# Patient Record
Sex: FEMALE | Race: BLACK OR AFRICAN AMERICAN | NOT HISPANIC OR LATINO | Employment: FULL TIME | URBAN - METROPOLITAN AREA
[De-identification: names, ages, dates, MRNs, and addresses within clinical notes are randomized per-mention and may not be internally consistent; named-entity substitution may affect disease eponyms.]

---

## 2017-01-06 ENCOUNTER — OFFICE VISIT (OUTPATIENT)
Dept: FAMILY MEDICINE | Facility: CLINIC | Age: 27
End: 2017-01-06

## 2017-01-06 VITALS
HEART RATE: 84 BPM | OXYGEN SATURATION: 99 % | SYSTOLIC BLOOD PRESSURE: 117 MMHG | WEIGHT: 282.5 LBS | BODY MASS INDEX: 44.24 KG/M2 | TEMPERATURE: 98.3 F | DIASTOLIC BLOOD PRESSURE: 77 MMHG

## 2017-01-06 DIAGNOSIS — N83.202 CYST OF LEFT OVARY: ICD-10-CM

## 2017-01-06 DIAGNOSIS — Z00.00 ENCOUNTER FOR ROUTINE ADULT HEALTH EXAMINATION WITHOUT ABNORMAL FINDINGS: Primary | ICD-10-CM

## 2017-01-06 RX ORDER — IBUPROFEN 600 MG/1
600 TABLET, FILM COATED ORAL
COMMUNITY
Start: 2016-11-29 | End: 2019-12-30

## 2017-01-06 RX ORDER — OXYCODONE AND ACETAMINOPHEN 5; 325 MG/1; MG/1
1 TABLET ORAL
COMMUNITY
Start: 2017-01-03 | End: 2017-01-13

## 2017-01-06 RX ORDER — METRONIDAZOLE 500 MG/1
500 TABLET ORAL
COMMUNITY
Start: 2017-01-03 | End: 2017-01-10

## 2017-01-06 NOTE — PATIENT INSTRUCTIONS
Polycystic Ovary Syndrome  Polycystic ovary syndrome (PCOS) causes harmless, small cysts in the ovaries and other symptoms. PCOS is caused by certain hormones being out of balance. The word  syndrome  means a group of symptoms. Women with PCOS may have no periods, irregular periods, or very long periods.    Your ovaries  Women store their eggs in their ovaries. Each egg is in a capsule called a follicle. Normally during the reproductive years, one follicle grows to produce a mature egg each month. This egg is released during ovulation and the follicle dissolves.  Hormones out of balance  With polycystic ovary syndrome (PCOS), the hormones that control ovulation are out of balance. These include estrogen, progesterones, and androgen. As a result, ovulation may not occur. Instead, the follicle stays enlarged. This is a fluid-filled sac (cyst). Over time, the ovaries fill with many small cysts. This is why they are called  poly  (many)  cystic  ovaries. In some women, the ovaries also make too much androgen.  PCOS symptoms  Women with PCOS may also have one or more of these symptoms:    Trouble getting pregnant (fertility problems)    Weight gain, especially around the waist     Acne    Hair growth on the face and other parts of the body    Patches of thickened, velvety, darkened skin called acanthosis nigricans  Women with PCOS are also at increased risk of developing cancer of the uterine lining, diabetes, and heart disease.     5488-6506 [employer]. [address] [place] [address], [place] [address]. All rights reserved. This information is not intended as a substitute for professional medical care. Always follow your healthcare professional's instructions.

## 2017-01-06 NOTE — MR AVS SNAPSHOT
After Visit Summary   1/6/2017    Dariusz Leyva    MRN: [id number]           Patient Information     Date Of Birth          1990        Visit Information        Provider Department      1/6/2017 4:30 PM Destiny Pinzon DO [facility]        Care Instructions      Polycystic Ovary Syndrome  Polycystic ovary syndrome (PCOS) causes harmless, small cysts in the ovaries and other symptoms. PCOS is caused by certain hormones being out of balance. The word  syndrome  means a group of symptoms. Women with PCOS may have no periods, irregular periods, or very long periods.    Your ovaries  Women store their eggs in their ovaries. Each egg is in a capsule called a follicle. Normally during the reproductive years, one follicle grows to produce a mature egg each month. This egg is released during ovulation and the follicle dissolves.  Hormones out of balance  With polycystic ovary syndrome (PCOS), the hormones that control ovulation are out of balance. These include estrogen, progesterones, and androgen. As a result, ovulation may not occur. Instead, the follicle stays enlarged. This is a fluid-filled sac (cyst). Over time, the ovaries fill with many small cysts. This is why they are called  poly  (many)  cystic  ovaries. In some women, the ovaries also make too much androgen.  PCOS symptoms  Women with PCOS may also have one or more of these symptoms:    Trouble getting pregnant (fertility problems)    Weight gain, especially around the waist     Acne    Hair growth on the face and other parts of the body    Patches of thickened, velvety, darkened skin called acanthosis nigricans  Women with PCOS are also at increased risk of developing cancer of the uterine lining, diabetes, and heart disease.     3365-7949 [employer]. [address] [place] [address], [place] [address]. All rights reserved. This information is not intended as a substitute for professional medical care. Always follow your  healthcare professional's instructions.              Follow-ups after your visit        Your next 10 appointments already scheduled     Jan 06, 2017  4:30 PM   Return Visit with Destiny Pinzon DO   [facility] ([facility])    [address] [place] [address]   [phone]            Jan 11, 2017  9:30 AM   Return Visit with Myrtle Mejia PHD   [facility] ([facility])    [address] [place] [address]   [phone]              Who to contact     Please call your clinic at [phone] to:    Ask questions about your health    Make or cancel appointments    Discuss your medicines    Learn about your test results    Speak to your doctor   If you have compliments or concerns about an experience at your clinic, or if you wish to file a complaint, please contact [facility] Patient Relations at [phone] or email us at [email or web address]         Additional Information About Your Visit        [facility] Information     [employer] gives you secure access to your electronic health record. If you see a primary care provider, you can also send messages to your care team and make appointments. If you have questions, please call your primary care clinic.  If you do not have a primary care provider, please call [phone] and they will assist you.      [employer] is an electronic gateway that provides easy, online access to your medical records. With [employer], you can request a clinic appointment, read your test results, renew a prescription or communicate with your care team.     To access your existing account, please contact your [facility] or call [phone] for assistance.        Care EveryWhere ID     This is your Care EveryWhere ID. This could be used by other organizations to access your [place] medical records  [id number]        Your Vitals Were     Pulse Temperature Pulse Oximetry Last Period Breastfeeding?       84  98.3  F (36.8  C) (Oral) 99% 12/14/2016 No        Blood Pressure from Last 3 Encounters:   01/06/17 117/77   12/02/16 138/82   10/12/16 126/92    Weight from Last 3 Encounters:   01/06/17 282 lb 8 oz (128.141 kg)   12/02/16 280 lb (127.007 kg)   10/12/16 272 lb 6.4 oz (123.56 kg)              Today, you had the following     No orders found for display       Primary Care Provider Office Phone # Fax #    Destiny Pinzon [phone]       [facility] [address] [place] [address]        Thank you!     Thank you for choosing [facility]  for your care. Our goal is always to provide you with excellent care. Hearing back from our patients is one way we can continue to improve our services. Please take a few minutes to complete the written survey that you may receive in the mail after your visit with us. Thank you!             Your Updated Medication List - Protect others around you: Learn how to safely use, store and throw away your medicines at www.disposemymeds.org.          This list is accurate as of: 1/6/17  4:25 PM.  Always use your most recent med list.                   Brand Name Dispense Instructions for use    Ankle Stabilizer XL Misc     1 each    1 Units as needed       cetirizine 10 MG tablet    zyrTEC    30 tablet    Take 1 tablet (10 mg) by mouth every evening       etonogestrel-ethinyl estradiol 0.12-0.015 MG/24HR vaginal ring    NUVARING    3 each    Insert 1 ring vaginally every 21 days then remove for 1 week then repeat with new ring.       fluticasone 50 MCG/ACT spray    FLONASE    16 g    Spray 1-2 sprays into both nostrils daily       ibuprofen 600 MG tablet    ADVIL/MOTRIN     Take 600 mg by mouth       metroNIDAZOLE 500 MG tablet    FLAGYL     Take 500 mg by mouth       oxyCODONE-acetaminophen 5-325 MG per tablet    PERCOCET     Take 1 tablet by mouth

## 2017-01-06 NOTE — PROGRESS NOTES
Preceptor attestation:  Patient seen and discussed with the resident.  Assessment and plan reviewed with resident and agreed upon.  Supervising physician: Hawa Sherman  [facility]

## 2017-01-06 NOTE — PROGRESS NOTES
Subjective:   Dariusz Leyva is a 26 year old female who presents for ER follow up. Patient was seen at [facility] ED on January 3, 2017 1 week ago for suprapubic and abdominal pain.  At that time she had a right upper quadrant ultrasound which was normal UA was unremarkable pregnancy test negative as well as a transvaginal ultrasound showing a 3.1 cm hemorrhagic cyst within the left ovary with moderate complex fluid in the pelvis compatible with blood products from bleeding hemorrhagic cyst. She was also found to have bacterial vaginosis and was treated with Flagyl. She tells me she has finished this prescription and her pelvic pain has resolved.   She reports her last menstrual period was December 14.  She has not had intercourse since her last negative pregnancy tests that she had at the ED.  She has a NuvaRing at home but has not been using this yet.  She does report abnormal periods.  Otherwise today she denies abdominal/pelvic pain, fevers chills, nausea vomiting diarrhea, vaginal discharge, vaginal bleeding, dysuria or hematuria.     Of note she was recently seen for a preventative physical exam visit, at that time her hepatitis B antibody was equivocal.  Needs repeat hep B antibody studies today to ensure immunity.    PMHX/PSHX/MEDS/ALLERGIES/SHX/FHX reviewed and updated in Epic.    Objective:   /77 mmHg  Pulse 84  Temp(Src) 98.3  F (36.8  C) (Oral)  Ht   Wt 282 lb 8 oz (128.141 kg)  SpO2 99%  LMP 12/14/2016  Breastfeeding? No, Body mass index is 44.24 kg/(m^2).  Constitutional: healthy, a&o, nad  Cardiovascular: RRR, no m/g/r  Respiratory: BCTA, no wheezes/rhonchi or rales  Gastrointestinal: soft, nt/nd, BS+, no organomegaly  Psychiatric: mentation appears normal and affect normal/bright       Assesment & Plan:   Dariusz Leyva, 26 year old female, with history of recent ED visit 1 week ago, diagnosed with BV and left ovarian hemorrhagic cyst.    (Z00.00) Encounter for routine adult health  examination without abnormal findings  (primary encounter diagnosis)  Comment: Hep B antibody equivocal at recent physical exam visit, we will repeat this today to ensure immunity.  Plan: Hepatitis B Surface Ab ([employer])    (N83.202) Cyst of left ovary  Comment: we discussed the likelihood of underlying PCOS considering BMI of 44 and irregular periods.  We discussed the options for treatment such as regulating her periods with OCPs as well as metformin.  She would like to hold off on treatment for now, will return to discuss this further if she desires. Nicholas pelvic pain from recent ED visit 1 week ago has resolved. We discussed the pathophysiology of a ruptured ovarian cyst and what to expect in the future.  Plan:   - continue to monitor for now, may return to discuss initiation of OCPs if she desires      I ended our visit today by discussing the patient's diagnoses and recommended treatment. Please refer to today's diagnoses and orders for further details. I briefly discussed the pathophysiology of these conditions and outlined their expected course. I discussed the warning symptoms and signs that indicate an atypical course that would need urgent or emergent care. I also discussed self care strategies for symptom relief. Patient voiced complete understanding of plan of care and was in full agreement to proceed as discussed.    RTC prn or sooner if develops new or worsening symptoms.      Preceptor: Dr. Whit Pinzon PGY-3

## 2017-01-09 LAB — HBV SURFACE AB SER-ACNC: ABNORMAL M[IU]/ML

## 2017-01-11 ENCOUNTER — OFFICE VISIT (OUTPATIENT)
Dept: PSYCHOLOGY | Facility: CLINIC | Age: 27
End: 2017-01-11

## 2017-01-11 DIAGNOSIS — F32.A DEPRESSION, UNSPECIFIED DEPRESSION TYPE: Primary | ICD-10-CM
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
## 2017-01-13 NOTE — PROGRESS NOTES
I have reviewed and agree with the behavioral health fellow's documentation for this visit.  I did not personally see the patient.  Katty Mac, PhD., LP

## 2017-01-16 NOTE — PROGRESS NOTES
Quick Note:    Sent [employer] message that I would not re-immunize at this time unless her sexual partner has hep B and she is high risk.  ______

## 2017-01-26 ENCOUNTER — OFFICE VISIT (OUTPATIENT)
Dept: FAMILY MEDICINE | Facility: CLINIC | Age: 27
End: 2017-01-26

## 2017-01-26 VITALS
TEMPERATURE: 98.1 F | DIASTOLIC BLOOD PRESSURE: 78 MMHG | WEIGHT: 271.6 LBS | HEIGHT: 66 IN | SYSTOLIC BLOOD PRESSURE: 131 MMHG | HEART RATE: 86 BPM | BODY MASS INDEX: 43.65 KG/M2 | OXYGEN SATURATION: 98 %

## 2017-01-26 DIAGNOSIS — M25.551 HIP PAIN, RIGHT: ICD-10-CM

## 2017-01-26 DIAGNOSIS — N89.8 VAGINAL DISCHARGE: Primary | ICD-10-CM

## 2017-01-26 DIAGNOSIS — Z91.89 HISTORY OF SEXUAL INTERCOURSE: ICD-10-CM

## 2017-01-26 LAB
BACTERIA: NORMAL
BACTERIA: NORMAL
BILIRUBIN UR: NEGATIVE
BLOOD UR: ABNORMAL
CASTS: NORMAL /LPF
CLUE CELLS: NORMAL
CRYSTAL URINE: NORMAL /LPF
EPITHELIAL CELLS UR: >100 /LPF (ref 0–2)
GLUCOSE URINE: NEGATIVE
HBA1C MFR BLD: 5.4 % (ref 4.1–5.7)
HCG UR QL: NEGATIVE
KETONES UR QL: ABNORMAL
LEUKOCYTE ESTERASE UR: NEGATIVE
MOTILE TRICHOMONAS: NEGATIVE
MUCOUS URINE: NORMAL LPF
NITRITE UR QL STRIP: NEGATIVE
ODOR: NORMAL
PH UR STRIP: 6 [PH] (ref 5–7)
PH WET PREP: NORMAL
PROTEIN UR: ABNORMAL
RBC URINE: NORMAL /HPF
SP GR UR STRIP: >=1.03
UROBILINOGEN UR STRIP-ACNC: ABNORMAL
WBC URINE: NORMAL /HPF
WBC WET PREP: NORMAL
YEAST: NORMAL

## 2017-01-26 NOTE — PROGRESS NOTES
"     SUBJECTIVE       Dariusz Leyva is a 26 year old  female with a PMHx significant for:     Patient Active Problem List   Diagnosis     Lumbago     Nonallopathic lesion of lumbar region     Nonallopathic lesion of sacral region     Pain in thoracic spine     Nonallopathic lesion of thoracic region     Abnormal Pap smear of cervix     Large tonsils     Morbid obesity (H)     MDD (major depressive disorder)     CARLY (generalized anxiety disorder)     Depression     Here today for vaginal discharge and hip pain  #vaginal discharge  -seen ED January 3, 2017 1 week ago for suprapubic and abdominal pain.  At that time she had a right upper quadrant ultrasound which was normal UA was unremarkable pregnancy test negative as well as a transvaginal ultrasound showing a 3.1 cm hemorrhagic cyst within the left ovary with moderate complex fluid in the pelvis compatible with blood products from bleeding hemorrhagic cyst. She was also found to have bacterial vaginosis and was treated with Flagyl.   -ongoing discharge since  -started nuva ring two weeks ago, two days ago developed discomfort and discharge and took it out  -pap 2/2016, negative  -does have an odor  -burning and itching   -no fever, abdominal pain, urinary issues  -last intercourse: 2 weeks condoms  -LMP: 1/14/2017 - irregular, heavy periods  -No hx of migraine with aura, HTN, breast CA (personal or family), and clotting (personal or family).   #Sits at work mostly  -started right hip pain at work  -touch or movement makes worse  -takes ibuprofen without relief  -no injury  -prior two days ago did nancy  -no numbness, tingling    Patient speaks English and so an  was not used.    PMH, Medications and Allergies were reviewed and updated as needed.          OBJECTIVE     Filed Vitals:    01/26/17 1434   BP: 131/78   Pulse: 86   Temp: 98.1  F (36.7  C)   TempSrc: Oral   Height: 5' 6\" (167.6 cm)   Weight: 271 lb 9.6 oz (123.197 kg)   SpO2: 98%     Body mass " index is 43.86 kg/(m^2).    Gen:  NAD  HEENT: mucous membranes moist  Neck: supple without lymphadenopathy  CV:  RRR  - no murmurs, rubs, or gallups  Pulm:  CTAB, no wheezes/rales/rhonchi  ABD: soft, obese, nontender, no masses, no rebound, BS intact throughout  Extrem: normal strength bilaterally. Pain with hip flexion, no pain with internal rotation of the right hip.   Neuro: normal sensation and lower extremity strength  Psych: Mood and affect appropriate  : thick, white discharge. Cervix normal, No CMT. No odor to discharge.     No results found for this or any previous visit (from the past 24 hour(s)).      ASSESSMENT AND PLAN     Dariusz was seen today for vaginal problem and musculoskeletal problem.    Diagnoses and all orders for this visit:    Vaginal discharge: wet prep obtained. Hx of recurrent BV and yeast. A1C 6/2016, with A1C 5.7, maybe reason for recurrent symptoms. Repeat A1C today, BMI 44 with evidence of metabolic syndrome and likely PCOS with heavy, irregular periods, and hirtuism. Obtained wet prep, UPT and UA. Pap up to date. Prescribed birth control pills - no Fmhx or personal hx clot, breast Ca, migraine with aura.   -     HCG Qualitative Urine (UPT)  (P FM)  -     Urinalysis, Micro If ([facility])  -     Wet Prep (P )  -     Chlamydia/Gono Amplified ([facility])   - A1C    Hip pain, right: likely musculoskeletal, related to recent nancy class. Pain with hip flexion. Could also be related to possible ovarian cyst. Gait normal. Neuro exam normal. Recommended observation with continued tylenol, NSAID and ice.     RTC in 3 weeks for follow up or sooner if develops new or worsening symptoms.    Discussed with Dr. Rain Wright, PGY- 3

## 2017-01-26 NOTE — PROGRESS NOTES
Preceptor attestation:  Patient seen and discussed with the resident.  Assessment and plan reviewed with resident and agreed upon.  Supervising physician: Emil Medina  [facility]
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
## 2017-01-27 LAB
C TRACH RRNA SPEC QL NAA+PROBE: NEGATIVE
N GONORRHOEA RRNA SPEC QL NAA+PROBE: NEGATIVE

## 2017-03-07 ENCOUNTER — TELEPHONE (OUTPATIENT)
Dept: PSYCHOLOGY | Facility: CLINIC | Age: 27
End: 2017-03-07

## 2017-03-07 NOTE — TELEPHONE ENCOUNTER
[facility] phone call message- general phone call:    Reason for call: Patient would like a call back from Dr. Mejia due to issues with scheduling. Would like to see if she can to get to see her sooner but at a later time. Please call her back.     Return call needed: Yes    OK to leave a message on voice mail? Yes    Primary language: English      needed? No    Call taken on March 7, 2017 at 11:31 AM by Jorge Arriola

## 2017-03-08 NOTE — TELEPHONE ENCOUNTER
Called Dariusz back - she reports her work schedule has changed and is unable to get back in to see me for the next month with her current availability. Agreed to make a one-time slot on 3/10/17 at 3:30, at which visit we will plan for ongoing care. She is aware of this appointment time.    Myrtle Meija, Ph.D.,   Behavioral Health Fellow

## 2017-03-10 ENCOUNTER — OFFICE VISIT (OUTPATIENT)
Dept: PSYCHOLOGY | Facility: CLINIC | Age: 27
End: 2017-03-10

## 2017-03-10 DIAGNOSIS — F32.A DEPRESSION, UNSPECIFIED DEPRESSION TYPE: Primary | ICD-10-CM

## 2017-03-10 NOTE — PROGRESS NOTES
Behavioral Health Progress Note    Client Name: Dariusz Leyva    Service Type: Individual  Length of Visit: 50 minutes  Attendees: none    Identifying Information and Presenting Problem:    The patient is a 26 year old American female who is being seen for problematic symptoms of depression, emotional dysregulation and interpersonal problems.    Treatment Objective(s) Addressed in This Session:  Mood Instability: will develop better understanding of triggers and coping strategies to stabilize mood  Interpersonal effectiveness: will identify alternative strategies to improve interpersonal problems    Progress on / Status of Treatment Objective(s) / Homework:  Stable     PHQ-9 SCORE 6/22/2016 7/20/2016 10/12/2016   Total Score 10 11 10       CARLY-7 SCORE 7/20/2016 10/12/2016   Total Score 9 9       Topics Discussed/Interventions Provided:    Today we discussed Dariusz's lack of trust in people and ambivalence about having more stable relationships. She really does not believe that people can be trusted because she feels she has been let down by everyone. Discussed what attributes she looks for in a partner - i.e., attractiveness, can lead the conversation, funny, and accomplished/successful. She has two close friendships that she made when she was younger. Discussed how to recognize if a person is trustworthy, like if she has not been betrayed by them or taken advantage of by them after knowing them for many years. Encouraged her to look for those attributes in new people she meets. She has mixed feelings about her current relationship with Eliezer. His main positive attribute is that he provides for her financially. However, he is not aware of her other ongoing relationships and would probably break up with her if he found out. Because this is an inherently unstable relationship, introduced the option of finding a relationship with someone she wanted to be with exclusively or pursuing an openly polyamorous  relationship. She had not considered polyamory and voiced skepticism that she could find a man like that. Discussed how she could not find it if not open about her own desires. She seems to want an exclusive relationship eventually, but we'll stay mindful of alternative options.      Assessment: The patient appeared to be active and engaged in today's session and was receptive to feedback.     Mental Status: Dariusz Leyva appeared generally alert and oriented. Dress was casual and appropriate to the weather and occasion. Grooming and hygiene were good. Eye contact was good. Speech was of normal volume and rate and was clear, coherent, and relevant. Mood was euthymic with congruent affect. Thought processes were relevant, logical and goal-directed. Thought content was WNL with no evidence of psychotic or paranoid features. No evidence of SI/HI or self-harm, intent, or plans. Memory appeared grossly intact. Insight and judgment appeared adequate to maintain safety and patient exhibited good impulse control during the appointment.     Does the patient appear to be at imminent risk of harm to self/others at this time? No    The session was necessary to address depression that have been interfering with patient's ability to function interpersonally and occupationally.  Ongoing psychotherapy is necessary to provide counseling.    Diagnosis (DSM-5):    311 Other/unspec. Depressive Disorder    R/O Borderline Personality Disorder     Plan:  1. Follow up in 1 month.       NOTE: Treatment plan update DUE.  Diagnostic assessment update due 7/18/17.

## 2017-03-10 NOTE — MR AVS SNAPSHOT
After Visit Summary   3/10/2017    Dariusz Leyva    MRN: [id number]           Patient Information     Date Of Birth          1990        Visit Information        Provider Department      3/10/2017 3:30 PM Myrtle Mejia, PhD [facility]        Today's Diagnoses     Depression, unspecified depression type    -  1       Follow-ups after your visit        Follow-up notes from your care team     Return in about 1 month (around 4/10/2017).      Your next 10 appointments already scheduled     Apr 05, 2017  3:30 PM CDT   Return Visit with Myrtle Mejia,    [facility] ([facility])    [address] [place] [address]   [phone]              Who to contact     Please call your clinic at [phone] to:    Ask questions about your health    Make or cancel appointments    Discuss your medicines    Learn about your test results    Speak to your doctor   If you have compliments or concerns about an experience at your clinic, or if you wish to file a complaint, please contact [facility] Patient Relations at [phone] or email us at [email or web address]         Additional Information About Your Visit        MyChart Information     ViVut gives you secure access to your electronic health record. If you see a primary care provider, you can also send messages to your care team and make appointments. If you have questions, please call your primary care clinic.  If you do not have a primary care provider, please call [phone] and they will assist you.      BrightWhistlehart is an electronic gateway that provides easy, online access to your medical records. With Boomr, you can request a clinic appointment, read your test results, renew a prescription or communicate with your care team.     To access your existing account, please contact your [facility] or call [phone] for assistance.        Care EveryWhere ID     This  is your Care EveryWhere ID. This could be used by other organizations to access your [place] medical records  [id number]         Blood Pressure from Last 3 Encounters:   01/26/17 131/78   01/06/17 117/77   12/02/16 138/82    Weight from Last 3 Encounters:   01/26/17 271 lb 9.6 oz (123.2 kg)   01/06/17 282 lb 8 oz (128.1 kg)   12/02/16 280 lb (127 kg)              Today, you had the following     No orders found for display       Primary Care Provider Office Phone # Fax #    Destiny Pinzon [phone]       [facility] [address] [place] [address]        Thank you!     Thank you for choosing [facility]  for your care. Our goal is always to provide you with excellent care. Hearing back from our patients is one way we can continue to improve our services. Please take a few minutes to complete the written survey that you may receive in the mail after your visit with us. Thank you!             Your Updated Medication List - Protect others around you: Learn how to safely use, store and throw away your medicines at www.disposemymeds.org.          This list is accurate as of: 3/10/17 11:59 PM.  Always use your most recent med list.                   Brand Name Dispense Instructions for use    Ankle Stabilizer XL Misc     1 each    1 Units as needed       cetirizine 10 MG tablet    zyrTEC    30 tablet    Take 1 tablet (10 mg) by mouth every evening       desogestrel-ethinyl estradiol 0.1/0.125/0.15 -0.025 MG per tablet    MARGARET CONNELLYIVET    84 tablet    Take 1 tablet by mouth daily       etonogestrel-ethinyl estradiol 0.12-0.015 MG/24HR vaginal ring    NUVARING    3 each    Insert 1 ring vaginally every 21 days then remove for 1 week then repeat with new ring.       fluticasone 50 MCG/ACT spray    FLONASE    16 g    Spray 1-2 sprays into both nostrils daily       ibuprofen 600 MG tablet    ADVIL/MOTRIN     Take 600 mg by mouth

## 2017-03-15 ENCOUNTER — OFFICE VISIT (OUTPATIENT)
Dept: FAMILY MEDICINE | Facility: CLINIC | Age: 27
End: 2017-03-15

## 2017-03-15 VITALS
TEMPERATURE: 98.5 F | BODY MASS INDEX: 44.71 KG/M2 | OXYGEN SATURATION: 98 % | WEIGHT: 277 LBS | HEART RATE: 73 BPM | DIASTOLIC BLOOD PRESSURE: 79 MMHG | SYSTOLIC BLOOD PRESSURE: 119 MMHG

## 2017-03-15 DIAGNOSIS — R87.610 ATYPICAL SQUAMOUS CELLS OF UNDETERMINED SIGNIFICANCE ON CYTOLOGIC SMEAR OF CERVIX (ASC-US): ICD-10-CM

## 2017-03-15 DIAGNOSIS — R10.84 ABDOMINAL PAIN, GENERALIZED: Primary | ICD-10-CM

## 2017-03-15 LAB
BACTERIA: NORMAL
CLUE CELLS: NORMAL
HCG UR QL: NEGATIVE
MOTILE TRICHOMONAS: NEGATIVE
ODOR: NORMAL
PH WET PREP: NORMAL
WBC WET PREP: <2
YEAST: NORMAL

## 2017-03-15 NOTE — PROGRESS NOTES
Preceptor attestation:  Patient seen and discussed with the resident. Assessment and plan reviewed with resident and agreed upon.  Supervising physician: Rico Lerner  [facility]

## 2017-03-15 NOTE — PROGRESS NOTES
"       SUBJECTIVE       Dariusz Leyva is a 26 year old  female with a PMH significant for:     Patient Active Problem List   Diagnosis     Lumbago     Nonallopathic lesion of lumbar region     Nonallopathic lesion of sacral region     Pain in thoracic spine     Nonallopathic lesion of thoracic region     Abnormal Pap smear of cervix     Large tonsils     Morbid obesity (H)     MDD (major depressive disorder)     CARLY (generalized anxiety disorder)     Depression     She presents with -1 month of \"cervical pain\". On and off sharp pain, feels \"like a contraction\", bothers her for 10 seconds at a time. Pain is intermittent and she does not note any inciting or relieving factors. \"Milky white\" vaginal discharge. She notes this smells different from her normal discharge. Has had history of bacterial vaginosis and vaginal yeast infections in the past. Has been sexually active within the last month. Has had two different sexual partners in the last year. No bleeding. Also noticing dull lower abdominal pain, associated with \"cervical pain.\"  She notes constipation, no diarrhea. Had a \"blighted ovum\" in 2013, s/p D&C. Patient also has had high risk HPV and ASCUS in 2015. She has since had a negative PAP smear and HPV testing last year. She was due for PAP smear last month.     No fever,chills, night sweats, or abdominal pain. Illness 1 week ago, fever chills, body aches.     Only medication patient is currently on is Nuvaring    PMH, Medications and Allergies were reviewed and updated as needed.        REVIEW OF SYSTEMS     ROS noted above.        OBJECTIVE     Vitals:    03/15/17 1550   BP: 119/79   Pulse: 73   Temp: 98.5  F (36.9  C)   SpO2: 98%   Weight: 277 lb (125.6 kg)     Body mass index is 44.71 kg/(m^2).    Constitutional: Awake, alert, cooperative, no apparent distress, and appears stated age.  Lungs: No increased work of breathing, good air exchange, clear to auscultation bilaterally, no crackles or " "wheezing.  Cardiovascular: Regular rate and rhythm, normal S1 and S2, no S3 or S4, and no murmur noted.  Abdomen: Obese, No scars, normal bowel sounds, soft, non-distended, Mild tenderness of the suprapubic region.   Genitourinary: No urethral discharge, normal external genitalia. Sterile speculum exam revealed copious white discharge in the vaginal vault. Punctate hemorrhage noted on the anterior border of the cervix. No abnormalities noted with the cervical os. No cervical motion tenderness. No vaginal wall tenderness. No ovarian masses noted on bimanual exam.     Results for orders placed or performed in visit on 03/15/17 (from the past 24 hour(s))   HCG Qualitative Urine (UPT)  ([facility])   Result Value Ref Range    HCG Qual Urine NEGATIVE Negative           ASSESSMENT AND PLAN     1. Abdominal pain, generalized  Patient's pain is described as \"cervical pain.\" Patient's b-HCG is negative. Presence of abnormal discharge makes vaginitis more likely. Suprapubic pain to palpation makes pelvic inflammatory disease worrisome, but patient without cervical motion tenderness. Presence of punctate hemorrhage of the cervix is concerning for cervical pathology, especially with past history of ASCUS. Patient also due for Pap smear at this time. Patient is agreeable to treat pain with ibuprofen PRN until etiology is worked up further.   - HCG Qualitative Urine (UPT)  ([facility])  - Gonorrhea/Chlamydia  - Wet Prep  - GYN Cytology  - HPV Cascade PCR    Will call patient regarding results and will schedule follow-up at that time.     Emli Orr MD PGY1  [facility]    "

## 2017-03-15 NOTE — MR AVS SNAPSHOT
After Visit Summary   3/15/2017    Dariusz Leyva    MRN: [id number]           Patient Information     Date Of Birth          1990        Visit Information        Provider Department      3/15/2017 3:50 PM Emil Orr MD [facility]        Today's Diagnoses     Abdominal pain, generalized    -  1    Atypical squamous cells of undetermined significance on cytologic smear of cervix (ASC-US)           Follow-ups after your visit        Your next 10 appointments already scheduled     Apr 05, 2017  3:30 PM CDT   Return Visit with Myrtle Mejia PhD   [facility] ([facility])    [address] [place] [address]   [phone]              Who to contact     Please call your clinic at [phone] to:    Ask questions about your health    Make or cancel appointments    Discuss your medicines    Learn about your test results    Speak to your doctor   If you have compliments or concerns about an experience at your clinic, or if you wish to file a complaint, please contact [facility] Patient Relations at [phone] or email us at [email or web address]         Additional Information About Your Visit        MyChart Information     mPowa gives you secure access to your electronic health record. If you see a primary care provider, you can also send messages to your care team and make appointments. If you have questions, please call your primary care clinic.  If you do not have a primary care provider, please call [phone] and they will assist you.      mPowa is an electronic gateway that provides easy, online access to your medical records. With mPowa, you can request a clinic appointment, read your test results, renew a prescription or communicate with your care team.     To access your existing account, please contact your [facility] or call [phone] for assistance.        Care EveryWhere ID      This is your Care EveryWhere ID. This could be used by other organizations to access your [facility] medical records  [id number]        Your Vitals Were     Pulse Temperature Last Period Pulse Oximetry Breastfeeding? BMI (Body Mass Index)    73 98.5  F (36.9  C) 02/15/2017 (Exact Date) 98% No 44.71 kg/m2       Blood Pressure from Last 3 Encounters:   03/15/17 119/79   01/26/17 131/78   01/06/17 117/77    Weight from Last 3 Encounters:   03/15/17 277 lb (125.6 kg)   01/26/17 271 lb 9.6 oz (123.2 kg)   01/06/17 282 lb 8 oz (128.1 kg)              We Performed the Following     Chlamydia/Gono Amplified ([facility])     GYN Cytology ([facility])     HCG Qualitative Urine (UPT)  ([facility])     Wet Prep ([facility])        Primary Care Provider Office Phone # Fax #    Destiny Pinzon [phone]       [facility] [address] [place] [address]        Thank you!     Thank you for choosing [facility]  for your care. Our goal is always to provide you with excellent care. Hearing back from our patients is one way we can continue to improve our services. Please take a few minutes to complete the written survey that you may receive in the mail after your visit with us. Thank you!             Your Updated Medication List - Protect others around you: Learn how to safely use, store and throw away your medicines at www.disposemymeds.org.          This list is accurate as of: 3/15/17  4:59 PM.  Always use your most recent med list.                   Brand Name Dispense Instructions for use    Ankle Stabilizer XL Misc     1 each    1 Units as needed       cetirizine 10 MG tablet    zyrTEC    30 tablet    Take 1 tablet (10 mg) by mouth every evening       desogestrel-ethinyl estradiol 0.1/0.125/0.15 -0.025 MG per tablet    GODWIN, VELIVET    84 tablet    Take 1 tablet by mouth daily       etonogestrel-ethinyl estradiol 0.12-0.015 MG/24HR vaginal ring    NUVARING    3 each    Insert 1 ring vaginally every 21 days  then remove for 1 week then repeat with new ring.       fluticasone 50 MCG/ACT spray    FLONASE    16 g    Spray 1-2 sprays into both nostrils daily       ibuprofen 600 MG tablet    ADVIL/MOTRIN     Take 600 mg by mouth

## 2017-03-16 LAB
C TRACH RRNA CVX QL NAA+PROBE: NEGATIVE
N GONORRHOEA RRNA SPEC QL NAA+PROBE: NEGATIVE

## 2017-03-20 LAB
INTERPRETATION: NORMAL
INTERPRETER: NORMAL

## 2017-03-22 ENCOUNTER — RECORDS - HEALTHEAST (OUTPATIENT)
Dept: ADMINISTRATIVE | Facility: OTHER | Age: 27
End: 2017-03-22

## 2017-03-22 LAB
HIGH RISK?: YES
HPV REFLEX?: NORMAL
LAB AP ABNORMAL BLEEDING: NO
LAB AP BIRTH CONTROL/HORMONES: NORMAL
LAB AP CASE REPORT: NORMAL
LAB AP CERVICAL APPEARANCE: NORMAL
LAB AP GYN INTERPRETATION: NORMAL
LAB AP MALIGNANT?: NORMAL
LAB AP PATIENT STATUS: NORMAL
LAB AP PREVIOUS ABNL: NORMAL
LAB AP PREVIOUS NORMAL: NORMAL
LAST MENS PERIOD: NORMAL
SPECIMEN ADEQUACY:: NORMAL

## 2017-03-22 NOTE — PROGRESS NOTES
"Called patient to inform her of her testing results. She continues to have \"cervical\" pain at this time. I informed her that she should come back to clinic for follow-up for further workup at this time."

## 2017-03-23 NOTE — PROGRESS NOTES
PAP smear showed no abnormal cells and reflex HPV was negative for high risk HPV. Per guidelines, Jamelia should return to routine PAP smear every three years.    Emil Orr MD PGY1  [facility]

## 2017-04-05 ENCOUNTER — OFFICE VISIT (OUTPATIENT)
Dept: PSYCHOLOGY | Facility: CLINIC | Age: 27
End: 2017-04-05

## 2017-04-05 DIAGNOSIS — F32.A DEPRESSION, UNSPECIFIED DEPRESSION TYPE: Primary | ICD-10-CM

## 2017-04-05 NOTE — MR AVS SNAPSHOT
After Visit Summary   4/5/2017    Dariusz Leyva    MRN: [id number]           Patient Information     Date Of Birth          1990        Visit Information        Provider Department      4/5/2017 3:30 PM Myrtle Mejia, PhD [facility]        Today's Diagnoses     Depression, unspecified depression type    -  1       Follow-ups after your visit        Who to contact     Please call your clinic at [phone] to:    Ask questions about your health    Make or cancel appointments    Discuss your medicines    Learn about your test results    Speak to your doctor   If you have compliments or concerns about an experience at your clinic, or if you wish to file a complaint, please contact [facility] Patient Relations at [phone] or email us at [email or web address]         Additional Information About Your Visit        MyChart Information     Ibercheckt gives you secure access to your electronic health record. If you see a primary care provider, you can also send messages to your care team and make appointments. If you have questions, please call your primary care clinic.  If you do not have a primary care provider, please call [phone] and they will assist you.      [employer] is an electronic gateway that provides easy, online access to your medical records. With [employer], you can request a clinic appointment, read your test results, renew a prescription or communicate with your care team.     To access your existing account, please contact your [facility] or call [phone] for assistance.        Care EveryWhere ID     This is your Care EveryWhere ID. This could be used by other organizations to access your [place] medical records  [id number]        Your Vitals Were     Last Period                   02/15/2017 (Exact Date)            Blood Pressure from Last 3 Encounters:   03/15/17 119/79   01/26/17 131/78   01/06/17  117/77    Weight from Last 3 Encounters:   03/15/17 277 lb (125.6 kg)   01/26/17 271 lb 9.6 oz (123.2 kg)   01/06/17 282 lb 8 oz (128.1 kg)              Today, you had the following     No orders found for display       Primary Care Provider Office Phone # Fax #    Destiny Pinzon [phone]       [facility] [address] [place] [address]        Thank you!     Thank you for choosing [facility]  for your care. Our goal is always to provide you with excellent care. Hearing back from our patients is one way we can continue to improve our services. Please take a few minutes to complete the written survey that you may receive in the mail after your visit with us. Thank you!             Your Updated Medication List - Protect others around you: Learn how to safely use, store and throw away your medicines at www.disposemymeds.org.          This list is accurate as of: 4/5/17 11:59 PM.  Always use your most recent med list.                   Brand Name Dispense Instructions for use    Ankle Stabilizer XL Misc     1 each    1 Units as needed       cetirizine 10 MG tablet    zyrTEC    30 tablet    Take 1 tablet (10 mg) by mouth every evening       desogestrel-ethinyl estradiol 0.1/0.125/0.15 -0.025 MG per tablet    GODWIN, VELIVET    84 tablet    Take 1 tablet by mouth daily       etonogestrel-ethinyl estradiol 0.12-0.015 MG/24HR vaginal ring    NUVARING    3 each    Insert 1 ring vaginally every 21 days then remove for 1 week then repeat with new ring.       fluticasone 50 MCG/ACT spray    FLONASE    16 g    Spray 1-2 sprays into both nostrils daily       ibuprofen 600 MG tablet    ADVIL/MOTRIN     Take 600 mg by mouth
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
## 2017-04-07 ENCOUNTER — TRANSFERRED RECORDS (OUTPATIENT)
Dept: HEALTH INFORMATION MANAGEMENT | Facility: CLINIC | Age: 27
End: 2017-04-07

## 2017-04-18 ENCOUNTER — OFFICE VISIT (OUTPATIENT)
Dept: FAMILY MEDICINE | Facility: CLINIC | Age: 27
End: 2017-04-18

## 2017-04-18 VITALS
SYSTOLIC BLOOD PRESSURE: 129 MMHG | TEMPERATURE: 98.4 F | HEART RATE: 66 BPM | BODY MASS INDEX: 44.04 KG/M2 | WEIGHT: 280.6 LBS | DIASTOLIC BLOOD PRESSURE: 78 MMHG | HEIGHT: 67 IN | OXYGEN SATURATION: 97 %

## 2017-04-18 DIAGNOSIS — R10.2 PELVIC PAIN IN FEMALE: ICD-10-CM

## 2017-04-18 DIAGNOSIS — D17.30 LIPOMA OF SKIN AND SUBCUTANEOUS TISSUE: ICD-10-CM

## 2017-04-18 DIAGNOSIS — R35.0 URINARY FREQUENCY: Primary | ICD-10-CM

## 2017-04-18 LAB
BACTERIA: NORMAL
BILIRUBIN UR: NEGATIVE
BLOOD UR: ABNORMAL
CASTS: NORMAL /LPF
CRYSTAL URINE: NORMAL /LPF
EPITHELIAL CELLS UR: NORMAL /LPF (ref 0–2)
GLUCOSE URINE: NEGATIVE
KETONES UR QL: ABNORMAL
LEUKOCYTE ESTERASE UR: ABNORMAL
MUCOUS URINE: NORMAL LPF
NITRITE UR QL STRIP: NEGATIVE
PH UR STRIP: 6 [PH] (ref 5–7)
PROTEIN UR: ABNORMAL
RBC URINE: NORMAL /HPF
SP GR UR STRIP: 1.02
UROBILINOGEN UR STRIP-ACNC: ABNORMAL
WBC URINE: NORMAL /HPF

## 2017-04-18 RX ORDER — SULFAMETHOXAZOLE/TRIMETHOPRIM 800-160 MG
1 TABLET ORAL 2 TIMES DAILY
Qty: 6 TABLET | Refills: 0 | Status: SHIPPED | OUTPATIENT
Start: 2017-04-18 | End: 2017-04-18

## 2017-04-18 ASSESSMENT — ANXIETY QUESTIONNAIRES
IF YOU CHECKED OFF ANY PROBLEMS ON THIS QUESTIONNAIRE, HOW DIFFICULT HAVE THESE PROBLEMS MADE IT FOR YOU TO DO YOUR WORK, TAKE CARE OF THINGS AT HOME, OR GET ALONG WITH OTHER PEOPLE: SOMEWHAT DIFFICULT
GAD7 TOTAL SCORE: 8
1. FEELING NERVOUS, ANXIOUS, OR ON EDGE: SEVERAL DAYS
6. BECOMING EASILY ANNOYED OR IRRITABLE: SEVERAL DAYS
2. NOT BEING ABLE TO STOP OR CONTROL WORRYING: MORE THAN HALF THE DAYS
5. BEING SO RESTLESS THAT IT IS HARD TO SIT STILL: NOT AT ALL
3. WORRYING TOO MUCH ABOUT DIFFERENT THINGS: MORE THAN HALF THE DAYS
7. FEELING AFRAID AS IF SOMETHING AWFUL MIGHT HAPPEN: NOT AT ALL

## 2017-04-18 ASSESSMENT — PATIENT HEALTH QUESTIONNAIRE - PHQ9: 5. POOR APPETITE OR OVEREATING: MORE THAN HALF THE DAYS

## 2017-04-18 NOTE — PROGRESS NOTES
Preceptor attestation:  Patient seen and discussed with the resident. Assessment and plan reviewed with resident and agreed upon.  Supervising physician: John Luke

## 2017-04-18 NOTE — MR AVS SNAPSHOT
After Visit Summary   4/18/2017    Dariusz Leyva    MRN: [id number]           Patient Information     Date Of Birth          1990        Visit Information        Provider Department      4/18/2017 3:30 PM Destiny Pinzon DO [facility]        Today's Diagnoses     Urinary frequency    -  1    Lipoma of skin and subcutaneous tissue        Pelvic pain in female           Follow-ups after your visit        Additional Services     GENERAL SURG ADULT REFERRAL       Patient to stop at the [facility] Desk    Reason for Referral: Lipoma on back     needed: No  Language: English    May leave message on voicemail: Yes    ([facility] Only) Referral should be tracked (Yes/No)?                  Future tests that were ordered for you today     Open Future Orders        Priority Expected Expires Ordered    GENERAL SURG ADULT REFERRAL Routine  4/18/2018 4/18/2017            Who to contact     Please call your clinic at [phone] to:    Ask questions about your health    Make or cancel appointments    Discuss your medicines    Learn about your test results    Speak to your doctor   If you have compliments or concerns about an experience at your clinic, or if you wish to file a complaint, please contact [facility] Patient Relations at [phone] or email us at [email or web address]         Additional Information About Your Visit        MyChart Information     [employer] gives you secure access to your electronic health record. If you see a primary care provider, you can also send messages to your care team and make appointments. If you have questions, please call your primary care clinic.  If you do not have a primary care provider, please call [phone] and they will assist you.      [employer] is an electronic gateway that provides easy, online access to your medical records. With [employer], you can request a clinic appointment, read your test results, renew a  "prescription or communicate with your care team.     To access your existing account, please contact your [facility] or call [phone] for assistance.        Care EveryWhere ID     This is your Care EveryWhere ID. This could be used by other organizations to access your [facility] medical records  [id number]        Your Vitals Were     Pulse Temperature Height Last Period Pulse Oximetry BMI (Body Mass Index)    66 98.4  F (36.9  C) (Oral) 5' 6.5\" (168.9 cm) 04/13/2017 (Approximate) 97% 44.61 kg/m2       Blood Pressure from Last 3 Encounters:   04/18/17 129/78   03/15/17 119/79   01/26/17 131/78    Weight from Last 3 Encounters:   04/18/17 280 lb 9.6 oz (127.3 kg)   03/15/17 277 lb (125.6 kg)   01/26/17 271 lb 9.6 oz (123.2 kg)              We Performed the Following     Urinalysis, Micro If ([facility])     Urine Culture ([facility])        Primary Care Provider Office Phone # Fax #    Destiny Pinzon [phone]       [facility] [address] [place] [address]        Thank you!     Thank you for choosing [facility]  for your care. Our goal is always to provide you with excellent care. Hearing back from our patients is one way we can continue to improve our services. Please take a few minutes to complete the written survey that you may receive in the mail after your visit with us. Thank you!             Your Updated Medication List - Protect others around you: Learn how to safely use, store and throw away your medicines at www.disposemymeds.org.          This list is accurate as of: 4/18/17  4:19 PM.  Always use your most recent med list.                   Brand Name Dispense Instructions for use    Ankle Stabilizer XL Misc     1 each    1 Units as needed       cetirizine 10 MG tablet    zyrTEC    30 tablet    Take 1 tablet (10 mg) by mouth every evening       desogestrel-ethinyl estradiol 0.1/0.125/0.15 -0.025 MG per tablet    CHRIS CONNELLY    84 tablet    " Take 1 tablet by mouth daily       etonogestrel-ethinyl estradiol 0.12-0.015 MG/24HR vaginal ring    NUVARING    3 each    Insert 1 ring vaginally every 21 days then remove for 1 week then repeat with new ring.       fluticasone 50 MCG/ACT spray    FLONASE    16 g    Spray 1-2 sprays into both nostrils daily       ibuprofen 600 MG tablet    ADVIL/MOTRIN     Take 600 mg by mouth

## 2017-04-18 NOTE — NURSING NOTE
Primary Care PTSD Screen    In your life, have you ever had any experience that was so frightening, horrible, or upsetting that, in the past month, you...    1.) Have had nightmares about it or thought about it when you did not want to? No    2.) Tried hard not to think about it or went out of your way to avoid situations that remind you of it? No    3.) Were constantly on guard, watchful, or easily startled? No    4.) Felt numb or detached from others, activities, or your surroundings? No                        Bipolar Screening    Have you experienced sustained periods of feeling uncharacteristically energetic? No    Have you had periods of not sleeping, but not feeling tired? No    Have you felt that your thoughts were racing and couldn't be slowed down? No    Have you had periods where you were excessive in sexual interest, spending money, or taking unusual risks? Yes

## 2017-04-18 NOTE — PROGRESS NOTES
Subjective:   Dariusz Leyva is a 26 year old female with PMHx of    Patient Active Problem List    Diagnosis Date Noted     Depression 07/20/2016     Priority: Medium     MDD (major depressive disorder) 06/23/2016     Priority: Medium     CARLY (generalized anxiety disorder) 06/23/2016     Priority: Medium     Large tonsils 11/09/2015     Priority: Medium     Morbid obesity (H) 11/09/2015     Priority: Medium     Abnormal Pap smear of cervix      Priority: Medium     3/15/17 nl Pap with neg HPV, pt needs repeat Pap in 3yrs.  2/1/16: Normal pap with neg HPV, pt need repeat pap in 1 yr  3/6/15 ASCUS Pap with + high risk HPV, pt needs repeat Pap in 1yr.       Lumbago 01/08/2015     Priority: Medium     Nonallopathic lesion of lumbar region 01/08/2015     Priority: Medium     Problem list name updated by automated process. Provider to review       Nonallopathic lesion of sacral region 01/08/2015     Priority: Medium     Problem list name updated by automated process. Provider to review       Pain in thoracic spine 01/08/2015     Priority: Medium     Nonallopathic lesion of thoracic region 01/08/2015     Priority: Medium     Problem list name updated by automated process. Provider to review         who presents for re-check of lower abdominal pain. She has been seen and evaluated for this same complaint twice here in the past few months, most recently 1 month ago. At that visit, she had a normal pap and normal pelvic exam, neg UPT, and neg wet prep and neg GC/chlam. She has had a TVUS done Jan of this year when she was having this pelvic pain which showed a 3.1 cm hemorrhagic cyst within the left ovary with moderate complex fluid in the pelvis compatible with blood products from bleeding hemorrhagic cyst.    Today she tells me she is still having the pain but it is mild. States pain occurs every day or every other day. Pain tends to get worse before and during her period. Periods occur every 30 days and are regular,  "denies heavy flow. Denies any fevers/chills, dysuria,. Endorses urinary frequency and persistent vaginal discharge. Currently sexually active, last time was 2 weeks ago, denies dyspareunia. Currently on her menstrual cycle. Denies vaginal itching or irritation. Denies recent UTI or Abx. Had an A1c of 5.4 in Jan.     PMHX/PSHX/MEDS/ALLERGIES/SHX/FHX reviewed and updated in Epic.      Objective:   /78  Pulse 66  Temp 98.4  F (36.9  C) (Oral)  Ht 5' 6.5\" (168.9 cm)  Wt 280 lb 9.6 oz (127.3 kg)  LMP 04/13/2017 (Approximate)  SpO2 97%  BMI 44.61 kg/m2, Body mass index is 44.61 kg/(m^2).  Constitutional: obese, a&o, nad  Gastrointestinal: soft, nt/nd, BS+, no organomegaly  : Declined exam today       Results for orders placed or performed in visit on 03/15/17   HCG Qualitative Urine (UPT)  ([facility])   Result Value Ref Range    HCG Qual Urine NEGATIVE Negative   Wet Prep ([facility])   Result Value Ref Range    Yeast Wet Prep None none    Motile Trichomonas Wet Prep Negative Negative    Clue Cells Wet Prep None NONE    WBC WET PREP <2 2 - 5    Bacteria Wet Prep Few None    pH Wet Prep Not performed 3.8 - 4.5    Odor Wet Prep None NONE   Chlamydia/Gono Amplified ([facility])   Result Value Ref Range    Chlamydia trac,Amplified Prb Negative Negative    N gonorrhoeae,Amplified Prb Negative Negative    Narrative    Test performed by:  [facility] [address] [place] [address]   GYN Cytology ([facility])   Result Value Ref Range    Lab AP Case Report SEE RESULTS BELOW     Lab AP Gyn Interpretation SEE RESULTS BELOW     Lab AP Malignant? Normal Normal    Specimen adequacy:       Satisfactory for evaluation, endocervical/transformation zone component present    HPV Reflex? Yes regardless of result     High Risk? Yes     Last Mens Period 2/19/17     Lab AP Abnormal Bleeding No     Lab AP Patient Status not applicable     Lab AP Birth Control/Hormones Pill/patch/ring     Lab AP Previous Normal 2/1/16  "    Lab AP Previous Abnl Yes, ASUS and high risk HPV 3/2015     Lab AP Cervical Appearance punctate bleeding of the anterior cervix     Narrative    Test performed by:  [facility] [address] [place] [address]   HPV [place] PCR ([facility])   Result Value Ref Range    Interpretation No HPV Type(s) Detected No HPV Type(s) Detected, No High Risk HP     Lemuel Freitas MD, [facility]     Narrative    Test performed by:  [facility] [address] [place] [address]  No HPV Type(s) Detected  Interpretation: The sample is negative for the presence of DNA from one or   more of the following high risk HPV types (16, 18, 31, 33, 35, 39, 45, 51, 52,   56, 58, 66, and 68) and/or probable high or low risk HPV types (2a, 6, 11,   26, 30, 32, 34, 42, 43, 44, 53, 54, 55, 57, 61, 62, 67, 69, 70, 71, 72, 73,   74, 77, 81, 82, 83, 84, 85, 87, 89).  High risk types are classified by the   IARC as carcinogenicity, probably, or possible carcinogenic to humans.    According to the IARC, low risk types are not classifiable as to their   carcingenicity to humans. These results do not exclude the possibility of   additional low or high risk HPV types not detected due to adequacy and   representativeness of sampling or assay sensitivity.  Comments: The absence of low and or high risk HPV types reduces the collective   risk for the development of cervical dysplasia or neoplasia.  This result, in   association with the morphology assessment of the Pap sample are encians   information for the determination of follow-up testing and in the clinical   management of the patient.  Methodology:  Genomic DNA was extracted from the submitted specimen and   amplified by the polymerase chain reaction (PCR) using consensus   oligonucleotide primers for the L1 region of the human papillomavirus (HPV)   genome.  Concurrently, the integrity of the extracted DNA was evaluated by the   amplification of  beta-globin, a common housekeeping gene.  Result   interpretation is performed by analysis of peak height and size data generated   through fluorescence detection by automated electrophoresis.  HPV DNA   positive PCR products were subjected to digestion by the restriction   endonucleases Hae III, Pst 1, and Rsa 1.  Digested DNA fragments were    by automated electrophoresis.  Digital electropherograms and gel   images of data were generated and the specific HPV type was determined by   matching the restriction fragment patterns of the respective specimens to that   of known HPV restriction fragment patterns.  The analytical and performance   characteristics of this laboratory-developed test (LDT) were determined by   [facility] pursuant to Clinical Laboratory Improvement Amendments (CLIA 88)   requirements.  It has not been cleared or approved by the U.S. Food and Drug   Administration (FDA).  The FDA has determined that such clearance or approval   is not a requirement prior to use for clinical purposes.       Assesment & Plan:   Dariusz Leyva, 26 year old female, here for evaluation of pelvic pain    (R10.2) Pelvic pain in female  Comment/Plan: DDx includes endometriosis, UTI (considering urinary frequency in the setting of a normal A1c a few months ago), PID, or STD. She declined  exam today as well as wet prep. Will start with UA and UC, if positive with treat for UTI which may be etiology of her pelvic pain. If negative, could consider PID treatment or repeat TVUS.    UPDATE 4/20/17: UC showing no growth. Called pt to notify her of the results and she would like to proceed with TVUS. Will place order and have  call her to help get her scheduled for this.    (R35.0) Urinary frequency  (primary encounter diagnosis)  Plan:  Urinalysis, Micro If ([facility]), Urine Culture         ([facility])    (D17.30) Lipoma of skin and subcutaneous tissue  Comment: causing pain, would like to have this  removed.  Plan: GENERAL SURG ADULT REFERRAL    I ended our visit today by discussing the patient's diagnoses and recommended treatment. Please refer to today's diagnoses and orders for further details. I briefly discussed the pathophysiology of these conditions and outlined their expected course. I discussed the warning symptoms and signs that indicate an atypical course that would need urgent or emergent care. I also discussed self care strategies for symptom relief. Patient voiced complete understanding of plan of care and was in full agreement to proceed as discussed.    RTC in 1-2 weeks, for follow up of pelvic pain or sooner if develops new or worsening symptoms.      Preceptor: Dr. Doran PGY-3

## 2017-04-19 LAB — CULTURE: NORMAL

## 2017-04-19 ASSESSMENT — PATIENT HEALTH QUESTIONNAIRE - PHQ9: SUM OF ALL RESPONSES TO PHQ QUESTIONS 1-9: 10

## 2017-04-19 ASSESSMENT — ANXIETY QUESTIONNAIRES: GAD7 TOTAL SCORE: 8

## 2017-04-19 NOTE — PATIENT INSTRUCTIONS
[facility] General Surgery  [facility]   [address], [address]   [place] [address]   [phone]  Date: Friday April 21 2017   Time:  9:45 AM Dr. Baker     If you have any questions or need to reschedule please call the number listed above.  Any other concerns please call our referral coordinator at [phone]    [facility]  Care Coordinator     GAVE TO PATIENT VIA PHONE 2:07 PM 4/19/2017     PELVIC ULTRASOUND REFERRAL:  [facility]  Radiology Department 1st floor  [address] [address]  [phone]  Date:  Friday April 21, 2017  Time:  12:30 PM  Please have a full bladder by drinking at least 24 ounces of water and refrain from using the bathroom 1 hour prior to appointment.  Given to patient via phone.  Michelle Barney  4/21/17

## 2017-04-20 NOTE — PROGRESS NOTES
Called to notify pt of neg UC results. She wishes to proceed with TVUS. Order placed. Will follow up after she has this done.

## 2017-04-21 ENCOUNTER — HOSPITAL ENCOUNTER (OUTPATIENT)
Dept: ULTRASOUND IMAGING | Facility: CLINIC | Age: 27
Discharge: HOME | End: 2017-04-21

## 2017-04-21 ENCOUNTER — RECORDS - HEALTHEAST (OUTPATIENT)
Dept: ADMINISTRATIVE | Facility: OTHER | Age: 27
End: 2017-04-21

## 2017-04-21 ENCOUNTER — OFFICE VISIT - HEALTHEAST (OUTPATIENT)
Dept: SURGERY | Facility: CLINIC | Age: 27
End: 2017-04-21

## 2017-04-21 DIAGNOSIS — R10.2 PELVIC PAIN IN FEMALE: ICD-10-CM

## 2017-04-21 DIAGNOSIS — D17.1 LIPOMA OF TORSO: ICD-10-CM

## 2017-04-21 DIAGNOSIS — R10.2 PELVIC PAIN: ICD-10-CM

## 2017-04-21 ASSESSMENT — MIFFLIN-ST. JEOR: SCORE: 2028.17

## 2017-04-21 NOTE — PROGRESS NOTES
Pelvic Ultrasound  LM- we will fax orders to [facility] they will call pt to schedule  Otilia Ibanez 10:19 AM 4/21/2017

## 2017-04-21 NOTE — LETTER
April 25, 2017      Dariusz Leyva  [address]   [place] [address]        Dear Dariusz,      Just want to let you know that your  transvaginal ultrasound was normal. Thanks!          If you have any questions, please call the clinic to make an appointment.    Sincerely,    Destiny Pinzon MD

## 2017-04-25 ENCOUNTER — TELEPHONE (OUTPATIENT)
Dept: FAMILY MEDICINE | Facility: CLINIC | Age: 27
End: 2017-04-25

## 2017-04-25 NOTE — TELEPHONE ENCOUNTER
Patient wants to know if she could have a thyroid test because of her weight gain. Also requested a wet prep. Transferred to schedule to set up appointment.

## 2017-04-26 ENCOUNTER — AMBULATORY - HEALTHEAST (OUTPATIENT)
Dept: SURGERY | Facility: CLINIC | Age: 27
End: 2017-04-26

## 2017-04-26 ENCOUNTER — RECORDS - HEALTHEAST (OUTPATIENT)
Dept: ADMINISTRATIVE | Facility: OTHER | Age: 27
End: 2017-04-26

## 2017-04-27 ENCOUNTER — AMBULATORY - HEALTHEAST (OUTPATIENT)
Dept: SURGERY | Facility: CLINIC | Age: 27
End: 2017-04-27

## 2017-04-27 ENCOUNTER — COMMUNICATION - HEALTHEAST (OUTPATIENT)
Dept: SURGERY | Facility: CLINIC | Age: 27
End: 2017-04-27

## 2017-04-27 DIAGNOSIS — D17.1 LIPOMA OF TORSO: ICD-10-CM

## 2017-04-28 ENCOUNTER — OFFICE VISIT (OUTPATIENT)
Dept: FAMILY MEDICINE | Facility: CLINIC | Age: 27
End: 2017-04-28

## 2017-04-28 VITALS
DIASTOLIC BLOOD PRESSURE: 77 MMHG | HEART RATE: 89 BPM | BODY MASS INDEX: 42.91 KG/M2 | SYSTOLIC BLOOD PRESSURE: 120 MMHG | WEIGHT: 273.4 LBS | HEIGHT: 67 IN | TEMPERATURE: 98.3 F

## 2017-04-28 DIAGNOSIS — R63.5 WEIGHT GAIN: ICD-10-CM

## 2017-04-28 DIAGNOSIS — N89.8 VAGINAL DISCHARGE: Primary | ICD-10-CM

## 2017-04-28 LAB
BACTERIA: NORMAL
CLUE CELLS: NORMAL
MOTILE TRICHOMONAS: NEGATIVE
ODOR: NORMAL
PH WET PREP: NORMAL
TSH SERPL DL<=0.05 MIU/L-ACNC: 4.21 UIU/ML (ref 0.3–5)
WBC WET PREP: NORMAL
YEAST: NORMAL

## 2017-04-28 RX ORDER — FLUCONAZOLE 150 MG/1
150 TABLET ORAL ONCE
Qty: 1 TABLET | Refills: 0 | Status: SHIPPED | OUTPATIENT
Start: 2017-04-28 | End: 2017-04-28

## 2017-04-28 NOTE — MR AVS SNAPSHOT
After Visit Summary   4/28/2017    Dariusz Leyva    MRN: [id number]           Patient Information     Date Of Birth          1990        Visit Information        Provider Department      4/28/2017 4:30 PM Destiny Pinzon DO [facility]        Today's Diagnoses     Vaginal discharge    -  1    Weight gain           Follow-ups after your visit        Additional Services     WEIGHT MANAGEMENT/ [facility] LIFESTYLE PROGRAM REFERRAL       Patient prefers to be called    Reason for Referral: inability to lose weight     needed: No  Language: English    May leave message on voicemail: Yes    ([place] Only) Referral should be tracked (Yes/No)?                  Who to contact     Please call your clinic at [phone] to:    Ask questions about your health    Make or cancel appointments    Discuss your medicines    Learn about your test results    Speak to your doctor   If you have compliments or concerns about an experience at your clinic, or if you wish to file a complaint, please contact [facility] Patient Relations at [phone] or email us at [email or web address]         Additional Information About Your Visit        MyChart Information     [employer] gives you secure access to your electronic health record. If you see a primary care provider, you can also send messages to your care team and make appointments. If you have questions, please call your primary care clinic.  If you do not have a primary care provider, please call [phone] and they will assist you.      [employer] is an electronic gateway that provides easy, online access to your medical records. With [employer], you can request a clinic appointment, read your test results, renew a prescription or communicate with your care team.     To access your existing account, please contact your [facility] or call [phone] for assistance.        Care EveryWhere ID      "This is your Care EveryWhere ID. This could be used by other organizations to access your [place] medical records  [id number]        Your Vitals Were     Pulse Temperature Height Last Period BMI (Body Mass Index)       89 98.3  F (36.8  C) 5' 7\" (170.2 cm) 04/13/2017 (Approximate) 42.82 kg/m2        Blood Pressure from Last 3 Encounters:   04/28/17 120/77   04/18/17 129/78   03/15/17 119/79    Weight from Last 3 Encounters:   04/28/17 273 lb 6.4 oz (124 kg)   04/18/17 280 lb 9.6 oz (127.3 kg)   03/15/17 277 lb (125.6 kg)              We Performed the Following     TSH  Sensitive ([facility])     WEIGHT MANAGEMENT/ P LIFESTYLE PROGRAM REFERRAL     Wet Prep ([facility])          Today's Medication Changes          These changes are accurate as of: 4/28/17 11:59 PM.  If you have any questions, ask your nurse or doctor.               Start taking these medicines.        Dose/Directions    fluconazole 150 MG tablet   Commonly known as:  DIFLUCAN   Used for:  Vaginal discharge        Dose:  150 mg   Take 1 tablet (150 mg) by mouth once for 1 dose   Quantity:  1 tablet   Refills:  0            Where to get your medicines      These medications were sent to [facility] - [place], [place] - [address] AT [address] [facility] [address]  [address], [place] [address]     Phone:  [phone]     fluconazole 150 MG tablet                Primary Care Provider Office Phone # Fax #    Destiny DO Jeromy [phone]       [facility] [address] [place] [address]        Thank you!     Thank you for choosing [facility]  for your care. Our goal is always to provide you with excellent care. Hearing back from our patients is one way we can continue to improve our services. Please take a few minutes to complete the written survey that you may receive in the mail after your visit with us. Thank you!             Your Updated Medication List - Protect others around you: Learn how to safely use, " store and throw away your medicines at www.disposemymeds.org.          This list is accurate as of: 4/28/17 11:59 PM.  Always use your most recent med list.                   Brand Name Dispense Instructions for use    Ankle Stabilizer XL Misc     1 each    1 Units as needed       cetirizine 10 MG tablet    zyrTEC    30 tablet    Take 1 tablet (10 mg) by mouth every evening       desogestrel-ethinyl estradiol 0.1/0.125/0.15 -0.025 MG per tablet    GODWIN VELIVET    84 tablet    Take 1 tablet by mouth daily       etonogestrel-ethinyl estradiol 0.12-0.015 MG/24HR vaginal ring    NUVARING    3 each    Insert 1 ring vaginally every 21 days then remove for 1 week then repeat with new ring.       fluconazole 150 MG tablet    DIFLUCAN    1 tablet    Take 1 tablet (150 mg) by mouth once for 1 dose       fluticasone 50 MCG/ACT spray    FLONASE    16 g    Spray 1-2 sprays into both nostrils daily       ibuprofen 600 MG tablet    ADVIL/MOTRIN     Take 600 mg by mouth       TRAMADOL HCL PO

## 2017-04-28 NOTE — PROGRESS NOTES
Preceptor attestation:  Patient seen and discussed with the resident. Assessment and plan reviewed with resident and agreed upon.  Supervising physician: Andrae Curtis  [facility]

## 2017-04-28 NOTE — PROGRESS NOTES
"Subjective:   Dariusz Leyva is a 26 year old female with PMHx of  Patient Active Problem List    Diagnosis Date Noted     Depression 07/20/2016     Priority: Medium     MDD (major depressive disorder) 06/23/2016     Priority: Medium     CARLY (generalized anxiety disorder) 06/23/2016     Priority: Medium     Large tonsils 11/09/2015     Priority: Medium     Morbid obesity (H) 11/09/2015     Priority: Medium     Abnormal Pap smear of cervix      Priority: Medium     3/15/17 nl Pap with neg HPV, pt needs repeat Pap in 3yrs.  2/1/16: Normal pap with neg HPV, pt need repeat pap in 1 yr  3/6/15 ASCUS Pap with + high risk HPV, pt needs repeat Pap in 1yr.       Lumbago 01/08/2015     Priority: Medium     Nonallopathic lesion of lumbar region 01/08/2015     Priority: Medium     Problem list name updated by automated process. Provider to review       Nonallopathic lesion of sacral region 01/08/2015     Priority: Medium     Problem list name updated by automated process. Provider to review       Pain in thoracic spine 01/08/2015     Priority: Medium     Nonallopathic lesion of thoracic region 01/08/2015     Priority: Medium     Problem list name updated by automated process. Provider to review       who presents for wet prep. She is having thick white discharge with vaginal itching and irritation. LMP 4/13/17. Not currently sexually active. No abnormal genital lesions. She does have a hx of yeast infection, this feels similar. She is also concerned about inability to lose weight. She has been trying exercise, portion control and dietary modifications without improvement. Wants her thyroid levels checked. Had these checked in October and were normal. Otherwise she denies fatigue, fevers/chills, abd/pelvic pain or NVD.    PMHX/PSHX/MEDS/ALLERGIES/SHX/FHX reviewed and updated in Epic.      Objective:   /77  Pulse 89  Temp 98.3  F (36.8  C)  Ht 5' 7\" (170.2 cm)  Wt 273 lb 6.4 oz (124 kg)  LMP 04/13/2017 (Approximate)  " BMI 42.82 kg/m2, Body mass index is 42.82 kg/(m^2).  Constitutional: obese, a&o, nad  Cardiovascular: RRR, no m/g/r  : Declined exam  Psychiatric: mentation appears normal and affect normal/bright    Assesment & Plan:   Dariusz Leyva, 26 year old female, here for evaluation of inability to lose weight and vaginal discharge.    (N89.8) Vaginal discharge  (primary encounter diagnosis)  Comment: will treat for yeast infection based on symptoms.  Plan: Wet Prep ([facility]), fluconazole (DIFLUCAN) 150         MG tablet    (R63.5) Weight gain  Plan: [facility]  [facility] WEIGHT MANAGEMENT/        [facility] LIFESTYLE PROGRAM REFERRAL    I ended our visit today by discussing the patient's diagnoses and recommended treatment. Please refer to today's diagnoses and orders for further details. I briefly discussed the pathophysiology of these conditions and outlined their expected course. I discussed the warning symptoms and signs that indicate an atypical course that would need urgent or emergent care. I also discussed self care strategies for symptom relief. Patient voiced complete understanding of plan of care and was in full agreement to proceed as discussed.    RTC as needed, for follow up of vaginal DC or sooner if develops new or worsening symptoms.      Preceptor: Dr. Ever Pinzon PGY-3

## 2017-04-28 NOTE — LETTER
May 3, 2017       TO: Dariusz Leyva  [address]   [place] [address]         Dear Dariusz,    Your doctor has referred you to [facility] here at [facility]. These visits are usually covered by insurance, but each insurance plan is different so we encourage you to check this out with your particular insurance carrier.  Please call the number on the back of your insurance card to ask them if [facility] visits will be covered before your appointment. Here are some tips when you make that phone call:     -You will want to talk to someone about your covered benefits.   -Tell them that your doctor has referred you for a [facility] visit and they  will be billing with       these codes:     Health and Behavior Assessment for the initial visit (38090)    Health and Behavior Intervention for any follow up visits (03614)   -Ask if those codes are covered in your plan.    Your insurance should be able to tell you if those codes are covered or not. If they tell you that they are not covered, please contact the Referral Coordinators at [facility] so that we can let your doctor know, cancel your appointment and consider other options to help you towards your health goals.  Please call if you have any questions.    Your [facility] appointment is scheduled for:  Date: June 6 2017   Time: 3:30 Pm with Dr Mejia     Thank you,    Le  Care Coordinator   [phone]

## 2017-05-01 NOTE — PROGRESS NOTES
Please call patient and let her know wet prep was negative for trichomonas, yeast or BV. TSH (thyroid function) was normal. Thanks!

## 2017-05-04 ENCOUNTER — COMMUNICATION - HEALTHEAST (OUTPATIENT)
Dept: SURGERY | Facility: CLINIC | Age: 27
End: 2017-05-04

## 2017-05-30 DIAGNOSIS — J30.2 SEASONAL ALLERGIC RHINITIS: ICD-10-CM

## 2017-05-30 DIAGNOSIS — J30.2 SEASONAL ALLERGIC RHINITIS, UNSPECIFIED ALLERGIC RHINITIS TRIGGER: Primary | ICD-10-CM

## 2017-05-30 RX ORDER — FLUTICASONE PROPIONATE 50 MCG
1-2 SPRAY, SUSPENSION (ML) NASAL DAILY
Qty: 16 G | Refills: 0 | Status: SHIPPED | OUTPATIENT
Start: 2017-05-30 | End: 2017-12-22

## 2017-06-07 ENCOUNTER — TELEPHONE (OUTPATIENT)
Dept: FAMILY MEDICINE | Facility: CLINIC | Age: 27
End: 2017-06-07

## 2017-06-07 NOTE — TELEPHONE ENCOUNTER
This provider placed an outreach call to the patient as she did not show for her scheduled appointment today 6/7/2017. She forgot about today's appointment and we rescheduled for 2 weeks from today. 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    Myrtle Mejia, PhD

## 2017-07-18 ENCOUNTER — TRANSFERRED RECORDS (OUTPATIENT)
Dept: HEALTH INFORMATION MANAGEMENT | Facility: CLINIC | Age: 27
End: 2017-07-18

## 2017-10-06 ENCOUNTER — OFFICE VISIT (OUTPATIENT)
Dept: FAMILY MEDICINE | Facility: CLINIC | Age: 27
End: 2017-10-06

## 2017-10-06 VITALS
HEART RATE: 74 BPM | BODY MASS INDEX: 42.53 KG/M2 | DIASTOLIC BLOOD PRESSURE: 75 MMHG | SYSTOLIC BLOOD PRESSURE: 108 MMHG | HEIGHT: 67 IN | TEMPERATURE: 98 F | WEIGHT: 271 LBS

## 2017-10-06 DIAGNOSIS — Z11.3 ROUTINE SCREENING FOR STI (SEXUALLY TRANSMITTED INFECTION): Primary | ICD-10-CM

## 2017-10-06 DIAGNOSIS — N89.8 VAGINAL DISCHARGE: ICD-10-CM

## 2017-10-06 LAB
BACTERIA: NORMAL
BACTERIA: NORMAL
BILIRUBIN UR: NEGATIVE
BLOOD UR: NEGATIVE
CASTS: NORMAL /LPF
CLUE CELLS: NORMAL
CRYSTAL URINE: NORMAL /LPF
EPITHELIAL CELLS UR: NORMAL /LPF (ref 0–2)
GLUCOSE URINE: NEGATIVE
HCG UR QL: NEGATIVE
KETONES UR QL: NEGATIVE
LEUKOCYTE ESTERASE UR: ABNORMAL
MOTILE TRICHOMONAS: NEGATIVE
MUCOUS URINE: NORMAL LPF
NITRITE UR QL STRIP: NEGATIVE
ODOR: NORMAL
PH UR STRIP: 6.5 [PH] (ref 5–7)
PH WET PREP: NORMAL
PROTEIN UR: NEGATIVE
RBC URINE: <2 /HPF
SP GR UR STRIP: 1.02
UROBILINOGEN UR STRIP-ACNC: ABNORMAL
WBC URINE: NORMAL /HPF
WBC WET PREP: NORMAL
YEAST: NORMAL

## 2017-10-06 NOTE — PROGRESS NOTES
Preceptor attestation:  Patient seen and discussed with the resident. Assessment and plan reviewed with resident and agreed upon.  Supervising physician: Keyur Gallagher  [facility]

## 2017-10-06 NOTE — PATIENT INSTRUCTIONS
Labs were normal. The STD test is still pending and I will call with the results. You may consider an IUD in the future if you notice continued irritation from the NuvaRing. Thank you!    Dr. Rousseau

## 2017-10-06 NOTE — LETTER
October 10, 2017      Dariusz Leyva  [address]  [place] [address]        Dear Dariusz,  The results from your labs were normal without signs of infections. No follow up is needed.  Thank you for allowing me to be a part of your health care!    Please see below for your test results.    Resulted Orders   Urinalysis, Micro If ([facility])   Result Value Ref Range    Specific Gravity Urine 1.020 1.005 - 1.030    pH Urine 6.5 4.5 - 8.0    Leukocyte Esterase UR 3+ (A) NEGATIVE    Nitrite Urine Negative NEGATIVE    Protein UR Negative NEGATIVE    Glucose Urine Negative NEGATIVE    Ketones Urine Negative NEGATIVE    Urobilinogen mg/dL 0.2 E.U./dL 0.2 E.U./dL    Bilirubin UR Negative NEGATIVE    Blood UR Negative NEGATIVE   Wet Prep ([facility])   Result Value Ref Range    Yeast Wet Prep None none    Motile Trichomonas Wet Prep Negative Negative    Clue Cells Wet Prep Present <20% NONE    WBC WET PREP 10-25 2 - 5    Bacteria Wet Prep Few None    pH Wet Prep Not performed 3.8 - 4.5    Odor Wet Prep None NONE   HCG Qualitative Urine (UPT)  ([facility])   Result Value Ref Range    HCG Qual Urine NEGATIVE Negative   Chlamydia/Gono Amplified ([facility])   Result Value Ref Range    Chlamydia trac,Amplified Prb Negative Negative    N gonorrhoeae,Amplified Prb Negative Negative    Narrative    Test performed by:  [facility] [address] [place] [address]   Urine Microscopic ([facility])   Result Value Ref Range    WBC Urine 10-25 <5 /hpf    RBC Urine <2 <5 /hpf    Epithelial Cells UR 5-10 0 - 2 /lpf    Mucous Urine Few NONE lpf    Casts Urine amorphous urates (few) NONE /lpf    Crystal Urine None NONE /lpf    Bacteria Wet Prep Few None       If you have any questions, please call the clinic to make an appointment.    Sincerely,    Janeth Rousseau MD

## 2017-10-06 NOTE — PROGRESS NOTES
S: Dariusz Leyva is a 27 year old female with a PMH of BV presenting to clinic today with a chief complaint of concern for recurrent BV or yeast. Vaginal discharge of yellow or milky white. Noting discolored underwear at times. Denies blood. LMP 9/22/17 reported to be normal. She denies new sexual partners. She has the nuvaring since feb 2017. She notes increased vaginal irritation but other birth controls make her feel sick in the past and likes the lower hormone options. Occasional abdominal pain, but no nausea or vomiting. She reports foul smelling discharge similar to past episodes of BV.       ROS:  General: No fevers, chills  GI: No vomiting, constipation, diarrhea  : No urinary pains, no frequency.    Patient Active Problem List   Diagnosis     Lumbago     Nonallopathic lesion of lumbar region     Nonallopathic lesion of sacral region     Pain in thoracic spine     Nonallopathic lesion of thoracic region     Abnormal Pap smear of cervix     Large tonsils     Morbid obesity (H)     MDD (major depressive disorder)     CARLY (generalized anxiety disorder)     Depression       Current Outpatient Prescriptions   Medication Sig Dispense Refill     fluticasone (FLONASE) 50 MCG/ACT spray Spray 1-2 sprays into both nostrils daily 16 g 0     TRAMADOL HCL PO        desogestrel-ethinyl estradiol (GODWIN, VELIVET) 0.1/0.125/0.15 -0.025 MG per tablet Take 1 tablet by mouth daily (Patient not taking: Reported on 10/6/2017) 84 tablet 1     ibuprofen (ADVIL/MOTRIN) 600 MG tablet Take 600 mg by mouth       etonogestrel-ethinyl estradiol (NUVARING) 0.12-0.015 MG/24HR vaginal ring Insert 1 ring vaginally every 21 days then remove for 1 week then repeat with new ring. (Patient not taking: Reported on 10/6/2017) 3 each 3     cetirizine (ZYRTEC) 10 MG tablet Take 1 tablet (10 mg) by mouth every evening (Patient not taking: Reported on 10/6/2017) 30 tablet 1     Elastic Bandages & Supports (ANKLE STABILIZER XL) MISC 1 Units as  "needed 1 each 0       O: /75  Pulse 74  Temp 98  F (36.7  C) (Oral)  Ht 5' 7.25\" (170.8 cm)  Wt 271 lb (122.9 kg)  LMP 09/22/2017  BMI 42.13 kg/m2   Gen:  Well nourished and in No acute distress  ABD: soft, nontender, obese  : pink moist cervix with physiologic discharge and without bleeding. Female external genitalia without obvious abnormality.   Psych: Euthymic      Assessment and Plan:  Dariusz was seen today for vaginal problem.    Diagnoses and all orders for this visit:    Routine screening for STI (sexually transmitted infection)  -     Chlamydia/Gono Amplified ([facility])  Comments: denied new sexual partners however due to increased and changed discharge will check for STI although no obvious purulent discharge or irritation of cervix on physical exam. will call patient with results    Vaginal discharge  -     Urinalysis, Micro If ([facility])  -     Wet Prep ([facility])  -     HCG Qualitative Urine (UPT)  ([facility])  -     Urine Microscopic (P )  Comments: labs were discussed and no BV, trich, or yeast. Unlikely UTI. May all be related to NuvaRing which patient has had since the beginning of this year which could lead to occasional vaginal irritation. Discussed future possibility of placing an IUD for birth control which may help alleviate the discomfort from the intravaginal birthcontrol. Patient is reportedly already aware of proper hygiene to minimize yeast or BV infections. She will return on a PRN basis.       This patient was seen and discussed with Dr. Gallagher.     Janeth Rousseau DO  PGY 1    "

## 2017-10-06 NOTE — MR AVS SNAPSHOT
After Visit Summary   10/6/2017    Dariusz Leyva    MRN: [id number]           Patient Information     Date Of Birth          1990        Visit Information        Provider Department      10/6/2017 2:10 PM Janeth Rousseau MD [facility]        Today's Diagnoses     Routine screening for STI (sexually transmitted infection)    -  1    Vaginal discharge          Care Instructions    Labs were normal. The STD test is still pending and I will call with the results. You may consider an IUD in the future if you notice continued irritation from the NuvaRing. Thank you!    Dr. Rousseau          Follow-ups after your visit        Follow-up notes from your care team     Return if symptoms worsen or fail to improve.      Who to contact     Please call your clinic at [phone] to:    Ask questions about your health    Make or cancel appointments    Discuss your medicines    Learn about your test results    Speak to your doctor   If you have compliments or concerns about an experience at your clinic, or if you wish to file a complaint, please contact [facility] Patient Relations at [phone] or email us at [email or web address]         Additional Information About Your Visit        MyChart Information     [employer] gives you secure access to your electronic health record. If you see a primary care provider, you can also send messages to your care team and make appointments. If you have questions, please call your primary care clinic.  If you do not have a primary care provider, please call [phone] and they will assist you.      [employer] is an electronic gateway that provides easy, online access to your medical records. With [employer], you can request a clinic appointment, read your test results, renew a prescription or communicate with your care team.     To access your existing account, please contact your [facility] or call  "[phone] for assistance.        Care EveryWhere ID     This is your Care EveryWhere ID. This could be used by other organizations to access your [place] medical records  [id number]        Your Vitals Were     Pulse Temperature Height Last Period BMI (Body Mass Index)       74 98  F (36.7  C) (Oral) 5' 7.25\" (170.8 cm) 09/22/2017 42.13 kg/m2        Blood Pressure from Last 3 Encounters:   10/06/17 108/75   04/28/17 120/77   04/18/17 129/78    Weight from Last 3 Encounters:   10/06/17 271 lb (122.9 kg)   04/28/17 273 lb 6.4 oz (124 kg)   04/18/17 280 lb 9.6 oz (127.3 kg)              We Performed the Following     Chlamydia/Gono Amplified ([facility])     HCG Qualitative Urine (UPT)  ([facility])     Urinalysis, Micro If ([facility])     Urine Microscopic ([facility])     Wet Prep (P )        Primary Care Provider Office Phone # Fax #    Janeth Antionette Rousseau [phone]       [facility] [address] [place] [address]        Equal Access to Services     ELLY ESCOTO AH: Hadii royce [place], waaxda luadarsh, qaybta kaalmada stevenda, rosalee tay. So [facility] [phone].    ATENCIÓN: Si habla español, tiene a bates disposición servicios gratuitos de asistencia lingüística. LlThe [facility] [phone].    We comply with applicable federal civil rights laws and [place] laws. We do not discriminate on the basis of race, color, national origin, age, disability, sex, sexual orientation, or gender identity.            Thank you!     Thank you for choosing [facility]  for your care. Our goal is always to provide you with excellent care. Hearing back from our patients is one way we can continue to improve our services. Please take a few minutes to complete the written survey that you may receive in the mail after your visit with us. Thank you!             Your Updated Medication List - Protect others around you: Learn how to safely use, store and throw away your " medicines at www.disposemymeds.org.          This list is accurate as of: 10/6/17  3:31 PM.  Always use your most recent med list.                   Brand Name Dispense Instructions for use Diagnosis    Ankle Stabilizer XL Misc     1 each    1 Units as needed    Achilles tendinitis of right lower extremity       cetirizine 10 MG tablet    zyrTEC    30 tablet    Take 1 tablet (10 mg) by mouth every evening    Rash and nonspecific skin eruption       desogestrel-ethinyl estradiol 0.1/0.125/0.15 -0.025 MG per tablet    GODWIN VELIVECHAPITO    84 tablet    Take 1 tablet by mouth daily    Vaginal discharge, History of sexual intercourse       etonogestrel-ethinyl estradiol 0.12-0.015 MG/24HR vaginal ring    NUVARING    3 each    Insert 1 ring vaginally every 21 days then remove for 1 week then repeat with new ring.    Encounter for initial prescription of vaginal ring hormonal contraceptive       fluticasone 50 MCG/ACT spray    FLONASE    16 g    Spray 1-2 sprays into both nostrils daily    Seasonal allergic rhinitis, unspecified allergic rhinitis trigger       ibuprofen 600 MG tablet    ADVIL/MOTRIN     Take 600 mg by mouth        TRAMADOL HCL PO

## 2017-10-09 LAB
C TRACH RRNA CVX QL NAA+PROBE: NEGATIVE
N GONORRHOEA RRNA SPEC QL NAA+PROBE: NEGATIVE

## 2017-10-10 NOTE — PROGRESS NOTES
Please send letter to patient with lab results.    Dear Dariusz,     The results from your labs were normal without signs of infections. No follow up is needed.  Thank you for allowing me to be a part of your health care!    Sincerely,   Dr. Rousseau  10/10/2017

## 2017-12-21 ENCOUNTER — TRANSFERRED RECORDS (OUTPATIENT)
Dept: HEALTH INFORMATION MANAGEMENT | Facility: CLINIC | Age: 27
End: 2017-12-21

## 2017-12-22 ENCOUNTER — DOCUMENTATION ONLY (OUTPATIENT)
Dept: PSYCHOLOGY | Facility: CLINIC | Age: 27
End: 2017-12-22

## 2017-12-22 ENCOUNTER — OFFICE VISIT (OUTPATIENT)
Dept: FAMILY MEDICINE | Facility: CLINIC | Age: 27
End: 2017-12-22

## 2017-12-22 VITALS
OXYGEN SATURATION: 100 % | HEART RATE: 90 BPM | DIASTOLIC BLOOD PRESSURE: 68 MMHG | WEIGHT: 281.38 LBS | BODY MASS INDEX: 43.74 KG/M2 | TEMPERATURE: 98.1 F | SYSTOLIC BLOOD PRESSURE: 128 MMHG

## 2017-12-22 DIAGNOSIS — F43.21 SITUATIONAL DEPRESSION: ICD-10-CM

## 2017-12-22 DIAGNOSIS — N91.2 ABSENCE OF MENSTRUATION: ICD-10-CM

## 2017-12-22 DIAGNOSIS — Z32.01 PREGNANCY TEST POSITIVE: Primary | ICD-10-CM

## 2017-12-22 DIAGNOSIS — Z11.3 SCREEN FOR STD (SEXUALLY TRANSMITTED DISEASE): ICD-10-CM

## 2017-12-22 LAB
BACTERIA: NORMAL
CLUE CELLS: NORMAL
HCG UR QL: POSITIVE
HIV 1+2 AB+HIV1 P24 AG SERPL QL IA: NEGATIVE
MOTILE TRICHOMONAS: NEGATIVE
ODOR: NORMAL
PH WET PREP: NORMAL
WBC WET PREP: NORMAL
YEAST: NORMAL

## 2017-12-22 RX ORDER — ONDANSETRON 4 MG/1
4-8 TABLET, ORALLY DISINTEGRATING ORAL EVERY 8 HOURS PRN
Qty: 50 TABLET | Refills: 0 | Status: SHIPPED | OUTPATIENT
Start: 2017-12-22 | End: 2018-02-23

## 2017-12-22 RX ORDER — OXYCODONE AND ACETAMINOPHEN 7.5; 325 MG/1; MG/1
2 TABLET ORAL PRN
COMMUNITY
End: 2019-12-30

## 2017-12-22 ASSESSMENT — ANXIETY QUESTIONNAIRES
3. WORRYING TOO MUCH ABOUT DIFFERENT THINGS: SEVERAL DAYS
6. BECOMING EASILY ANNOYED OR IRRITABLE: NOT AT ALL
GAD7 TOTAL SCORE: 3
5. BEING SO RESTLESS THAT IT IS HARD TO SIT STILL: NOT AT ALL
IF YOU CHECKED OFF ANY PROBLEMS ON THIS QUESTIONNAIRE, HOW DIFFICULT HAVE THESE PROBLEMS MADE IT FOR YOU TO DO YOUR WORK, TAKE CARE OF THINGS AT HOME, OR GET ALONG WITH OTHER PEOPLE: SOMEWHAT DIFFICULT
7. FEELING AFRAID AS IF SOMETHING AWFUL MIGHT HAPPEN: NOT AT ALL
1. FEELING NERVOUS, ANXIOUS, OR ON EDGE: SEVERAL DAYS
2. NOT BEING ABLE TO STOP OR CONTROL WORRYING: NOT AT ALL

## 2017-12-22 ASSESSMENT — PATIENT HEALTH QUESTIONNAIRE - PHQ9
SUM OF ALL RESPONSES TO PHQ QUESTIONS 1-9: 9
5. POOR APPETITE OR OVEREATING: SEVERAL DAYS

## 2017-12-22 NOTE — PROGRESS NOTES
Preceptor attestation:  Patient seen and discussed with the resident. Assessment and plan reviewed with resident and agreed upon.  Supervising physician: Hawa Sherman  [facility]

## 2017-12-22 NOTE — PROGRESS NOTES
SUBJECTIVE       Dariusz Leyva is a 27 year old  female with a PMH significant for:     Patient Active Problem List   Diagnosis     Lumbago     Nonallopathic lesion of lumbar region     Nonallopathic lesion of sacral region     Pain in thoracic spine     Nonallopathic lesion of thoracic region     Abnormal Pap smear of cervix     Large tonsils     Morbid obesity (H)     MDD (major depressive disorder)     CARLY (generalized anxiety disorder)     Depression     She presents for STD check and urine pregnancy test.    Patient is concerned that she might be pregnant.  Her last menstrual period was on November 18, 2017.  It was like any of her usual periods.  It lasted 3-4 days.  She had some cramping on the first day.  Flow is heavy initially, but then decreased and stopped after the fourth day.  She had been on NuvaRing for contraception but recently stopped it due to side effect of irritation in order.  She notes that her periods have been somewhat different since stopping the NuvaRing but are still coming fairly regularly.    She has been feeling nauseated for the last month noting breast tenderness.  This is very unusual for her and she is concerned that she might be pregnant.  If she is pregnant, she notes that it would be from a partner that she is not in a relationship and would not be good for her right now.    She would also like to be checked for sexually transmitted infections.  She underwent testing for gonorrhea chlamydia and trichomonas at her last office visit last month and all was negative.  She had full STD testing done approximately 1 year ago.  She is sexually active with 2 male partners.  And she feels safe in those relationships.    She denies any vaginal discharge or odors.  She also denies pelvic pain.    PMH, Medications and Allergies were reviewed and updated as needed.    ROS as above        OBJECTIVE     Vitals:    12/22/17 0833   BP: 128/68   BP Location: Left arm   Patient Position:  Sitting   Cuff Size: Adult Large   Pulse: 90   Temp: 98.1  F (36.7  C)   TempSrc: Oral   SpO2: 100%   Weight: 281 lb 6 oz (127.6 kg)     Body mass index is 43.74 kg/(m^2).    General: Alert, appropriate, and cooperative.  Appears stated age.  In no acute distress.  Tearful when discussing positive pregnancy result  HEENT:  Atraumatic.  Pupils equal, round, and reactive bilaterally.  Extraocular movements intact.Mouth shows moist mucous membranes.   Neck:  Supple.  No adenopathy.  Thyroid palpation within normal limits.  CV:  Regular rhythm and rate.  No murmurs, rubs, or gallops.  Lungs:  Clear to auscultation bilaterally.  No wheezes, rhonchi, or rales.  Abd:  Soft, non-distended, non-tender.  Bowel sounds present.  No masses or organomegaly to palpation.      Results for orders placed or performed in visit on 12/22/17 (from the past 24 hour(s))   HCG Qualitative Urine (UPT)  ([facility])   Result Value Ref Range    HCG Qual Urine POSITIVE Negative   Wet Prep ([facility])   Result Value Ref Range    Yeast Wet Prep None none    Motile Trichomonas Wet Prep Negative Negative    Clue Cells Wet Prep Present <20% NONE    WBC WET PREP 2-5 2 - 5    Bacteria Wet Prep Moderate None    pH Wet Prep Not performed 3.8 - 4.5    Odor Wet Prep None NONE           ASSESSMENT AND PLAN     (Z32.01) Pregnancy test positive  (primary encounter diagnosis)  (N91.2) Absence of menstruation  Comment: Patient's last menstrual period was November 18.  Pregnancy test is positive today in clinic.  This is an unwanted pregnancy.  Patient states that this is not a good time in her life, and her current partner is not the father of this baby.  She is very tearful regarding this and is just wondering what her options are.  Discussed options including carrying child to term and keeping the child, carrying the child to term and giving child up for adoption, or terminating the pregnancy.  I informed her that we do not do pregnancy terminations here at this  clinic, but due to prenatal care.  She was given information on where she can be seen for pregnancy termination.  If she decides to continue the pregnancy, which it seems very unlikely that she will at this time, patient can come here for prenatal care.  Patient is also having nausea.  We discussed options for nausea including Zofran and discussed the risks of cardiac defects, as well as vitamin B6 and pyridoxine.  She elects to have Zofran.    Plan: Patient given prescription for Zofran ODT 4-8 mg 3 times daily as needed.  Patient was also given resources for places regarding termination of pregnancy as above.  Strongly encouraged patient to consider contraception after the termination.      (Z11.3) Screen for STD (sexually transmitted disease)  Comment: Patient requesting full STD testing today.  Has no acute concerns, declines offer for pelvic exam.  She is up-to-date on her Pap smear  Plan: Full STD testing with HIV, syphilis, gonorrhea, chlamydia, wet prep, hepatitis B, hepatitis C patient feeling very upset after finding out she was pregnant.  This was an unplanned and unwanted pregnancy.  She has required therapy for    (F43.21) Situational depression  Comment: Similar situations in the past and had this with Dr. Mejia here at the clinic.  Patient is not currently on medications and declines offer for medications today.  Is willing to resume counseling.  Plan: Mental health referral placed today.  Do not feel patient will need ongoing community therapy, but likely will need 3-6 visits to discuss her concerns.      RTC as needed for follow up of contraception vs prenatal care or sooner if develops new or worsening symptoms.    Staffed with Dr. Sherman.    Carly Hernandes MD PGY-3  [facility]  12/22/2017

## 2017-12-22 NOTE — PATIENT INSTRUCTIONS
Results for NONA MOYA (MRN [id number]) as of 12/22/2017 09:13   Ref. Range 12/22/2017 08:44   HCG Qual Urine Latest Ref Range: Negative  POSITIVE       You are 4 weeks and 6 days pregnant.    For nausea:  1. Take zofran 1-2 tablets three times daily as needed for nausea    For mood:  1. Someone will call you to set up appointment    If you have thoughts about self harm or if you just need additional support and care, here are some resources for you:    Crisis Lines:  [facility] Adult Crisis:  [phone]  [facility] Multilingual Crisis Line:  [phone]  [facility] Adult Crisis:  [phone]    You can also consider going to the [facility] at the following address.  Walk ins are welcome:    [address] [place] [address]   [phone] (for 24 hour crisis consultation)    Monday - Friday 8:00am - 7:00pm  Saturday:  11:00am - 3:00pm  Sunday and Holidays Closed    If you feel at risk of immediate harm, go directly to the Emergency Department.    Follow up as needed    A message has been sent to the behavioral health team to advise for mental health referral. See documentation encounter for details.  Mirian  12/22/17

## 2017-12-22 NOTE — PROGRESS NOTES
Behavioral Health Team,    Patient is being referred for mental health services. Please advise if we are able to see patient for in house treatment or if a community option would be best.    Thank you.     Mirian  Care Coordinator

## 2017-12-22 NOTE — MR AVS SNAPSHOT
After Visit Summary   12/22/2017    Nona Leyva    MRN: [id number]           Patient Information     Date Of Birth          1990        Visit Information        Provider Department      12/22/2017 8:40 AM Carly Hernandes MD [facility]        Today's Diagnoses     Absence of menstruation    -  1    Screen for STD (sexually transmitted disease)        Situational depression        Pregnancy test positive          Care Instructions    Results for NONA LEYVA (MRN [id number]) as of 12/22/2017 09:13   Ref. Range 12/22/2017 08:44   HCG Qual Urine Latest Ref Range: Negative  POSITIVE       You are 4 weeks and 6 days pregnant.    For nausea:  1. Take zofran 1-2 tablets three times daily as needed for nausea    For mood:  1. Someone will call you to set up appointment    If you have thoughts about self harm or if you just need additional support and care, here are some resources for you:    Crisis Lines:  [facility] Adult Crisis:  [phone]  [facility] Multilingual Crisis Line:  [phone]  [facility] Adult Crisis:  [phone]    You can also consider going to the [facility] at the following address.  Walk ins are welcome:    [address] [place] [address]   [phone] (for 24 hour crisis consultation)    Monday - Friday 8:00am - 7:00pm  Saturday:  11:00am - 3:00pm  Sunday and Holidays Closed    If you feel at risk of immediate harm, go directly to the Emergency Department.    Follow up as needed            Follow-ups after your visit        Additional Services     MENTAL HEALTH REFERRAL  -       Use this form for behavioral health consults and assessments. The referral coordinator will help to determine whether patients are best served by clinic behavioral health staff or by community providers.    Presenting Problem: situational depression    Currently having suicidal thoughts: No  Previous psych hospitalization: No    Type of  referral(s) requested (indicate all that apply):  Adult Psychotherapy--for diagnosis and/or non-pharmacological treatment of mental health diagnosis.        needed:No  Language: English                  Who to contact     Please call your clinic at [phone] to:    Ask questions about your health    Make or cancel appointments    Discuss your medicines    Learn about your test results    Speak to your doctor   If you have compliments or concerns about an experience at your clinic, or if you wish to file a complaint, please contact [facility] Patient Relations at [phone] or email us at [email or web address]         Additional Information About Your Visit        [employer] Information     [employer] gives you secure access to your electronic health record. If you see a primary care provider, you can also send messages to your care team and make appointments. If you have questions, please call your primary care clinic.  If you do not have a primary care provider, please call [phone] and they will assist you.      [employer] is an electronic gateway that provides easy, online access to your medical records. With [employer], you can request a clinic appointment, read your test results, renew a prescription or communicate with your care team.     To access your existing account, please contact your [facility] or call [phone] for assistance.        Care EveryWhere ID     This is your Care EveryWhere ID. This could be used by other organizations to access your [facility] medical records  [id number]        Your Vitals Were     Pulse Temperature Last Period Pulse Oximetry Breastfeeding? BMI (Body Mass Index)    90 98.1  F (36.7  C) (Oral) 11/17/2017 100% No 43.74 kg/m2       Blood Pressure from Last 3 Encounters:   12/22/17 128/68   10/06/17 108/75   04/28/17 120/77    Weight from Last 3 Encounters:   12/22/17 281 lb 6 oz (127.6 kg)    10/06/17 271 lb (122.9 kg)   04/28/17 273 lb 6.4 oz (124 kg)              We Performed the Following     Chlamydia/Gono Amplified ([facility])     HCG Qualitative Urine (UPT)  ([facility])     Hepatitis B Surface Ag ([facility])     Hepatitis C Antibody ([facility])     HIV Ag/Ab Screen [facility] ([facility])     MENTAL HEALTH REFERRAL  -     Syphilis Screen [facility] (RPR/VDRL) ([facility])     Wet Prep ([facility])          Today's Medication Changes          These changes are accurate as of: 12/22/17  9:14 AM.  If you have any questions, ask your nurse or doctor.               Start taking these medicines.        Dose/Directions    ondansetron 4 MG ODT tab   Commonly known as:  ZOFRAN ODT   Used for:  Pregnancy test positive   Started by:  Carly Hernandes MD        Dose:  4-8 mg   Take 1-2 tablets (4-8 mg) by mouth every 8 hours as needed for nausea   Quantity:  50 tablet   Refills:  0            Where to get your medicines      These medications were sent to [id number] - [place], [place] - [address] AT [facility] [address] [facility] [address]  [address], [place] [address]     Phone:  [phone]     ondansetron 4 MG ODT tab                Primary Care Provider Office Phone # Fax #    Janeth Antionette Rousseau [phone]       [facility] [address] [place] [address]        Equal Access to Services     DEMI ESCOTO AH: Hadii aad ku hadasho Soomaali, waaxda luqadaha, qaybta kaalmada adeegyada, waxay mcin haymariellen dang mcnulty ah. So [facility] [phone].    ATENCIÓN: Si habla español, tiene a bates disposición servicios gratuitos de asistencia lingüística. Llame al [phone].    We comply with applicable federal civil rights laws and [place] laws. We do not discriminate on the basis of race, color, national origin, age, disability, sex, sexual orientation, or gender identity.            Thank you!     Thank you for choosing [facility]  for your care. Our goal is  always to provide you with excellent care. Hearing back from our patients is one way we can continue to improve our services. Please take a few minutes to complete the written survey that you may receive in the mail after your visit with us. Thank you!             Your Updated Medication List - Protect others around you: Learn how to safely use, store and throw away your medicines at www.disposemymeds.org.          This list is accurate as of: 12/22/17  9:14 AM.  Always use your most recent med list.                   Brand Name Dispense Instructions for use Diagnosis    Ankle Stabilizer XL Misc     1 each    1 Units as needed    Achilles tendinitis of right lower extremity       cetirizine 10 MG tablet    zyrTEC    30 tablet    Take 1 tablet (10 mg) by mouth every evening    Rash and nonspecific skin eruption       desogestrel-ethinyl estradiol 0.1/0.125/0.15 -0.025 MG per tablet    GODWIN, VELIVET    84 tablet    Take 1 tablet by mouth daily    Vaginal discharge, History of sexual intercourse       etonogestrel-ethinyl estradiol 0.12-0.015 MG/24HR vaginal ring    NUVARING    3 each    Insert 1 ring vaginally every 21 days then remove for 1 week then repeat with new ring.    Encounter for initial prescription of vaginal ring hormonal contraceptive       fluticasone 50 MCG/ACT spray    FLONASE    16 g    Spray 1-2 sprays into both nostrils daily    Seasonal allergic rhinitis, unspecified allergic rhinitis trigger       ibuprofen 600 MG tablet    ADVIL/MOTRIN     Take 600 mg by mouth        ondansetron 4 MG ODT tab    ZOFRAN ODT    50 tablet    Take 1-2 tablets (4-8 mg) by mouth every 8 hours as needed for nausea    Pregnancy test positive       oxyCODONE-acetaminophen 7.5-325 MG per tablet    PERCOCET     Take 1 tablet by mouth 2 times daily        TRAMADOL HCL PO

## 2017-12-22 NOTE — PROGRESS NOTES
Unfortunately, I do not think we have current openings for in-house therapy at this time.  Please share the following community resources with this patient:    [facility]  [facility] [facility] [address]  [place] [address]  [phone]    [facility]  [facility] [facility]  [address]  [place], [place] [address]  [phone]    [facility] - [address]   [facility] [address]  [phone]

## 2017-12-22 NOTE — LETTER
December 27, 2017      Dariusz Leyva  [address]   [place] [address]        Dear Dariusz,  We recently had placed an order for you to see a mental health provider. Unfortunately we are unable to see you in clinic due to being at capacity. Please call one of the following recommended clinics to schedule an appointment for therapy.    [facility]  [address] [place] [address]  [address]  [place] [address]  [phone]     [facility]  [address] [place] [address]  [address]  [place], [place] [address]  [phone]     [facility]  [address]  [address]   [facility] [address]  [phone]      Sincerely,    Mirian  Care Coordinator

## 2017-12-23 LAB
HBV SURFACE AG SERPL QL IA: NEGATIVE
RPR SER QL: NORMAL

## 2017-12-23 ASSESSMENT — ANXIETY QUESTIONNAIRES: GAD7 TOTAL SCORE: 3

## 2017-12-25 LAB — HCV AB SER QL: NEGATIVE

## 2017-12-26 LAB
C TRACH RRNA SPEC QL NAA+PROBE: NEGATIVE
N GONORRHOEA RRNA SPEC QL NAA+PROBE: NEGATIVE

## 2018-02-23 ENCOUNTER — RECORDS - HEALTHEAST (OUTPATIENT)
Dept: ADMINISTRATIVE | Facility: OTHER | Age: 28
End: 2018-02-23

## 2018-02-23 ENCOUNTER — OFFICE VISIT (OUTPATIENT)
Dept: FAMILY MEDICINE | Facility: CLINIC | Age: 28
End: 2018-02-23
Payer: COMMERCIAL

## 2018-02-23 VITALS
BODY MASS INDEX: 43.36 KG/M2 | HEIGHT: 67 IN | WEIGHT: 276.25 LBS | TEMPERATURE: 98.3 F | SYSTOLIC BLOOD PRESSURE: 127 MMHG | RESPIRATION RATE: 16 BRPM | HEART RATE: 84 BPM | OXYGEN SATURATION: 100 % | DIASTOLIC BLOOD PRESSURE: 75 MMHG

## 2018-02-23 DIAGNOSIS — Z11.3 SCREEN FOR STD (SEXUALLY TRANSMITTED DISEASE): Primary | ICD-10-CM

## 2018-02-23 DIAGNOSIS — R10.32 LLQ ABDOMINAL PAIN: ICD-10-CM

## 2018-02-23 LAB
% GRANULOCYTES: 61.8 %G (ref 40–75)
BACTERIA: NORMAL
CLUE CELLS: NORMAL
GRANULOCYTES #: 5.6 K/UL (ref 1.6–8.3)
HCT VFR BLD AUTO: 42.7 % (ref 35–47)
HEMOGLOBIN: 12.9 G/DL (ref 11.7–15.7)
LYMPHOCYTES # BLD AUTO: 2.8 K/UL (ref 0.8–5.3)
LYMPHOCYTES NFR BLD AUTO: 31.1 %L (ref 20–48)
MCH RBC QN AUTO: 29.1 PG (ref 26.5–35)
MCHC RBC AUTO-ENTMCNC: 30.2 G/DL (ref 32–36)
MCV RBC AUTO: 96.3 FL (ref 78–100)
MID #: 0.6 K/UL (ref 0–2.2)
MID %: 7.1 %M (ref 0–20)
MOTILE TRICHOMONAS: NEGATIVE
ODOR: NORMAL
PH WET PREP: NORMAL
PLATELET # BLD AUTO: 504 K/UL (ref 150–450)
RBC # BLD AUTO: 4.4 M/UL (ref 3.8–5.2)
WBC # BLD AUTO: 9 K/UL (ref 4–11)
WBC WET PREP: <2
YEAST: NORMAL

## 2018-02-23 NOTE — PATIENT INSTRUCTIONS
Bloodwork today  Schedule US    Back in 1 week if not getting better     Dr. Enedelia Arana    PELVIC ULTRASOUND:  [facility]  Radiology Department 1st floor  [address] [address]  [phone]  Date:   Monday February 26, 2018  Time:  6:45 AM  Please have a full bladder by drinking 32 ounces of water and refrain from using the bathroom 1 hour prior to appointment.  Given to patient.  Michelle BYRNES Pack  2/23/18

## 2018-02-23 NOTE — MR AVS SNAPSHOT
After Visit Summary   2/23/2018    Dariusz Leyva    MRN: [id number]           Patient Information     Date Of Birth          1990        Visit Information        Provider Department      2/23/2018 3:10 PM Enedelia Arana MD [facility]        Today's Diagnoses     Screen for STD (sexually transmitted disease)    -  1    LLQ abdominal pain          Care Instructions    Bloodwork today  Schedule US    Back in 1 week if not getting better     Dr. Enedelia Arana          Follow-ups after your visit        Future tests that were ordered for you today     Open Future Orders        Priority Expected Expires Ordered    US Pelvic Complete with Transvaginal Routine  2/23/2019 2/23/2018            Who to contact     Please call your clinic at [phone] to:    Ask questions about your health    Make or cancel appointments    Discuss your medicines    Learn about your test results    Speak to your doctor            Additional Information About Your Visit        MyChart Information     [employer] gives you secure access to your electronic health record. If you see a primary care provider, you can also send messages to your care team and make appointments. If you have questions, please call your primary care clinic.  If you do not have a primary care provider, please call [phone] and they will assist you.      [employer] is an electronic gateway that provides easy, online access to your medical records. With [employer], you can request a clinic appointment, read your test results, renew a prescription or communicate with your care team.     To access your existing account, please contact your [facility] or call [phone] for assistance.        Care EveryWhere ID     This is your Care EveryWhere ID. This could be used by other organizations to access your [facility] medical records  [id number]        Your Vitals Were     Pulse Temperature Respirations Height Last  "Period Pulse Oximetry    84 98.3  F (36.8  C) (Oral) 16 5' 6.69\" (169.4 cm) 02/03/2018 100%    Breastfeeding? BMI (Body Mass Index)                No 43.67 kg/m2           Blood Pressure from Last 3 Encounters:   02/23/18 127/75   12/22/17 128/68   10/06/17 108/75    Weight from Last 3 Encounters:   02/23/18 276 lb 4 oz (125.3 kg)   12/22/17 281 lb 6 oz (127.6 kg)   10/06/17 271 lb (122.9 kg)              We Performed the Following     CBC with Diff Plt ([facility])     Chlamydia/Gono Amplified ([facility])     Wet Prep ([facility])          Today's Medication Changes          These changes are accurate as of 2/23/18  3:54 PM.  If you have any questions, ask your nurse or doctor.               Stop taking these medicines if you haven't already. Please contact your care team if you have questions.     ondansetron 4 MG ODT tab   Commonly known as:  ZOFRAN ODT   Stopped by:  Enedelia Arana MD                    Primary Care Provider Office Phone # Fax #    Janeth Antionette Rousseau [phone]       [facility] [address] [place] [address]        Equal Access to Services     ELLY ESCOOT AH: Delma tonyo Solaurel, waaxda luqadaha, qaybta kaalmada adeegyada, rosalee tay. So [place] [facility] [phone].    ATENCIÓN: Si habla español, tiene a bates disposición servicios gratuitos de asistencia lingüística. Llame al [phone].    We comply with applicable federal civil rights laws and [place] laws. We do not discriminate on the basis of race, color, national origin, age, disability, sex, sexual orientation, or gender identity.            Thank you!     Thank you for choosing [facility]  for your care. Our goal is always to provide you with excellent care. Hearing back from our patients is one way we can continue to improve our services. Please take a few minutes to complete the written survey that you may receive in the mail after your visit with us. Thank you!   "           Your Updated Medication List - Protect others around you: Learn how to safely use, store and throw away your medicines at www.disposemymeds.org.          This list is accurate as of 2/23/18  3:54 PM.  Always use your most recent med list.                   Brand Name Dispense Instructions for use Diagnosis    ibuprofen 600 MG tablet    ADVIL/MOTRIN     Take 600 mg by mouth        oxyCODONE-acetaminophen 7.5-325 MG per tablet    PERCOCET     Take 2 tablets by mouth as needed        TRAMADOL HCL PO

## 2018-02-23 NOTE — LETTER
February 27, 2018      Dariusz Leyva  [address]   [place] [address]        Dear Dariusz,  Your tests from last clinic visit were all negative for infection. We will watch for the ultrasound results. Come see me in clinic if things aren't getting better.   Please see below for your test results.    Resulted Orders   Chlamydia/Gono Amplified ([facility])   Result Value Ref Range    Chlamydia trac,Amplified Prb Negative Negative    N gonorrhoeae,Amplified Prb Negative Negative    Narrative    Test performed by:  [facility]  [address] [place] [address]   Wet Prep ([facility])   Result Value Ref Range    Yeast Wet Prep None none    Motile Trichomonas Wet Prep Negative Negative    Clue Cells Wet Prep None NONE    WBC WET PREP <2 2 - 5    Bacteria Wet Prep Few None    pH Wet Prep Not performed 3.8 - 4.5    Odor Wet Prep None NONE   CBC with Diff Plt ([facility])   Result Value Ref Range    WBC 9.0 4.0 - 11.0 K/uL    Lymphocytes # 2.8 0.8 - 5.3 K/uL    % Lymphocytes 31.1 20.0 - 48.0 %L    Mid # 0.6 0.0 - 2.2 K/uL    Mid % 7.1 0.0 - 20.0 %M    GRANULOCYTES # 5.6 1.6 - 8.3 K/uL    % Granulocytes 61.8 40.0 - 75.0 %G    RBC 4.4 3.8 - 5.2 M/uL    Hemoglobin 12.9 11.7 - 15.7 g/dL    Hematocrit 42.7 35.0 - 47.0 %    MCV 96.3 78.0 - 100.0 fL    MCH 29.1 26.5 - 35.0    MCHC 30.2 (L) 32.0 - 36.0 g/dL    Platelets 504.0 (H) 150.0 - 450.0 K/uL       If you have any questions, please call the clinic to make an appointment.    Sincerely,    Enedelia Arana MD

## 2018-02-23 NOTE — PROGRESS NOTES
"     SUBJECTIVE     Chief Complaint   Patient presents with     Pelvic Pain     left side pelvic area - pressure/ache - started this morning - was told has cyst on left during u/s at Dr. Hubbard's office (ANA completed)     Med Rec     unable to complete       Subjective: Dariusz Leyva is a 27 year old female with   Patient Active Problem List   Diagnosis     Lumbago     Nonallopathic lesion of lumbar region     Nonallopathic lesion of sacral region     Pain in thoracic spine     Nonallopathic lesion of thoracic region     Abnormal Pap smear of cervix     Large tonsils     Morbid obesity (H)     MDD (major depressive disorder)     CARLY (generalized anxiety disorder)     Depression      here for LLQ pain.     L sided abdominal pain, worse with certain sitting positions. Has had it off/on 2-3 days. \"pulsating\", lasts <1 min, however today doesn't seem to go away, had similar thing about 1 year ago. 5/10 severity. Was told she had an ovarian cyst at Dr. Teresa Hubbard's office (for therapeutic ) in 2017 = told her that her L ovary was bigger than right. They had set up a follow-up, but was told to cancel it if it didn't bother her so she canceled it. Has nuvaring in now. No fevers. LMP was . +sexually active, no new partners, wants to get STD testing again. +strong odor, thinks she has BV (recently took medication for it).     +intermittent chest pain on L side (not triggered by walking, no assoc diaphoresis or trouble breathing, no family history of heart dz), no trouble breathing. +sore throat.       ROS:  General: No fevers  Skin: No new rashes      PMH/PSH, FamHx, Meds, Allergies reviewed and updated as needed.    Social History     Social History     Marital status: Single     Spouse name: N/A     Number of children: N/A     Years of education: N/A     Social History Main Topics     Smoking status: Never Smoker     Smokeless tobacco: Never Used     Alcohol use 0.0 oz/week     0 Standard drinks or " "equivalent per week      Comment: 1 per week     Drug use: Yes      Comment: smokes pot once every few day      Sexual activity: Yes     Partners: Male     Other Topics Concern     None     Social History Narrative           OBJECTIVE     /75 (BP Location: Right arm, Patient Position: Sitting, Cuff Size: Adult Large)  Pulse 84  Temp 98.3  F (36.8  C) (Oral)  Resp 16  Ht 5' 6.69\" (169.4 cm)  Wt 276 lb 4 oz (125.3 kg)  LMP 02/03/2018  SpO2 100%  Breastfeeding? No  BMI 43.67 kg/m2  Body mass index is 43.67 kg/(m^2).    Physical exam:  Gen: No acute distress  CV: Regular rate and rhythm, no murmurs/rubs/gallops  Resp: Clear to auscultation bilaterally, no crackles or wheezes  ABD: soft, +moderate LLQ tenderness, no masses, no rebound.  : Normal external female genitalia. Vaginal discharge is thin. Cervix appears normal. Mild firmness of superior portion of cervical os, no polyp or overlying changes seen. No CMT. Significant L adnexal tenderness, no masses felt.  Skin: no suspicious lesions or rashes  Psych: Mood and affect appropriate, mentation appears normal.    Results for orders placed or performed in visit on 02/23/18 (from the past 24 hour(s))   CBC with Diff Plt ([facility])   Result Value Ref Range    WBC 9.0 4.0 - 11.0 K/uL    Lymphocytes # 2.8 0.8 - 5.3 K/uL    % Lymphocytes 31.1 20.0 - 48.0 %L    Mid # 0.6 0.0 - 2.2 K/uL    Mid % 7.1 0.0 - 20.0 %M    GRANULOCYTES # 5.6 1.6 - 8.3 K/uL    % Granulocytes 61.8 40.0 - 75.0 %G    RBC 4.4 3.8 - 5.2 M/uL    Hemoglobin 12.9 11.7 - 15.7 g/dL    Hematocrit 42.7 35.0 - 47.0 %    MCV 96.3 78.0 - 100.0 fL    MCH 29.1 26.5 - 35.0    MCHC 30.2 (L) 32.0 - 36.0 g/dL    Platelets 504.0 (H) 150.0 - 450.0 K/uL   Wet Prep ([facility])   Result Value Ref Range    Yeast Wet Prep None none    Motile Trichomonas Wet Prep Negative Negative    Clue Cells Wet Prep None NONE    WBC WET PREP <2 2 - 5    Bacteria Wet Prep Few None    pH Wet Prep Not performed 3.8 - 4.5    Odor Wet " Prep None NONE       1/3/17 pelvic US: 1.  3.1 cm hemorrhagic cyst within the left ovary with moderate complex fluid in the pelvis compatible with blood products and bleeding hemorrhagic cyst.    4/21/17 pelvic US: FINDINGS:  Bladder is well-distended. Uterus is midline and anteverted measuring 7.1 x 4.4 x 3.1 cm. Myometrium has a homogeneous texture. Endometrial stripe is normal at 5 mm. Both ovaries are seen and have a normal morphology and flow on color Doppler imaging.   Trace free fluid in the cul-de-sac.     ASSESSMENT AND PLAN     Dariusz Leyva is a 27 year old female here for  LLQ pain    1. Screen for STD (sexually transmitted disease)  Declines blood testing today for STDs. Appears similar to BV on exam. Nothing on wet prep.   - Chlamydia/Gono Amplified ([facility])  - Wet Prep ([facility])    2. LLQ abdominal pain  Significant tenderness on exam. No leukocytosis and hgb at baseline. Vitally stable. Reviewed prior charts - hemorrhagic cysts noted on prior pelvic US. Hx of hemorrhagic cysts in the past - per patient report had one in early January. LMP 02/03 - [facility]. Low likelihood of PID without leukocytosis and no CMT and fairly recent negative STD testing. Will check US today and have her follow up if pain isn't getting better within the week. Signed ANA for Dr. Teresa Hubbard's clinic.   - CBC with Diff Plt ([facility])  - US Pelvic Complete with Transvaginal; Future    Enedelia Arana MD, PGY-2  I precepted today with Marlen Rader MD.

## 2018-02-23 NOTE — PROGRESS NOTES
"Preceptor attestation:  Vital signs reviewed: /75 (BP Location: Right arm, Patient Position: Sitting, Cuff Size: Adult Large)  Pulse 84  Temp 98.3  F (36.8  C) (Oral)  Resp 16  Ht 5' 6.69\" (169.4 cm)  Wt 276 lb 4 oz (125.3 kg)  LMP 02/03/2018  SpO2 100%  Breastfeeding? No  BMI 43.67 kg/m2    Patient seen and discussed with the resident. Assessment and plan reviewed with resident and agreed upon.    Supervising physician: Marlen Rader MD  [facility]  "

## 2018-02-26 ENCOUNTER — HOSPITAL ENCOUNTER (OUTPATIENT)
Dept: ULTRASOUND IMAGING | Facility: CLINIC | Age: 28
Discharge: HOME | End: 2018-02-26

## 2018-02-26 ENCOUNTER — TRANSFERRED RECORDS (OUTPATIENT)
Dept: HEALTH INFORMATION MANAGEMENT | Facility: CLINIC | Age: 28
End: 2018-02-26

## 2018-02-26 DIAGNOSIS — R10.32 LEFT LOWER QUADRANT PAIN: ICD-10-CM

## 2018-02-26 LAB
C TRACH RRNA CVX QL NAA+PROBE: NEGATIVE
N GONORRHOEA RRNA SPEC QL NAA+PROBE: NEGATIVE

## 2018-03-29 ENCOUNTER — TELEPHONE (OUTPATIENT)
Dept: FAMILY MEDICINE | Facility: CLINIC | Age: 28
End: 2018-03-29

## 2018-03-29 NOTE — TELEPHONE ENCOUNTER
Request refill sent  For nuvaring but not current in pt's medication list. Please advise.  Ambika Ibanez MA

## 2018-04-02 NOTE — TELEPHONE ENCOUNTER
Pt called back and I notified pt she needed to schedule an appointment for a refill of this medication. Pt agreed and phone call was transferred to the scheduling department. Ambika Ibanez MA

## 2018-06-07 ENCOUNTER — OFFICE VISIT (OUTPATIENT)
Dept: FAMILY MEDICINE | Facility: CLINIC | Age: 28
End: 2018-06-07
Payer: COMMERCIAL

## 2018-06-07 VITALS
BODY MASS INDEX: 44.61 KG/M2 | TEMPERATURE: 98.5 F | SYSTOLIC BLOOD PRESSURE: 116 MMHG | WEIGHT: 282.2 LBS | OXYGEN SATURATION: 98 % | HEART RATE: 84 BPM | DIASTOLIC BLOOD PRESSURE: 79 MMHG

## 2018-06-07 DIAGNOSIS — Z30.015 ENCOUNTER FOR INITIAL PRESCRIPTION OF VAGINAL RING HORMONAL CONTRACEPTIVE: Primary | ICD-10-CM

## 2018-06-07 DIAGNOSIS — R53.83 FATIGUE, UNSPECIFIED TYPE: ICD-10-CM

## 2018-06-07 LAB
BACTERIA: NORMAL
BILIRUBIN UR: NEGATIVE
BLOOD UR: NEGATIVE
CASTS: NORMAL /LPF
CRYSTAL URINE: NORMAL /LPF
EPITHELIAL CELLS UR: NORMAL /LPF (ref 0–2)
GLUCOSE URINE: NEGATIVE
HCG UR QL: NEGATIVE
HEMOGLOBIN: 12.8 G/DL (ref 11.7–15.7)
KETONES UR QL: NEGATIVE
LEUKOCYTE ESTERASE UR: ABNORMAL
MUCOUS URINE: NORMAL LPF
NITRITE UR QL STRIP: NEGATIVE
PH UR STRIP: 6 [PH] (ref 5–7)
PROTEIN UR: NEGATIVE
RBC URINE: NORMAL /HPF
SP GR UR STRIP: >=1.03
TSH SERPL DL<=0.05 MIU/L-ACNC: 1.38 UIU/ML (ref 0.3–5)
UROBILINOGEN UR STRIP-ACNC: ABNORMAL
WBC URINE: NORMAL /HPF

## 2018-06-07 RX ORDER — ETONOGESTREL AND ETHINYL ESTRADIOL VAGINAL RING .015; .12 MG/D; MG/D
RING VAGINAL
Qty: 3 EACH | Refills: 1 | Status: SHIPPED | OUTPATIENT
Start: 2018-06-07 | End: 2018-06-19

## 2018-06-07 ASSESSMENT — ANXIETY QUESTIONNAIRES
6. BECOMING EASILY ANNOYED OR IRRITABLE: NEARLY EVERY DAY
GAD7 TOTAL SCORE: 4
2. NOT BEING ABLE TO STOP OR CONTROL WORRYING: NOT AT ALL
IF YOU CHECKED OFF ANY PROBLEMS ON THIS QUESTIONNAIRE, HOW DIFFICULT HAVE THESE PROBLEMS MADE IT FOR YOU TO DO YOUR WORK, TAKE CARE OF THINGS AT HOME, OR GET ALONG WITH OTHER PEOPLE: SOMEWHAT DIFFICULT
7. FEELING AFRAID AS IF SOMETHING AWFUL MIGHT HAPPEN: NOT AT ALL
1. FEELING NERVOUS, ANXIOUS, OR ON EDGE: NOT AT ALL
5. BEING SO RESTLESS THAT IT IS HARD TO SIT STILL: NOT AT ALL
3. WORRYING TOO MUCH ABOUT DIFFERENT THINGS: NOT AT ALL

## 2018-06-07 ASSESSMENT — PATIENT HEALTH QUESTIONNAIRE - PHQ9: 5. POOR APPETITE OR OVEREATING: SEVERAL DAYS

## 2018-06-07 NOTE — LETTER
August 1, 2018      Dariusz Leyva  [address]   [place] [address]        Dear Dariusz,  Pregnancy test negative. No evidence of bladder infection. Thyroid and hemoglobin normal, if she continues to have fatigue she can schedule a follow up appointment and we can discuss further.       Please see below for your test results.    Resulted Orders   Hemoglobin (HGB) ([facility])   Result Value Ref Range    Hemoglobin 12.8 11.7 - 15.7 g/dL   Thyroid [place] ([facility])   Result Value Ref Range    TSH 1.38 0.30 - 5.00 uIU/mL    Narrative    Test performed by:  [facility]  [address] [place] [address]   Urinalysis, Micro If ([facility])   Result Value Ref Range    Specific Gravity Urine >=1.030 1.005 - 1.030    pH Urine 6.0 4.5 - 8.0    Leukocyte Esterase UR Trace (A) NEGATIVE    Nitrite Urine Negative NEGATIVE    Protein UR Negative NEGATIVE    Glucose Urine Negative NEGATIVE    Ketones Urine Negative NEGATIVE    Urobilinogen mg/dL 0.2 E.U./dL 0.2 E.U./dL    Bilirubin UR Negative NEGATIVE    Blood UR Negative NEGATIVE   HCG Qualitative Urine (UPT)  ([facility])   Result Value Ref Range    HCG Qual Urine NEGATIVE Negative   Urine Microscopic ([facility])   Result Value Ref Range    WBC Urine 2-5 <5 /hpf    RBC Urine None <5 /hpf    Epithelial Cells UR 5-10 0 - 2 /lpf    Mucous Urine None NONE lpf    Casts Urine None NONE /lpf    Crystal Urine None NONE /lpf    Bacteria Wet Prep Few None       If you have any questions, please call the clinic to make an appointment.    Sincerely,    Ramos Brito MD

## 2018-06-07 NOTE — PROGRESS NOTES
Preceptor Attestation:   Patient seen, evaluated and discussed with the resident. I have verified the content of the note, which accurately reflects my assessment of the patient and the plan of care.   Supervising Physician:  Donald James MD

## 2018-06-07 NOTE — PATIENT INSTRUCTIONS
[facility]  [address] [place] [address] [address]  [place], [place] [address]  [phone]    [facility]  [address] [facility] [address]  [place] [address]  [phone] (for appointments)  Fax: [phone]    [facility] [address]  [place] [address]  Phone:  [phone]  Fax: [phone]  Hours:  Monday - Friday 8:30 - 5:00pm    [facility]  [facility] [facility] [address]  [place] [address]  [phone]

## 2018-06-07 NOTE — PROGRESS NOTES
Primary Care Behavioral Health Consult Note    Meeting lasted: 5 minutes  Others present: no one    Identifying Information and Presenting Problem:    Dr. Brito requested behavioral health consultation for this patient regarding mental health referral resources.  The patient is a 27 year old American individual that agreed to be seen by behavioral health today.    Topics Discussed/Interventions Provided: Briefly touched base on request for mental health resources.  Had provided these back in December 2017 and checked into any barriers which could have interfered with follow up at that time.  Ms. Leyva reported that it was challenging to schedule appts due to work schedule at that time.  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  We discussed the benefits of looking for other work given unhappiness in call center.  She denied any other barriers to accessing care at this time.  Provided resources for community based mental health providers who may have weekend or evening hours to help with accessing care.  Let her know that we have additional resources we can share if she runs into any barriers scheduling at the providers/agencies shared today.  She expressed appreciation for this help today.    Assessment:     Mental Status: Dariusz appeared generally alert and oriented. Dress was casual and appropriate to the weather and occasion. Grooming and hygiene were good. Eye contact was within normal limits. Speech was of normal volume and rate and was clear, coherent, and relevant. Mood was mildly depressed with congruent affect. Thought processes were relevant, logical and goal-directed. Thought content was WNL with no evidence of psychotic or paranoid features. No evidence of SI/HI or self-harm, intent, or plans. Memory appeared grossly intact. Insight and judgment appeared intact and patient exhibited good impulse  control during the appointment.     PHQ-9 SCORE 10/12/2016 4/18/2017 12/22/2017   Total Score 10 10 9       CARLY-7 SCORE 10/12/2016 4/18/2017 12/22/2017   Total Score 9 8 3       Diagnostic Considerations:      A complete diagnostic assessment was not performed at today's visit.  Review of the problem list today indicates the presence of the following diagnoses:    Major Depressive Disorder  Generalized Anxiety Disorder    Plan:  1.  Ms. Leyva will reach out to one or more of the agencies provided today to set up counseling.  She will let us know if she encounters any barriers and would like more help with this.    Katty Mac, PhD.,LP

## 2018-06-08 ASSESSMENT — PATIENT HEALTH QUESTIONNAIRE - PHQ9: SUM OF ALL RESPONSES TO PHQ QUESTIONS 1-9: 9

## 2018-06-08 ASSESSMENT — ANXIETY QUESTIONNAIRES: GAD7 TOTAL SCORE: 4

## 2018-06-19 ENCOUNTER — OFFICE VISIT (OUTPATIENT)
Dept: FAMILY MEDICINE | Facility: CLINIC | Age: 28
End: 2018-06-19
Payer: COMMERCIAL

## 2018-06-19 VITALS
WEIGHT: 288.8 LBS | DIASTOLIC BLOOD PRESSURE: 84 MMHG | OXYGEN SATURATION: 100 % | SYSTOLIC BLOOD PRESSURE: 125 MMHG | HEART RATE: 64 BPM | TEMPERATURE: 98.3 F | BODY MASS INDEX: 45.65 KG/M2

## 2018-06-19 DIAGNOSIS — Z11.3 ROUTINE SCREENING FOR STI (SEXUALLY TRANSMITTED INFECTION): ICD-10-CM

## 2018-06-19 DIAGNOSIS — Z30.015 ENCOUNTER FOR INITIAL PRESCRIPTION OF VAGINAL RING HORMONAL CONTRACEPTIVE: ICD-10-CM

## 2018-06-19 DIAGNOSIS — Z11.3 ROUTINE SCREENING FOR STI (SEXUALLY TRANSMITTED INFECTION): Primary | ICD-10-CM

## 2018-06-19 DIAGNOSIS — Z11.3 SCREEN FOR STD (SEXUALLY TRANSMITTED DISEASE): Primary | ICD-10-CM

## 2018-06-19 DIAGNOSIS — E66.01 MORBID OBESITY (H): Chronic | ICD-10-CM

## 2018-06-19 DIAGNOSIS — Z30.44 ENCOUNTER FOR SURVEILLANCE OF VAGINAL RING HORMONAL CONTRACEPTIVE DEVICE: ICD-10-CM

## 2018-06-19 PROBLEM — Z13.220 LIPID SCREENING: Status: ACTIVE | Noted: 2018-06-19

## 2018-06-19 LAB
ANION GAP SERPL CALCULATED.3IONS-SCNC: 9 MMOL/L (ref 5–18)
BUN SERPL-MCNC: 13 MG/DL (ref 8–22)
CALCIUM SERPL-MCNC: 9.4 MG/DL (ref 8.5–10.5)
CHLORIDE SERPL-SCNC: 106 MMOL/L (ref 98–107)
CHOLEST SERPL-MCNC: 265 MG/DL
CO2 SERPL-SCNC: 23 MMOL/L (ref 22–31)
CREAT SERPL-MCNC: 0.79 MG/DL (ref 0.6–1.1)
FASTING?: ABNORMAL
GLUCOSE SERPL-MCNC: 98 MG/DL (ref 70–125)
HDLC SERPL-MCNC: 57 MG/DL
HIV 1+2 AB+HIV1 P24 AG SERPL QL IA: NEGATIVE
LDLC SERPL CALC-MCNC: 192 MG/DL
POTASSIUM SERPL-SCNC: 3.8 MMOL/L (ref 3.5–5)
SODIUM SERPL-SCNC: 138 MMOL/L (ref 136–145)
TRIGL SERPL-MCNC: 80 MG/DL

## 2018-06-19 RX ORDER — ETONOGESTREL AND ETHINYL ESTRADIOL VAGINAL RING .015; .12 MG/D; MG/D
RING VAGINAL
Qty: 3 EACH | Refills: 11 | Status: SHIPPED | OUTPATIENT
Start: 2018-06-19 | End: 2019-01-28

## 2018-06-19 ASSESSMENT — ANXIETY QUESTIONNAIRES
6. BECOMING EASILY ANNOYED OR IRRITABLE: SEVERAL DAYS
2. NOT BEING ABLE TO STOP OR CONTROL WORRYING: NOT AT ALL
7. FEELING AFRAID AS IF SOMETHING AWFUL MIGHT HAPPEN: NOT AT ALL
IF YOU CHECKED OFF ANY PROBLEMS ON THIS QUESTIONNAIRE, HOW DIFFICULT HAVE THESE PROBLEMS MADE IT FOR YOU TO DO YOUR WORK, TAKE CARE OF THINGS AT HOME, OR GET ALONG WITH OTHER PEOPLE: SOMEWHAT DIFFICULT
3. WORRYING TOO MUCH ABOUT DIFFERENT THINGS: NOT AT ALL
1. FEELING NERVOUS, ANXIOUS, OR ON EDGE: NOT AT ALL
GAD7 TOTAL SCORE: 2
5. BEING SO RESTLESS THAT IT IS HARD TO SIT STILL: NOT AT ALL

## 2018-06-19 ASSESSMENT — PATIENT HEALTH QUESTIONNAIRE - PHQ9: 5. POOR APPETITE OR OVEREATING: SEVERAL DAYS

## 2018-06-19 NOTE — MR AVS SNAPSHOT
"              After Visit Summary   6/19/2018    Dariusz Leyva    MRN: [id number]           Patient Information     Date Of Birth          1990        Visit Information        Provider Department      6/19/2018 4:10 PM Roberto Ramirez MD [facility]        Today's Diagnoses     Screen for STD (sexually transmitted disease)    -  1    Encounter for surveillance of vaginal ring hormonal contraceptive device        Encounter for initial prescription of vaginal ring hormonal contraceptive        Morbid obesity (H)        Routine screening for STI (sexually transmitted infection)          Care Instructions    Using Nuva ring  Now starting menstral flow    1) \"STI\" check: GC/Ch.  HIV.  Syph.  2) Check Lipids and blood sugar    We will contact you with results ?Monday next week  All these tests should be done ONCE  Year:  June 2019          Follow-ups after your visit        Who to contact     Please call your clinic at [phone] to:    Ask questions about your health    Make or cancel appointments    Discuss your medicines    Learn about your test results    Speak to your doctor            Additional Information About Your Visit        [employer] Information     [employer] gives you secure access to your electronic health record. If you see a primary care provider, you can also send messages to your care team and make appointments. If you have questions, please call your primary care clinic.  If you do not have a primary care provider, please call [phone] and they will assist you.      [employer] is an electronic gateway that provides easy, online access to your medical records. With [employer], you can request a clinic appointment, read your test results, renew a prescription or communicate with your care team.     To access your existing account, please contact your [facility] or call [phone] for assistance.        Care EveryWhere ID     This is your Care EveryWhere ID. This " could be used by other organizations to access your [facility] medical records  [id number]        Your Vitals Were     Pulse Temperature Last Period Pulse Oximetry BMI (Body Mass Index)       64 98.3  F (36.8  C) (Oral) 06/17/2018 (Exact Date) 100% 45.65 kg/m2        Blood Pressure from Last 3 Encounters:   06/19/18 125/84   06/07/18 116/79   02/23/18 127/75    Weight from Last 3 Encounters:   06/19/18 288 lb 12.8 oz (131 kg)   06/07/18 282 lb 3.2 oz (128 kg)   02/23/18 276 lb 4 oz (125.3 kg)              We Performed the Following     Basic Metabolic Panel ([place])     Chlamydia/Gono Amplified ([facility])     HIV Ag/Ab Screen [place] ([facility])     Lipid Panel ([facility])     Syphilis Screen [place] (RPR/VDRL) ([facility])          Where to get your medicines      These medications were sent to [id number] - [place], [place] - [address] AT [facility] [address] [facility] [address]  [address], [place] [address]     Phone:  [phone]     etonogestrel-ethinyl estradiol 0.12-0.015 MG/24HR vaginal ring          Primary Care Provider Office Phone # Fax #    Janeth Antionette Rousseau [phone]       [facility] [address] [place] [address]        Equal Access to Services     ELLY ESCOTO AH: Hadii royce tonyo Solaurel, waaxda luqadaha, qaybta kaalmada chito, rosalee tay. So [facility] [phone].    ATENCIÓN: Si habla español, tiene a bates disposición servicios gratuitos de asistencia lingüística. Llame al [phone].    We comply with applicable federal civil rights laws and [place] laws. We do not discriminate on the basis of race, color, national origin, age, disability, sex, sexual orientation, or gender identity.            Thank you!     Thank you for choosing [facility]  for your care. Our goal is always to provide you with excellent care. Hearing back from our patients is one way we can continue to improve our services. Please  take a few minutes to complete the written survey that you may receive in the mail after your visit with us. Thank you!             Your Updated Medication List - Protect others around you: Learn how to safely use, store and throw away your medicines at www.disposemymeds.org.          This list is accurate as of 6/19/18  4:36 PM.  Always use your most recent med list.                   Brand Name Dispense Instructions for use Diagnosis    etonogestrel-ethinyl estradiol 0.12-0.015 MG/24HR vaginal ring    NUVARING    3 each    Insert 1 ring vaginally every 21 days then remove for 1 week then repeat with new ring.    Encounter for initial prescription of vaginal ring hormonal contraceptive       ibuprofen 600 MG tablet    ADVIL/MOTRIN     Take 600 mg by mouth        oxyCODONE-acetaminophen 7.5-325 MG per tablet    PERCOCET     Take 2 tablets by mouth as needed        TRAMADOL HCL PO

## 2018-06-20 LAB
C TRACH RRNA SPEC QL NAA+PROBE: NEGATIVE
N GONORRHOEA RRNA SPEC QL NAA+PROBE: NEGATIVE
TREPONEMA ANTIBODY (SYPHILIS): NEGATIVE

## 2018-06-20 ASSESSMENT — PATIENT HEALTH QUESTIONNAIRE - PHQ9: SUM OF ALL RESPONSES TO PHQ QUESTIONS 1-9: 7

## 2018-06-20 ASSESSMENT — ANXIETY QUESTIONNAIRES: GAD7 TOTAL SCORE: 2

## 2018-06-26 NOTE — PROGRESS NOTES
"       SUBJECTIVE       Dariusz Leyva is a 27 year old  female with a PMH significant for:     Patient Active Problem List   Diagnosis     Lumbago     Nonallopathic lesion of lumbar region     Nonallopathic lesion of sacral region     Pain in thoracic spine     Nonallopathic lesion of thoracic region     Abnormal Pap smear of cervix     Large tonsils     Morbid obesity (H)     MDD (major depressive disorder)     CARLY (generalized anxiety disorder)     Depression     Lipid screening     She presents with request for \"STI check\"  In relationship with one male. New partner.     Using Nuva ring  Now starting menstral flow.    PMH, Medications and Allergies were reviewed and updated as needed.        REVIEW OF SYSTEMS     General: No fevers, chills, sweats, unexplained weight loss  Head: No headache  Neck: No swallowing problems   CV: No chest pain or palpitations  Resp: No shortness of breath.  No cough. No hemoptysis.  GI: No constipation, diarrhea, or blood in stool.  no nausea or vomiting  : No pain passing urine or urinary frequency            OBJECTIVE     Vitals:    06/19/18 1613   BP: 125/84   Pulse: 64   Temp: 98.3  F (36.8  C)   TempSrc: Oral   SpO2: 100%   Weight: 288 lb 12.8 oz (131 kg)     Body mass index is 45.65 kg/(m^2).    Gen:  Well nourished and in NAD  HEENT: PERRLA; TMs normal color and landmarks; nasopharynx pink and moist; oropharynx pink and moist  Neck: supple without lymphadenopathy  CV:  RRR  - no murmurs, rubs, or gallups,   Pulm:  CTAB, no wheezes/rales/rhonchi, good air entry   ABD: soft, nontender, no masses, no rebound, BS intact throughout  Extrem: no cyanosis, edema or clubbing  Psych: Euthymic     No results found for this or any previous visit (from the past 24 hour(s)).        ASSESSMENT AND PLAN     Dariusz was seen today for other and medication reconciliation.    Diagnoses and all orders for this visit:    Screen for STD (sexually transmitted disease)  -     HIV Ag/Ab Screen " "[place] ([facility])  -     Syphilis Screen [place] (RPR/VDRL) ([facility])    Encounter for surveillance of vaginal ring hormonal contraceptive device    Encounter for initial prescription of vaginal ring hormonal contraceptive  -     etonogestrel-ethinyl estradiol (NUVARING) 0.12-0.015 MG/24HR vaginal ring; Insert 1 ring vaginally every 21 days then remove for 1 week then repeat with new ring.    Morbid obesity (H)  -     Cancel: Basic Metabolic Panel ([place])  -     Cancel: Lipid Panel ([place])  -     Basic Metabolic Profile ([facility])  -     Lipid [place] ([facility]) - Results > 1 hr    Routine screening for STI (sexually transmitted infection)  -     Chlamydia/Gono Amplified ([facility])        Patient Instructions   Using Nuva ring  Now starting menstral flow    1) \"STI\" check: GC/Ch.  HIV.  Syph.  2) Check Lipids and blood sugar    We will contact you with results ?Monday next week  All these tests should be done ONCE  Year:  June 2019      Total of 30 minutes was spent in face to face contact with patient with > 50% in counseling and coordination of care.  Options for treatment and/or follow-up care were reviewed with the patient. Dariusz Leyva was engaged and actively involved in the decision making process. She verbalized understanding of the options discussed and was satisfied with the final plan.        Roberto Ramirez MD        "

## 2018-07-11 ENCOUNTER — MEDICAL CORRESPONDENCE (OUTPATIENT)
Dept: HEALTH INFORMATION MANAGEMENT | Facility: CLINIC | Age: 28
End: 2018-07-11

## 2018-07-16 ENCOUNTER — MEDICAL CORRESPONDENCE (OUTPATIENT)
Dept: HEALTH INFORMATION MANAGEMENT | Facility: CLINIC | Age: 28
End: 2018-07-16

## 2018-08-27 ENCOUNTER — OFFICE VISIT (OUTPATIENT)
Dept: ENDOCRINOLOGY | Facility: CLINIC | Age: 28
End: 2018-08-27
Payer: COMMERCIAL

## 2018-08-27 ENCOUNTER — ALLIED HEALTH/NURSE VISIT (OUTPATIENT)
Dept: GASTROENTEROLOGY | Facility: CLINIC | Age: 28
End: 2018-08-27
Payer: COMMERCIAL

## 2018-08-27 VITALS
BODY MASS INDEX: 45.99 KG/M2 | OXYGEN SATURATION: 99 % | HEART RATE: 72 BPM | RESPIRATION RATE: 17 BRPM | SYSTOLIC BLOOD PRESSURE: 130 MMHG | HEIGHT: 67 IN | TEMPERATURE: 98.4 F | WEIGHT: 293 LBS | DIASTOLIC BLOOD PRESSURE: 84 MMHG

## 2018-08-27 DIAGNOSIS — Z71.3 NUTRITIONAL COUNSELING: ICD-10-CM

## 2018-08-27 DIAGNOSIS — E66.01 MORBID OBESITY (H): Primary | ICD-10-CM

## 2018-08-27 RX ORDER — CYCLOBENZAPRINE HCL 10 MG
TABLET ORAL
Refills: 0 | Status: ON HOLD | COMMUNITY
Start: 2018-06-26 | End: 2020-07-30

## 2018-08-27 ASSESSMENT — ENCOUNTER SYMPTOMS
CHILLS: 0
POLYPHAGIA: 0
NECK PAIN: 0
STIFFNESS: 0
ARTHRALGIAS: 0
NIGHT SWEATS: 1
BACK PAIN: 1
ALTERED TEMPERATURE REGULATION: 0
MUSCLE CRAMPS: 0
JOINT SWELLING: 0
HALLUCINATIONS: 0
MYALGIAS: 0
MUSCLE WEAKNESS: 0
FEVER: 0
WEIGHT GAIN: 1
DECREASED APPETITE: 0
POLYDIPSIA: 0
INCREASED ENERGY: 1
WEIGHT LOSS: 0
FATIGUE: 1

## 2018-08-27 NOTE — MR AVS SNAPSHOT
"              After Visit Summary   8/27/2018    Dariusz Leyva    MRN: [id number]           Patient Information     Date Of Birth          1990        Visit Information        Provider Department      8/27/2018 8:30 AM Alberto Gutierres MD [facility] Medical Weight Management        Today's Diagnoses     Morbid obesity (H)    -  1       Follow-ups after your visit        Follow-up notes from your care team     Return in about 4 months (around 12/27/2018).      Who to contact     Please call your clinic at [phone] to:    Ask questions about your health    Make or cancel appointments    Discuss your medicines    Learn about your test results    Speak to your doctor            Additional Information About Your Visit        [employer] Information     Fontself gives you secure access to your electronic health record. If you see a primary care provider, you can also send messages to your care team and make appointments. If you have questions, please call your primary care clinic.  If you do not have a primary care provider, please call [phone] and they will assist you.      Fontself is an electronic gateway that provides easy, online access to your medical records. With Fontself, you can request a clinic appointment, read your test results, renew a prescription or communicate with your care team.     To access your existing account, please contact your [facility] or call [phone] for assistance.        Care EveryWhere ID     This is your Care EveryWhere ID. This could be used by other organizations to access your [facility] medical records  [id number]        Your Vitals Were     Pulse Temperature Respirations Height Pulse Oximetry BMI (Body Mass Index)    72 98.4  F (36.9  C) (Oral) 17 1.702 m (5' 7\") 99% 46.36 kg/m2       Blood Pressure from Last 3 Encounters:   08/27/18 130/84   06/19/18 125/84   06/07/18 116/79    Weight from Last 3 Encounters:   08/27/18 134.3 kg " (296 lb)   06/19/18 131 kg (288 lb 12.8 oz)   06/07/18 128 kg (282 lb 3.2 oz)              Today, you had the following     No orders found for display       Primary Care Provider Office Phone # Fax #    Janeth Antionette Rousseau [phone]       [facility] [address] [place] [address]        Equal Access to Services     DEMI ESCOTO : Hadii aad ku hadasho Soomaali, waaxda luqadaha, qaybta kaalmada adeegyada, waxay idiin hayaan adeholli millard laDouglasaan ah. So [facility] [phone].    ATENCIÓN: Si habla espallyn, tiene a bates disposición servicios gratuitos de asistencia lingüística. [facility] [phone].    We comply with applicable federal civil rights laws and [place] laws. We do not discriminate on the basis of race, color, national origin, age, disability, sex, sexual orientation, or gender identity.            Thank you!     Thank you for choosing [facility] WEIGHT MANAGEMENT  for your care. Our goal is always to provide you with excellent care. Hearing back from our patients is one way we can continue to improve our services. Please take a few minutes to complete the written survey that you may receive in the mail after your visit with us. Thank you!             Your Updated Medication List - Protect others around you: Learn how to safely use, store and throw away your medicines at www.disposemymeds.org.          This list is accurate as of 8/27/18 11:25 AM.  Always use your most recent med list.                   Brand Name Dispense Instructions for use Diagnosis    cyclobenzaprine 10 MG tablet    FLEXERIL     TK ONE T AT NIGHT AS DIRECTED        etonogestrel-ethinyl estradiol 0.12-0.015 MG/24HR vaginal ring    NUVARING    3 each    Insert 1 ring vaginally every 21 days then remove for 1 week then repeat with new ring.    Encounter for initial prescription of vaginal ring hormonal contraceptive       ibuprofen 600 MG tablet    ADVIL/MOTRIN     Take 600 mg by mouth         oxyCODONE-acetaminophen 7.5-325 MG per tablet    PERCOCET     Take 2 tablets by mouth as needed        TRAMADOL HCL PO

## 2018-08-27 NOTE — NURSING NOTE
"Chief Complaint   Patient presents with     Consult     pt here for weight management consult       Vitals:    08/27/18 0837   BP: 130/84   BP Location: Left arm   Patient Position: Sitting   Cuff Size: Adult Large   Pulse: 72   Resp: 17   Temp: 98.4  F (36.9  C)   TempSrc: Oral   SpO2: 99%   Weight: 296 lb   Height: 5' 7\"       Body mass index is 46.36 kg/(m^2).      LORETO Larios, EMT                      "

## 2018-08-27 NOTE — PROGRESS NOTES
"    New Medical Weight Management Consult    PATIENT:  Dariusz Leyva  MRN:         [id number]  :         1990  LEIA:         2018    Dear Janeth Rousseau MD,    I had the pleasure of seeing your patient, Dariusz Leyva.  Full intake/assessment done to determine barriers to weight loss success and develop a treatment plan.  Dariusz Leyva is a 27 year old female interested in treatment of medical problems associated with weight.  Her weight today is 296 lbs 0 oz, Body mass index is 46.36 kg/(m^2)., and she has the following co-morbidities:     2018   I have the following co-morbidities associated with obesity: High Cholesterol, None of the above       Patient Goals Reviewed With Patient 2018   I am interested in attaining a healthier weight to diminish current health problems related to co-morbid conditions: Yes   I am interested in attaining a healthier weight in order to prevent future health problems: Yes       Referring Provider 2018   Please name the provider who referred you to Medical Weight Management.  If you do not know, please answer: \"I Don't Know\". Juan Diego Good       Wt Readings from Last 4 Encounters:   18 134.3 kg (296 lb)   18 131 kg (288 lb 12.8 oz)   18 128 kg (282 lb 3.2 oz)   18 125.3 kg (276 lb 4 oz)       Weight History Reviewed With Patient 2018   How concerned are you about your weight? Very Concerned   I became overweight: As an Adult   The following factors have contributed to my weight gain:  Starting on Medication (Steroids), Eating Wrong Types of Food, Lack of Exercise   I have tried the following methods to lose weight: Watching Portions or Calories, Exercise   I have the following family history of obesity/being overweight:  I am the only one in my immediate family who is overweight   Has anyone in your family had weight loss surgery? No       Diet Recall Reviewed With Patient 2018   How many glasses of juice do you drink " in a typical day? 1   How many of glasses of milk do you drink in a typical day? 0   How many 8oz glasses of sugar containing drinks such as Nino-Aid/sweet tea do you drink in a day? 0   How many cans/bottles of sugar pop/soda/tea/sports drinks do you drink in a day? 1   How many cans/bottles of diet pop/soda/tea or sports drink do you drink in a day? 0   How often do you have a drink of alcohol? Monthly or Less   If you do drink, how many drinks might you have in a day? 1 or 2       Eating Habits Reviewed With Patient 8/27/2018   Generally, my meals include foods like these: bread, pasta, rice, potatoes, corn, crackers, sweet dessert, pop, or juice. Never   Generally, my meals include foods like these: fried meats, brats, burgers, french fries, pizza, cheese, chips, or ice cream. A Few Times a Week   Eat fast food (like Sc[employer], [employer]age, Taco Bell). A Few Times a Week   Eat at a buffet or sit-down restaurant. A Few Times a Week   Eat most of my meals in front of the TV or computer. Almost Everyday   Often skip meals, eat at random times, have no regular eating times. A Few Times a Week   Rarely sit down for a meal but snack or graze throughout.  Half of the Week   Eat extra snacks between meals. A Few Times a Week   Eat most of my food at the end of the day. A Few Times a Week   Eat in the middle of the night or wake up at night to eat. Never   Eat extra snacks to prevent or correct low blood sugar. Never   Eat to prevent acid reflux or stomach pain. Never   Worry about not having enough food to eat. Half of the Week   Have you been to the food shelf at least a few times this year? Yes   I eat when I am depressed, stressed, anxious, or bored. Never   I eat when I am happy or as a reward. Never   I feel hungry all the time even if I just have eaten. Never   Feeling full is important to me. A Few Times a Week   Once I start eating, it is hard to stop. Never   I finish all the food on my plate even if I am  already full. A Few Times a Week   I can't resist eating delicious food or walk past the good food/smell. Never   I eat/snack without noticing that I am eating. Never   I eat when I am preparing the meal. Never   I eat more than usual when I see others eating. Never   I have trouble not eating sweets, ice cream, cookies, or chips if they are around the house. Never   I think about food all day. Never   What foods, if any, do you crave? Sweets/Candy/Chocolate   I feel out of control when eating. Never   I eat a large amount of food, like a loaf of bread, a box of cookies, a pint/quart of ice cream, all at once. Never   I eat a large amount of food even when I am not hungry. Never   I eat rapidly. Never   I eat alone because I feel embarrassed and do not want others to see how much I have eaten. Never   I eat until I am uncomfortably full. Never   I feel bad, disgusted, or guilty after I overeat. Never   I make myself vomit what I have eaten or use laxatives to get rid of food. Never       Activity/Exercise History Reviewed With Patient 8/27/2018   How much of a typical 12 hour day do you spend sitting? Most of the Day   How much of a typical 12 hour day do you spend lying down? Less Than Half the Day   How much of a typical day do you spend walking/standing? Less Than Half the Day   How many hours (not including work) do you spend on the TV/Video Games/Computer/Tablet/Phone? 4-5 Hours   How many times a week are you active for the purpose of exercise? Once a Week   How many total minutes do you spend doing some activity for the purpose of exercising when you exercise? Less Than 15 Minutes   What keeps you from being more active? Pain, Lack of Time, Too tired       PAST MEDICAL HISTORY:  Past Medical History:   Diagnosis Date     Depressive disorder        Work/Social History Reviewed With Patient 8/27/2018   My employment status is: Full-Time   My job is: Customer service [employer]   How much of your job is spent  "on the computer or phone? 100%   What is your marital status? Single   If in a relationship, is your significant other overweight? N/A   Do you have children? No   If you have children, are they overweight? N/A       Mental Health History Reviewed With Patient 8/27/2018   Have you ever been physically or sexually abused? No   How often in the past 2 weeks have you felt little interest or pleasure in doing things? For Several Days   Over the past 2 weeks how often have you felt down, depressed, or hopeless? Not at all       Sleep History Reviewed With Patient 8/27/2018   How many hours do you sleep at night? 6   Do you think that you snore loudly or has anybody ever heard you snore loudly (louder than talking or so loud it can be heard behind a shut door)? No   Has anyone seen or heard you stop breathing during your sleep? No   Do you often feel tired, fatigued, or sleepy during the day? Yes       MEDICATIONS:   Current Outpatient Prescriptions   Medication Sig Dispense Refill     cyclobenzaprine (FLEXERIL) 10 MG tablet TK ONE T AT NIGHT AS DIRECTED  0     etonogestrel-ethinyl estradiol (NUVARING) 0.12-0.015 MG/24HR vaginal ring Insert 1 ring vaginally every 21 days then remove for 1 week then repeat with new ring. 3 each 11     ibuprofen (ADVIL/MOTRIN) 600 MG tablet Take 600 mg by mouth       oxyCODONE-acetaminophen (PERCOCET) 7.5-325 MG per tablet Take 2 tablets by mouth as needed        TRAMADOL HCL PO          ALLERGIES:   Allergies   Allergen Reactions     Nka [No Known Allergies]      Seasonal Allergies Other (See Comments)     Drainage and sometimes break out in hives        PHYSICAL EXAM:  /84 (BP Location: Left arm, Patient Position: Sitting, Cuff Size: Adult Large)  Pulse 72  Temp 98.4  F (36.9  C) (Oral)  Resp 17  Ht 1.702 m (5' 7\")  Wt 134.3 kg (296 lb)  SpO2 99%  BMI 46.36 kg/m2   A & O x 3  HEENT: NCAT, mucous membranes moist  Respirations unlabored  Location of obesity: Mixed " Obesity    ASSESSMENT:  Dariusz is a patient with mature onset morbid obesity without significant element of familial/genetic influence and with current health consequences. She does need aggressive weight loss plan due to High Cholesterol, severe weight.      Dariusz Leyva eats a high carb diet, tends to snack/graze throughout day, rarely sitting to eat a true meal and has a disorganized meal pattern.    Her problem is complicated by strong craving/reward pathways and unhelpful lifestyle choices    Her ability to lose weight is impacted by physical impairment, lack of confidence, lack of education on nutrition and dietary needs and socioeconomic situation.    PLAN:    Dietician visit of education  Volumetrics eating plan  Meal planning    Programmatic/Healthy Living  Ancillary testing:  N/A.  Food Plan:  Volumetrics and High protein/low carbohydrate.  Activity Plan:  Exercise after meals.  Supplementary:   N/A.    Medication: Deferred    RTC:    12 weeks.  I spent 45 minutes with this patient face to face and explained the conditions and plans (more than 50% of time was counseling/coordination of weight management).    Sincerely,    Alberto Gutierres MD
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
## 2018-08-27 NOTE — LETTER
"2018       RE: Dariusz Leyva  [address]  [place] [address]     Dear Colleague,    Thank you for referring your patient, Dariusz Leyva, to the [facility] MEDICAL WEIGHT MANAGEMENT at [facility]. Please see a copy of my visit note below.    New Medical Weight Management Consult    PATIENT:  Dariusz Leyva  MRN:         [id number]  :         1990  LEIA:         2018    Dear Janeth Rousseau MD,    I had the pleasure of seeing your patient, Dariusz Leyva.  Full intake/assessment done to determine barriers to weight loss success and develop a treatment plan.  Dariusz Leyva is a 27 year old female interested in treatment of medical problems associated with weight.  Her weight today is 296 lbs 0 oz, Body mass index is 46.36 kg/(m^2)., and she has the following co-morbidities:     2018   I have the following co-morbidities associated with obesity: High Cholesterol, None of the above       Patient Goals Reviewed With Patient 2018   I am interested in attaining a healthier weight to diminish current health problems related to co-morbid conditions: Yes   I am interested in attaining a healthier weight in order to prevent future health problems: Yes       Referring Provider 2018   Please name the provider who referred you to Medical Weight Management.  If you do not know, please answer: \"I Don't Know\". Juan Diego Good       Wt Readings from Last 4 Encounters:   18 134.3 kg (296 lb)   18 131 kg (288 lb 12.8 oz)   18 128 kg (282 lb 3.2 oz)   18 125.3 kg (276 lb 4 oz)       Weight History Reviewed With Patient 2018   How concerned are you about your weight? Very Concerned   I became overweight: As an Adult   The following factors have contributed to my weight gain:  Starting on Medication (Steroids), Eating Wrong Types of Food, Lack of Exercise   I have tried the following methods to lose weight: Watching Portions or " Calories, Exercise   I have the following family history of obesity/being overweight:  I am the only one in my immediate family who is overweight   Has anyone in your family had weight loss surgery? No       Diet Recall Reviewed With Patient 8/27/2018   How many glasses of juice do you drink in a typical day? 1   How many of glasses of milk do you drink in a typical day? 0   How many 8oz glasses of sugar containing drinks such as Nino-Aid/sweet tea do you drink in a day? 0   How many cans/bottles of sugar pop/soda/tea/sports drinks do you drink in a day? 1   How many cans/bottles of diet pop/soda/tea or sports drink do you drink in a day? 0   How often do you have a drink of alcohol? Monthly or Less   If you do drink, how many drinks might you have in a day? 1 or 2       Eating Habits Reviewed With Patient 8/27/2018   Generally, my meals include foods like these: bread, pasta, rice, potatoes, corn, crackers, sweet dessert, pop, or juice. Never   Generally, my meals include foods like these: fried meats, brats, burgers, french fries, pizza, cheese, chips, or ice cream. A Few Times a Week   Eat fast food (like [employer], [employer], Taco Bell). A Few Times a Week   Eat at a buffet or sit-down restaurant. A Few Times a Week   Eat most of my meals in front of the TV or computer. Almost Everyday   Often skip meals, eat at random times, have no regular eating times. A Few Times a Week   Rarely sit down for a meal but snack or graze throughout.  Half of the Week   Eat extra snacks between meals. A Few Times a Week   Eat most of my food at the end of the day. A Few Times a Week   Eat in the middle of the night or wake up at night to eat. Never   Eat extra snacks to prevent or correct low blood sugar. Never   Eat to prevent acid reflux or stomach pain. Never   Worry about not having enough food to eat. Half of the Week   Have you been to the food shelf at least a few times this year? Yes   I eat when I am depressed,  stressed, anxious, or bored. Never   I eat when I am happy or as a reward. Never   I feel hungry all the time even if I just have eaten. Never   Feeling full is important to me. A Few Times a Week   Once I start eating, it is hard to stop. Never   I finish all the food on my plate even if I am already full. A Few Times a Week   I can't resist eating delicious food or walk past the good food/smell. Never   I eat/snack without noticing that I am eating. Never   I eat when I am preparing the meal. Never   I eat more than usual when I see others eating. Never   I have trouble not eating sweets, ice cream, cookies, or chips if they are around the house. Never   I think about food all day. Never   What foods, if any, do you crave? Sweets/Candy/Chocolate   I feel out of control when eating. Never   I eat a large amount of food, like a loaf of bread, a box of cookies, a pint/quart of ice cream, all at once. Never   I eat a large amount of food even when I am not hungry. Never   I eat rapidly. Never   I eat alone because I feel embarrassed and do not want others to see how much I have eaten. Never   I eat until I am uncomfortably full. Never   I feel bad, disgusted, or guilty after I overeat. Never   I make myself vomit what I have eaten or use laxatives to get rid of food. Never       Activity/Exercise History Reviewed With Patient 8/27/2018   How much of a typical 12 hour day do you spend sitting? Most of the Day   How much of a typical 12 hour day do you spend lying down? Less Than Half the Day   How much of a typical day do you spend walking/standing? Less Than Half the Day   How many hours (not including work) do you spend on the TV/Video Games/Computer/Tablet/Phone? 4-5 Hours   How many times a week are you active for the purpose of exercise? Once a Week   How many total minutes do you spend doing some activity for the purpose of exercising when you exercise? Less Than 15 Minutes   What keeps you from being more  active? Pain, Lack of Time, Too tired       PAST MEDICAL HISTORY:  Past Medical History:   Diagnosis Date     Depressive disorder        Work/Social History Reviewed With Patient 8/27/2018   My employment status is: Full-Time   My job is: Customer service call center   How much of your job is spent on the computer or phone? 100%   What is your marital status? Single   If in a relationship, is your significant other overweight? N/A   Do you have children? No   If you have children, are they overweight? N/A       Mental Health History Reviewed With Patient 8/27/2018   Have you ever been physically or sexually abused? No   How often in the past 2 weeks have you felt little interest or pleasure in doing things? For Several Days   Over the past 2 weeks how often have you felt down, depressed, or hopeless? Not at all       Sleep History Reviewed With Patient 8/27/2018   How many hours do you sleep at night? 6   Do you think that you snore loudly or has anybody ever heard you snore loudly (louder than talking or so loud it can be heard behind a shut door)? No   Has anyone seen or heard you stop breathing during your sleep? No   Do you often feel tired, fatigued, or sleepy during the day? Yes       MEDICATIONS:   Current Outpatient Prescriptions   Medication Sig Dispense Refill     cyclobenzaprine (FLEXERIL) 10 MG tablet TK ONE T AT NIGHT AS DIRECTED  0     etonogestrel-ethinyl estradiol (NUVARING) 0.12-0.015 MG/24HR vaginal ring Insert 1 ring vaginally every 21 days then remove for 1 week then repeat with new ring. 3 each 11     ibuprofen (ADVIL/MOTRIN) 600 MG tablet Take 600 mg by mouth       oxyCODONE-acetaminophen (PERCOCET) 7.5-325 MG per tablet Take 2 tablets by mouth as needed        TRAMADOL HCL PO          ALLERGIES:   Allergies   Allergen Reactions     Nka [No Known Allergies]      Seasonal Allergies Other (See Comments)     Drainage and sometimes break out in hives        PHYSICAL EXAM:  /84 (BP Location:  "Left arm, Patient Position: Sitting, Cuff Size: Adult Large)  Pulse 72  Temp 98.4  F (36.9  C) (Oral)  Resp 17  Ht 1.702 m (5' 7\")  Wt 134.3 kg (296 lb)  SpO2 99%  BMI 46.36 kg/m2   A & O x 3  HEENT: NCAT, mucous membranes moist  Respirations unlabored  Location of obesity: Mixed Obesity    ASSESSMENT:  Dariusz is a patient with mature onset morbid obesity without significant element of familial/genetic influence and with current health consequences. She does need aggressive weight loss plan due to High Cholesterol, severe weight.      Dariusz Leyva eats a high carb diet, tends to snack/graze throughout day, rarely sitting to eat a true meal and has a disorganized meal pattern.    Her problem is complicated by strong craving/reward pathways and unhelpful lifestyle choices    Her ability to lose weight is impacted by physical impairment, lack of confidence, lack of education on nutrition and dietary needs and socioeconomic situation.    PLAN:    Dietician visit of education  Volumetrics eating plan  Meal planning    Programmatic/Healthy Living  Ancillary testing:  N/A.  Food Plan:  Volumetrics and High protein/low carbohydrate.  Activity Plan:  Exercise after meals.  Supplementary:   N/A.    Medication: Deferred    RTC:    12 weeks.  I spent 45 minutes with this patient face to face and explained the conditions and plans (more than 50% of time was counseling/coordination of weight management).    Sincerely,    Alberto Gutierres MD  "

## 2018-08-27 NOTE — PATIENT INSTRUCTIONS
It was nice meeting you today:  1. Aim for getting at least 10 minutes of exercise/activity each day.  2. Pre-plan meals/menu ahead of time  3. Keep daily food and beverage journal  4. Use The Plate Method meal plan provided at visit today for guidance on getting 3 balanced meals per day.  5. Consistently eat 3 balanced meals per day, no skipping.  6. Eat breakfast within 60-90 minutes of getting up. And make homemade breakfast sandwich for example.  7. Can replace a meal with a protein drink    *Protein Shake Criteria: no more than 250 Calories, at least 20 grams of protein, and less than 10 grams of sugar     Meal Replacement Shake Options:   [employer]wise [facility] smoothie (160 Calories, 20 g protein)   Premier Protein (160 Calories, 30 g protein)  Slim Fast Advanced Nutrition (180 Calories, 20 g protein)  Muscle Milk, lactose-free, 17 oz bottle (210 Calories, 30 g protein)  Integrated Supplements, no artificial sugars (110 Calories, 20 g protein)  Quest Protein Bars (190 Calories, 20 g protein)  No Cow Protein Bar, gluten, dairy, and soy free (200 Calories, 20 g protein)    If you would like to schedule a follow up appointment with Arianna Worthington Registered Dietitian, please call [phone].

## 2018-08-27 NOTE — MR AVS SNAPSHOT
After Visit Summary   8/27/2018    Dariusz Leyva    MRN: [id number]           Patient Information     Date Of Birth          1990        Visit Information        Provider Department      8/27/2018 10:00 AM Arianna Worthington RD [facility]        Care Instructions    It was nice meeting you today:  1. Aim for getting at least 10 minutes of exercise/activity each day.  2. Pre-plan meals/menu ahead of time  3. Keep daily food and beverage journal  4. Use The Plate Method meal plan provided at visit today for guidance on getting 3 balanced meals per day.  5. Consistently eat 3 balanced meals per day, no skipping.  6. Eat breakfast within 60-90 minutes of getting up. And make homemade breakfast sandwich for example.  7. Can replace a meal with a protein drink    *Protein Shake Criteria: no more than 210 Calories, at least 20 grams of protein, and less than 10 grams of sugar     Meal Replacement Shake Options:   [facility] smoothie (160 Calories, 20 g protein)   Premier Protein (160 Calories, 30 g protein)  Slim Fast Advanced Nutrition (180 Calories, 20 g protein)  Muscle Milk, lactose-free, 17 oz bottle (210 Calories, 30 g protein)  Integrated Supplements, no artificial sugars (110 Calories, 20 g protein)  Quest Protein Bars (190 Calories, 20 g protein)  No Cow Protein Bar, gluten, dairy, and soy free (200 Calories, 20 g protein)    If you would like to schedule a follow up appointment with Arianna Worthington, Registered Dietitian, please call [phone].                Follow-ups after your visit        Who to contact     Please call your clinic at [phone] to:    Ask questions about your health    Make or cancel appointments    Discuss your medicines    Learn about your test results    Speak to your doctor            Additional Information About Your Visit        [employer] Information     Tomveyi Bidamon gives you secure access to your electronic health record. If you see a  primary care provider, you can also send messages to your care team and make appointments. If you have questions, please call your primary care clinic.  If you do not have a primary care provider, please call [phone] and they will assist you.      [employer]illa is an electronic gateway that provides easy, online access to your medical records. With [employer], you can request a clinic appointment, read your test results, renew a prescription or communicate with your care team.     To access your existing account, please contact your [facility] or call [phone] for assistance.        Care EveryWhere ID     This is your Care EveryWhere ID. This could be used by other organizations to access your [place] medical records  [id number]         Blood Pressure from Last 3 Encounters:   08/27/18 130/84   06/19/18 125/84   06/07/18 116/79    Weight from Last 3 Encounters:   08/27/18 134.3 kg (296 lb)   06/19/18 131 kg (288 lb 12.8 oz)   06/07/18 128 kg (282 lb 3.2 oz)              Today, you had the following     No orders found for display       Primary Care Provider Office Phone # Fax #    Janeth Antionette Rousseau [phone]       [facility] [address] [place] [address]        Equal Access to Services     ELLY ESCOTO : Hadii aad ku hadasho Soomaali, waaxda luqadaha, qaybta kaalmada adeegyada, rosalee tay. So [facility] [phone].    ATENCIÓN: Si habla español, tiene a bates disposición servicios gratuitos de asistencia lingüística. Llame al [phone].    We comply with applicable federal civil rights laws and [place] laws. We do not discriminate on the basis of race, color, national origin, age, disability, sex, sexual orientation, or gender identity.            Thank you!     Thank you for choosing [facility]  for your care. Our goal is always to provide you with excellent care. Hearing back from our  patients is one way we can continue to improve our services. Please take a few minutes to complete the written survey that you may receive in the mail after your visit with us. Thank you!             Your Updated Medication List - Protect others around you: Learn how to safely use, store and throw away your medicines at www.disposemymeds.org.          This list is accurate as of 8/27/18 10:30 AM.  Always use your most recent med list.                   Brand Name Dispense Instructions for use Diagnosis    cyclobenzaprine 10 MG tablet    FLEXERIL     TK ONE T AT NIGHT AS DIRECTED        etonogestrel-ethinyl estradiol 0.12-0.015 MG/24HR vaginal ring    NUVARING    3 each    Insert 1 ring vaginally every 21 days then remove for 1 week then repeat with new ring.    Encounter for initial prescription of vaginal ring hormonal contraceptive       ibuprofen 600 MG tablet    ADVIL/MOTRIN     Take 600 mg by mouth        oxyCODONE-acetaminophen 7.5-325 MG per tablet    PERCOCET     Take 2 tablets by mouth as needed        TRAMADOL HCL PO

## 2018-08-31 ENCOUNTER — TELEPHONE (OUTPATIENT)
Dept: BEHAVIORAL HEALTH | Facility: CLINIC | Age: 28
End: 2018-08-31

## 2018-08-31 NOTE — TELEPHONE ENCOUNTER
Caller Name and Relationship Dariusz  Patient Age 27  What do you need to be seen for? (brief) Not sure if she was referred for eating habits.  Do you have any mental health diagnoses and what are they? Anxiety, Depression  Do you have any mental health providers and who are they? No  Is this a court ordered eval? No

## 2018-09-17 ENCOUNTER — OFFICE VISIT (OUTPATIENT)
Dept: BEHAVIORAL HEALTH | Facility: CLINIC | Age: 28
End: 2018-09-17
Payer: COMMERCIAL

## 2018-09-17 DIAGNOSIS — F34.1 DYSTHYMIC DISORDER: Primary | ICD-10-CM

## 2018-09-17 NOTE — MR AVS SNAPSHOT
After Visit Summary   9/17/2018    Dariusz Leyva    MRN: [id number]           Patient Information     Date Of Birth          1990        Visit Information        Provider Department      9/17/2018 2:30 PM Zaheer Patel MD [facility]        Today's Diagnoses     Dysthymic disorder    -  1       Follow-ups after your visit        Who to contact     Please call your clinic at [phone] to:    Ask questions about your health    Make or cancel appointments    Discuss your medicines    Learn about your test results    Speak to your doctor            Additional Information About Your Visit        MyChart Information     Fancy gives you secure access to your electronic health record. If you see a primary care provider, you can also send messages to your care team and make appointments. If you have questions, please call your primary care clinic.  If you do not have a primary care provider, please call [phone] and they will assist you.      Fancy is an electronic gateway that provides easy, online access to your medical records. With [employer]ancy, you can request a clinic appointment, read your test results, renew a prescription or communicate with your care team.     To access your existing account, please contact your [facility] or call [phone] for assistance.        Care EveryWhere ID     This is your Care EveryWhere ID. This could be used by other organizations to access your [facility] medical records  [id number]         Blood Pressure from Last 3 Encounters:   08/27/18 130/84   06/19/18 125/84   06/07/18 116/79    Weight from Last 3 Encounters:   08/27/18 134.3 kg (296 lb)   06/19/18 131 kg (288 lb 12.8 oz)   06/07/18 128 kg (282 lb 3.2 oz)              Today, you had the following     No orders found for display       Primary Care Provider Office Phone # Fax #    Janeth Rousseau [phone]        [facility] [address] [place] [address]        Equal Access to Services     ELLY ESCOTO : Hadii aad ku hadisislara [place], erin mckinley, allegragemini degrootmalauro dale, rosalee reidparesholiver tay. So [facility] [phone].    ATENCIÓN: Si habla español, tiene a bates disposición servicios gratuitos de asistencia lingüística. [facility] [phone].    We comply with applicable federal civil rights laws and [place] laws. We do not discriminate on the basis of race, color, national origin, age, disability, sex, sexual orientation, or gender identity.            Thank you!     Thank you for choosing [facility]  for your care. Our goal is always to provide you with excellent care. Hearing back from our patients is one way we can continue to improve our services. Please take a few minutes to complete the written survey that you may receive in the mail after your visit with us. Thank you!             Your Updated Medication List - Protect others around you: Learn how to safely use, store and throw away your medicines at www.disposemymeds.org.          This list is accurate as of 9/17/18 11:59 PM.  Always use your most recent med list.                   Brand Name Dispense Instructions for use Diagnosis    cyclobenzaprine 10 MG tablet    FLEXERIL     TK ONE T AT NIGHT AS DIRECTED        etonogestrel-ethinyl estradiol 0.12-0.015 MG/24HR vaginal ring    NUVARING    3 each    Insert 1 ring vaginally every 21 days then remove for 1 week then repeat with new ring.    Encounter for initial prescription of vaginal ring hormonal contraceptive       ibuprofen 600 MG tablet    ADVIL/MOTRIN     Take 600 mg by mouth        oxyCODONE-acetaminophen 7.5-325 MG per tablet    PERCOCET     Take 2 tablets by mouth as needed        TRAMADOL HCL PO
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
## 2018-09-24 ENCOUNTER — OFFICE VISIT (OUTPATIENT)
Dept: BEHAVIORAL HEALTH | Facility: CLINIC | Age: 28
End: 2018-09-24
Payer: COMMERCIAL

## 2018-09-24 DIAGNOSIS — F34.1 DYSTHYMIC DISORDER: Primary | ICD-10-CM

## 2018-09-24 NOTE — MR AVS SNAPSHOT
After Visit Summary   9/24/2018    Dariusz Leyva    MRN: [id number]           Patient Information     Date Of Birth          1990        Visit Information        Provider Department      9/24/2018 3:30 PM Zaheer Patel MD [email or web address]        Today's Diagnoses     Dysthymic disorder    -  1       Follow-ups after your visit        Your next 10 appointments already scheduled     Oct 01, 2018  3:30 PM CDT   Adult Psychotherapy with Zaheer Patel MD   [email or web address] ([email or web address] Affiliate Clinics)    [address] [place] [address] [id number] [phone]              Who to contact     Please call your clinic at [phone] to:    Ask questions about your health    Make or cancel appointments    Discuss your medicines    Learn about your test results    Speak to your doctor            Additional Information About Your Visit        MyChart Information     Regalamos gives you secure access to your electronic health record. If you see a primary care provider, you can also send messages to your care team and make appointments. If you have questions, please call your primary care clinic.  If you do not have a primary care provider, please call [phone] and they will assist you.      Regalamos is an electronic gateway that provides easy, online access to your medical records. With [employer], you can request a clinic appointment, read your test results, renew a prescription or communicate with your care team.     To access your existing account, please contact your [facility] or call [phone] for assistance.        Care EveryWhere ID     This is your Care EveryWhere ID. This could be used by other organizations to access your [place] medical records  [id number]         Blood Pressure from Last 3 Encounters:   08/27/18 130/84   06/19/18 125/84   06/07/18 116/79    Weight from Last 3 Encounters:   08/27/18  134.3 kg (296 lb)   06/19/18 131 kg (288 lb 12.8 oz)   06/07/18 128 kg (282 lb 3.2 oz)              Today, you had the following     No orders found for display       Primary Care Provider Office Phone # Fax #    Janeth Antionette Rousseau [phone]       [facility] [address] [place] [address]        Equal Access to Services     ELLY ESCOTO : Hadii aad ku hadasho Soomaali, waaxda luqadaha, qaybta kaalmada adeegyada, waxay idiin haymariellen adeeg venice laDouglasdomingo ah. So [facility] [phone].    ATENCIÓN: Si patricio fonseca, tiene a bates disposición servicios gratuitos de asistencia lingüística. Llame al [phone].    We comply with applicable federal civil rights laws and [place] laws. We do not discriminate on the basis of race, color, national origin, age, disability, sex, sexual orientation, or gender identity.            Thank you!     Thank you for choosing [facility]  for your care. Our goal is always to provide you with excellent care. Hearing back from our patients is one way we can continue to improve our services. Please take a few minutes to complete the written survey that you may receive in the mail after your visit with us. Thank you!             Your Updated Medication List - Protect others around you: Learn how to safely use, store and throw away your medicines at www.disposemymeds.org.          This list is accurate as of 9/24/18 11:59 PM.  Always use your most recent med list.                   Brand Name Dispense Instructions for use Diagnosis    cyclobenzaprine 10 MG tablet    FLEXERIL     TK ONE T AT NIGHT AS DIRECTED        etonogestrel-ethinyl estradiol 0.12-0.015 MG/24HR vaginal ring    NUVARING    3 each    Insert 1 ring vaginally every 21 days then remove for 1 week then repeat with new ring.    Encounter for initial prescription of vaginal ring hormonal contraceptive       ibuprofen 600 MG tablet    ADVIL/MOTRIN     Take 600 mg by mouth         oxyCODONE-acetaminophen 7.5-325 MG per tablet    PERCOCET     Take 2 tablets by mouth as needed        TRAMADOL HCL PO

## 2018-09-25 NOTE — PROGRESS NOTES
"OUTPATIENT PSYCHOTHERAPY PROGRESS NOTE    Client Name: Dariusz Leyva   YOB: 1990 (28 year old)   Date of Service:  Sep 24, 2018  Time of Service: 3:30 to 4:30 (60 minutes)  Service Type(s):99891 psychotherapy (53-60 min. with patient and/or family)    Diagnoses:   Encounter Diagnosis   Name Primary?     Dysthymic disorder Yes       Individuals Present:   Client attended alone    Treatment goal(s) being addressed:   Discussed Dariusz's goals for treatment and interpersonal values    Subjective:  Patient reports difficult weekend and that she was lonely. Patient said she was doing \"okay\" today. Dariusz continues to go to work and is taking on an additional job.    Treatment:   Dariusz discussed feeling lonely over the weekend. She stated that she reached out to a few friends but was unable to find someone to do something with so spent the weekend binge watching netflix. Discussed interpersonal situation with coworker who stopped talking to her. Dariusz brought up her recurring pattern of making and losing friends for various reasons. She discussed feeling as if she always \"gave\" but never \"received\" within her friendships. We discussed the values that Dariusz had for friendship and possibilities for engaging with people with similar values.       Assessment and Progress:   Patient appeared engaged in treatment. Insight appears fair although Dariusz struggled to accept clinician pondered alternatives to interpersonal situations.  Patient denied current suicidal ideations.    Plan:   No homework was assigned for this session. Next therapy appointment has been scheduled for 10/1/18 to continue work on treatment goals.      Treatment Plan review due: 10/1/18      BOOKER Ambrocio.  Practicum Student    I did not see this patient directly, but have discussed the session with the above trainee and approve this documentation.     Mirian Dietrich, PhD, LP                                                "                                     Clinical Supervisor

## 2018-10-01 ENCOUNTER — OFFICE VISIT (OUTPATIENT)
Dept: BEHAVIORAL HEALTH | Facility: CLINIC | Age: 28
End: 2018-10-01
Payer: COMMERCIAL

## 2018-10-01 DIAGNOSIS — F34.1 DYSTHYMIC DISORDER: Primary | ICD-10-CM

## 2018-10-01 NOTE — MR AVS SNAPSHOT
After Visit Summary   10/1/2018    Dariusz Leyva    MRN: [id number]           Patient Information     Date Of Birth          1990        Visit Information        Provider Department      10/1/2018 3:30 PM Zaheer Patel MD [facility]        Today's Diagnoses     Dysthymic disorder    -  1       Follow-ups after your visit        Who to contact     Please call your clinic at [phone] to:    Ask questions about your health    Make or cancel appointments    Discuss your medicines    Learn about your test results    Speak to your doctor            Additional Information About Your Visit        MyChart Information     K[employer] gives you secure access to your electronic health record. If you see a primary care provider, you can also send messages to your care team and make appointments. If you have questions, please call your primary care clinic.  If you do not have a primary care provider, please call [phone] and they will assist you.      K[employer]Call is an electronic gateway that provides easy, online access to your medical records. With [employer], you can request a clinic appointment, read your test results, renew a prescription or communicate with your care team.     To access your existing account, please contact your [facility] or call [phone] for assistance.        Care EveryWhere ID     This is your Care EveryWhere ID. This could be used by other organizations to access your [place] medical records  [id number]         Blood Pressure from Last 3 Encounters:   08/27/18 130/84   06/19/18 125/84   06/07/18 116/79    Weight from Last 3 Encounters:   08/27/18 134.3 kg (296 lb)   06/19/18 131 kg (288 lb 12.8 oz)   06/07/18 128 kg (282 lb 3.2 oz)              Today, you had the following     No orders found for display       Primary Care Provider Office Phone # Fax #    Janeth Rousseau [phone]        [facility] [address] [place] [address]        Equal Access to Services     ELLY ESCOTO : Hadii aad ku hadisislara [place], wamarquezlauro mckinley, allegragemini degrootmalauro dale, rosalee reidparesholiver tay. So [facility] [phone].    ATENCIÓN: Si habla español, tiene a bates disposición servicios gratuitos de asistencia lingüística. [facility] [phone].    We comply with applicable federal civil rights laws and [place] laws. We do not discriminate on the basis of race, color, national origin, age, disability, sex, sexual orientation, or gender identity.            Thank you!     Thank you for choosing [facility]  for your care. Our goal is always to provide you with excellent care. Hearing back from our patients is one way we can continue to improve our services. Please take a few minutes to complete the written survey that you may receive in the mail after your visit with us. Thank you!             Your Updated Medication List - Protect others around you: Learn how to safely use, store and throw away your medicines at www.disposemymeds.org.          This list is accurate as of 10/1/18 11:59 PM.  Always use your most recent med list.                   Brand Name Dispense Instructions for use Diagnosis    cyclobenzaprine 10 MG tablet    FLEXERIL     TK ONE T AT NIGHT AS DIRECTED        etonogestrel-ethinyl estradiol 0.12-0.015 MG/24HR vaginal ring    NUVARING    3 each    Insert 1 ring vaginally every 21 days then remove for 1 week then repeat with new ring.    Encounter for initial prescription of vaginal ring hormonal contraceptive       ibuprofen 600 MG tablet    ADVIL/MOTRIN     Take 600 mg by mouth        oxyCODONE-acetaminophen 7.5-325 MG per tablet    PERCOCET     Take 2 tablets by mouth as needed        TRAMADOL HCL PO

## 2018-10-03 NOTE — PROGRESS NOTES
OUTPATIENT PSYCHOTHERAPY PROGRESS NOTE    Client Name: Dariusz Leyva   YOB: 1990 (28 year old)   Date of Service:  Oct 1, 2018  Time of Service: 3:30 to 4:30 (65 minutes)  Service Type(s): 79178 psychotherapy (53-60 min. with patient and/or family)    Diagnoses:   Encounter Diagnosis   Name Primary?     Dysthymic disorder Yes       Individuals Present:   Client attended alone    Treatment goal(s) being addressed:   Addressing negative thinking    Subjective:  Patient started a new job last weekend. Reports enjoying the job so far but fears that she will not onboard fast enough and will be fired. Patient also noted tiredness due to juggling 3 jobs in addition to school. Patient is traveling to [place] later this week to attend a [email or web address]. She states she is excited for this.     Treatment:   Spent most of treatment discussing therapy goals and completing a behavioral plan.  Patient discussed goals of changing negative thinking, reducing anxiety, and feeling less alone.    Assessment and Progress:   Patient was talkative during session, prone to going on tangents during treatment planning. Patient reported passive suicidal ideation but no immediate safety concerns. Dariusz continues to reject alternate possible explanations of interpersonal events.     Plan:   Next therapy appointment has not yet been scheduled. Plan to follow-up upon return to [facility].    Treatment Plan review due: 1/1/19      Zaheer Patel BS  Practicum Student    I did not see this patient directly, but have discussed the session with the above trainee and approve this documentation.     Mirian Dietrich, PhD,                                                                                  Clinical Supervisor

## 2018-11-12 ENCOUNTER — OFFICE VISIT (OUTPATIENT)
Dept: BEHAVIORAL HEALTH | Facility: CLINIC | Age: 28
End: 2018-11-12
Payer: COMMERCIAL

## 2018-11-12 DIAGNOSIS — F34.1 DYSTHYMIC DISORDER: Primary | ICD-10-CM

## 2018-11-12 NOTE — MR AVS SNAPSHOT
After Visit Summary   11/12/2018    Dariusz Leyva    MRN: [id number]           Patient Information     Date Of Birth          1990        Visit Information        Provider Department      11/12/2018 9:00 AM Zaheer Ptael MD [facility]        Today's Diagnoses     Dysthymic disorder    -  1       Follow-ups after your visit        Who to contact     Please call your clinic at [phone] to:    Ask questions about your health    Make or cancel appointments    Discuss your medicines    Learn about your test results    Speak to your doctor            Additional Information About Your Visit        MyChart Information     P[employer] gives you secure access to your electronic health record. If you see a primary care provider, you can also send messages to your care team and make appointments. If you have questions, please call your primary care clinic.  If you do not have a primary care provider, please call [phone] and they will assist you.      Puddle is an electronic gateway that provides easy, online access to your medical records. With [employer], you can request a clinic appointment, read your test results, renew a prescription or communicate with your care team.     To access your existing account, please contact your [facility] or call [phone] for assistance.        Care EveryWhere ID     This is your Care EveryWhere ID. This could be used by other organizations to access your [place] medical records  [id number]         Blood Pressure from Last 3 Encounters:   08/27/18 130/84   06/19/18 125/84   06/07/18 116/79    Weight from Last 3 Encounters:   08/27/18 134.3 kg (296 lb)   06/19/18 131 kg (288 lb 12.8 oz)   06/07/18 128 kg (282 lb 3.2 oz)              Today, you had the following     No orders found for display       Primary Care Provider Office Phone # Fax #    Janeth Rousseau [phone] [place] [address]        Equal Access to Services     ELLY ESCOTO : Hadii royce du ramón [place], wamarquezda luqyinkaha, qazoila paigeelieserrosalee hwang. So [facility] [phone].    ATENCIÓN: Si habla español, tiene a bates disposición servicios gratuitos de asistencia lingüística. [facility] [phone].    We comply with applicable federal civil rights laws and [place] laws. We do not discriminate on the basis of race, color, national origin, age, disability, sex, sexual orientation, or gender identity.            Thank you!     Thank you for choosing [facility]  for your care. Our goal is always to provide you with excellent care. Hearing back from our patients is one way we can continue to improve our services. Please take a few minutes to complete the written survey that you may receive in the mail after your visit with us. Thank you!             Your Updated Medication List - Protect others around you: Learn how to safely use, store and throw away your medicines at www.disposemymeds.org.          This list is accurate as of 11/12/18 11:59 PM.  Always use your most recent med list.                   Brand Name Dispense Instructions for use Diagnosis    cyclobenzaprine 10 MG tablet    FLEXERIL     TK ONE T AT NIGHT AS DIRECTED        etonogestrel-ethinyl estradiol 0.12-0.015 MG/24HR vaginal ring    NUVARING    3 each    Insert 1 ring vaginally every 21 days then remove for 1 week then repeat with new ring.    Encounter for initial prescription of vaginal ring hormonal contraceptive       ibuprofen 600 MG tablet    ADVIL/MOTRIN     Take 600 mg by mouth        oxyCODONE-acetaminophen 7.5-325 MG per tablet    PERCOCET     Take 2 tablets by mouth as needed        TRAMADOL HCL PO

## 2018-11-12 NOTE — PROGRESS NOTES
"OUTPATIENT PSYCHOTHERAPY PROGRESS NOTE    Client Name: Dariusz Leyva   YOB: 1990 (28 year old)   Date of Service:  Nov 12, 2018  Time of Service: 9:00 to 10:00 (60 minutes)  Service Type(s):25262 psychotherapy (53-60 min. with patient and/or family)    Diagnoses:   Encounter Diagnosis   Name Primary?     Dysthymic disorder Yes       Individuals Present:   Client attended alone    Treatment goal(s) being addressed:   Reduction of depressive symptoms  Increase interpersonal skills    Subjective:  Dariusz reported that she was tired having recently completed a night shift at one of her jobs. She stated that she continues to struggle with depression. Currently, she makes it to her primary job about 2 out of the 5 weekday shifts. She also has two additional jobs. She reports that her grades are good at school but that \"she doesn't care anymore\". Dariusz's aunt is moving to [place].    Treatment:   Discussed self care tools and setting boundaries with others. Taught TIPP skill.     Assessment and Progress:   Dariusz reported some suicidal ideation since last session. She denied intent or plan. Also endorsed self-injurious urges. She has not acted on these urges. We discussed tools and techniques to help cope with these urges.    Plan:   Dariusz will practice distress tolerance and TIPP skills between sessions.      Treatment Plan review due: January      Zaheer Patel  Practicum Student    I did not see this patient directly. This patient was discussed with me in psychotherapy supervision, and I agree with the plan as documented.    Mirian Dietrich, Ph.D., L.P.        "

## 2018-11-19 ENCOUNTER — OFFICE VISIT (OUTPATIENT)
Dept: BEHAVIORAL HEALTH | Facility: CLINIC | Age: 28
End: 2018-11-19
Payer: COMMERCIAL

## 2018-11-19 DIAGNOSIS — F34.1 DYSTHYMIC DISORDER: Primary | ICD-10-CM
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
## 2018-11-19 NOTE — MR AVS SNAPSHOT
After Visit Summary   11/19/2018    Dariusz Leyva    MRN: [id number]           Patient Information     Date Of Birth          1990        Visit Information        Provider Department      11/19/2018 8:00 AM Zaheer Patel MD [email or web address]        Today's Diagnoses     Dysthymic disorder    -  1       Follow-ups after your visit        Who to contact     Please call your clinic at [phone] to:    Ask questions about your health    Make or cancel appointments    Discuss your medicines    Learn about your test results    Speak to your doctor            Additional Information About Your Visit        MyChart Information     [employer] gives you secure access to your electronic health record. If you see a primary care provider, you can also send messages to your care team and make appointments. If you have questions, please call your primary care clinic.  If you do not have a primary care provider, please call [phone] and they will assist you.      [employer] is an electronic gateway that provides easy, online access to your medical records. With [employer], you can request a clinic appointment, read your test results, renew a prescription or communicate with your care team.     To access your existing account, please contact your [facility] or call [phone] for assistance.        Care EveryWhere ID     This is your Care EveryWhere ID. This could be used by other organizations to access your [facility] medical records  [id number]         Blood Pressure from Last 3 Encounters:   08/27/18 130/84   06/19/18 125/84   06/07/18 116/79    Weight from Last 3 Encounters:   08/27/18 134.3 kg (296 lb)   06/19/18 131 kg (288 lb 12.8 oz)   06/07/18 128 kg (282 lb 3.2 oz)              Today, you had the following     No orders found for display       Primary Care Provider Office Phone # Fax #    Janeth Rousseau [phone] [place] [address]        Equal Access to Services     ELLY ESCOTO : Hadii royce du ramón [place], wamarquezda luqyinkaha, qazoila paigeelieserrosalee hwang. So [facility] [phone].    ATENCIÓN: Si habla español, tiene a bates disposición servicios gratuitos de asistencia lingüística. [facility] [phone].    We comply with applicable federal civil rights laws and [place] laws. We do not discriminate on the basis of race, color, national origin, age, disability, sex, sexual orientation, or gender identity.            Thank you!     Thank you for choosing [facility]  for your care. Our goal is always to provide you with excellent care. Hearing back from our patients is one way we can continue to improve our services. Please take a few minutes to complete the written survey that you may receive in the mail after your visit with us. Thank you!             Your Updated Medication List - Protect others around you: Learn how to safely use, store and throw away your medicines at www.disposemymeds.org.          This list is accurate as of 11/19/18  4:38 PM.  Always use your most recent med list.                   Brand Name Dispense Instructions for use Diagnosis    cyclobenzaprine 10 MG tablet    FLEXERIL     TK ONE T AT NIGHT AS DIRECTED        etonogestrel-ethinyl estradiol 0.12-0.015 MG/24HR vaginal ring    NUVARING    3 each    Insert 1 ring vaginally every 21 days then remove for 1 week then repeat with new ring.    Encounter for initial prescription of vaginal ring hormonal contraceptive       ibuprofen 600 MG tablet    ADVIL/MOTRIN     Take 600 mg by mouth        oxyCODONE-acetaminophen 7.5-325 MG per tablet    PERCOCET     Take 2 tablets by mouth as needed        TRAMADOL HCL PO

## 2018-12-03 ENCOUNTER — OFFICE VISIT (OUTPATIENT)
Dept: BEHAVIORAL HEALTH | Facility: CLINIC | Age: 28
End: 2018-12-03
Payer: COMMERCIAL

## 2018-12-03 DIAGNOSIS — F34.1 DYSTHYMIC DISORDER: Primary | ICD-10-CM
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
## 2018-12-10 ENCOUNTER — OFFICE VISIT (OUTPATIENT)
Dept: BEHAVIORAL HEALTH | Facility: CLINIC | Age: 28
End: 2018-12-10
Payer: COMMERCIAL

## 2018-12-10 ENCOUNTER — MEDICAL CORRESPONDENCE (OUTPATIENT)
Dept: HEALTH INFORMATION MANAGEMENT | Facility: CLINIC | Age: 28
End: 2018-12-10

## 2018-12-10 DIAGNOSIS — F34.1 DYSTHYMIC DISORDER: Primary | ICD-10-CM
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
## 2019-01-21 ENCOUNTER — OFFICE VISIT (OUTPATIENT)
Dept: BEHAVIORAL HEALTH | Facility: CLINIC | Age: 29
End: 2019-01-21
Payer: COMMERCIAL

## 2019-01-21 DIAGNOSIS — F34.1 DYSTHYMIC DISORDER: Primary | ICD-10-CM

## 2019-01-22 DIAGNOSIS — Z30.015 ENCOUNTER FOR INITIAL PRESCRIPTION OF VAGINAL RING HORMONAL CONTRACEPTIVE: ICD-10-CM
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
## 2019-01-23 ENCOUNTER — VIRTUAL VISIT (OUTPATIENT)
Dept: PSYCHIATRY | Facility: CLINIC | Age: 29
End: 2019-01-23
Payer: COMMERCIAL

## 2019-01-23 ENCOUNTER — DOCUMENTATION ONLY (OUTPATIENT)
Dept: PSYCHIATRY | Facility: CLINIC | Age: 29
End: 2019-01-23

## 2019-01-23 DIAGNOSIS — F34.1 DYSTHYMIC DISORDER: Primary | ICD-10-CM
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
## 2019-01-23 NOTE — PROGRESS NOTES
12/6/18        Zaheer CELESTE. Supervised by Mirian Dietrich PhD  [facility]  [address] [place] [address] [place]  First floor, [address]  [place] [address]    P: [phone]  F: [phone]    To Whom It May Concern: at [address]/[employer]:    Dariusz Leyva is my patient, and has been under my care since September of this year.  I am intimately familiar with her history and with the functional limitations imposed by her disability.  She meets the definition of disability under the Americans with Disabilities Act, the Fair Housing Act, and the Rehabilitation Act of 1973.      Due to mental illness, Dariusz has certain limitations regarding coping with stress, loneliness, and social interactions. In order to help alleviate these difficulties, and to enhance her ability to live independently and to fully use and enjoy the dwelling unit you own and/or administer, I am prescribing an emotional support animal that will assist Dariusz in coping with her mental health disability.    I am familiar with the voluminous professional literature concerning the therapeutic benefits of assistance animals for people with disabilities such as that experienced by Dariusz, e.g. see article Psychological Effects of Dog Ownership: Role Strain, Role Enhancement, and Depression by (Stacie XIE, 2007) . Upon request, I will share additional citations to relevant studies, and would be happy to answer other questions you may have concerning my recommendation that Dariusz Leyva have an emotional support animal.  Should you have additional question, please do not hesitate to contact me.    Sincerely,  Signature        Zaheer Dietrich PhD

## 2019-01-24 ENCOUNTER — TELEPHONE (OUTPATIENT)
Dept: PSYCHIATRY | Facility: CLINIC | Age: 29
End: 2019-01-24
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
## 2019-01-28 RX ORDER — ETONOGESTREL AND ETHINYL ESTRADIOL VAGINAL RING .015; .12 MG/D; MG/D
RING VAGINAL
Qty: 3 EACH | Refills: 11 | Status: SHIPPED | OUTPATIENT
Start: 2019-01-28 | End: 2019-07-23

## 2019-02-04 ENCOUNTER — TELEPHONE (OUTPATIENT)
Dept: PSYCHIATRY | Facility: CLINIC | Age: 29
End: 2019-02-04

## 2019-02-28 ENCOUNTER — DOCUMENTATION ONLY (OUTPATIENT)
Dept: FAMILY MEDICINE | Facility: CLINIC | Age: 29
End: 2019-02-28

## 2019-02-28 NOTE — PROGRESS NOTES
Patient requesting that PCP sign a form for her apartment complex, [address], that states that a pet is necessary due to chronic MI and that management should make an exception to their no-pet policy.  Reviewed with Dr. Rousseau and the letter that was written by patients psychiatry clinic.  Dr. Rousseau did fill out the management complex form.    Called patient and [facility].  Placed letter in front office and will have a copy scanned into chart.    FLORY Sauceda RN

## 2019-06-11 ENCOUNTER — TRANSFERRED RECORDS (OUTPATIENT)
Dept: HEALTH INFORMATION MANAGEMENT | Facility: CLINIC | Age: 29
End: 2019-06-11

## 2019-06-16 ENCOUNTER — TRANSFERRED RECORDS (OUTPATIENT)
Dept: HEALTH INFORMATION MANAGEMENT | Facility: CLINIC | Age: 29
End: 2019-06-16

## 2019-06-20 ENCOUNTER — TRANSFERRED RECORDS (OUTPATIENT)
Dept: HEALTH INFORMATION MANAGEMENT | Facility: CLINIC | Age: 29
End: 2019-06-20

## 2019-06-25 ENCOUNTER — TELEPHONE (OUTPATIENT)
Dept: FAMILY MEDICINE | Facility: CLINIC | Age: 29
End: 2019-06-25

## 2019-06-25 ENCOUNTER — MEDICAL CORRESPONDENCE (OUTPATIENT)
Dept: HEALTH INFORMATION MANAGEMENT | Facility: CLINIC | Age: 29
End: 2019-06-25
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
## 2019-07-03 ENCOUNTER — MEDICAL CORRESPONDENCE (OUTPATIENT)
Dept: HEALTH INFORMATION MANAGEMENT | Facility: CLINIC | Age: 29
End: 2019-07-03

## 2019-07-11 ENCOUNTER — MEDICAL CORRESPONDENCE (OUTPATIENT)
Dept: HEALTH INFORMATION MANAGEMENT | Facility: CLINIC | Age: 29
End: 2019-07-11

## 2019-07-19 ENCOUNTER — DOCUMENTATION ONLY (OUTPATIENT)
Dept: FAMILY MEDICINE | Facility: CLINIC | Age: 29
End: 2019-07-19

## 2019-07-19 ENCOUNTER — MEDICAL CORRESPONDENCE (OUTPATIENT)
Dept: HEALTH INFORMATION MANAGEMENT | Facility: CLINIC | Age: 29
End: 2019-07-19

## 2019-07-19 NOTE — PROGRESS NOTES
To be completed in Nursing note:    Please reference list for forms that require a visit for completion.  Please remind patients that providers are given 3-5 business days to complete and return forms.      Form type: [employer] medical ([employer])     Date form received: 19    Date form completed by Physician: 19    How was form returned to patient (mailed, faxed, or at  for patient to ): Faxed back to [employer] @ [phone]    Date form mailed/faxed/left at  for patient and sent to HIM for scannin19    Once form is left for patient, faxed, or mailed PCS will then close the documentation only encounter.

## 2019-07-22 ENCOUNTER — DOCUMENTATION ONLY (OUTPATIENT)
Dept: FAMILY MEDICINE | Facility: CLINIC | Age: 29
End: 2019-07-22

## 2019-07-22 ENCOUNTER — MEDICAL CORRESPONDENCE (OUTPATIENT)
Dept: HEALTH INFORMATION MANAGEMENT | Facility: CLINIC | Age: 29
End: 2019-07-22

## 2019-07-22 NOTE — PROGRESS NOTES
To be completed in Nursing note:    Please reference list for forms that require a visit for completion.  Please remind patients that providers are given 3-5 business days to complete and return forms.      Form type: Home Care   Date form received: 19    Date form completed by Physician:19    How was form returned to patient (mailed, faxed, or at  for patient to ): Faxed back to [facility]     Date form mailed/faxed/left at  for patient and sent to HIM for [email or web address]      Once form is left for patient, faxed, or mailed PCS will then close the documentation only encounter.

## 2019-07-23 ENCOUNTER — DOCUMENTATION ONLY (OUTPATIENT)
Dept: FAMILY MEDICINE | Facility: CLINIC | Age: 29
End: 2019-07-23

## 2019-07-23 ENCOUNTER — OFFICE VISIT (OUTPATIENT)
Dept: FAMILY MEDICINE | Facility: CLINIC | Age: 29
End: 2019-07-23
Payer: MEDICAID

## 2019-07-23 VITALS
RESPIRATION RATE: 14 BRPM | BODY MASS INDEX: 45.26 KG/M2 | SYSTOLIC BLOOD PRESSURE: 126 MMHG | OXYGEN SATURATION: 100 % | TEMPERATURE: 98.1 F | HEART RATE: 76 BPM | DIASTOLIC BLOOD PRESSURE: 80 MMHG | WEIGHT: 289 LBS

## 2019-07-23 DIAGNOSIS — F41.1 GAD (GENERALIZED ANXIETY DISORDER): ICD-10-CM

## 2019-07-23 DIAGNOSIS — S82.141S CLOSED FRACTURE OF RIGHT TIBIAL PLATEAU, SEQUELA: Primary | ICD-10-CM

## 2019-07-23 RX ORDER — HYDROXYZINE HYDROCHLORIDE 25 MG/1
25 TABLET, FILM COATED ORAL EVERY 6 HOURS
COMMUNITY
Start: 2019-07-15 | End: 2019-12-30

## 2019-07-23 RX ORDER — OXYCODONE HYDROCHLORIDE 5 MG/1
5 TABLET ORAL EVERY 6 HOURS PRN
COMMUNITY
Start: 2019-07-16 | End: 2019-12-30

## 2019-07-23 RX ORDER — AMOXICILLIN 250 MG
1 CAPSULE ORAL 2 TIMES DAILY
COMMUNITY
Start: 2019-06-19 | End: 2019-12-30

## 2019-07-23 RX ORDER — GABAPENTIN 300 MG/1
300 CAPSULE ORAL 3 TIMES DAILY
COMMUNITY
Start: 2019-07-01 | End: 2019-12-30

## 2019-07-23 RX ORDER — ACETAMINOPHEN 500 MG
1000 TABLET ORAL 3 TIMES DAILY
COMMUNITY
Start: 2019-06-19 | End: 2019-12-30

## 2019-07-23 RX ORDER — CYCLOBENZAPRINE HCL 5 MG
5 TABLET ORAL 3 TIMES DAILY
COMMUNITY
Start: 2019-06-20 | End: 2019-12-30

## 2019-07-23 ASSESSMENT — PATIENT HEALTH QUESTIONNAIRE - PHQ9: SUM OF ALL RESPONSES TO PHQ QUESTIONS 1-9: 8

## 2019-07-23 NOTE — PATIENT INSTRUCTIONS
7/23/2019  Dariusz Leyva    It was a pleasure to see you today at [facility].     My recommendations after this visit include:   1. Dr. Rashid for future visits  2. Return as needed  3. Mental health referral  4. Good job with PT  5. Plan for return for birth control options    Thank you for allowing me to be a part of your health care team!    Sincerely,   Dr. Rousseau      MENTAL HEALTH REFERRAL    July 23, 2019  Mental Health referral routed to behavioral health team for recommendations. See Documentation Only encounter for more information.  GAGE See  7/23/2019

## 2019-07-23 NOTE — PROGRESS NOTES
Behavioral Health Team,    Patient is being referred for mental health services by their provider, Dr. Rousseau.  Please advise if we are able to see patient for in house treatment or if a community option would be best.    Thank you,    GAGE See
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
## 2019-07-23 NOTE — PROGRESS NOTES
Preceptor Attestation:   Patient seen, evaluated and discussed with the resident. I personally reviewed the imaging and agree with the interpretation documented by the resident. I have verified the content of the note, which accurately reflects my assessment of the patient and the plan of care.   Supervising Physician:  Chapin Elizabeth MD

## 2019-07-24 NOTE — PROGRESS NOTES
SW contacted pt ([phone]). Pt reports that she is interested in returning to the [facility]  [facility] for families, services were originally discontinued when [place] through [place]. SW will contact [facility] psychiatry dept. to place referral and will contact pt to update on referral status.    SW contacted [facility], spoke with Agusto ([phone]). He states that there should not be an issue in pt resuming services with [facility]. He states that he will contact the provider to schedule with the pt, and estimates that the pt will hear back from their department within the week.    GAGE See  7/24/2019    ADDENDUM 7/26/2019 8:19 AM:  CARL contacted pt ([phone]) to follow-up on referral. Pt states that she has yet to receive a call from [facility] psychiatry. SW requested that if the pt does not hear from [facility] psychiatry within the next week to contact the clinic so that the SW may further continue to assist.    CARL contacted [facility] psychiatry ([phone]) to assess if clinic has correct contact information, spoke with Harriett. Harriett states that they have yet to contact the pt. Pt is considered a current pt of Zaheer Deckert, therefore there should be no issue in the pt resuming services. If the pt was previously seen at the [facility] location, the pt would need to schedule through their office. Harriett states that they will call the pt today to determine location and scheduling.    GAGE eSe  7/26/2019    ADDENDUM 7/30/2019 10:43 AM:  CARL contacted [facility] ([phone]) regarding referral. Reported that it would be a 3-4 month wait, and that the referral has not been received at this time. CARL clarified that the pt is considered to be a current client of Zaheer Deckert, and that the reported turn around had been 1-2 weeks. It appears that the referral has not been passed on or has been sent to the previous supervisor of Zaheer Patel.  She will contact  later with more information regarding referral.    GAGE See  7/30/2019

## 2019-07-24 NOTE — PROGRESS NOTES
Review of Dr. Rousseau's order and note indicates that this is a referral for psychotherapy to address anxiety.  Review of Epic today indicates that Ms. Leyva did recently receive therapy with Zaheer Patel (a practicum student supervised by Dr. Mirian Dietrich via [facility]) as recently as January 2019.  It was unclear to me why Ms. Leyva discontinued this therapy (possibly the practicum student finished their training?), but this might be worth exploring to see if she would like to return to the [facility] clinic for therapy.  Diagnoses at that time were Persistent Depressive Disorder and Unspecified Anxiety.    If Ms. Leyva prefers not to return to the [facility] for whatever reason, we can try to connect her to other community based resources for therapy.  In the past, Ms. Leyva has sited her work schedule as a barrier and was last provided resources with weekend and evening hours back in June 2018. Will provide these again below and ask that our referral coordinators reach out to her to discuss these options as well as barriers to past efforts to connect to therapy so we can provide support with this as needed.    [facility]  [address]   [address]  [place] [address]  [phone] (for appointments)  Fax: [phone]  [facility] - [address]  [address]  [place] [address]  Phone:  [phone]  Fax: [phone]  Hours:  Monday - Friday 8:30 - 5:00pm    [facility]  [address]  [address]  [place] [address]  [phone]    [facility]  [address]  [address]  [place], [place] [address]  [phone]    [facility]  [address]  [place] [address]  [phone]    I also do see that Ms. Leyva has been receiving a number of home based services  recently and will provide information for home based services to consider if this would increase the likelihood of successful connection to services:    [facility]  Complete on-line referral form or call [phone].    [facility] [place], [address], [address]  [place], [place]   [address]  Office:   [phone]  Fax:   [phone]    Let me know if you have questions or would like additional follow up from me.  Thanks!      Katty Mac, Ph.D.,     Disclaimer  The above treatment recommendations are based on consultation with the patient's primary care provider and a review of relevant information in EPIC.? I have not personally examined the patient.? All recommendations should be implemented with considerations of the patient's relevant prior history and current clinical status.  Please contact me with any questions about the care of this patient.

## 2019-07-29 ENCOUNTER — MEDICAL CORRESPONDENCE (OUTPATIENT)
Dept: HEALTH INFORMATION MANAGEMENT | Facility: CLINIC | Age: 29
End: 2019-07-29

## 2019-07-30 ENCOUNTER — TELEPHONE (OUTPATIENT)
Dept: BEHAVIORAL HEALTH | Facility: CLINIC | Age: 29
End: 2019-07-30

## 2019-07-30 NOTE — TELEPHONE ENCOUNTER
Rahat Cosme from [facility] checking to see if patient has been added to wait list to continue services with us.

## 2019-07-31 ENCOUNTER — DOCUMENTATION ONLY (OUTPATIENT)
Dept: FAMILY MEDICINE | Facility: CLINIC | Age: 29
End: 2019-07-31

## 2019-07-31 NOTE — PROGRESS NOTES
To be completed in Nursing note:    Please reference list for forms that require a visit for completion.  Please remind patients that providers are given 3-5 business days to complete and return forms.      Form type: Home Healthcare     Date form received: 19    Date form completed by Physician:19    How was form returned to patient (mailed, faxed, or at  for patient to ): Faxed back to Home Healthcare     Date form mailed/faxed/left at  for patient and sent to HIM for scannin19      Once form is left for patient, faxed, or mailed PCS will then close the documentation only encounter.

## 2019-08-01 ENCOUNTER — MEDICAL CORRESPONDENCE (OUTPATIENT)
Dept: HEALTH INFORMATION MANAGEMENT | Facility: CLINIC | Age: 29
End: 2019-08-01

## 2019-08-01 NOTE — PROGRESS NOTES
CARL contacted pt ([phone]). Pt reports that she is interested in returning to the [facility] Dept  Cl[facility] for families, services were originally discontinued when she [place] through [place]. SW will contact [facility] psychiatry dept. to place referral and will contact pt to update on referral status.    CARL contacted [facility], spoke with Agusto ([phone]). He states that there should not be an issue in pt resuming services with Zaheer Patel. He states that he will contact the provider to schedule with the pt, and estimates that the pt will hear back from their department within the week.    GAGE See  7/24/2019    ADDENDUM 7/26/2019 8:19 AM:  CARL contacted pt ([phone]) to follow-up on referral. Pt states that she has yet to receive a call from [facility] psychiatry. SW requested that if the pt does not hear from [facility] psychiatry within the next week to contact the clinic so that the SW may further continue to assist.    CARL contacted [facility] psychiatry ([phone]) to assess if clinic has correct contact information, spoke with Harriett. Harriett states that they have yet to contact the pt. Pt is considered a current pt of Zaheer [place], therefore there should be no issue in the pt resuming services. If the pt was previously seen at the [facility] location, the pt would need to schedule through their office. Harriett states that they will call the pt today to determine location and scheduling.    GAGE See  7/26/2019    ADDENDUM 7/30/2019 10:43 AM:  CARL contacted [facility] ([phone]) regarding referral. Margy reported that it would be a 3-4 month wait, and that the referral has not been received at this time. CARL clarified that the pt is considered to be a current client of Zaheer [place], and that the reported turn around had been 1-2 weeks. It appears that the referral has not been passed on or has been sent to the previous supervisor of Zaheer  Amanda. She will contact SW later with more information regarding referral.    GAGE See  7/30/2019    SW has yet to receive a call from [facility]. SW contacted [facility], spoke with Hu ([phone]). She reached out to her supervisor, and states that pt is still considered to be a current pt with Zaheer Deckert. She will contact the pt to schedule states that the appt should be created within 4 weeks.    SW contacted pt ([phone]) to notify of progress and potential start dates. Pt states that she is interested in seeing Zaheer Patel again for therapy, but is uncomfortable waiting if it will be several months. SW will contact pt early next week to f/u and determine whether pt would like to schedule with Zaheer Patel or a different community agency.    GAGE See  8/2/2019    ADDENDUM 8/7/2019 9:47 AM:  CARL contacted [facility], spoke with Harriett ([phone]). She states that the pt is scheduled to see aZheer Patel for therapy on 8/16/19.    SW contacted pt ([phone]) to determine if she would require any additional assistance. Pt states that at this time she has no concerns, will contact clinic it she requires assistance.    GAGE See  8/7/2019

## 2019-08-02 ENCOUNTER — TELEPHONE (OUTPATIENT)
Dept: FAMILY MEDICINE | Facility: CLINIC | Age: 29
End: 2019-08-02

## 2019-08-02 NOTE — TELEPHONE ENCOUNTER
Spoke with Destiny REHMAN, gave verbal order for Homehealth aide for 2Xs a week for 2 weeks and for shower bench.  [employer] to supply the bench.  Home health aide to help with using the bench.    Deborah SEYMOUR

## 2019-08-02 NOTE — TELEPHONE ENCOUNTER
[facility] phone call message - order or referral request for patient:     Order or referral being requested: home health aid and an DME order for a bath bench       Additional Comments: for trainign with the new bath bench 2 times a week for 2 weeks     OK to leave a message on voice mail? Yes    Primary language: English      needed? No    Call taken on August 2, 2019 at 10:03 AM by Rico Casiano

## 2019-08-05 ENCOUNTER — MEDICAL CORRESPONDENCE (OUTPATIENT)
Dept: HEALTH INFORMATION MANAGEMENT | Facility: CLINIC | Age: 29
End: 2019-08-05

## 2019-08-09 ENCOUNTER — MEDICAL CORRESPONDENCE (OUTPATIENT)
Dept: HEALTH INFORMATION MANAGEMENT | Facility: CLINIC | Age: 29
End: 2019-08-09

## 2019-08-16 ENCOUNTER — OFFICE VISIT (OUTPATIENT)
Dept: PSYCHIATRY | Facility: CLINIC | Age: 29
End: 2019-08-16
Attending: PSYCHOLOGIST
Payer: COMMERCIAL

## 2019-08-16 DIAGNOSIS — F34.1 DYSTHYMIC DISORDER: Primary | ICD-10-CM

## 2019-08-22 ENCOUNTER — DOCUMENTATION ONLY (OUTPATIENT)
Dept: FAMILY MEDICINE | Facility: CLINIC | Age: 29
End: 2019-08-22

## 2019-08-22 ENCOUNTER — MEDICAL CORRESPONDENCE (OUTPATIENT)
Dept: HEALTH INFORMATION MANAGEMENT | Facility: CLINIC | Age: 29
End: 2019-08-22

## 2019-08-22 NOTE — PROGRESS NOTES
To be completed in Nursing note:    Please reference list for forms that require a visit for completion.  Please remind patients that providers are given 3-5 business days to complete and return forms.      Form type: [employer] [facility] Integrated Home Care Orders    Date form received: 19    Date form completed by Physician: 19    How was form returned to patient (mailed, faxed, or at  for patient to ): Faxed back to [phone]    Date form mailed/faxed/left at  for patient and sent to HIM for scannin19      Once form is left for patient, faxed, or mailed PCS will then close the documentation only encounter.

## 2019-09-10 ENCOUNTER — MYC MEDICAL ADVICE (OUTPATIENT)
Dept: PSYCHIATRY | Facility: CLINIC | Age: 29
End: 2019-09-10
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
## 2019-09-17 ENCOUNTER — OFFICE VISIT (OUTPATIENT)
Dept: PSYCHIATRY | Facility: CLINIC | Age: 29
End: 2019-09-17
Attending: PSYCHOLOGIST
Payer: COMMERCIAL

## 2019-09-17 DIAGNOSIS — F34.1 DYSTHYMIC DISORDER: Primary | ICD-10-CM

## 2019-09-17 ASSESSMENT — PATIENT HEALTH QUESTIONNAIRE - PHQ9: SUM OF ALL RESPONSES TO PHQ QUESTIONS 1-9: 12

## 2019-09-19 ENCOUNTER — MEDICAL CORRESPONDENCE (OUTPATIENT)
Dept: HEALTH INFORMATION MANAGEMENT | Facility: CLINIC | Age: 29
End: 2019-09-19

## 2019-09-24 ENCOUNTER — OFFICE VISIT (OUTPATIENT)
Dept: PSYCHIATRY | Facility: CLINIC | Age: 29
End: 2019-09-24
Attending: PSYCHOLOGIST
Payer: COMMERCIAL

## 2019-09-24 DIAGNOSIS — F34.1 DYSTHYMIC DISORDER: Primary | ICD-10-CM

## 2019-09-26 ENCOUNTER — MEDICAL CORRESPONDENCE (OUTPATIENT)
Dept: HEALTH INFORMATION MANAGEMENT | Facility: CLINIC | Age: 29
End: 2019-09-26

## 2019-10-01 ENCOUNTER — DOCUMENTATION ONLY (OUTPATIENT)
Dept: FAMILY MEDICINE | Facility: CLINIC | Age: 29
End: 2019-10-01

## 2019-10-01 ENCOUNTER — OFFICE VISIT (OUTPATIENT)
Dept: PSYCHIATRY | Facility: CLINIC | Age: 29
End: 2019-10-01
Attending: PSYCHOLOGIST
Payer: COMMERCIAL

## 2019-10-01 DIAGNOSIS — F34.1 DYSTHYMIC DISORDER: Primary | ICD-10-CM

## 2019-10-01 NOTE — PROGRESS NOTES
To be completed in Nursing note:    Please reference list for forms that require a visit for completion.  Please remind patients that providers are given 3-5 business days to complete and return forms.      Form type: Integrated Home Care    Date form received: 09/23/19    Date form completed by Physician: 9/26/19    How was form returned to patient: Faxed      Date form mailed/faxed/left at  for patient and sent to HIM for scanning: 10/1/19      Once form is left for patient, faxed, or mailed PCS will then close the documentation only encounter.
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
## 2019-10-08 ENCOUNTER — OFFICE VISIT (OUTPATIENT)
Dept: PSYCHIATRY | Facility: CLINIC | Age: 29
End: 2019-10-08
Attending: PSYCHOLOGIST
Payer: COMMERCIAL

## 2019-10-08 DIAGNOSIS — F34.1 DYSTHYMIC DISORDER: Primary | ICD-10-CM

## 2019-10-09 ENCOUNTER — DOCUMENTATION ONLY (OUTPATIENT)
Dept: FAMILY MEDICINE | Facility: CLINIC | Age: 29
End: 2019-10-09

## 2019-10-09 NOTE — PROGRESS NOTES
To be completed in Nursing note:    Please reference list for forms that require a visit for completion.  Please remind patients that providers are given 3-5 business days to complete and return forms.      Form type: Integrated Home Care    Date form received: 10/1/19    Date form completed by Physician: 10/14/19        Date form faxed for patient and sent to HIM for scanning: 10/15/19      Once form is left for patient, faxed, or mailed PCS will then close the documentation only encounter.

## 2019-10-14 ENCOUNTER — MEDICAL CORRESPONDENCE (OUTPATIENT)
Dept: HEALTH INFORMATION MANAGEMENT | Facility: CLINIC | Age: 29
End: 2019-10-14
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
## 2019-12-04 ENCOUNTER — OFFICE VISIT (OUTPATIENT)
Dept: FAMILY MEDICINE | Facility: CLINIC | Age: 29
End: 2019-12-04
Payer: COMMERCIAL

## 2019-12-04 VITALS
BODY MASS INDEX: 48.4 KG/M2 | DIASTOLIC BLOOD PRESSURE: 80 MMHG | OXYGEN SATURATION: 98 % | HEART RATE: 100 BPM | TEMPERATURE: 98.4 F | SYSTOLIC BLOOD PRESSURE: 128 MMHG | WEIGHT: 293 LBS | RESPIRATION RATE: 18 BRPM

## 2019-12-04 DIAGNOSIS — Z32.00 PREGNANCY EXAMINATION OR TEST, PREGNANCY UNCONFIRMED: ICD-10-CM

## 2019-12-04 DIAGNOSIS — B96.89 BACTERIAL VAGINOSIS: ICD-10-CM

## 2019-12-04 DIAGNOSIS — R10.30 LOWER ABDOMINAL PAIN: ICD-10-CM

## 2019-12-04 DIAGNOSIS — R19.7 DIARRHEA OF PRESUMED INFECTIOUS ORIGIN: Primary | ICD-10-CM

## 2019-12-04 DIAGNOSIS — Z72.51 UNPROTECTED SEX: ICD-10-CM

## 2019-12-04 DIAGNOSIS — N76.0 BACTERIAL VAGINOSIS: ICD-10-CM

## 2019-12-04 LAB
BACTERIA: NORMAL
BILIRUBIN UR: NEGATIVE MG/DL
BLOOD UR: NEGATIVE MG/DL
CLUE CELLS: NORMAL
GLUCOSE URINE: NEGATIVE
HCG UR QL: NEGATIVE
KETONES UR QL: NEGATIVE MG/DL
LEUKOCYTE ESTERASE UR: NEGATIVE
MOTILE TRICHOMONAS: NEGATIVE
NITRITE UR QL STRIP: NEGATIVE MG/DL
ODOR: NORMAL
PH UR STRIP: 5.5 [PH] (ref 4.5–8)
PH WET PREP: NORMAL (ref 3.8–4.5)
PROTEIN UR: NEGATIVE MG/DL
SP GR UR STRIP: 1.01 (ref 1–1.03)
UROBILINOGEN UR STRIP-ACNC: NORMAL E.U./DL
WBC WET PREP: NORMAL (ref 2–5)
YEAST: NORMAL

## 2019-12-04 RX ORDER — METRONIDAZOLE 500 MG/1
500 TABLET ORAL 2 TIMES DAILY
Qty: 14 TABLET | Refills: 0 | Status: SHIPPED | OUTPATIENT
Start: 2019-12-04 | End: 2019-12-04 | Stop reason: ALTCHOICE

## 2019-12-04 RX ORDER — CLINDAMYCIN HCL 300 MG
300 CAPSULE ORAL 2 TIMES DAILY
Qty: 14 CAPSULE | Refills: 0 | Status: SHIPPED | OUTPATIENT
Start: 2019-12-04 | End: 2019-12-30

## 2019-12-04 RX ORDER — LOPERAMIDE HYDROCHLORIDE 2 MG/1
2 TABLET ORAL 4 TIMES DAILY PRN
Qty: 30 TABLET | Refills: 0 | Status: SHIPPED | OUTPATIENT
Start: 2019-12-04 | End: 2019-12-30

## 2019-12-04 NOTE — PATIENT INSTRUCTIONS
If not better by next week, come back and see me in clinic.         Patient Education     Diarrhea with Uncertain Cause (Adult)    Diarrhea is when stools are loose and watery. This can be caused by:    Viral infections    Bacterial infections    Food poisoning    Parasites    Irritable bowel syndrome (IBS)    Inflammatory bowel diseases such as ulcerative colitis, Crohn's disease, and celiac disease    Food intolerance, such as to lactose, the sugar found in milk and milk products    Reaction to medicines like antibiotics, laxatives, cancer drugs, and antacids  Along with diarrhea, you may also have:    Abdominal pain and cramping    Nausea and vomiting    Loss of bowel control    Fever and chills    Bloody stools  In some cases, antibiotics may help to treat diarrhea. You may have a stool sample test. This is done to see what is causing your diarrhea, and if antibiotics will help treat it. The results of a stool sample test may take up to 2 days. The healthcare provider may not give you antibiotics until he or she has the stool test results.  Diarrhea can cause dehydration. This is the loss of too much water and other fluids from the body. When this occurs, body fluid must be replaced. This can be done with oral rehydration solutions. Oral rehydration solutions are available at drugstores and grocery stores without a prescription. Sports drinks are not the best choice if you are very dehydrated. They have too much sugar and not enough electrolytes.  Home care  Follow all instructions given by your healthcare provider. Rest at home for the next 24 hours, or until you feel better. Avoid caffeine, tobacco, and alcohol. These can make diarrhea, cramping, and pain worse.  If taking medicines:    Over-the-counter nausea and diarrhea medicines are generally OK unless you experience fever or blood stool. Check with your doctor first in those circumstances.    You may use acetaminophen or NSAID medicines like ibuprofen or  naproxen to reduce pain and fever. Don t use these if you have chronic liver or kidney disease, or ever had a stomach ulcer or gastrointestinal bleeding. Don't use NSAID medicines if you are already taking one for another condition (like arthritis) or are on daily aspirin therapy (such as for heart disease or after a stroke). Talk with your healthcare provider first.    If antibiotics were prescribed, be sure you take them until they are finished. Don t stop taking them even when you feel better. Antibiotics must be taken as a full course.  To prevent the spread of illness:    Remember that washing with soap and water and using alcohol-based  is the best way to prevent the spread of infection. Dry your hands with a single use towel (like a paper towel).    Clean the toilet after each use.    Wash your hands before eating.    Wash your hands before and after preparing food. Keep in mind that people with diarrhea or vomiting should not prepare food for others.    Wash your hands after using cutting boards, countertops, and knives that have been in contact with raw foods.    Wash and then peel fruits and vegetables.    Keep uncooked meats away from cooked and ready-to-eat foods.    Use a food thermometer when cooking. Cook poultry to at least 165 F (74 C). Cook ground meat (beef, veal, pork, lamb) to at least 160 F (71 C). Cook fresh beef, veal, lamb, and pork to at least 145 F (63 C).    Don t eat raw or undercooked eggs (poached or nida side up), poultry, meat, or unpasteurized milk and juices.  Food and drinks  The main goal while treating vomiting or diarrhea is to prevent dehydration. This is done by taking small amounts of liquids often.    Keep in mind that liquids are more important than food right now.    Drink only small amounts of liquids at a time.    Don t force yourself to eat, especially if you are having cramping, vomiting, or diarrhea. Don t eat large amounts at a time, even if you are  hungry.    If you eat, avoid fatty, greasy, spicy, or fried foods.    Don t eat dairy foods or drink milk if you have diarrhea. These can make diarrhea worse.  During the first 24 hours you can try:    Oral rehydration solutions.  Sports drinks may be used if you are not too dehydrated and are otherwise healthy.    Soft drinks without caffeine    Ginger ale    Water (plain or flavored)    Decaf tea or coffee    Clear broth, consommé, or bouillon    Gelatin, popsicles, or frozen fruit juice bars  The second 24 hours, if you are feeling better, you can add:    Hot cereal, plain toast, bread, rolls, or crackers    Plain noodles, rice, mashed potatoes, chicken noodle soup, or rice soup    Unsweetened canned fruit (no pineapple)    Bananas  As you recover:    Limit fat intake to less than 15 grams per day. Don t eat margarine, butter, oils, mayonnaise, sauces, gravies, fried foods, peanut butter, meat, poultry, or fish.    Limit fiber. Don t eat raw or cooked vegetables, fresh fruits except bananas, or bran cereals.    Limit caffeine and chocolate.    Limit dairy.    Don t use spices or seasonings except salt.    Go back to your normal diet over time, as you feel better and your symptoms improve.    If the symptoms come back, go back to a simple diet or clear liquids.  Follow-up care  Follow up with your healthcare provider, or as advised. If a stool sample was taken or cultures were done, call the healthcare provider for the results as instructed.  Call 911  Call 911 if you have any of these symptoms:    Trouble breathing    Confusion    Extreme drowsiness or trouble walking    Loss of consciousness    Rapid heart rate    Chest pain    Stiff neck    Seizure  When to seek medical advice  Call your healthcare provider right away if any of these occur:    Abdominal pain that gets worse    Constant lower right abdominal pain    Continued vomiting and inability to keep liquids down    Diarrhea more than 5 times a  day    Blood in vomit or stool    Dark urine or no urine for 8 hours, dry mouth and tongue, tiredness, weakness, or dizziness    Drowsiness    New rash    You don t get better in 2 to 3 days    Fever of 100.4 F (38 C) or higher, or as directed by your healthcare provider  Date Last Reviewed: 6/1/2018 2000-2018 [employer]. [address] [place] [address]. All rights reserved. This information is not intended as a substitute for professional medical care. Always follow your healthcare professional's instructions.

## 2019-12-04 NOTE — LETTER
December 9, 2019      Dariusz Leyva  [address]   [place] [address]        Dear Dariusz,    Your results came back positive for chlamydia. However, since you were treated with azithromycin a few weeks ago at [facility], we do not necessarily have to treat unless you develop further symptoms such as vaginal discharge or lower abdominal cramping.     I also recommend using condoms in the mean time to lower risk of re-infection and retest in three months. Please call the clinic for further questions or concerns.     Please see below for your test results.    Resulted Orders   Chlamydia/Gono Amplified ([facility])   Result Value Ref Range    Chlamydia trac,Amplified Prb Positive (A) Negative    N gonorrhoeae,Amplified Prb Negative Negative    Narrative    Test performed by:  [facility] [address] [place] [address]   Urinalysis, Micro If ([facility])   Result Value Ref Range    Specific Gravity Urine 1.015 1.005 - 1.030    pH Urine 5.5 4.5 - 8.0    Leukocyte Esterase UR Negative NEGATIVE    Nitrite Urine Negative NEGATIVE mg/dL    Protein UR Negative NEGATIVE mg/dL    Glucose Urine Negative NEGATIVE    Ketones Urine Negative NEGATIVE mg/dL    Urobilinogen mg/dL 0.2 E.U./dL 0.2 E.U./dL E.U./dL    Bilirubin UR Negative NEGATIVE mg/dL    Blood UR Negative NEGATIVE mg/dL   Wet Prep ([facility])   Result Value Ref Range    Yeast Wet Prep None none    Motile Trichomonas Wet Prep Negative Negative    Clue Cells Wet Prep Present >20% NONE    WBC WET PREP 2-5 2 - 5    Bacteria Wet Prep Moderate None    pH Wet Prep Not performed 3.8 - 4.5    Odor Wet Prep None NONE   HCG Qualitative Urine (UPT)  ([facility])   Result Value Ref Range    HCG Qual Urine NEGATIVE Negative       If you have any questions, please call the clinic to make an appointment.    Sincerely,    Дмитрий Keller MD

## 2019-12-04 NOTE — PROGRESS NOTES
SUBJECTIVE       Dariusz Leyva is a 29 year old  female with a PMH significant for:     Patient Active Problem List   Diagnosis     Lumbago     Nonallopathic lesion of lumbar region     Nonallopathic lesion of sacral region     Pain in thoracic spine     Nonallopathic lesion of thoracic region     Abnormal Pap smear of cervix     Large tonsils     Morbid obesity (H)     MDD (major depressive disorder)     CARLY (generalized anxiety disorder)     Depression     Lipid screening     She presents with lower abdominal pain and diarrhea since after Thanksgiving.  Patient states that no one else in her family got sick from eating Thanksgiving food.  She has tried Pepto-Bismol and ginger ale with some relief.  Denies any bright red blood per rectum, vomiting, fever, chills.  Describes stooling 0-2 times per day normally, but is now stooling 3-4 times per day.  Initially with watery diarrhea, but now more loose. Patient states that she is fearful of eating due to concerns of not making it to the bathroom on time.  Denies any food intolerance or allergies.    Of note, patient was treated with azithromycin for chlamydia infection at [facility] about 3 weeks ago. Patient endorses unprotected sex about 1 week ago with one male partner.  Also states that he had some vaginal malodor that is not normal from her usual odor.  Endorses mild dysuria.  Denies any vaginal irritation or discharge. LMP on 11/19.     PMH, Medications and Allergies were reviewed and updated as needed.          OBJECTIVE     Vitals:    12/04/19 1510   BP: 128/80   Pulse: 100   Resp: 18   Temp: 98.4  F (36.9  C)   TempSrc: Oral   SpO2: 98%   Weight: 140.2 kg (309 lb)     Body mass index is 48.4 kg/m .    Constitutional: Awake, alert, cooperative, no apparent distress, obese, and appears stated age.  Eyes: Sclera clear, conjunctiva normal.  ENT: Normocephalic, without obvious abnormality, atramatic  Neck: Supple, symmetrical, trachea  midline  Back: Symmetric, no curvature  Lungs: No increased work of breathing, good air exchange, clear to auscultation bilaterally, no crackles or wheezing.  Cardiovascular: Regular rate and rhythm, normal S1 and S2, no S3 or S4, and no murmur noted.  Abdomen: Obese, tender to palpation most prominent at the lower abdomen, normal bowel sounds  Neurologic: Cranial nerves II-XII are grossly intact and gait is normal.  Neuropsychiatric: Normal affect, mood, orientation, memory and insight.  Skin: Warm, dry      Results for orders placed or performed in visit on 12/04/19   Urinalysis, Micro If ([facility])     Status: None   Result Value Ref Range    Specific Gravity Urine 1.015 1.005 - 1.030    pH Urine 5.5 4.5 - 8.0    Leukocyte Esterase UR Negative NEGATIVE    Nitrite Urine Negative NEGATIVE mg/dL    Protein UR Negative NEGATIVE mg/dL    Glucose Urine Negative NEGATIVE    Ketones Urine Negative NEGATIVE mg/dL    Urobilinogen mg/dL 0.2 E.U./dL 0.2 E.U./dL E.U./dL    Bilirubin UR Negative NEGATIVE mg/dL    Blood UR Negative NEGATIVE mg/dL   Wet Prep ([facility])     Status: None   Result Value Ref Range    Yeast Wet Prep None none    Motile Trichomonas Wet Prep Negative Negative    Clue Cells Wet Prep Present >20% NONE    WBC WET PREP 2-5 2 - 5    Bacteria Wet Prep Moderate None    pH Wet Prep Not performed 3.8 - 4.5    Odor Wet Prep None NONE   HCG Qualitative Urine (UPT)  ([facility])     Status: None   Result Value Ref Range    HCG Qual Urine NEGATIVE Negative       ASSESSMENT AND PLAN      Dariusz was seen today for abdominal pain.    Diagnoses and all orders for this visit:    Diarrhea of presumed infectious origin  Likely viral in etiology. Anticipate that symptoms will continue to improve and resolve.  -     loperamide (IMODIUM A-D) 2 MG tablet; Take 1 tablet (2 mg) by mouth 4 times daily as needed for diarrhea    Lower abdominal pain  Unprotected sex  -     Chlamydia/Gono Amplified ([facility])  -     Urinalysis, Micro  "If (P )  -     Wet Prep ([facility])  -     HCG Qualitative Urine (UPT)  ([facility])    Bacterial vaginosis  \"Metronidazole doesn't work for me anymore\".   -     Discontinue: metroNIDAZOLE (FLAGYL) 500 MG tablet; Take 1 tablet (500 mg) by mouth 2 times daily for 7 days  -     clindamycin (CLEOCIN) 300 MG capsule; Take 1 capsule (300 mg) by mouth 2 times daily for 7 days            RTC if not better in two weeks, or sooner if develops new or worsening symptoms.    Дмитрий Keller MD  12/4/2019    Precepted with Dr. Gallagher, who agree with assessment and plan    "

## 2019-12-04 NOTE — LETTER
December 5, 2019      Dariusz Leyva  [address]   [place] [address]        Dear Dariusz,      Your lab results came back positive for bacterial vaginosis (BV), which is an overgrowth of bacteria normally present in the vagina.     I have sent a prescription for the treatment of BV, which is clindamycine. Please take this twice a day for seven days.     Your other lab results that have came back is normal. You still have one more lab result pending. I will let know as soon as it becomes available.     If you have any questions or concerns, please call the clinic.     Please see below for your test results.    Resulted Orders   Urinalysis, Micro If ([facility])   Result Value Ref Range    Specific Gravity Urine 1.015 1.005 - 1.030    pH Urine 5.5 4.5 - 8.0    Leukocyte Esterase UR Negative NEGATIVE    Nitrite Urine Negative NEGATIVE mg/dL    Protein UR Negative NEGATIVE mg/dL    Glucose Urine Negative NEGATIVE    Ketones Urine Negative NEGATIVE mg/dL    Urobilinogen mg/dL 0.2 E.U./dL 0.2 E.U./dL E.U./dL    Bilirubin UR Negative NEGATIVE mg/dL    Blood UR Negative NEGATIVE mg/dL   Wet Prep ([facility])   Result Value Ref Range    Yeast Wet Prep None none    Motile Trichomonas Wet Prep Negative Negative    Clue Cells Wet Prep Present >20% NONE    WBC WET PREP 2-5 2 - 5    Bacteria Wet Prep Moderate None    pH Wet Prep Not performed 3.8 - 4.5    Odor Wet Prep None NONE   HCG Qualitative Urine (UPT)  (P )   Result Value Ref Range    HCG Qual Urine NEGATIVE Negative       If you have any questions, please call the clinic to make an appointment.    Sincerely,    Дмитрий Keller MD

## 2019-12-04 NOTE — RESULT ENCOUNTER NOTE
Dariusz,    Your lab results came back positive for bacterial vaginosis (BV), which is an overgrowth of bacteria normally present in the vagina.    I have sent a prescription for the treatment of BV, which is clindamycine. Please take this twice a day for seven days.    Your other lab results that have came back is normal. You still have one more lab result pending. I will let know as soon as it becomes available.     If you have any questions or concerns, please call the clinic.      Thanks,    Dr. Keller

## 2019-12-04 NOTE — PROGRESS NOTES
Preceptor Attestation:   Patient seen, evaluated and discussed with the resident. I have verified the content of the note, which accurately reflects my assessment of the patient and the plan of care.   Supervising Physician:  Keyur Gallagher MD.

## 2019-12-05 ENCOUNTER — TELEPHONE (OUTPATIENT)
Dept: FAMILY MEDICINE | Facility: CLINIC | Age: 29
End: 2019-12-05

## 2019-12-05 LAB
C TRACH RRNA SPEC QL NAA+PROBE: POSITIVE
N GONORRHOEA RRNA SPEC QL NAA+PROBE: NEGATIVE

## 2019-12-05 NOTE — TELEPHONE ENCOUNTER
"Pt states she recently went to [facility] and tested positive for Chlamydia, for which she was treated. This occurred late October, and they told her this test would remain positive \"for a while.\" She did take the full dose of Azithromycin. Routed to Dr. Keller. ./LR        "

## 2019-12-30 ENCOUNTER — OFFICE VISIT (OUTPATIENT)
Dept: FAMILY MEDICINE | Facility: CLINIC | Age: 29
End: 2019-12-30
Payer: COMMERCIAL

## 2019-12-30 VITALS
WEIGHT: 293 LBS | TEMPERATURE: 98 F | OXYGEN SATURATION: 99 % | SYSTOLIC BLOOD PRESSURE: 132 MMHG | HEART RATE: 99 BPM | DIASTOLIC BLOOD PRESSURE: 85 MMHG | BODY MASS INDEX: 49.34 KG/M2 | RESPIRATION RATE: 16 BRPM

## 2019-12-30 DIAGNOSIS — Z01.818 PREOP GENERAL PHYSICAL EXAM: Primary | ICD-10-CM

## 2019-12-30 DIAGNOSIS — S82.141S CLOSED FRACTURE OF RIGHT TIBIAL PLATEAU, SEQUELA: ICD-10-CM

## 2019-12-30 LAB
B-HCG SERPL-ACNC: 4833 MLU/ML (ref 0–4)
BASOPHILS # BLD AUTO: 0 THOU/UL (ref 0–0.2)
BASOPHILS NFR BLD AUTO: 0 % (ref 0–2)
EOSINOPHIL # BLD AUTO: 0.1 THOU/UL (ref 0–0.4)
EOSINOPHIL NFR BLD AUTO: 1 % (ref 0–6)
ERYTHROCYTE [DISTWIDTH] IN BLOOD BY AUTOMATED COUNT: 14.1 % (ref 11–14.5)
HCT VFR BLD AUTO: 36 % (ref 35–47)
HGB BLD-MCNC: 11.5 G/DL (ref 12–16)
LYMPHOCYTES # BLD AUTO: 2.1 THOU/UL (ref 0.8–4.4)
LYMPHOCYTES NFR BLD AUTO: 27 % (ref 20–40)
MCH RBC QN AUTO: 28.3 PG (ref 27–34)
MCHC RBC AUTO-ENTMCNC: 31.9 G/DL (ref 32–36)
MCV RBC AUTO: 89 FL (ref 80–100)
MONOCYTES # BLD AUTO: 0.6 THOU/UL (ref 0–0.9)
MONOCYTES NFR BLD AUTO: 8 % (ref 2–10)
NEUTROPHILS # BLD AUTO: 5.1 THOU/UL (ref 2–7.7)
NEUTROPHILS NFR BLD AUTO: 64 % (ref 50–70)
PLATELET # BLD AUTO: 433 THOU/UL (ref 140–440)
PMV BLD AUTO: 9.4 FL (ref 8.5–12.5)
RBC # BLD AUTO: 4.07 MILL/UL (ref 3.8–5.4)
WBC # BLD AUTO: 7.9 THOU/UL (ref 4–11)

## 2019-12-30 RX ORDER — CYCLOBENZAPRINE HCL 5 MG
5 TABLET ORAL 3 TIMES DAILY
Qty: 30 TABLET | Refills: 0 | Status: SHIPPED | OUTPATIENT
Start: 2019-12-30 | End: 2021-03-26

## 2019-12-30 RX ORDER — ACETAMINOPHEN 500 MG
1000 TABLET ORAL 3 TIMES DAILY
Qty: 30 TABLET | Refills: 0 | Status: ON HOLD | OUTPATIENT
Start: 2019-12-30 | End: 2020-07-30

## 2019-12-30 RX ORDER — IBUPROFEN 600 MG/1
600 TABLET, FILM COATED ORAL EVERY 8 HOURS PRN
Qty: 30 TABLET | Refills: 0 | Status: ON HOLD | OUTPATIENT
Start: 2019-12-30 | End: 2020-07-31

## 2019-12-30 NOTE — PROGRESS NOTES
Preceptor Attestation:   Patient seen, evaluated and discussed with the resident. I have verified the content of the note, which accurately reflects my assessment of the patient and the plan of care.   Supervising Physician:  Andrae Curtis MD.

## 2019-12-30 NOTE — PROGRESS NOTES
[facility] [address] [place] [address]  [phone]  Dept: [phone]    PRE-OP EVALUATION:  Today's date: 2019    Dariusz Leyva (: 1990) presents for pre-operative evaluation assessment as requested by Dr. Hernandez.  She requires evaluation and anesthesia risk assessment prior to undergoing surgery/procedure for treatment of knee hardware removal .    Proposed Surgery/ Procedure: Hardware removal  Date of Surgery/ Procedure: 1/15/2020  Time of Surgery/ Procedure: [facility]  Hospital/Surgical Facility: [facility]  Fax number for surgical facility: [phone]   Primary Physician: Caridad Rashid  Type of Anesthesia Anticipated: General    Patient has a Health Care Directive or Living Will:  NO, Mother - Caridad Cooper [phone]    1. NO - Do you have a history of heart attack, stroke, stent, bypass or surgery on an artery in the head, neck, heart or legs?  2. NO - Do you ever have any pain or discomfort in your chest?  3. NO - Do you have a history of  Heart Failure?  4. YES - ARE YOUR TROUBLED BY SHORTNESS OF BREATH WHEN WALKING ON THE LEVEL, UP A SLIGHT HILL OR AT NIGHT? Trouble walking up a flight of stairs, but no history of asthma  5. NO - Do you currently have a cold, bronchitis or other respiratory infection?  6. NO - Do you have a cough, shortness of breath or wheezing?  7. NO - Do you sometimes get pains in the calves of your legs when you walk?  8. NO - Do you or anyone in your family have previous history of blood clots?  9. NO - Do you or does anyone in your family have a serious bleeding problem such as prolonged bleeding following surgeries or cuts?  10. NO - Have you ever had problems with anemia or been told to take iron pills?  11. NO - Have you had any abnormal blood loss such as black, tarry or bloody stools, or abnormal vaginal bleeding?  12. NO - Have you ever had a blood transfusion?  13. NO - Have you or any of your relatives ever  had problems with anesthesia?  14. NO - Do you have sleep apnea, excessive snoring or daytime drowsiness?  15. NO - Do you have any prosthetic heart valves?  16. NO - Do you have prosthetic joints?  17. YES - IS THERE ANY CHANCE THAT YOU MAY BE PREGNANT? Pregnant, having medication  on 19 and planning on following quant beta hCGs prior to pregnancy.       HPI:     HPI related to upcoming procedure: 6 months s/p ORIF of right Schatzker 4 tibial plateau fracture - with continued pain and instability of the knee, likely related to multiligamentous injury to the knee. From Dr. Hernandez's note on 2019    See problem list for active medical problems.  Problems all longstanding and stable, except as noted/documented.  See ROS for pertinent symptoms related to these conditions.      MEDICAL HISTORY:     Patient Active Problem List    Diagnosis Date Noted     Lipid screening 2018     Priority: Medium     Depression 2016     Priority: Medium     MDD (major depressive disorder) 2016     Priority: Medium     CARLY (generalized anxiety disorder) 2016     Priority: Medium     Large tonsils 2015     Priority: Medium     Morbid obesity (H) 2015     Priority: Medium     Abnormal Pap smear of cervix      Priority: Medium     3/15/17 nl Pap with neg HPV, pt needs repeat Pap in 3yrs.  16: Normal pap with neg HPV, pt need repeat pap in 1 yr  3/6/15 ASCUS Pap with + high risk HPV, pt needs repeat Pap in 1yr.       Lumbago 2015     Priority: Medium     Nonallopathic lesion of lumbar region 2015     Priority: Medium     Problem list name updated by automated process. Provider to review       Nonallopathic lesion of sacral region 2015     Priority: Medium     Problem list name updated by automated process. Provider to review       Pain in thoracic spine 2015     Priority: Medium     Nonallopathic lesion of thoracic region 2015     Priority: Medium      Problem list name updated by automated process. Provider to review        Past Medical History:   Diagnosis Date     Depressive disorder      History reviewed. No pertinent surgical history.  Current Outpatient Medications   Medication Sig Dispense Refill     acetaminophen (TYLENOL) 500 MG tablet Take 2 tablets (1,000 mg) by mouth 3 times daily 30 tablet 0     cyclobenzaprine (FLEXERIL) 5 MG tablet Take 1 tablet (5 mg) by mouth 3 times daily 30 tablet 0     ibuprofen (ADVIL/MOTRIN) 600 MG tablet Take 1 tablet (600 mg) by mouth every 8 hours as needed for moderate pain 30 tablet 0     cyclobenzaprine (FLEXERIL) 10 MG tablet TK ONE T AT NIGHT AS DIRECTED  0     OTC products: None, except as noted above    Allergies   Allergen Reactions     Nka [No Known Allergies]      Seasonal Allergies Other (See Comments)     Drainage and sometimes break out in hives       Latex Allergy: NO    Social History     Tobacco Use     Smoking status: Never Smoker     Smokeless tobacco: Never Used   Substance Use Topics     Alcohol use: Yes     Alcohol/week: 0.0 standard drinks     Comment: 1 per week     History   Drug Use     Comment: smokes pot once every few day        REVIEW OF SYSTEMS:   Constitutional, neuro, ENT, endocrine, pulmonary, cardiac, gastrointestinal, genitourinary, musculoskeletal, integument and psychiatric systems are negative, except as otherwise noted.    EXAM:   /85   Pulse 99   Temp 98  F (36.7  C) (Oral)   Resp 16   Wt 142.9 kg (315 lb)   LMP 11/19/2019   SpO2 99%   Breastfeeding No   BMI 49.34 kg/m      GENERAL APPEARANCE: healthy, alert and no distress     EYES: EOMI, PERRL     HENT: ear canals and TM's normal and nose and mouth without ulcers or lesions     NECK: no adenopathy, no asymmetry, masses, or scars and thyroid normal to palpation     RESP: lungs clear to auscultation - no rales, rhonchi or wheezes     CV: regular rates and rhythm, normal S1 S2, no S3 or S4 and no murmur, click or rub      ABDOMEN:  soft, nontender, no HSM or masses and bowel sounds normal     MS: extremities normal- no gross deformities noted, no evidence of inflammation in joints     SKIN: no suspicious lesions or rashes     NEURO: Normal strength and tone, sensory exam grossly normal, mentation intact and speech normal     PSYCH: mentation appears normal. and affect normal/bright      LYMPHATICS: No cervical adenopathy    DIAGNOSTICS:   Hemoglobin (indicated for history of anemia or procedure with significant blood loss such as tonsillectomy, major intraperitoneal surgery, vascular surgery, major spine surgery, total joint replacement)    Recent Labs   Lab Test 06/19/18  1652 06/07/18  1016 02/23/18  1612 01/26/17  1517  06/22/16  1433   HGB  --  12.8 12.9  --    < >  --      --   --   --   --   --    POTASSIUM 3.8  --   --   --   --   --    CR 0.79  --   --   --   --   --    A1C  --   --   --  5.4  --  5.7    < > = values in this interval not displayed.        IMPRESSION:   Reason for surgery/procedure: hardware removal  Diagnosis/reason for consult: tibial fracture    The proposed surgical procedure is considered INTERMEDIATE risk.    REVISED CARDIAC RISK INDEX  The patient has the following serious cardiovascular risks for perioperative complications such as (MI, PE, VFib and 3  AV Block):  No serious cardiac risks  INTERPRETATION: 0 risks: Class I (very low risk - 0.4% complication rate)    The patient has the following additional risks for perioperative complications:  No identified additional risks      ICD-10-CM    1. Preop general physical exam Z01.818 Beta-HCG Quantitative ([facility])     CBC w/ Diff and Plt ([facility])     Beta-HCG Quantitative ([facility])     CBC with Diff Plt ([facility])   2. Closed fracture of right tibial plateau, sequela S82.141S acetaminophen (TYLENOL) 500 MG tablet     cyclobenzaprine (FLEXERIL) 5 MG tablet     ibuprofen (ADVIL/MOTRIN) 600 MG tablet       RECOMMENDATIONS:     --Patient is on  no chronic medications so will not take medications on the day of the procedure  --Advised patient that she should not take ibuprofen while pregnant, okay to take when not pregnant    APPROVAL GIVEN to proceed with proposed procedure, with follow up quant beta hCG following  on 19 to show she is not pregnant. Future orders placed for 1/10/2020 and patient instructed to complete labs on 20.     Signed Electronically by: Caridad Rashid MD  I precepted today with Andrae Curtis MD.     Copy of this evaluation report is provided to requesting physician.      Caridad Rashid MD PGY2  I precepted today with Andrae Curtis MD.

## 2019-12-31 NOTE — RESULT ENCOUNTER NOTE
Dear Dariusz Leyva,     The results from your blood testing showed that your pregnancy is likely between 5 and 7 weeks along. Your hemoglobin is a little low, but it's nothing we'd need to do anything about prior to surgery. You can come back to clinic 1/10/2020 to have the labs rechecked prior to your surgery.    Thank you for allowing me to be a part of your health care!    Sincerely,   Dr. Rashid  12/30/2019

## 2020-01-13 DIAGNOSIS — Z01.818 PREOP GENERAL PHYSICAL EXAM: ICD-10-CM

## 2020-01-13 LAB
% GRANULOCYTES: 60.3 %G (ref 40–75)
B-HCG SERPL-ACNC: 15 MLU/ML (ref 0–4)
GRANULOCYTES #: 3.3 K/UL (ref 1.6–8.3)
HCT VFR BLD AUTO: 38.5 % (ref 35–47)
HEMOGLOBIN: 11.9 G/DL (ref 11.7–15.7)
LYMPHOCYTES # BLD AUTO: 1.6 K/UL (ref 0.8–5.3)
LYMPHOCYTES NFR BLD AUTO: 30.5 %L (ref 20–48)
MCH RBC QN AUTO: 28.5 PG (ref 26.5–35)
MCHC RBC AUTO-ENTMCNC: 30.9 G/DL (ref 32–36)
MCV RBC AUTO: 92.1 FL (ref 78–100)
MID #: 0.5 K/UL (ref 0–2.2)
MID %: 9.2 %M (ref 0–20)
PLATELET # BLD AUTO: 392 K/UL (ref 150–450)
RBC # BLD AUTO: 4.2 M/UL (ref 3.8–5.2)
WBC # BLD AUTO: 5.4 K/UL (ref 4–11)

## 2020-01-15 ENCOUNTER — MYC MEDICAL ADVICE (OUTPATIENT)
Dept: FAMILY MEDICINE | Facility: CLINIC | Age: 30
End: 2020-01-15

## 2020-01-15 DIAGNOSIS — B37.31 CANDIDIASIS OF VAGINA: Primary | ICD-10-CM

## 2020-01-15 RX ORDER — FLUCONAZOLE 150 MG/1
150 TABLET ORAL ONCE
Qty: 1 TABLET | Refills: 0 | Status: SHIPPED | OUTPATIENT
Start: 2020-01-15 | End: 2020-01-15

## 2020-01-15 NOTE — TELEPHONE ENCOUNTER
"Patient reporting a \"yeast infection\" states she has whit thick discharge, mild irritation and itching. Symptoms started 2 days ago. Patient states she has had diflucan in the past and it has resolved her symptoms.     Note routed to Dr.Aldrin Cabrera RN  "

## 2020-01-15 NOTE — TELEPHONE ENCOUNTER
Based on symptoms, will send Rx for one time diflucan dose to her pharmacy. If symptoms are not improving or worsening by the end of the week despite treatment with Diflucan she should be evaluation.     Joe Davis MD PGY2   Proxy for Dr. Rashid

## 2020-01-28 ENCOUNTER — OFFICE VISIT (OUTPATIENT)
Dept: FAMILY MEDICINE | Facility: CLINIC | Age: 30
End: 2020-01-28
Payer: COMMERCIAL

## 2020-01-28 VITALS
DIASTOLIC BLOOD PRESSURE: 88 MMHG | BODY MASS INDEX: 45.99 KG/M2 | SYSTOLIC BLOOD PRESSURE: 136 MMHG | OXYGEN SATURATION: 100 % | TEMPERATURE: 97.7 F | WEIGHT: 293 LBS | HEIGHT: 67 IN | RESPIRATION RATE: 12 BRPM | HEART RATE: 80 BPM

## 2020-01-28 DIAGNOSIS — J02.9 SORE THROAT: Primary | ICD-10-CM

## 2020-01-28 DIAGNOSIS — Z01.818 PREOP GENERAL PHYSICAL EXAM: ICD-10-CM

## 2020-01-28 LAB — S PYO AG THROAT QL IA.RAPID: NEGATIVE

## 2020-01-28 ASSESSMENT — MIFFLIN-ST. JEOR: SCORE: 2130.24

## 2020-01-28 NOTE — PROGRESS NOTES
[facility] [address] [place] [address]  Phone: [phone]  Fax: [phone]    1/28/2020    Adult PRE-OP Evaluation:    Dariusz Leyva, 1990 presents for pre-operative evaluation and assessment as requested by Dr. Hernandez, prior to undergoing surgery/procedure for treatment of  Hardware removal after tibial plateau fracture.    Proposed procedure: hardware removal (R knee)    Date of Surgery/ Procedure: 2/5/2020  Hospital/Surgical Facility: [facility]     Primary Physician: Caridad Rashid  Type of Anesthesia Anticipated: General  History of anesthesia complications: NONE  History of  abnormal bleeding: NONE   History of blood transfusions: NO  Patient has a Health Care Directive or Living Will:  NO (though agreeable to chest compressions and intubation if necessary)    Preoperative Questions   1. NO - Do you have a history of heart attack, stroke, stent, bypass or surgery on an artery in the head, neck, heart or legs?  2. YES - Do you ever have any pain or discomfort in your chest? Pinpoint area to the left of sternum, massage relieves pain. No associated diaphoresis or shortness of breath, not worse with activity  3. NO - Have you ever had a severe pain across the front of your chest lasting for half an hour or more?  4. NO - Do you have a history of Congestive Heart Failure?  5. YES - Are you troubled by shortness of breath when: walking on the level/ up a slight hill/ at night? Shortness of breath with activity like stairs (1 flight of stairs causes moderate to severe shortness of breath). No SOB with lying flat.   6. NO - Does your chest ever sound wheezy or whistling?  7. NO - Do you currently have a cold, bronchitis or other respiratory infection?  8. YES - Have you had a cold, bronchitis or other respiratory infection within the last 2 weeks? Today feels have sore throat (feels right side of throat is swelling), dry cough, no fever or chills  - Rapid strep negative today.    9. NO - Do you usually have a cough?  10. YES - Do you sometimes get pains in the calves of your legs when you walk? Bilateral, central mid-calf, been going on for 2 weeks, feeling of tightness (has also been largely immobile with R lower extremity pain). No noted swelling or erythema.  11. NO - Do you or anyone in your family have previous history of blood clots?  12. NO - Do you or does anyone in your family have a serious bleeding problem such as prolonged bleeding following surgeries or cuts?  13. NO - Have you ever had problems with anemia or been told to take iron pills?  14. YES - Have you had any abnormal blood loss such as black, tarry or bloody stools, or abnormal vaginal bleeding? Anal fissure diagnosed 2 weeks prior, bright red blood mixed with mucus without excessive loss or hemodynamic instability, healing.  15. NO - Have you ever had a blood transfusion?  16. NO - Have you or any of your relatives ever had problems with anesthesia?  17. NO - Do you have sleep apnea, excessive snoring or daytime drowsiness?  18. NO - Do you have any prosthetic heart valves?  19. NO - Do you have prosthetic joints? (metal plate over tibia, knee not replaced)  20. NO - Is there any chance that you may be pregnant?    Patient Active Problem List   Diagnosis     Lumbago     Nonallopathic lesion of lumbar region     Nonallopathic lesion of sacral region     Pain in thoracic spine     Nonallopathic lesion of thoracic region     Abnormal Pap smear of cervix     Large tonsils     Morbid obesity (H)     MDD (major depressive disorder)     CARLY (generalized anxiety disorder)     Depression     Lipid screening       acetaminophen (TYLENOL) 500 MG tablet, Take 2 tablets (1,000 mg) by mouth 3 times daily - taking as needed  ibuprofen (ADVIL/MOTRIN) 600 MG tablet, Take 1 tablet (600 mg) by mouth every 8 hours as needed for moderate pain   cyclobenzaprine (FLEXERIL) 10 MG tablet, TK ONE T AT NIGHT AS DIRECTED  cyclobenzaprine  (FLEXERIL) 5 MG tablet, Take 1 tablet (5 mg) by mouth 3 times daily  [] fluconazole (DIFLUCAN) 150 MG tablet, Take 1 tablet (150 mg) by mouth once for 1 dose    No current facility-administered medications on file prior to visit.       OTC products: None, except as noted above    Allergies   Allergen Reactions     Nka [No Known Allergies]      Seasonal Allergies Other (See Comments)     Drainage and sometimes break out in hives      Latex Allergy: NO    Social History     Socioeconomic History     Marital status: Single     Spouse name: None     Number of children: None     Years of education: None     Highest education level: None   Occupational History     None   Social Needs     Financial resource strain: None     Food insecurity:     Worry: None     Inability: None     Transportation needs:     Medical: None     Non-medical: None   Tobacco Use     Smoking status: Never Smoker     Smokeless tobacco: Never Used   Substance and Sexual Activity     Alcohol use: Yes     Alcohol/week: 0.0 standard drinks     Comment: 1 per week     Drug use: Yes     Comment: smokes pot once every few day      Sexual activity: Yes     Partners: Male   Lifestyle     Physical activity:     Days per week: None     Minutes per session: None     Stress: None   Relationships     Social connections:     Talks on phone: None     Gets together: None     Attends Jew service: None     Active member of club or organization: None     Attends meetings of clubs or organizations: None     Relationship status: None     Intimate partner violence:     Fear of current or ex partner: None     Emotionally abused: None     Physically abused: None     Forced sexual activity: None   Other Topics Concern     None   Social History Narrative     None       REVIEW OF SYSTEMS:   Also endorsing intermittent mild bilateral lower quadrant, achy abdominal pain with occasional nausea which has been present for quite some time. Currently also using boric  "acid suppositories for suspected bacterial vaginosis. No fevers or chills.   Also endorsing vaginal bleeding with sex (though states sex as been rough lately), and none otherwise.    Other constitutional, HEENT, cardiovascular, pulmonary, gi and gu systems are negative, except as otherwise noted.    EXAM:     Patient Vitals for the past 24 hrs:   BP Temp Temp src Pulse Resp SpO2 Height Weight   01/28/20 1617 136/88 97.7  F (36.5  C) Oral 80 12 100 % 1.702 m (5' 7\") 136.3 kg (300 lb 6.4 oz)     Body mass index is 47.05 kg/m .  GENERAL: healthy, alert and no distress  EYES: Eyes grossly normal to inspection, extraocular movements - intact, and PERRL  HENT: Mouth- no significant posterior pharyngeal erythema, some bilateral enlargement of taste buds over root of tongue.   NECK: mild tenderness to palpation along R anterior cervical lymph nodes though so significant swelling.  RESP: lungs clear to auscultation - no rales, no rhonchi, no wheezes  CV: regular rates and rhythm, normal S1 S2, no S3 or S4 and no murmur, no click or rub -  ABDOMEN: soft, no tenderness, no  hepatosplenomegaly, no masses, normal bowel sounds  MS: extremities- no gross deformities noted, no edema. Brace over R knee.  SKIN: no suspicious lesions, no rashes. Lower extremities with no appreciable erythema, swelling, or warmth  NEURO: strength and tone- normal, sensory exam- grossly normal, mentation- intact,   PSYCH: Alert and oriented times 3; speech- coherent , normal rate and volume; able to articulate logical thoughts    DIAGNOSTICS:        Will get UPT at a future date (lab closed at time of visit)    RISK ASSESSMENT:     Cardiovascular Risk:  -Patient is able to participate in strenuous activity without chest pain.  -The patient does not have chest pain with exertion.  -Patient does not have a history of congestive heart failure.    -The patient does not have a history of stroke and does not have a history of valvular disease.    Pulmonary " Risk:  -In terms of risk factors for pulmonary complication, the patient has no risk factors    Perioperative Complications:  -The patient does not have a history of bleeding or clotting problems in the past.    -The patient has not had complications from surgeries.    -The patient does not have a family history of any anesthesia or surgical complications.      IMPRESSION:   Reason for surgery/procedure: removal of hardware from R lower extremity for pain relief and functionality    The proposed surgical procedure is considered INTERMEDIATE risk.    For above listed surgery and anesthesia:   Patient is at low risk for surgery/procedure and perioperative/procedure complications.    RECOMMENDATIONS:     Labs:  Hb 11.9 on 1/13/2020    Fasting:  NPO for 12 hours prior to surgery    Preop Plan:  --Approval given to proceed with proposed procedure pending negative UPT      Medications:  Patient not taking chronic medications and no further instruction needed for management.    Problems from history:  Chest pain - likely musculoskeletal in nature, not concerning at this time  Lower abdominal pain - comes and goes, not severe, and no concerning exam findings. Will monitor at future visit.  Bilateral calf pain - likely soreness from immobility and periodic walking. No concerning erythema or swelling to suggest cellulitis or DVT  Sore throat - no clear posterior pharyngeal erythema, may be related to allergies or elusive aphthous ulcer. Will monitor though not concerning at this time.  Vaginal bleeding with sex - may be related to trauma with sex, though also due to cervical dysplasia. Due for pap screening soon, will investigate at future visit.      Gunner Russell MS    I precepted today with Car Graham MD.       I was present with the medical student who participated in the service and in the documentation of this note. I have verified the history and personally performed the physical exam and medical decision making,  and have verified the content of the note, which accurately reflects my assessment of the patient and the plan of care.    Ramos Brito, PGY3  [facility]    Please contact our office if there are any further questions or information required about this patient.

## 2020-01-28 NOTE — PROGRESS NOTES
Preceptor Attestation:   Patient seen, evaluated and discussed with the resident. I have verified the content of the note, which accurately reflects my assessment of the patient and the plan of care.   Supervising Physician:  Car Graham MD

## 2020-01-29 DIAGNOSIS — Z01.818 PREOP GENERAL PHYSICAL EXAM: ICD-10-CM

## 2020-01-29 LAB
GROUP A STREP,THROAT: NORMAL
HCG UR QL: NEGATIVE

## 2020-01-30 ENCOUNTER — OFFICE VISIT (OUTPATIENT)
Dept: FAMILY MEDICINE | Facility: CLINIC | Age: 30
End: 2020-01-30
Payer: COMMERCIAL

## 2020-01-30 VITALS
OXYGEN SATURATION: 99 % | BODY MASS INDEX: 45.99 KG/M2 | TEMPERATURE: 97.7 F | DIASTOLIC BLOOD PRESSURE: 78 MMHG | HEIGHT: 67 IN | WEIGHT: 293 LBS | RESPIRATION RATE: 16 BRPM | SYSTOLIC BLOOD PRESSURE: 126 MMHG | HEART RATE: 60 BPM

## 2020-01-30 DIAGNOSIS — R30.0 DYSURIA: Primary | ICD-10-CM

## 2020-01-30 DIAGNOSIS — R30.0 DYSURIA: ICD-10-CM

## 2020-01-30 DIAGNOSIS — Z86.19 HISTORY OF CHLAMYDIA: ICD-10-CM

## 2020-01-30 DIAGNOSIS — N76.0 BACTERIAL VAGINITIS: ICD-10-CM

## 2020-01-30 DIAGNOSIS — B96.89 BACTERIAL VAGINITIS: ICD-10-CM

## 2020-01-30 DIAGNOSIS — N30.00 ACUTE CYSTITIS WITHOUT HEMATURIA: Primary | ICD-10-CM

## 2020-01-30 LAB
BACTERIA: NORMAL
BACTERIA: NORMAL
CASTS: NORMAL /LPF
CLUE CELLS: NORMAL
CRYSTAL URINE: NORMAL /LPF
EPITHELIAL CELLS UR: NORMAL /LPF (ref 0–2)
MOTILE TRICHOMONAS: NEGATIVE
MUCOUS URINE: NORMAL LPF
ODOR: NORMAL
PH WET PREP: NORMAL (ref 3.8–4.5)
RBC URINE: NORMAL /HPF
WBC URINE: NORMAL /HPF
WBC WET PREP: NORMAL (ref 2–5)
YEAST: NORMAL

## 2020-01-30 RX ORDER — SULFAMETHOXAZOLE/TRIMETHOPRIM 800-160 MG
1 TABLET ORAL 2 TIMES DAILY
Qty: 6 TABLET | Refills: 0 | Status: ON HOLD | OUTPATIENT
Start: 2020-01-30 | End: 2020-07-30

## 2020-01-30 RX ORDER — METRONIDAZOLE 7.5 MG/G
1 GEL VAGINAL DAILY
Qty: 50 G | Refills: 0 | Status: ON HOLD | OUTPATIENT
Start: 2020-01-30 | End: 2020-07-30

## 2020-01-30 ASSESSMENT — MIFFLIN-ST. JEOR: SCORE: 2146.56

## 2020-01-30 NOTE — PROGRESS NOTES
"[facility] Visit    Subjective:  Dariusz Leyva is a 29 year old female with a PMHx significant for   Patient Active Problem List   Diagnosis     Lumbago     Nonallopathic lesion of lumbar region     Nonallopathic lesion of sacral region     Pain in thoracic spine     Nonallopathic lesion of thoracic region     Abnormal Pap smear of cervix     Large tonsils     Morbid obesity (H)     MDD (major depressive disorder)     CARLY (generalized anxiety disorder)     Depression     Lipid screening    who presents with concern for UTI.  She had roughly 1 week of dark, foul-smelling urine as well as \"urethral pain\" and suprapubic fullness/pain.  She did an over-the-counter urine dipstick at home and reports that it was positive for nitrites and leukocyte esterase.  She has had UTIs in the past, but she does not get them frequently.  No fevers or chills.  No flank pain.  No difficulty urinating or sensation of incomplete bladder emptying.  Due to the urethral irritation, she did  Pyridium which she started taking a couple of days ago.    Patient also has a history of recurrent bacterial vaginosis.  Was recently seen at [facility] for this, they recommended boric acid suppositories which she has been doing for about a week.  She has not noticed any vaginal irritation, vaginal pain, increased discharge or increased vaginal odor.    ROS:   A complete 12-point ROS was obtained and was negative except as stated above in HPI.    Objective:  Vitals:    01/30/20 1237   BP: 126/78   Pulse: 60   Resp: 16   Temp: 97.7  F (36.5  C)   TempSrc: Oral   SpO2: 99%   Weight: 137.9 kg (304 lb)   Height: 1.702 m (5' 7\")     Body mass index is 47.61 kg/m .    GEN: NAD, healthy, alert  EYES: grossly normal to inspection  ABD: soft, mild suprapubic tenderness with deep palpation, no masses noted   MSK: Wearing right leg brace  BACK: Lumbar tenderness to palpation.  No CVA tenderness with percussion  PSYCH: " mentation appears normal, affect normal/bright    Results for orders placed or performed in visit on 01/30/20 (from the past 24 hour(s))   Urine Microscopic ([facility])   Result Value Ref Range    WBC Urine 2-5 <5 /hpf    RBC Urine None <5 /hpf    Epithelial Cells UR 10-25 0 - 2 /lpf    Mucous Urine None NONE lpf    Casts Urine None NONE /lpf    Crystal Urine None NONE /lpf    Bacteria Wet Prep Few None    Narrative    unable to run urinalysis due to pt's urine too orange- taking pyridium   Wet Prep ([facility])   Result Value Ref Range    Yeast Wet Prep None none    Motile Trichomonas Wet Prep Negative Negative    Clue Cells Wet Prep Present >20% NONE    WBC WET PREP 2-5 2 - 5    Bacteria Wet Prep Few None    pH Wet Prep Not performed 3.8 - 4.5    Odor Wet Prep None NONE       Assessment/Plan:  Dariusz was seen today for uti.    Diagnoses and all orders for this visit:    Bacterial vaginitis  Patient requested wet prep to assess boric acid efficacy.  Did have greater than 20% clue cells and she elected to treat this as an acute bacterial vaginosis infection.  Oral metronidazole has not worked for her in the past, but it is been quite sometime since she has tried vaginal metronidazole.  She is amenable to trying this.  -     metroNIDAZOLE (METROGEL) 0.75 % vaginal gel; Place 1 applicator (5 g) vaginally daily for 10 days    Dysuria  Acute cystitis without hematuria  Patient presenting with convincing symptoms for UTI including suprapubic tenderness, dark/malodorous urine and urethral irritation in addition to a over-the-counter urine dipstick with positive nitrites and leukocyte esterase.  Given that she has been taking Pyridium, we are unable to run a urinalysis today.  Urine microscopy was bland, but with few bacteria complicated by dirty sample with several epithelial cells.  Awaiting urine culture.  Given that patient is having such convincing symptoms/had positive home test will treat with 3 days of Bactrim presuming  of his UTI.  No prior urine cultures to assess for sensitivities.  Will change antibiotic if indicated based on culture.  -     Urine Culture ([facility])  -     Cancel: Urinalysis ([facility])  -     Urine Microscopic ([facility])  -     Wet Prep ([facility])  -     sulfamethoxazole-trimethoprim (BACTRIM DS/SEPTRA DS) 800-160 MG tablet; Take 1 tablet by mouth 2 times daily for 3 days    History of chlamydia  History of treated chlamydia infection several months ago.  During her visit to [facility] for bacterial vaginosis, she did have another test of cure chlamydia lab drawn.  She still awaiting these results and declines repeat testing today.    Options for treatment and follow-up care were reviewed with the patient who was engaged and actively involved in the decision making process, verbalized understanding of the options discussed, and satisfied with the final plan.    Patient was staffed with supervising physician, Dr. Rader.     Joe Davis MD PGY2  [facility]

## 2020-01-30 NOTE — PATIENT INSTRUCTIONS
Acute cystitis without hematuria (UTI)  - sulfamethoxazole-trimethoprim (BACTRIM DS/SEPTRA DS) 800-160 MG tablet; Take 1 tablet by mouth 2 times daily for 3 days  Dispense: 6 tablet; Refill: 0    I will let you know if we need to change the antibiotic based on the culture.

## 2020-01-31 LAB — CULTURE: NORMAL

## 2020-03-10 ENCOUNTER — HEALTH MAINTENANCE LETTER (OUTPATIENT)
Age: 30
End: 2020-03-10

## 2020-04-21 ENCOUNTER — TELEPHONE (OUTPATIENT)
Dept: FAMILY MEDICINE | Facility: CLINIC | Age: 30
End: 2020-04-21

## 2020-04-21 NOTE — TELEPHONE ENCOUNTER
Can you see if she can schedule an appointment to discuss this? I'm assuming there was an outside provider that started the medication.    Caridad Rashid MD

## 2020-04-21 NOTE — TELEPHONE ENCOUNTER
Refill request for etonogestrel-ethinyl estradiol (NUVARING) 0.12-0.015 MG/24HR vaginal ring and sig: insert 1 ring vaginally every 21 days, remove for one week and repeat with new ring.    This med is not on the medication list. Please advise.    ARSENIO McclainA

## 2020-04-22 NOTE — TELEPHONE ENCOUNTER
Called patient and talked to her about the med. She say she don't the med now. Told patient that this med been discontinued and she need appointment to refill it. Offered telephone visit to patient. Patient will call in to schedule if needed.    LILI Mcclain

## 2020-04-28 ENCOUNTER — VIRTUAL VISIT (OUTPATIENT)
Dept: FAMILY MEDICINE | Facility: CLINIC | Age: 30
End: 2020-04-28
Payer: COMMERCIAL

## 2020-04-28 VITALS — BODY MASS INDEX: 46.99 KG/M2 | WEIGHT: 293 LBS

## 2020-04-28 DIAGNOSIS — N89.8 VAGINAL DISCHARGE: ICD-10-CM

## 2020-04-28 DIAGNOSIS — Z72.51 UNPROTECTED SEX: ICD-10-CM

## 2020-04-28 DIAGNOSIS — J30.89 SEASONAL ALLERGIC RHINITIS DUE TO OTHER ALLERGIC TRIGGER: Primary | ICD-10-CM

## 2020-04-28 LAB
BACTERIA: NORMAL
CLUE CELLS: NORMAL
HCG UR QL: NEGATIVE
HIV 1+2 AB+HIV1 P24 AG SERPL QL IA: NEGATIVE
MOTILE TRICHOMONAS: NEGATIVE
ODOR: NORMAL
PH WET PREP: NORMAL (ref 3.8–4.5)
WBC WET PREP: NORMAL (ref 2–5)
YEAST: NORMAL

## 2020-04-28 RX ORDER — CETIRIZINE HYDROCHLORIDE 10 MG/1
10 TABLET ORAL DAILY
Qty: 30 TABLET | Refills: 1 | Status: SHIPPED | OUTPATIENT
Start: 2020-04-28 | End: 2020-09-21

## 2020-04-28 RX ORDER — ETONOGESTREL AND ETHINYL ESTRADIOL VAGINAL RING .015; .12 MG/D; MG/D
1 RING VAGINAL
Status: ON HOLD | COMMUNITY
Start: 2020-04-28 | End: 2020-07-30

## 2020-04-28 ASSESSMENT — PATIENT HEALTH QUESTIONNAIRE - PHQ9: SUM OF ALL RESPONSES TO PHQ QUESTIONS 1-9: 8

## 2020-04-28 NOTE — PROGRESS NOTES
I greeted the patient via telephone,  reviewed the telephone visit encounter and discussed the patient with the resident and agree with the resident s assessment and plan of care as documented.    Car Graham MD

## 2020-04-28 NOTE — PATIENT INSTRUCTIONS
I will call you with your results after you come into the lab today. Please give us a call if you have other questions or concerns.

## 2020-04-28 NOTE — PROGRESS NOTES
"Family Medicine Telephone Visit Note           Telephone Visit Consent   Patient was verbally read the following and verbal consent was obtained.    \"This telephone visit will be conducted via a call between you and your physician/provider. We have found that certain health care needs can be provided without the need for a physical exam.  This service lets us provide the care you need with a short phone conversation.  If a prescription is necessary we can send it directly to your pharmacy.  If lab work is needed we can place an order for that and you can then stop by our lab to have the test done at a later time.    Telephone visits are billed at different rates depending on your insurance coverage. During this emergency period, for some insurers they may be billed the same as an in-person visit.  Please reach out to your insurance provider with any questions.    If during the course of the call the physician/provider feels a telephone visit is not appropriate, you will not be charged for this service.\"    Name person giving consent:  Patient   Date verbal consent given:  4/28/2020  Time verbal consent given:  8:19 AM      Chief Complaint   Patient presents with     Abdominal Pain     some abdominal pain, it is after sex she has pain, everytime     Vaginal Problem     after sex she went to the bathroom and it was burning plus she has lower back pain, some vagnial discharge, has some odor, been going on for a few weeks      Medication Request     she would like a non droswy allergy medication             HPI   Patients name: Dariusz  Appointment start time:  8:27 AM      29-year-old female history of STDs calling for vaginal symptoms for the past 2 weeks.  She has noticed vaginal discharge that smells like ammonia.  Thinks it might be bacterial vaginosis as she has had this in the past.  She had a new partner since December but has since got off that relationship.  She has had another new partner this past month.  " She has been using condoms although inconsistently and a NuvaRing for birth control.  She has had vaginal itching intermittently but no white discharge.  Her first day of LMP was April 9.  She does not have any pain with intercourse.  She has not had persistent dysuria or any new urinary frequency or urgency.  She has had intermittent low back pain and pelvic pain discomfort for the past 2 weeks during the same timeframe as the vaginal discharge has occurred.  States she had a UTI years ago but it does not feel quite like this.  She is agreeable to obtaining a UPT and STD testing.  Furthermore, she would like something for allergies.  She has been using Benadryl for persistent sneezing but it is making her sleepy.  She has seasonal allergies.  Finally, she has a history of anxiety and depression but states she does not have concerns with depression right now.  She does have some anxiety but has been coping and declines any assistance from our clinic at this time.  She did see a psychiatrist previously and has been thinking about possibly reaching out to him.  Denies any SI or HI.  She is feeling safe in relationships.    Current Outpatient Medications   Medication Sig Dispense Refill     cyclobenzaprine (FLEXERIL) 5 MG tablet Take 1 tablet (5 mg) by mouth 3 times daily 30 tablet 0     ibuprofen (ADVIL/MOTRIN) 600 MG tablet Take 1 tablet (600 mg) by mouth every 8 hours as needed for moderate pain 30 tablet 0     acetaminophen (TYLENOL) 500 MG tablet Take 2 tablets (1,000 mg) by mouth 3 times daily 30 tablet 0     cyclobenzaprine (FLEXERIL) 10 MG tablet TK ONE T AT NIGHT AS DIRECTED  0     Allergies   Allergen Reactions     Nka [No Known Allergies]      Seasonal Allergies Other (See Comments)     Drainage and sometimes break out in hives      PHQ 6/19/2018 7/23/2019 4/28/2020   PHQ-9 Total Score 7 8 8   Q9: Thoughts of better off dead/self-harm past 2 weeks Not at all Not at all Not at all   Some encounter information  "is confidential and restricted. Go to Review Flowsheets activity to see all data.            Physical Exam:     There were no vitals taken for this visit.  Estimated body mass index is 47.61 kg/m  as calculated from the following:    Height as of 1/30/20: 1.702 m (5' 7\").    Weight as of 1/30/20: 137.9 kg (304 lb).    Exam:  Constitutional: healthy, alert and no distress  Psychiatric: mentation appears normal and affect normal/bright          Assessment and Plan   1. Seasonal allergic rhinitis due to other allergic trigger  - cetirizine (ZYRTEC) 10 MG tablet; Take 1 tablet (10 mg) by mouth daily  Dispense: 30 tablet; Refill: 1    2. Vaginal discharge  - Chlamydia/Gono Amplified ([facility])  - Wet Prep ([facility])  - Syphilis Screen [place] ([facility])  - HIV Ag/Ab Screen [place] ([facility])  - HCG Qualitative Urine (UPT)  ([facility])    3. Unprotected sex  Unprotected intercourse with vaginal discharge and request to be tested for STDs.  She will come into the lab today for vaginal self swab and urine sample.  I will call her with results when they are available.  - Chlamydia/Gono Amplified ([facility])  - Wet Prep ([facility])  - Syphilis Screen [place] ([facility])  - HIV Ag/Ab Screen [place] ([facility])  - HCG Qualitative Urine (UPT)  ([facility])    Refilled medications that would be required in the next 3 months.     After Visit Information:  Patient chose to view AVS via [employer]    No follow-ups on file.    Appointment end time: 8:47 AM  This is a telephone visit that took 20 minutes.      Clinician location:  [place]    Caridad Chawla MD  I precepted today with Dr. Graham.              "

## 2020-04-29 LAB — TREPONEMA ANTIBODY (SYPHILIS): NEGATIVE

## 2020-05-01 LAB
C TRACH RRNA SPEC QL NAA+PROBE: NEGATIVE
N GONORRHOEA RRNA SPEC QL NAA+PROBE: NEGATIVE

## 2020-05-01 NOTE — RESULT ENCOUNTER NOTE
Unable to call patient as not in clinic this afternoon. Please inform her that her STD testing is all negative. Everything looks good.     Caridad Chawla MD

## 2020-06-03 ENCOUNTER — VIRTUAL VISIT (OUTPATIENT)
Dept: PSYCHIATRY | Facility: CLINIC | Age: 30
End: 2020-06-03
Attending: PSYCHOLOGIST
Payer: COMMERCIAL

## 2020-06-03 DIAGNOSIS — F41.1 GAD (GENERALIZED ANXIETY DISORDER): ICD-10-CM

## 2020-06-03 DIAGNOSIS — F32.A DEPRESSION: Primary | ICD-10-CM
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
## 2020-06-05 NOTE — TELEPHONE ENCOUNTER
OUTPATIENT TREATMENT PLAN SUMMARY    Date of Treatment Plan: 6/9/20  90-Day Review Date: 9/9/20  Date of Initial Service: 9/17/18       1. DSM-V Diagnosis (include numeric code)  Unspecified depressive disorder and unspecified anxiety disorder  2. Current symptoms and circumstances that substantiate the diagnosis:  Low mood, sleep and appetite disturbance, concentration concerns and possible anhedonia  Financial worries, spending to cope    3. How symptoms and/or behaviors are affecting level of function:   Difficulty engaging in enjoyable activities, feeling isolated    4. Risk Assessment:  Suicide:  Assessed Level of Immediate Risk: Low  Ideation: Yes, not current but in the past  Plan:  No  Means: No  Intent: No    Homicide/Violence:  Assessed Level of Immediate Risk: None  Ideation: No  Plan: No  Means: No  Intent: No    If on a medication, please include name and dosage:        Symptom/Problem Measurable Goals Interventions Gains Made   1.Depressive symptoms 1. Decrease symptoms related to depression per patient report 1.CBT: Problem solving, skill building and psychoeducation 1. N/A   2.Anxiety 2. Increase social interaction by 50% per patient report. 2.CBT: Problem solving, skill building and psychoeducation 2. N/A   3. 3.  3. 3       5. Frequency of Sessions: Weekly    6. Discharge and Aftercare Goals: discharge on completion of goals    7. Expected duration of treatment:  To be reassessed in 90 days    8. Participants in therapy plan (family, friends, support network): client and therapist      See scanned document for Acknowledgement of Current Treatment Plan      Regulatory Guidelines for Updating Treatment Plan  [place] Medical Assistance: Reviewed & signed at least every 90days  Medicare:  Update per policy

## 2020-06-09 ENCOUNTER — VIRTUAL VISIT (OUTPATIENT)
Dept: PSYCHIATRY | Facility: CLINIC | Age: 30
End: 2020-06-09
Attending: PSYCHOLOGIST
Payer: COMMERCIAL

## 2020-06-09 DIAGNOSIS — F41.1 GAD (GENERALIZED ANXIETY DISORDER): ICD-10-CM

## 2020-06-09 DIAGNOSIS — F32.A DEPRESSION: Primary | ICD-10-CM

## 2020-06-09 NOTE — PROGRESS NOTES
OUTPATIENT PSYCHOTHERAPY PROGRESS NOTE    Client Name: Dariusz Leyva   YOB: 1990 (29 year old)   Time of Service: 54 minutes  Service Type(s): Individual psychotherapy    Video- Visit Details  Type of service:  video visit for psychotherapy  Time of service:    Date:  06/09/2020    Video Start Time:  7:08 am        Video End Time: 8:02 am    Reason for video visit:  Patient unable to travel due to Covid-19  Originating Site (patient location):  Patient's home  Distant Site (provider location):  Remote location  Mode of Communication:  Video Conference via [email or web address]  Consent:  Patient has given verbal consent for video visit?: Yes    Diagnoses:   Unspecified depressive disorder and unspecified anxiety    Goals of therapy: Elevate mood and show evidence of usual energy levels, activities, and socialization level, Reduce overall frequency, intensity, and duration of the anxiety so that daily functioning is not impaired      Individuals Present:   Psychotherapy services during this visit included myself and Dariusz Leyva.    Narrative:  Myrtle reported she is doing ok.  She introduced this provider to her cats -  Gizmo  and  Infiniti.  and her Iguana -  Donatelo  She indicated her mood was  Blah.   She has been sleeping  ok,  pain is affecting her at night. She has not had a huge appetite. Her concentration has been  so-so.  She started back in school - hard to focus on doing assignments - does not have a book.      She has an appointment now for her leg to discuss the surgery on June 25th. She reported that from her own research she thinks that recover could take up to a year.     She will be graduating from school after her current class is over. She reported she does not expect support when she graduates. She described a pattern of not being supported by friends and family, including after her brothers were killed.    Dariusz shared more information about her brothers and their deaths. She  "indicated she was close to her brothers, especially her older brother. They were only about a year apart in age. She explained that her older brother  first and her younger brother  about a year later. Both were murdered. She does not remember much from her older brother's , with the exception of shoveling dirt onto his grave. She explained they were honoring a tradition from part of her family. She shared that after her younger brother , she felt \"numb\" and the experience was more \"hectic.\" She recalled that her parents were fighting more. Her parent's relationship has been on and off, but she had never seen so much strain on their relationship as she witnessed after her younger brother's death. She shared that she took on a lot of the planning for both of their funerals and some of the financial responsibility. She also needed to raise money to pay for her younger brother's . She recalls a lot of financial strain. Dariusz indicated her older sister (her sister is four years older) was not supportive during the process. Her relationship with her sister has always been strained. Her sister has challenged her for living in [place] and has made comments about her language use.     Discussed her grief process. Dariusz reported that over the years it has become less hard. She did feel guilty for a very long time about their deaths. She kept saying she would bring them to [place] - for safety. She has since reconciled with this idea and feels that it was not her fault anymore. She explained sometimes she will have lucid dreams about her brothers and she feels a lot better. She reported she has only had the opportunity to visit their graves a few times. She had the opportunity make a documentary about her brothers through a program. She explained it helped her to do creative things to cope with it. She indicated her brother's murders remain unsolved, but she believes everything balances " "out.     Mental Status:  Appearance:  Well groomed   Behavior/relationship to examiner/demeanor:  Cooperative and Engaged  Motor activity/EPS:  Within normal limits  Speech rate:  Within normal limits  Speech volume:  Within normal limits  Speech articulation:  Within normal limits  Speech coherence: Within normal limits  Speech spontaneity: Within normal limits  Mood (subjective report):  \"blah\"  Affect (objective appearance):  Subdued  Thought Process (Associations):  Logical and Linear   Thought process (Rate):  Within normal limits   Thought content:  Denied current suicidal ideation   Abnormal Perception:  None   Insight:  Good   Judgment:  Good     Plan: Continue with goals as outlined above    Andreea Vásquez Psy.D.,L.P                  "

## 2020-06-16 ENCOUNTER — VIRTUAL VISIT (OUTPATIENT)
Dept: PSYCHIATRY | Facility: CLINIC | Age: 30
End: 2020-06-16
Attending: PSYCHOLOGIST
Payer: COMMERCIAL

## 2020-06-16 DIAGNOSIS — F32.A DEPRESSION: Primary | ICD-10-CM

## 2020-06-16 DIAGNOSIS — F41.1 GAD (GENERALIZED ANXIETY DISORDER): ICD-10-CM

## 2020-06-16 NOTE — PROGRESS NOTES
OUTPATIENT PSYCHOTHERAPY PROGRESS NOTE    Client Name: Dariusz Leyva   YOB: 1990 (29 year old)   Time of Service: 44 minutes  Service Type(s): Individual psychotherapy    Video- Visit Details  Type of service:  video visit for psychotherapy  Time of service:    Date:  06/16/2020    Video Start Time:  7:01 am        Video End Time: 7:45 am    Reason for video visit:  Patient unable to travel due to Covid-19  Originating Site (patient location):  Patient's home  Distant Site (provider location):  Remote location  Mode of Communication:  Video Conference via Doxy.me  Consent:  Patient has given verbal consent for video visit?: Yes    Diagnoses:   Unspecified depressive disorder and unspecified anxiety    Goals of therapy: Elevate mood and show evidence of usual energy levels, activities, and socialization level, Reduce overall frequency, intensity, and duration of the anxiety so that daily functioning is not impaired      Individuals Present:   Psychotherapy services during this visit included myself and Yadirafadumo Gordon.    Narrative:  Dariusz reported she needed to end early to go to another appointment, planned on ending at 7:45am. Dariusz reported her mood was  Ok.  She is doing a lot with school, work, and EMT required continue education. She has not been able to get a lot of sleep - allergies are disrupting it. She indicated she is eating a least twice a day. She is experiencing low motivation. She is having mental debates about taking breaks and doing work. She endorsed anhedonia. Denied restlessness, concentration difficulties, and suicidal ideation. She indicated she is always stressing about bills, getting school done, and the transition after.      Dariusz is completing her communication degree and she would like to do something in media. 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 She reports job rejection related to discrimination. She has noticed that the places that do  "call her, tend to have high turnover rates. She would like some resources for placement.  She reported she will be done with school in about 3 weeks.      She described her experiences of racism in her work. She indicated she has some hypervigilance about completing work in a certain way (following the rules). She questions some rules in her mind, but she does not outwardly question them. She reported she tries not to be confrontational.      Dariusz has been having more things delivered lately because she has been concerned about how people will behave. She indicated she is concerned about her own reactions. She discussed incidents in the media about people making racist calls to the police. She recalled her experiences with police as a child, including having police come into her home and cuffing her brothers, as well as witnessing police brutality toward her father. Discussed her feelings of isolation.      Mental Status:  Appearance:  Well groomed   Behavior/relationship to examiner/demeanor:  Cooperative and Engaged  Motor activity/EPS:  Within normal limits  Speech rate:  Within normal limits  Speech volume:  Within normal limits  Speech articulation:  Within normal limits  Speech coherence: Within normal limits  Speech spontaneity: Within normal limits  Mood (subjective report):  \"okay\"  Affect (objective appearance):  Mood congruent  Thought Process (Associations):  Logical and Linear   Thought process (Rate):  Within normal limits   Thought content:  Denied current suicidal ideation   Abnormal Perception:  None   Insight:  Good   Judgment:  Good     Plan: Continue with goals as outlined above and provider will reach out to social work to see if there might be employment related support available    Andreea Vásquez Psy.D.,L.P                "

## 2020-06-23 ENCOUNTER — VIRTUAL VISIT (OUTPATIENT)
Dept: PSYCHIATRY | Facility: CLINIC | Age: 30
End: 2020-06-23
Attending: PSYCHOLOGIST
Payer: COMMERCIAL

## 2020-06-23 ENCOUNTER — TELEPHONE (OUTPATIENT)
Dept: PSYCHIATRY | Facility: CLINIC | Age: 30
End: 2020-06-23

## 2020-06-23 DIAGNOSIS — F32.A DEPRESSION: Primary | ICD-10-CM

## 2020-06-23 DIAGNOSIS — F41.1 GAD (GENERALIZED ANXIETY DISORDER): ICD-10-CM

## 2020-06-23 NOTE — PROGRESS NOTES
OUTPATIENT PSYCHOTHERAPY PROGRESS NOTE    Client Name: Dariusz Leyva   YOB: 1990 (29 years old)   Time of Service: 61 minutes  Service Type(s): Individual psychotherapy    Video- Visit Details  Type of service:  video visit for psychotherapy  Time of service:    Date:  06/23/2020    Video Start Time:  7:01 am        Video End Time: 8:02 am    Reason for video visit:  Patient unable to travel due to Covid-19  Originating Site (patient location):  Patient's home  Distant Site (provider location):  Remote location  Mode of Communication:  Video Conference via Doxy.me  Consent:  Patient has given verbal consent for video visit?: Yes    Diagnoses:   Unspecified depressive disorder and unspecified anxiety    Goals of therapy: Elevate mood and show evidence of usual energy levels, activities, and socialization level, Reduce overall frequency, intensity, and duration of the anxiety so that daily functioning is not impaired      Individuals Present:   Psychotherapy services during this visit included myself and Dariusz Leyva.    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 They have been switching her schedule from late shift to mornings. She Is tired. 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      Adriana is feeling  overwhelmed  right now. She is working on her EMT recertification (has forty hours of work), two weeks behind in school, and she is not feeling motivated to do anything. She reported her mood has been  a little irritable.  She states she has never been really happy in any of her jobs. Her jobs have been changing responsibilities. Discussed observations of white privilege in her work environment.    Discussed previous experiences that influence her experiences now. She was homeless for a period. She shared she did not learn about things like taxes,  "leases, etc when she was growing up. She was constantly told by her mother,  I can t wait until you grow up and are out of the house.  She started working at 16 to pay for things. She reported she did not have a lot of role models to help her understand how to transition to adulthood and into college.      Myrtle has noticed a pattern with friends where they start excluding her. This occurred her first year of college and again later in life. She reported she is a very giving person. She has noticed that once the person can no longer get something from her or can t control her, they stop engaging. Discussed what she would like in a friendship - she reported mostly for them to listen to her talk.      Mental Status:  Appearance:  Well groomed   Behavior/relationship to examiner/demeanor:  Cooperative and Engaged  Motor activity/EPS:  Within normal limits  Speech rate:  Within normal limits  Speech volume:  Within normal limits  Speech articulation:  Within normal limits  Speech coherence: Within normal limits  Speech spontaneity: Within normal limits  Mood (subjective report):  \"overwhelmed\"  Affect (objective appearance):  Subdued  Thought Process (Associations):  Logical and Linear   Thought process (Rate):  Within normal limits   Thought content:  Denied current suicidal ideation   Abnormal Perception:  None   Insight:  Good   Judgment:  Good     Plan: Continue with goals as outlined above    Andreea Vásquez Psy.D.,L.P              "

## 2020-06-23 NOTE — TELEPHONE ENCOUNTER
Referral from Andreea Vásquez:  Dariusz has reported some explicit and implicit experiences with racism with her work search. She has an EMT license, but she also has a physical limitations due to a leg injury so she cannot do some of the work. 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     She is just finishing her degree in communications, and she has done some work in written media. She would like to find work in communications, but she is not hopeful she will be able to find work. She is meeting with a surgeon this month about getting surgery on her leg, so although it will likely be a long recovery she is hopeful the surgery will help.     Her University does not seem to have a robust work placement support. Any ideas that you might have would be helpful.     CARL called patient at 12:15 pm. She is currently at work. Writer made plans to call pt at 4 or 4:15, when she is finished with work and more available.    CARL called back in the afternoon and discussed some possible resources, including:  -[place] [employer]  -[employer]  -[place] [employer].    Pt has My Chart and agreed to have writer send phone numbers or links via My Chart.    Writer will check in with writer in 2 weeks. Writer encouraged Dariusz to call/message writer if she needs further assistance before two weeks.    Ema Jaimes, GISSEL, LICSW

## 2020-06-30 ENCOUNTER — VIRTUAL VISIT (OUTPATIENT)
Dept: PSYCHIATRY | Facility: CLINIC | Age: 30
End: 2020-06-30
Attending: PSYCHOLOGIST
Payer: COMMERCIAL

## 2020-06-30 DIAGNOSIS — F32.A DEPRESSION: Primary | ICD-10-CM

## 2020-06-30 DIAGNOSIS — F41.1 GAD (GENERALIZED ANXIETY DISORDER): ICD-10-CM

## 2020-06-30 DIAGNOSIS — Z11.59 ENCOUNTER FOR SCREENING FOR OTHER VIRAL DISEASES: Primary | ICD-10-CM

## 2020-06-30 NOTE — PROGRESS NOTES
OUTPATIENT PSYCHOTHERAPY PROGRESS NOTE    Client Name: Dariusz Leyva   YOB: 1990 (29 years old)   Time of Service: 57 minutes  Service Type(s): Individual psychotherapy    Video- Visit Details  Type of service:  video visit for psychotherapy  Time of service:    Date:  06/30/2020    Video Start Time:  7:17 am        Video End Time: 8:14 am    Reason for video visit:  Patient unable to travel due to Covid-19  Originating Site (patient location):  Patient's home  Distant Site (provider location):  [facility]  Mode of Communication:  Video Conference via Doxy.me  Consent:  Patient has given verbal consent for video visit?: Yes    Diagnoses:   Unspecified depressive disorder and unspecified anxiety    Goals of therapy: Elevate mood and show evidence of usual energy levels, activities, and socialization level, Reduce overall frequency, intensity, and duration of the anxiety so that daily functioning is not impaired      Individuals Present:   Psychotherapy services during this visit included myself and Dariusz Leyva.    Narrative:  Dariusz reported she was late because she overslept. She explained she stayed up as late as she could last night trying to complete her continuing education credits. She has one more day to complete these requirements. She has the day off, so she hopes she can get them down today.      Dariusz reported her mood is  stagnate.  She has been frustrated with coworkers and needing to do her CEC s. Sleep has disrupted by needing to do so much. She had her surgery consult and she is wating to be scheduled for surgery at the [facility].      Treatment focused on considering themes from family interactions. Dariusz reported experiences of her feelings being invalidated by being called  dramatic.  She also remembers experiences as a child where she was expected to take on an adult role and she did not have modeling on how to transition to adulthood. Further she  "reported several difficult transitions and times when her living situation would change with her parent's relationship changes. She reported remembering she was sad she was not closer to her mother when she was a freshman in college.  She recognized that her mother has own traumas that are barriers to her ability to be close. She has learned to manage her family by distance and acceptance.      Dariusz reported that she does not feel at home in [place] or in [place]. She was asked if she has ever felt at home somewhere. She said  no.  Provider asked Dariusz  What would home feel like?  She reported it would be peaceful and  people would mind their own business.  She referenced the suburbs, but explained she does not feel comfortable there. Discussed impact of being in white dominant spaces.      Mental Status:  Appearance:  Casually groomed   Behavior/relationship to examiner/demeanor:  Cooperative and Engaged  Motor activity/EPS:  Within normal limits  Speech rate:  Within normal limits  Speech volume:  Within normal limits  Speech articulation:  Within normal limits  Speech coherence: Within normal limits  Speech spontaneity: Within normal limits  Mood (subjective report):  \"stagnate\"  Affect (objective appearance):  Euthymic  Thought Process (Associations):  Logical and Linear   Thought process (Rate):  Within normal limits   Thought content:  Denied current suicidal ideation   Abnormal Perception:  None   Insight:  Good   Judgment:  Good     Plan: Continue with goals as outlined above    Andreea Vásquez Psy.D.,L.P            "

## 2020-07-07 ENCOUNTER — VIRTUAL VISIT (OUTPATIENT)
Dept: PSYCHIATRY | Facility: CLINIC | Age: 30
End: 2020-07-07
Attending: PSYCHOLOGIST
Payer: COMMERCIAL

## 2020-07-07 DIAGNOSIS — F41.1 GAD (GENERALIZED ANXIETY DISORDER): ICD-10-CM

## 2020-07-07 DIAGNOSIS — F32.A DEPRESSION: Primary | ICD-10-CM

## 2020-07-07 NOTE — PROGRESS NOTES
OUTPATIENT PSYCHOTHERAPY PROGRESS NOTE    Client Name: Dariusz Leyva   YOB: 1990 (29 years old)   Time of Service: 39 minutes  Service Type(s): Individual psychotherapy    Video- Visit Details  Type of service:  video visit for psychotherapy  Time of service:    Date:  07/07/2020    Video Start Time:  7:21 am        Video End Time: 8:04 am    Reason for video visit:  Patient unable to travel due to Covid-19  Originating Site (patient location):  Patient's home  Distant Site (provider location):  [facility]  Mode of Communication:  Video Conference via Doxy.me  Consent:  Patient has given verbal consent for video visit?: Yes    Diagnoses:   Unspecified depressive disorder and unspecified anxiety    Goals of therapy: Elevate mood and show evidence of usual energy levels, activities, and socialization level, Reduce overall frequency, intensity, and duration of the anxiety so that daily functioning is not impaired      Individuals Present:   Psychotherapy services during this visit included myself and Yadirafadumo Gordon.    Narrative:  Dariusz reported she is not sleeping well. She indicated her surgery for her leg has been scheduled for July 29th. She may need to stay overnight. She anticipates based on her last surgery that she will have a lower mood after the surgery. It reminds her of some limits she has when she has surgery - grief that she will not be the same. She is trying to get herself more mentally prepared.      Discussed support for her while she is recovering. She indicated she has some friends that can help support her after the surgery. She said her ex might prepare some food for her. She also has an aunt she might be able to have support her as a PCA.      She reported her mother came to [place] for the fourth of July holiday. Her mother spent time with friends, but Dariusz went to a [facility] with her nephew and saw her mother. Processed her experiences with her  "mother and how her past relationship with her mother impacted the interaction.     She finished her last class for her bachelor's degree. She stated,  technically I am graduated.  She was also able to finish her continuing education credits. She is licensed, but it is currently showing her as inactive. Discussed what to do with free time. Wants to  declutter  her home.     Discussed finances as she plans for recovering from surgery. She plans to take leave from both of her jobs.      Mental Status:  Appearance:  Casually groomed   Behavior/relationship to examiner/demeanor:  Cooperative and Engaged  Motor activity/EPS:  Within normal limits  Speech rate:  Within normal limits  Speech volume:  Within normal limits  Speech articulation:  Within normal limits  Speech coherence: Within normal limits  Speech spontaneity: Within normal limits  Mood (subjective report):  \"tired\"  Affect (objective appearance):  Euthymic  Thought Process (Associations):  Logical and Linear   Thought process (Rate):  Within normal limits   Thought content:  Denied current suicidal ideation   Abnormal Perception:  None   Insight:  Good   Judgment:  Good     Plan: Continue with goals as outlined above    Andreea Vásquez Psy.D.,L.P          "

## 2020-07-09 ENCOUNTER — TELEPHONE (OUTPATIENT)
Dept: PSYCHIATRY | Facility: CLINIC | Age: 30
End: 2020-07-09

## 2020-07-13 NOTE — TELEPHONE ENCOUNTER
Social Work   Incoming Voicemail  [facility]    Incoming Voicemail From:  Dariusz Leyva    Content of Voicemail:    Dariusz is requesting help trying to figure out insurance. She needs a PCA worker and does not know the process. She is requesting a call back.    Response/Plan:    SW spoke briefly with patient on 7/10/20. She was at work and requested a call back on 7/13/20.    SW talked with patient on 7/13/20. She is putting employment search on hold for a few months as she is having surgery at the end of the month. She will not be able to do much of the care she typically does for herself and it working to obtain PCA services during this time.    CARL reviewed [employer] policy and encouraged her to both call her insurance company and to make a M[employer]ho[employer] Assessment request with [facility]. Dariusz feels she can complete both tasks on her own. She agreed to have writer email her the link to [facility].     She will contact writer if she needs additional support.      Will route to patient's current psychiatric provider(s) as an FYI.   Please call or EPIC message with any questions or concerns.    Ema Jaimes, MSW, LICSW

## 2020-07-20 ENCOUNTER — OFFICE VISIT (OUTPATIENT)
Dept: FAMILY MEDICINE | Facility: CLINIC | Age: 30
End: 2020-07-20
Payer: COMMERCIAL

## 2020-07-20 VITALS
SYSTOLIC BLOOD PRESSURE: 115 MMHG | OXYGEN SATURATION: 100 % | TEMPERATURE: 97.9 F | DIASTOLIC BLOOD PRESSURE: 79 MMHG | RESPIRATION RATE: 16 BRPM | HEART RATE: 70 BPM

## 2020-07-20 DIAGNOSIS — R51.9 NONINTRACTABLE HEADACHE, UNSPECIFIED CHRONICITY PATTERN, UNSPECIFIED HEADACHE TYPE: ICD-10-CM

## 2020-07-20 DIAGNOSIS — Z01.818 PREOP GENERAL PHYSICAL EXAM: Primary | ICD-10-CM

## 2020-07-20 DIAGNOSIS — Z11.3 ROUTINE SCREENING FOR STI (SEXUALLY TRANSMITTED INFECTION): ICD-10-CM

## 2020-07-20 LAB
BACTERIA: NORMAL
BUN SERPL-MCNC: 16 MG/DL (ref 7–19)
CALCIUM SERPL-MCNC: 9 MG/DL (ref 8.5–10.1)
CHLORIDE SERPLBLD-SCNC: 105.9 MMOL/L (ref 98–110)
CHOLEST SERPL-MCNC: 235.5 MG/DL (ref 0–200)
CHOLEST/HDLC SERPL: 4.2 {RATIO} (ref 0–5)
CLUE CELLS: NORMAL
CO2 SERPL-SCNC: 23.5 MMOL/L (ref 20–32)
CREAT SERPL-MCNC: 0.9 MG/DL (ref 0.5–1)
GFR SERPL CREATININE-BSD FRML MDRD: 83.1 ML/MIN/1.7 M2
GLUCOSE SERPL-MCNC: 98.9 MG'DL (ref 70–99)
HCG UR QL: NEGATIVE
HDLC SERPL-MCNC: 56 MG/DL
HIV 1+2 AB+HIV1 P24 AG SERPL QL IA: NEGATIVE
LDLC SERPL CALC-MCNC: 159 MG/DL (ref 0–129)
MOTILE TRICHOMONAS: NEGATIVE
ODOR: NORMAL
PH WET PREP: NORMAL (ref 3.8–4.5)
POTASSIUM SERPL-SCNC: 4.5 MMOL/L (ref 3.2–4.6)
SODIUM SERPL-SCNC: 137.3 MMOL/L (ref 132–142)
TRIGL SERPL-MCNC: 101 MG/DL (ref 0–150)
VLDL CHOLESTEROL: 20.2 MG/DL (ref 7–32)
WBC WET PREP: <2 (ref 2–5)
YEAST: NORMAL

## 2020-07-20 RX ORDER — ACETAMINOPHEN 325 MG/1
325-650 TABLET ORAL EVERY 6 HOURS PRN
Qty: 1 BOTTLE | Refills: 0 | Status: ON HOLD | OUTPATIENT
Start: 2020-07-20 | End: 2020-07-31

## 2020-07-20 NOTE — PROGRESS NOTES
Preceptor Attestation:    Patient seen and evaluated in person. I discussed the patient with the resident. I have verified the content of the note, which accurately reflects my assessment of the patient and the plan of care.   Supervising Physician:  Andrae Curtis MD.
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
## 2020-07-21 LAB — TREPONEMA ANTIBODY (SYPHILIS): NEGATIVE

## 2020-07-22 ENCOUNTER — VIRTUAL VISIT (OUTPATIENT)
Dept: PSYCHIATRY | Facility: CLINIC | Age: 30
End: 2020-07-22
Attending: PSYCHOLOGIST
Payer: COMMERCIAL

## 2020-07-22 DIAGNOSIS — F41.1 GAD (GENERALIZED ANXIETY DISORDER): ICD-10-CM

## 2020-07-22 DIAGNOSIS — F32.A DEPRESSION: Primary | ICD-10-CM

## 2020-07-22 LAB
C TRACH RRNA SPEC QL NAA+PROBE: NEGATIVE
N GONORRHOEA RRNA SPEC QL NAA+PROBE: NEGATIVE

## 2020-07-22 NOTE — PROGRESS NOTES
OUTPATIENT PSYCHOTHERAPY PROGRESS NOTE    Client Name: Dariusz Leyva   YOB: 1990 (29 years old)   Time of Service: 62 minutes  Service Type(s): Individual psychotherapy    Video- Visit Details  Type of service:  video visit for psychotherapy  Time of service:    Date:  07/22/2020    Video Start Time:  9:01am        Video End Time:  10:03    Reason for video visit:  Patient unable to travel due to Covid-19  Originating Site (patient location):  [facility]   Location- Patient's home  Distant Site (provider location):  Remote location  Mode of Communication:  Video Conference via [email or web address]  Consent:  Patient has given verbal consent for video visit?: Yes     Diagnoses:   Unspecified depressive disorder and unspecified anxiety    Goals of therapy: Elevate mood and show evidence of usual energy levels, activities, and socialization level, Reduce overall frequency, intensity, and duration of the anxiety so that daily functioning is not impaired      Individuals Present:   Psychotherapy services during this visit included myself and Dariusz Leyva.    Narrative:  Dariusz reported Sunday was her last day of work, because she has surgery on Wednesday. She was going to take a leave but she is considering quitting due to work place strife. There has been lack of communication, including being asked to stay behind with clients during a meeting. It was not her typical day to work and the other workers did not come back to work to relieve her. The information from the [employer] was not offered to her. Discussed other challenges to her job.      Dariusz is nervous about getting surgery. She has concerns about the risks and whether she will get support to help her after. She is recalling what it was like when she first broke her leg and she does not want to reexperience what she went through before.     Discussed needing to feel like she is on guard, so she won t get hurt. She shared she has one friend that she  "has been able to share and be more open with. She also has a good relationship with her father.        Mental Status:  Appearance:  Neatly groomed   Behavior/relationship to examiner/demeanor:  Cooperative and Engaged  Motor activity/EPS:  Within normal limits  Speech rate:  Within normal limits  Speech volume:  Within normal limits  Speech articulation:  Within normal limits  Speech coherence: Within normal limits  Speech spontaneity: Within normal limits  Mood (subjective report):  \"okay\"  Affect (objective appearance):  Mood congruent  Thought Process (Associations):  Logical and Linear   Thought process (Rate):  Within normal limits   Thought content:  Denied current suicidal ideation   Abnormal Perception:  None   Insight:  Good   Judgment:  Good     Plan: Continue with goals as outlined above    Andreea Vásquez Psy.D.,L.P        "

## 2020-07-27 DIAGNOSIS — Z11.59 ENCOUNTER FOR SCREENING FOR OTHER VIRAL DISEASES: ICD-10-CM

## 2020-07-27 PROCEDURE — U0003 INFECTIOUS AGENT DETECTION BY NUCLEIC ACID (DNA OR RNA); SEVERE ACUTE RESPIRATORY SYNDROME CORONAVIRUS 2 (SARS-COV-2) (CORONAVIRUS DISEASE [COVID-19]), AMPLIFIED PROBE TECHNIQUE, MAKING USE OF HIGH THROUGHPUT TECHNOLOGIES AS DESCRIBED BY CMS-2020-01-R: HCPCS | Performed by: ORTHOPAEDIC SURGERY

## 2020-07-28 ENCOUNTER — ANESTHESIA EVENT (OUTPATIENT)
Dept: SURGERY | Facility: CLINIC | Age: 30
End: 2020-07-28
Payer: COMMERCIAL

## 2020-07-28 ENCOUNTER — VIRTUAL VISIT (OUTPATIENT)
Dept: PSYCHIATRY | Facility: CLINIC | Age: 30
End: 2020-07-28
Attending: PSYCHOLOGIST
Payer: COMMERCIAL

## 2020-07-28 DIAGNOSIS — F32.A DEPRESSION: Primary | ICD-10-CM

## 2020-07-28 LAB
SARS-COV-2 RNA SPEC QL NAA+PROBE: NOT DETECTED
SPECIMEN SOURCE: NORMAL

## 2020-07-28 NOTE — ANESTHESIA PREPROCEDURE EVALUATION
"Anesthesia Pre-Procedure Evaluation    Patient: Dariusz Leyva   MRN:     [id number] Gender:   female   Age:    29 year old :      1990        Preoperative Diagnosis: DJD (degenerative joint disease) [M19.90]   Procedure(s):  Right Knee Exam Under Anesthesia, Arthroscopy, Possible Anterior Cruciate Ligament Reconstruction with Autograft  Posterior Lateral Corner Reconstruction with Allograft     LABS:  CBC:   Lab Results   Component Value Date    HGB 11.9 2020    HGB 11.5 (L) 2019    HCT 38.5 2020    HCT 36.0 2019     BMP:   Lab Results   Component Value Date    .3 2020     2018    POTASSIUM 4.5 2020    POTASSIUM 3.8 2018    CHLORIDE 105.9 2020    CHLORIDE 106 2018    CO2 23.5 2020    BUN 16.0 2020    BUN 13 2018    CR 0.9 2020    CR 0.79 2018    GLC 98.9 2020    GLC 98 2018     COAGS: No results found for: PTT, INR, FIBR  POC:   Lab Results   Component Value Date    HCG Negative 2020     OTHER:   Lab Results   Component Value Date    A1C 5.4 2017    TONI 9.0 2020        Preop Vitals    BP Readings from Last 3 Encounters:   20 115/79   20 126/78   20 136/88    Pulse Readings from Last 3 Encounters:   20 70   20 60   20 80      Resp Readings from Last 3 Encounters:   20 16   20 16   20 12    SpO2 Readings from Last 3 Encounters:   20 100%   20 99%   20 100%      Temp Readings from Last 1 Encounters:   20 36.6  C (97.9  F) (Oral)    Ht Readings from Last 1 Encounters:   20 1.702 m (5' 7\")      Wt Readings from Last 1 Encounters:   20 136.1 kg (300 lb)    Estimated body mass index is 46.99 kg/m  as calculated from the following:    Height as of 20: 1.702 m (5' 7\").    Weight as of 20: 136.1 kg (300 lb).     LDA:        Past Medical History:   Diagnosis Date     Depressive disorder  "      History reviewed. No pertinent surgical history.   Allergies   Allergen Reactions     Nka [No Known Allergies]      Seasonal Allergies Other (See Comments)     Drainage and sometimes break out in hives         Anesthesia Evaluation     . Pt has had prior anesthetic. Type: General    No history of anesthetic complications          ROS/MED HX    ENT/Pulmonary:     (+)YUE risk factors obese, allergic rhinitis, , . .    Neurologic:  - neg neurologic ROS     Cardiovascular:  - neg cardiovascular ROS       METS/Exercise Tolerance:     Hematologic:  - neg hematologic  ROS       Musculoskeletal:  - neg musculoskeletal ROS       GI/Hepatic:  - neg GI/hepatic ROS       Renal/Genitourinary:  - ROS Renal section negative       Endo:     (+) Obesity, .      Psychiatric:     (+) psychiatric history depression      Infectious Disease:  - neg infectious disease ROS       Malignancy:      - no malignancy   Other:                     JZG FV AN PHYSICAL EXAM    Assessment:   ASA SCORE: 2    H&P: History and physical reviewed and following examination; no interval change.    NPO Status: NPO Appropriate     Plan:   Anes. Type:  General   Pre-Medication: Midazolam; Acetaminophen   Induction:  IV (Standard)   Airway: LMA   Access/Monitoring: PIV; 2nd PIV   Maintenance: Balanced     Postop Plan:   Postop Pain: Opioids  Postop Sedation/Airway: Not planned  Disposition: Outpatient     PONV Management:   Adult Risk Factors: Female, Postop Opioids   Prevention: Ondansetron, Dexamethasone                   Jay Camilo MD
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
## 2020-07-29 ENCOUNTER — ANCILLARY PROCEDURE (OUTPATIENT)
Dept: ULTRASOUND IMAGING | Facility: CLINIC | Age: 30
End: 2020-07-29
Payer: COMMERCIAL

## 2020-07-29 ENCOUNTER — HOSPITAL ENCOUNTER (OUTPATIENT)
Facility: CLINIC | Age: 30
Setting detail: OBSERVATION
Discharge: HOME HEALTH | End: 2020-08-01
Attending: ORTHOPAEDIC SURGERY | Admitting: ORTHOPAEDIC SURGERY
Payer: COMMERCIAL

## 2020-07-29 ENCOUNTER — APPOINTMENT (OUTPATIENT)
Dept: GENERAL RADIOLOGY | Facility: CLINIC | Age: 30
End: 2020-07-29
Attending: ORTHOPAEDIC SURGERY
Payer: COMMERCIAL

## 2020-07-29 ENCOUNTER — ANESTHESIA (OUTPATIENT)
Dept: SURGERY | Facility: CLINIC | Age: 30
End: 2020-07-29
Payer: COMMERCIAL

## 2020-07-29 DIAGNOSIS — Z98.890 S/P KNEE SURGERY: ICD-10-CM

## 2020-07-29 DIAGNOSIS — S82.141S CLOSED FRACTURE OF RIGHT TIBIAL PLATEAU, SEQUELA: ICD-10-CM

## 2020-07-29 DIAGNOSIS — M25.561 ACUTE PAIN OF RIGHT KNEE: Primary | ICD-10-CM

## 2020-07-29 PROBLEM — M25.569 KNEE PAIN: Status: ACTIVE | Noted: 2020-07-29

## 2020-07-29 LAB
GLUCOSE BLDC GLUCOMTR-MCNC: 87 MG/DL (ref 70–99)
HCG UR QL: NEGATIVE
HGB BLD-MCNC: 13.2 G/DL (ref 11.7–15.7)

## 2020-07-29 PROCEDURE — 25000125 ZZHC RX 250: Performed by: ANESTHESIOLOGY

## 2020-07-29 PROCEDURE — 37000008 ZZH ANESTHESIA TECHNICAL FEE, 1ST 30 MIN: Performed by: ORTHOPAEDIC SURGERY

## 2020-07-29 PROCEDURE — 81025 URINE PREGNANCY TEST: CPT | Performed by: ANESTHESIOLOGY

## 2020-07-29 PROCEDURE — 25000566 ZZH SEVOFLURANE, EA 15 MIN: Performed by: ORTHOPAEDIC SURGERY

## 2020-07-29 PROCEDURE — C1713 ANCHOR/SCREW BN/BN,TIS/BN: HCPCS | Performed by: ORTHOPAEDIC SURGERY

## 2020-07-29 PROCEDURE — 40000170 ZZH STATISTIC PRE-PROCEDURE ASSESSMENT II: Performed by: ORTHOPAEDIC SURGERY

## 2020-07-29 PROCEDURE — 85018 HEMOGLOBIN: CPT | Performed by: ANESTHESIOLOGY

## 2020-07-29 PROCEDURE — 36000064 ZZH SURGERY LEVEL 4 EA 15 ADDTL MIN - UMMC: Performed by: ORTHOPAEDIC SURGERY

## 2020-07-29 PROCEDURE — 25000128 H RX IP 250 OP 636: Performed by: ANESTHESIOLOGY

## 2020-07-29 PROCEDURE — 71000014 ZZH RECOVERY PHASE 1 LEVEL 2 FIRST HR: Performed by: ORTHOPAEDIC SURGERY

## 2020-07-29 PROCEDURE — C1763 CONN TISS, NON-HUMAN: HCPCS | Performed by: ORTHOPAEDIC SURGERY

## 2020-07-29 PROCEDURE — 25000128 H RX IP 250 OP 636: Performed by: ORTHOPAEDIC SURGERY

## 2020-07-29 PROCEDURE — 25800030 ZZH RX IP 258 OP 636: Performed by: ANESTHESIOLOGY

## 2020-07-29 PROCEDURE — 71000015 ZZH RECOVERY PHASE 1 LEVEL 2 EA ADDTL HR: Performed by: ORTHOPAEDIC SURGERY

## 2020-07-29 PROCEDURE — 27210794 ZZH OR GENERAL SUPPLY STERILE: Performed by: ORTHOPAEDIC SURGERY

## 2020-07-29 PROCEDURE — 25000125 ZZHC RX 250: Performed by: NURSE ANESTHETIST, CERTIFIED REGISTERED

## 2020-07-29 PROCEDURE — G0378 HOSPITAL OBSERVATION PER HR: HCPCS

## 2020-07-29 PROCEDURE — 40000278 XR SURGERY CARM FLUORO LESS THAN 5 MIN: Mod: TC

## 2020-07-29 PROCEDURE — 25000125 ZZHC RX 250: Performed by: ORTHOPAEDIC SURGERY

## 2020-07-29 PROCEDURE — 00000146 ZZHCL STATISTIC GLUCOSE BY METER IP

## 2020-07-29 PROCEDURE — 36000066 ZZH SURGERY LEVEL 4 W FLUORO 1ST 30 MIN - UMMC: Performed by: ORTHOPAEDIC SURGERY

## 2020-07-29 PROCEDURE — 96376 TX/PRO/DX INJ SAME DRUG ADON: CPT

## 2020-07-29 PROCEDURE — 36415 COLL VENOUS BLD VENIPUNCTURE: CPT | Performed by: ANESTHESIOLOGY

## 2020-07-29 PROCEDURE — 25800030 ZZH RX IP 258 OP 636: Performed by: NURSE ANESTHETIST, CERTIFIED REGISTERED

## 2020-07-29 PROCEDURE — 37000009 ZZH ANESTHESIA TECHNICAL FEE, EACH ADDTL 15 MIN: Performed by: ORTHOPAEDIC SURGERY

## 2020-07-29 PROCEDURE — 25000128 H RX IP 250 OP 636: Performed by: NURSE ANESTHETIST, CERTIFIED REGISTERED

## 2020-07-29 PROCEDURE — 96374 THER/PROPH/DIAG INJ IV PUSH: CPT

## 2020-07-29 PROCEDURE — 25000132 ZZH RX MED GY IP 250 OP 250 PS 637: Performed by: PHYSICIAN ASSISTANT

## 2020-07-29 PROCEDURE — 25000132 ZZH RX MED GY IP 250 OP 250 PS 637: Performed by: ORTHOPAEDIC SURGERY

## 2020-07-29 PROCEDURE — 25000128 H RX IP 250 OP 636: Performed by: STUDENT IN AN ORGANIZED HEALTH CARE EDUCATION/TRAINING PROGRAM

## 2020-07-29 PROCEDURE — 25800025 ZZH RX 258: Performed by: ORTHOPAEDIC SURGERY

## 2020-07-29 DEVICE — GRAFT TENDON SEMITENDINOSUS 26CM 430355: Type: IMPLANTABLE DEVICE | Site: KNEE | Status: FUNCTIONAL

## 2020-07-29 DEVICE — IMPLANTABLE DEVICE: Type: IMPLANTABLE DEVICE | Site: KNEE | Status: FUNCTIONAL

## 2020-07-29 RX ORDER — ONDANSETRON 4 MG/1
4 TABLET, ORALLY DISINTEGRATING ORAL EVERY 6 HOURS PRN
Status: DISCONTINUED | OUTPATIENT
Start: 2020-07-29 | End: 2020-08-01 | Stop reason: HOSPADM

## 2020-07-29 RX ORDER — SODIUM CHLORIDE, SODIUM LACTATE, POTASSIUM CHLORIDE, CALCIUM CHLORIDE 600; 310; 30; 20 MG/100ML; MG/100ML; MG/100ML; MG/100ML
INJECTION, SOLUTION INTRAVENOUS CONTINUOUS
Status: DISCONTINUED | OUTPATIENT
Start: 2020-07-29 | End: 2020-07-29 | Stop reason: HOSPADM

## 2020-07-29 RX ORDER — NALOXONE HYDROCHLORIDE 0.4 MG/ML
.1-.4 INJECTION, SOLUTION INTRAMUSCULAR; INTRAVENOUS; SUBCUTANEOUS
Status: DISCONTINUED | OUTPATIENT
Start: 2020-07-29 | End: 2020-07-29 | Stop reason: HOSPADM

## 2020-07-29 RX ORDER — ONDANSETRON 2 MG/ML
4 INJECTION INTRAMUSCULAR; INTRAVENOUS EVERY 30 MIN PRN
Status: DISCONTINUED | OUTPATIENT
Start: 2020-07-29 | End: 2020-07-29 | Stop reason: HOSPADM

## 2020-07-29 RX ORDER — FLUMAZENIL 0.1 MG/ML
0.2 INJECTION, SOLUTION INTRAVENOUS
Status: DISCONTINUED | OUTPATIENT
Start: 2020-07-29 | End: 2020-07-29

## 2020-07-29 RX ORDER — SODIUM CHLORIDE, SODIUM LACTATE, POTASSIUM CHLORIDE, CALCIUM CHLORIDE 600; 310; 30; 20 MG/100ML; MG/100ML; MG/100ML; MG/100ML
INJECTION, SOLUTION INTRAVENOUS CONTINUOUS PRN
Status: DISCONTINUED | OUTPATIENT
Start: 2020-07-29 | End: 2020-07-29

## 2020-07-29 RX ORDER — OXYCODONE HYDROCHLORIDE 5 MG/1
5-10 TABLET ORAL
Status: DISCONTINUED | OUTPATIENT
Start: 2020-07-29 | End: 2020-08-01 | Stop reason: HOSPADM

## 2020-07-29 RX ORDER — ONDANSETRON 4 MG/1
4 TABLET, ORALLY DISINTEGRATING ORAL EVERY 30 MIN PRN
Status: DISCONTINUED | OUTPATIENT
Start: 2020-07-29 | End: 2020-07-29 | Stop reason: HOSPADM

## 2020-07-29 RX ORDER — AMOXICILLIN 250 MG
1-2 CAPSULE ORAL 2 TIMES DAILY
Qty: 30 TABLET | Refills: 0 | Status: SHIPPED | OUTPATIENT
Start: 2020-07-29 | End: 2020-07-31

## 2020-07-29 RX ORDER — MEPERIDINE HYDROCHLORIDE 25 MG/ML
12.5 INJECTION INTRAMUSCULAR; INTRAVENOUS; SUBCUTANEOUS
Status: DISCONTINUED | OUTPATIENT
Start: 2020-07-29 | End: 2020-07-29 | Stop reason: HOSPADM

## 2020-07-29 RX ORDER — ACETAMINOPHEN 325 MG/1
650 TABLET ORAL EVERY 4 HOURS PRN
Qty: 30 TABLET | Refills: 0 | Status: SHIPPED | OUTPATIENT
Start: 2020-07-29 | End: 2020-07-31

## 2020-07-29 RX ORDER — HYDROMORPHONE HYDROCHLORIDE 1 MG/ML
.3-.5 INJECTION, SOLUTION INTRAMUSCULAR; INTRAVENOUS; SUBCUTANEOUS EVERY 5 MIN PRN
Status: DISCONTINUED | OUTPATIENT
Start: 2020-07-29 | End: 2020-07-29 | Stop reason: HOSPADM

## 2020-07-29 RX ORDER — IBUPROFEN 600 MG/1
600 TABLET, FILM COATED ORAL EVERY 6 HOURS PRN
Qty: 30 TABLET | Refills: 0 | Status: SHIPPED | OUTPATIENT
Start: 2020-07-29 | End: 2021-03-26

## 2020-07-29 RX ORDER — CEFAZOLIN SODIUM IN 0.9 % NACL 3 G/100 ML
3 INTRAVENOUS SOLUTION, PIGGYBACK (ML) INTRAVENOUS
Status: COMPLETED | OUTPATIENT
Start: 2020-07-29 | End: 2020-07-29

## 2020-07-29 RX ORDER — CEFAZOLIN SODIUM 1 G/3ML
1 INJECTION, POWDER, FOR SOLUTION INTRAMUSCULAR; INTRAVENOUS SEE ADMIN INSTRUCTIONS
Status: DISCONTINUED | OUTPATIENT
Start: 2020-07-29 | End: 2020-07-29 | Stop reason: CLARIF

## 2020-07-29 RX ORDER — AMOXICILLIN 250 MG
1-2 CAPSULE ORAL 2 TIMES DAILY
Status: DISCONTINUED | OUTPATIENT
Start: 2020-07-29 | End: 2020-08-01 | Stop reason: HOSPADM

## 2020-07-29 RX ORDER — HYDROMORPHONE HCL/0.9% NACL/PF 0.2MG/0.2
0.2 SYRINGE (ML) INTRAVENOUS
Status: DISCONTINUED | OUTPATIENT
Start: 2020-07-29 | End: 2020-08-01 | Stop reason: HOSPADM

## 2020-07-29 RX ORDER — SODIUM CHLORIDE, SODIUM LACTATE, POTASSIUM CHLORIDE, CALCIUM CHLORIDE 600; 310; 30; 20 MG/100ML; MG/100ML; MG/100ML; MG/100ML
INJECTION, SOLUTION INTRAVENOUS CONTINUOUS
Status: DISCONTINUED | OUTPATIENT
Start: 2020-07-29 | End: 2020-08-01 | Stop reason: HOSPADM

## 2020-07-29 RX ORDER — NALOXONE HYDROCHLORIDE 0.4 MG/ML
.1-.4 INJECTION, SOLUTION INTRAMUSCULAR; INTRAVENOUS; SUBCUTANEOUS
Status: DISCONTINUED | OUTPATIENT
Start: 2020-07-29 | End: 2020-08-01 | Stop reason: HOSPADM

## 2020-07-29 RX ORDER — OXYCODONE HYDROCHLORIDE 5 MG/1
5-10 TABLET ORAL
Qty: 30 TABLET | Refills: 0 | Status: SHIPPED | OUTPATIENT
Start: 2020-07-29 | End: 2020-07-31

## 2020-07-29 RX ORDER — BUPIVACAINE HYDROCHLORIDE AND EPINEPHRINE 2.5; 5 MG/ML; UG/ML
INJECTION, SOLUTION INFILTRATION; PERINEURAL PRN
Status: DISCONTINUED | OUTPATIENT
Start: 2020-07-29 | End: 2020-07-29 | Stop reason: HOSPADM

## 2020-07-29 RX ORDER — NALOXONE HYDROCHLORIDE 0.4 MG/ML
.1-.4 INJECTION, SOLUTION INTRAMUSCULAR; INTRAVENOUS; SUBCUTANEOUS
Status: DISCONTINUED | OUTPATIENT
Start: 2020-07-29 | End: 2020-07-29

## 2020-07-29 RX ORDER — LIDOCAINE HYDROCHLORIDE 20 MG/ML
INJECTION, SOLUTION INFILTRATION; PERINEURAL PRN
Status: DISCONTINUED | OUTPATIENT
Start: 2020-07-29 | End: 2020-07-29

## 2020-07-29 RX ORDER — DEXAMETHASONE SODIUM PHOSPHATE 4 MG/ML
INJECTION, SOLUTION INTRA-ARTICULAR; INTRALESIONAL; INTRAMUSCULAR; INTRAVENOUS; SOFT TISSUE PRN
Status: DISCONTINUED | OUTPATIENT
Start: 2020-07-29 | End: 2020-07-29

## 2020-07-29 RX ORDER — METOCLOPRAMIDE 10 MG/1
10 TABLET ORAL EVERY 6 HOURS PRN
Status: DISCONTINUED | OUTPATIENT
Start: 2020-07-29 | End: 2020-08-01 | Stop reason: HOSPADM

## 2020-07-29 RX ORDER — ONDANSETRON 2 MG/ML
INJECTION INTRAMUSCULAR; INTRAVENOUS PRN
Status: DISCONTINUED | OUTPATIENT
Start: 2020-07-29 | End: 2020-07-29

## 2020-07-29 RX ORDER — PROCHLORPERAZINE MALEATE 10 MG
10 TABLET ORAL EVERY 6 HOURS PRN
Status: DISCONTINUED | OUTPATIENT
Start: 2020-07-29 | End: 2020-08-01 | Stop reason: HOSPADM

## 2020-07-29 RX ORDER — BUPIVACAINE HYDROCHLORIDE 2.5 MG/ML
INJECTION, SOLUTION EPIDURAL; INFILTRATION; INTRACAUDAL PRN
Status: DISCONTINUED | OUTPATIENT
Start: 2020-07-29 | End: 2020-07-29

## 2020-07-29 RX ORDER — PROPOFOL 10 MG/ML
INJECTION, EMULSION INTRAVENOUS PRN
Status: DISCONTINUED | OUTPATIENT
Start: 2020-07-29 | End: 2020-07-29

## 2020-07-29 RX ORDER — FENTANYL CITRATE 50 UG/ML
INJECTION, SOLUTION INTRAMUSCULAR; INTRAVENOUS PRN
Status: DISCONTINUED | OUTPATIENT
Start: 2020-07-29 | End: 2020-07-29

## 2020-07-29 RX ORDER — ONDANSETRON 2 MG/ML
4 INJECTION INTRAMUSCULAR; INTRAVENOUS EVERY 6 HOURS PRN
Status: DISCONTINUED | OUTPATIENT
Start: 2020-07-29 | End: 2020-08-01 | Stop reason: HOSPADM

## 2020-07-29 RX ORDER — HYDROMORPHONE HYDROCHLORIDE 1 MG/ML
.3-.5 INJECTION, SOLUTION INTRAMUSCULAR; INTRAVENOUS; SUBCUTANEOUS EVERY 10 MIN PRN
Status: DISCONTINUED | OUTPATIENT
Start: 2020-07-29 | End: 2020-07-29 | Stop reason: HOSPADM

## 2020-07-29 RX ORDER — KETOROLAC TROMETHAMINE 30 MG/ML
INJECTION, SOLUTION INTRAMUSCULAR; INTRAVENOUS PRN
Status: DISCONTINUED | OUTPATIENT
Start: 2020-07-29 | End: 2020-07-29

## 2020-07-29 RX ORDER — ACETAMINOPHEN 325 MG/1
650 TABLET ORAL EVERY 6 HOURS PRN
Status: DISCONTINUED | OUTPATIENT
Start: 2020-07-29 | End: 2020-07-31

## 2020-07-29 RX ORDER — METOCLOPRAMIDE HYDROCHLORIDE 5 MG/ML
10 INJECTION INTRAMUSCULAR; INTRAVENOUS EVERY 6 HOURS PRN
Status: DISCONTINUED | OUTPATIENT
Start: 2020-07-29 | End: 2020-08-01 | Stop reason: HOSPADM

## 2020-07-29 RX ORDER — FENTANYL CITRATE 50 UG/ML
25-50 INJECTION, SOLUTION INTRAMUSCULAR; INTRAVENOUS
Status: DISCONTINUED | OUTPATIENT
Start: 2020-07-29 | End: 2020-07-29 | Stop reason: CLARIF

## 2020-07-29 RX ORDER — HYDROXYZINE HYDROCHLORIDE 25 MG/1
25 TABLET, FILM COATED ORAL EVERY 6 HOURS PRN
Status: DISCONTINUED | OUTPATIENT
Start: 2020-07-29 | End: 2020-08-01 | Stop reason: HOSPADM

## 2020-07-29 RX ORDER — LIDOCAINE 40 MG/G
CREAM TOPICAL
Status: DISCONTINUED | OUTPATIENT
Start: 2020-07-29 | End: 2020-08-01 | Stop reason: HOSPADM

## 2020-07-29 RX ADMIN — FENTANYL CITRATE 50 MCG: 50 INJECTION, SOLUTION INTRAMUSCULAR; INTRAVENOUS at 14:21

## 2020-07-29 RX ADMIN — FENTANYL CITRATE 25 MCG: 50 INJECTION, SOLUTION INTRAMUSCULAR; INTRAVENOUS at 16:31

## 2020-07-29 RX ADMIN — HYDROMORPHONE HYDROCHLORIDE 0.5 MG: 1 INJECTION, SOLUTION INTRAMUSCULAR; INTRAVENOUS; SUBCUTANEOUS at 19:32

## 2020-07-29 RX ADMIN — MIDAZOLAM 2 MG: 1 INJECTION INTRAMUSCULAR; INTRAVENOUS at 15:28

## 2020-07-29 RX ADMIN — SODIUM CHLORIDE, POTASSIUM CHLORIDE, SODIUM LACTATE AND CALCIUM CHLORIDE: 600; 310; 30; 20 INJECTION, SOLUTION INTRAVENOUS at 16:43

## 2020-07-29 RX ADMIN — KETOROLAC TROMETHAMINE 30 MG: 30 INJECTION, SOLUTION INTRAMUSCULAR at 18:40

## 2020-07-29 RX ADMIN — SUGAMMADEX 300 MG: 100 INJECTION, SOLUTION INTRAVENOUS at 18:26

## 2020-07-29 RX ADMIN — PROPOFOL 50 MG: 10 INJECTION, EMULSION INTRAVENOUS at 18:14

## 2020-07-29 RX ADMIN — FENTANYL CITRATE 50 MCG: 50 INJECTION, SOLUTION INTRAMUSCULAR; INTRAVENOUS at 18:35

## 2020-07-29 RX ADMIN — OXYCODONE HYDROCHLORIDE 10 MG: 5 TABLET ORAL at 21:01

## 2020-07-29 RX ADMIN — BUPIVACAINE HYDROCHLORIDE 20 ML: 2.5 INJECTION, SOLUTION EPIDURAL; INFILTRATION; INTRACAUDAL at 14:29

## 2020-07-29 RX ADMIN — LIDOCAINE HYDROCHLORIDE 100 MG: 20 INJECTION, SOLUTION INFILTRATION; PERINEURAL at 15:43

## 2020-07-29 RX ADMIN — MIDAZOLAM 1 MG: 1 INJECTION INTRAMUSCULAR; INTRAVENOUS at 14:21

## 2020-07-29 RX ADMIN — HYDROXYZINE HYDROCHLORIDE 25 MG: 25 TABLET, FILM COATED ORAL at 19:00

## 2020-07-29 RX ADMIN — FENTANYL CITRATE 25 MCG: 50 INJECTION, SOLUTION INTRAMUSCULAR; INTRAVENOUS at 16:25

## 2020-07-29 RX ADMIN — DEXAMETHASONE SODIUM PHOSPHATE 4 MG: 4 INJECTION, SOLUTION INTRAMUSCULAR; INTRAVENOUS at 15:58

## 2020-07-29 RX ADMIN — HYDROMORPHONE HYDROCHLORIDE 0.5 MG: 1 INJECTION, SOLUTION INTRAMUSCULAR; INTRAVENOUS; SUBCUTANEOUS at 18:54

## 2020-07-29 RX ADMIN — DOCUSATE SODIUM 50 MG AND SENNOSIDES 8.6 MG 2 TABLET: 8.6; 5 TABLET, FILM COATED ORAL at 21:01

## 2020-07-29 RX ADMIN — ROCURONIUM BROMIDE 50 MG: 10 INJECTION INTRAVENOUS at 15:43

## 2020-07-29 RX ADMIN — HYDROMORPHONE HYDROCHLORIDE 0.5 MG: 1 INJECTION, SOLUTION INTRAMUSCULAR; INTRAVENOUS; SUBCUTANEOUS at 19:12

## 2020-07-29 RX ADMIN — DEXMEDETOMIDINE HYDROCHLORIDE 20 MCG: 100 INJECTION, SOLUTION INTRAVENOUS at 14:29

## 2020-07-29 RX ADMIN — FENTANYL CITRATE 50 MCG: 50 INJECTION, SOLUTION INTRAMUSCULAR; INTRAVENOUS at 18:14

## 2020-07-29 RX ADMIN — Medication 3 G: at 15:56

## 2020-07-29 RX ADMIN — PROPOFOL 150 MG: 10 INJECTION, EMULSION INTRAVENOUS at 15:43

## 2020-07-29 RX ADMIN — FENTANYL CITRATE 50 MCG: 50 INJECTION, SOLUTION INTRAMUSCULAR; INTRAVENOUS at 15:44

## 2020-07-29 RX ADMIN — HYDROMORPHONE HYDROCHLORIDE 0.5 MG: 1 INJECTION, SOLUTION INTRAMUSCULAR; INTRAVENOUS; SUBCUTANEOUS at 19:54

## 2020-07-29 RX ADMIN — ONDANSETRON 4 MG: 2 INJECTION INTRAMUSCULAR; INTRAVENOUS at 18:11

## 2020-07-29 RX ADMIN — Medication 1 G: at 17:52

## 2020-07-29 RX ADMIN — SODIUM CHLORIDE, POTASSIUM CHLORIDE, SODIUM LACTATE AND CALCIUM CHLORIDE: 600; 310; 30; 20 INJECTION, SOLUTION INTRAVENOUS at 15:28

## 2020-07-29 ASSESSMENT — MIFFLIN-ST. JEOR: SCORE: 2170.63

## 2020-07-29 NOTE — BRIEF OP NOTE
[facility] Brief Operative Note    Pre-operative diagnosis: Right posterolateral corner injury   Post-operative diagnosis Right posterolateral corner injury, lateral tibial plateau, chondral wear, anterior scar tissue   Procedure: Procedure(s):  Posterior Lateral Corner Reconstruction with Allograft  Right Knee Exam Under Anesthesia, Arthroscopy knee and Debridement, Peroneal Nerve Neurolysis   Surgeon(s): Surgeon(s) and Role:     * Cyril Lebron MD - Primary     * Car Culver PA-C - Assisting     * Enrique Montgomery MD - Fellow - Assisting     * Ric Power MD   Estimated blood loss: 30 mL    Specimens: None   Findings: GETA + block

## 2020-07-29 NOTE — ANESTHESIA CARE TRANSFER NOTE
Patient: Dariusz Leyva    Procedure(s):  Posterior Lateral Corner Reconstruction with Allograft  Right Knee Exam Under Anesthesia, Arthroscopy knee and Debridement, Peroneal Nerve Neurolysis    Diagnosis: DJD (degenerative joint disease) [M19.90]  Diagnosis Additional Information: No value filed.    Anesthesia Type:   General, Peripheral Nerve Block     Note:  Airway :Face Mask  Patient transferred to:PACU  Handoff Report: Identifed the Patient, Identified the Reponsible Provider, Reviewed the pertinent medical history, Discussed the surgical course, Reviewed Intra-OP anesthesia mangement and issues during anesthesia, Set expectations for post-procedure period and Allowed opportunity for questions and acknowledgement of understanding      Vitals: (Last set prior to Anesthesia Care Transfer)    CRNA VITALS  7/29/2020 1801 - 7/29/2020 1843      7/29/2020             NIBP:  (!) 146/93    Pulse:  99    NIBP Mean:  102    Temp:  36.9  C (98.4  F)    SpO2:  98 %    Resp Rate (observed):  14                Electronically Signed By: JOSÉ Sanchez CRNA  July 29, 2020  6:43 PM

## 2020-07-30 PROCEDURE — 96376 TX/PRO/DX INJ SAME DRUG ADON: CPT

## 2020-07-30 PROCEDURE — 25000132 ZZH RX MED GY IP 250 OP 250 PS 637: Performed by: ORTHOPAEDIC SURGERY

## 2020-07-30 PROCEDURE — G0378 HOSPITAL OBSERVATION PER HR: HCPCS

## 2020-07-30 PROCEDURE — 25000132 ZZH RX MED GY IP 250 OP 250 PS 637: Performed by: STUDENT IN AN ORGANIZED HEALTH CARE EDUCATION/TRAINING PROGRAM

## 2020-07-30 PROCEDURE — 25000132 ZZH RX MED GY IP 250 OP 250 PS 637: Performed by: CLINICAL NURSE SPECIALIST

## 2020-07-30 PROCEDURE — 25000132 ZZH RX MED GY IP 250 OP 250 PS 637: Performed by: PHYSICIAN ASSISTANT

## 2020-07-30 PROCEDURE — 96375 TX/PRO/DX INJ NEW DRUG ADDON: CPT

## 2020-07-30 PROCEDURE — 25000128 H RX IP 250 OP 636: Performed by: STUDENT IN AN ORGANIZED HEALTH CARE EDUCATION/TRAINING PROGRAM

## 2020-07-30 RX ORDER — ASPIRIN 81 MG/1
81 TABLET ORAL DAILY
Status: DISCONTINUED | OUTPATIENT
Start: 2020-07-30 | End: 2020-08-01 | Stop reason: HOSPADM

## 2020-07-30 RX ORDER — CEFAZOLIN SODIUM 2 G/100ML
2 INJECTION, SOLUTION INTRAVENOUS EVERY 8 HOURS
Status: DISCONTINUED | OUTPATIENT
Start: 2020-07-30 | End: 2020-07-30

## 2020-07-30 RX ORDER — CEFAZOLIN SODIUM 2 G/100ML
2 INJECTION, SOLUTION INTRAVENOUS EVERY 8 HOURS
Status: DISCONTINUED | OUTPATIENT
Start: 2020-07-30 | End: 2020-08-01

## 2020-07-30 RX ORDER — METHOCARBAMOL 500 MG/1
500 TABLET, FILM COATED ORAL 4 TIMES DAILY
Status: DISCONTINUED | OUTPATIENT
Start: 2020-07-30 | End: 2020-08-01 | Stop reason: HOSPADM

## 2020-07-30 RX ADMIN — HYDROXYZINE HYDROCHLORIDE 25 MG: 25 TABLET, FILM COATED ORAL at 09:13

## 2020-07-30 RX ADMIN — OXYCODONE HYDROCHLORIDE 10 MG: 5 TABLET ORAL at 12:46

## 2020-07-30 RX ADMIN — BENZOCAINE AND MENTHOL 1 LOZENGE: 15; 3.6 LOZENGE ORAL at 11:15

## 2020-07-30 RX ADMIN — OXYCODONE HYDROCHLORIDE 10 MG: 5 TABLET ORAL at 09:41

## 2020-07-30 RX ADMIN — CEFAZOLIN SODIUM 2 G: 2 INJECTION, SOLUTION INTRAVENOUS at 01:49

## 2020-07-30 RX ADMIN — OXYCODONE HYDROCHLORIDE 10 MG: 5 TABLET ORAL at 16:11

## 2020-07-30 RX ADMIN — OXYCODONE HYDROCHLORIDE 10 MG: 5 TABLET ORAL at 03:28

## 2020-07-30 RX ADMIN — BENZOCAINE AND MENTHOL 1 LOZENGE: 15; 3.6 LOZENGE ORAL at 16:12

## 2020-07-30 RX ADMIN — DOCUSATE SODIUM 50 MG AND SENNOSIDES 8.6 MG 1 TABLET: 8.6; 5 TABLET, FILM COATED ORAL at 09:13

## 2020-07-30 RX ADMIN — ACETAMINOPHEN 650 MG: 325 TABLET, FILM COATED ORAL at 09:13

## 2020-07-30 RX ADMIN — OXYCODONE HYDROCHLORIDE 10 MG: 5 TABLET ORAL at 19:32

## 2020-07-30 RX ADMIN — DOCUSATE SODIUM 50 MG AND SENNOSIDES 8.6 MG 2 TABLET: 8.6; 5 TABLET, FILM COATED ORAL at 19:32

## 2020-07-30 RX ADMIN — CEFAZOLIN SODIUM 2 G: 2 INJECTION, SOLUTION INTRAVENOUS at 17:44

## 2020-07-30 RX ADMIN — ASPIRIN 81 MG: 81 TABLET ORAL at 16:11

## 2020-07-30 RX ADMIN — OXYCODONE HYDROCHLORIDE 10 MG: 5 TABLET ORAL at 22:45

## 2020-07-30 RX ADMIN — OXYCODONE HYDROCHLORIDE 10 MG: 5 TABLET ORAL at 00:02

## 2020-07-30 RX ADMIN — HYDROXYZINE HYDROCHLORIDE 25 MG: 25 TABLET, FILM COATED ORAL at 16:11

## 2020-07-30 RX ADMIN — OXYCODONE HYDROCHLORIDE 10 MG: 5 TABLET ORAL at 06:37

## 2020-07-30 RX ADMIN — METHOCARBAMOL 500 MG: 500 TABLET, FILM COATED ORAL at 22:45

## 2020-07-30 RX ADMIN — CEFAZOLIN SODIUM 2 G: 2 INJECTION, SOLUTION INTRAVENOUS at 09:42

## 2020-07-30 NOTE — OR NURSING
"PACU to Inpatient Nursing Handoff    Patient Dariusz Leyva is a 29 year old female who speaks English.   Procedure Procedure(s):  Posterior Lateral Corner Reconstruction with Allograft  Right Knee Exam Under Anesthesia, Arthroscopy knee and Debridement, Peroneal Nerve Neurolysis   Surgeon(s) Primary: Cyril Lebron MD  Assisting: Car Culver PA-C  Fellow - Assisting: Enrique Montgomery MD; Jeet Bravo MD     Allergies   Allergen Reactions     Nka [No Known Allergies]      Seasonal Allergies Other (See Comments)     Drainage and sometimes break out in hives        Isolation  [unfilled]     Past Medical History   has a past medical history of Depressive disorder.    Anesthesia Combined General with Block   Dermatome Level     Preop Meds Not applicable   Nerve block Adductor canal.  Location:right. Med:bupivacaine. Time given: preop   Intraop Meds Fentanyl 200- last 18\"36, Precedex last at 14:29, zofran 18:11, decadron   Local Meds Yes   Antibiotics cefazolin (Ancef) - last given at 1752     Pain Patient Currently in Pain: yes  Comfort: tolerable with discomfort  Pain Control: partially effective   PACU meds  hydromorphone (Dilaudid): 2.0 mg (total dose) last given at 1954   hydroxizine (Vistaril/Atarax): 25 mg (total dose) last given at 700   ketorolac (Toradol): 30 mg last given at 1840   PCA / epidural No   Capnography Respiratory Monitoring (EtCO2): 41 mmHg  Integrated Pulmonary Index (IPI): 10   Telemetry ECG Rhythm: Normal sinus rhythm   Inpatient Telemetry Monitor Ordered? No        Labs Glucose Lab Results   Component Value Date    GLC 98.9 07/20/2020       Hgb Lab Results   Component Value Date    HGB 13.2 07/29/2020       INR No results found for: INR   PACU Imaging Not applicable     Wound/Incision Incision/Surgical Site 07/29/20 Right Knee (Active)   Incision Assessment UTV 07/29/20 1959   Closure Approximated;Sutures;Adhesive strip(s) 07/29/20 1818   Dressing Intervention " Clean, dry, intact 07/29/20 1959   Number of days: 0      CMS        Equipment ice pack   Other LDA       IV Access Peripheral IV 07/29/20 Left Lower forearm (Active)   Site Assessment WDL 07/29/20 1959   Line Status Infusing 07/29/20 1959   Phlebitis Scale 0-->no symptoms 07/29/20 1151   Infiltration Scale 0 07/29/20 1151   Extravasation? No 07/29/20 1151   Dressing Intervention New dressing  07/29/20 1151   Number of days: 0      Blood Products Not applicable EBL 30 mL   Intake/Output Date 07/29/20 0700 - 07/30/20 0659   Shift 1703-0947 4823-8575 2618-6168 24 Hour Total   INTAKE   P.O.  100  100   I.V.  1675  1675   Shift Total(mL/kg)  1775(12.65)  1775(12.65)   OUTPUT   Urine  275  275   Blood  30  30   Shift Total(mL/kg)  305(2.17)  305(2.17)   Weight (kg) 140.3 140.3 140.3 140.3      Drains / Singh     Time of void PreOp Void Prior to Procedure: 1142 (07/29/20 1141)    PostOp Voided (mL): 275 mL(bedpan) (07/29/20 1900)    Diapered? No   Bladder Scan      mL (07/29/20 1900)  tolerating sips     Vitals    B/P: (!) 143/78  T: 98.4  F (36.9  C)    Temp src: Oral  P:  Pulse: 72 (07/29/20 1945)    Heart Rate: 71 (07/29/20 1945)     R: (!) 6  O2:  SpO2: 99 %    O2 Device: None (Room air) (07/29/20 1945)    Oxygen Delivery: 6 LPM (07/29/20 1836)         Family/support present no family here   Patient belongings     Patient transported on cart   DC meds/scripts (obs/outpt) Not applicable   Inpatient Pain Meds Released? yes       Special needs/considerations None   Tasks needing completion None       Lacie Mueller, RN  [facility] [id number]

## 2020-07-30 NOTE — PLAN OF CARE
VS: VSS   O2: >90% on room air    Output: Voiding spontaneously, pt has urgency and frequency per baseline    Last BM: 7/29/2020   Activity: Up with assist of 1 and walker/gait belt     Up for meals? In chair    Skin: CDI    Pain: Partially controlled with Q3H oxycodone and PRN atarax and tylenol    CMS: No new numbness or tingling    Dressing: CDI    Diet: Regular    LDA: PIV SL    Equipment: Hinged knee brace, IV pump and pole, bedside commode, walker   Plan: Continue to monitor, discharge pending therapies and pain    Additional Info:

## 2020-07-30 NOTE — OR NURSING
"Pt awake and very talkative and hopeful. States she is in severe pain, but \"it is the pain I am used to having all the time and I can handle it.\" Pt appears very relaxed and almost sleepy.   "

## 2020-07-30 NOTE — OP NOTE
Procedure Date: 07/29/2020      PREOPERATIVE DIAGNOSES:   1. Right knee posterolateral corner injury.   2. Possible right knee anterior cruciate ligament deficiency.      POSTOPERATIVE DIAGNOSES:   1. Right knee posterolateral corner injury.   2. Right knee lateral tibial plateau chondral wear.   3. Right knee proliferative scar tissue.      OPERATIONS PERFORMED:   1. Right knee posterolateral corner reconstruction with allograft.   2. Right knee peroneal nerve neurolysis.   3. Right knee arthroscopic debridement.      SURGEON:  Cyril Lebron MD      COSURGEON:  Ric Power MD.  A cosurgeon was required for this case because of the complex nature of performing a posterolateral corner reconstruction and peroneal nerve neurolysis in a patient with an elevated BMI.  The patient's BMI was over 48.4      ASSISTANT:  Car Culver PA-C.  The physician assistant was required to prepare the grafts.      LEARNERS:   1. Enrique Montgomery MD, Orthopedic Fellow   2. Jeet Bravo MD, Orthopedic Fellow      ANESTHETIC:  General plus regional block.      DRAINS:  None.      COUNTS:  Sponge and needle count were correct.      MATERIAL FORWARDED TO THE LABORATORY:  None.      INDICATIONS:  Dariusz Leyva is a 29-year-old female with a difficult right knee problem.  She had a significant tibial plateau fracture 06/06/2019.  At that time, she was noted to have a posterolateral corner injury as well as tibial spine avulsion which was fixed during her tibial plateau open reduction and internal fixation.  She underwent hardware removal on 02/05/2020.  At that time, I was able to do an examination under anesthesia as well as a diagnostic arthroscopy.  I felt that her tibial spine had healed and her ACL was functioning reasonably well.  She did have increased external rotation as well as varus opening.  We rehabilitated Ms. Leyva's knee.  She still feels like her knee is unstable.  She does not have varus alignment.  Ms.  Gordon and I had a long conversation in clinic regarding treatment options.  We discussed continued nonsurgical treatment.  She is not in favor of this given her symptoms.  We discussed an examination under anesthesia, diagnostic arthroscopy, posterolateral corner reconstruction and possible ACL reconstruction.  I explained to Ms. Leyva that this is somewhat of a complex surgery and I would ask my partner, Dr. Ric Power, to assist.  I did have an opportunity to discuss the risks of surgery including bleeding, infection, nerve damage, complications from anesthesia, blood clot, etc. I also discussed the more pertinent risks with this type of surgery, including failure of the reconstruction.  The reconstruction may stretch or she could have recurrent injury.  There is a possibility of knee stiffness or loss of motion that could be problematic and require intervention.  She may have persistent pain or numbness.  There could be implant complications.  We will use donor graft which does carry the risk of unusual viral or bacterial infection.  She may go on to develop degenerative changes in this knee over time.  She may be unable to return to her desired level of activity.  I explained to Ms. Leyva that a posterolateral corner reconstruction does put the peroneal nerve at risk.  This could be an issue.  She understands the surgery as well as the surgical risks and would like to proceed.      OPERATIVE FINDINGS:  Examination under anesthesia reveals symmetrical range of motion.  There is no Lachman.  Symmetrical trace pivot glide.  She does have trace increased opening to varus stress in full extension.  She has 4-5 mm of increased opening at 30 degrees.  There is no increased opening to valgus stress.  She does have 15 degrees of increased external rotation on her dial test.  The diagnostic arthroscopy reveals normal-appearing patella and trochlea.  The medial compartment reveals intact medial meniscus and normal  cartilage surfaces.  The notch reveals an intact anterior cruciate ligament that takes up tension with an intraoperative Lachman and is stable to probing.  The PCL is intact.  The lateral compartment reveals grade 2-3 chondral fibrillation of the tibial plateau.  The lateral meniscus is intact.  There is superficial chondral fibrillation of the lateral femoral condyle.  There is significant opening of the lateral compartment in the figure-of-4 position.  The lateral meniscus is intact.      IMPLANTS:  Arthrex 6 x 20 BioComposite screw x3, Arthrex 7 x 20 BioComposite screw x1, semitendinosis allograft x2.      DESCRIPTION OF THE OPERATION:  After the patient was counseled, plans, alternatives and risks were discussed, consent was obtained.  The correct operative extremity was marked in the preoperative holding area.  Preoperative antibiotics were administered.  Regional block was administered.  The patient was brought back to the operating suite and administered a general anesthetic.  The examination under anesthesia was performed and the findings are noted above.  The right lower extremity was prepped and draped in the usual sterile fashion.  A timeout process was completed.  Standard anterolateral arthroscopy portal was created followed by superomedial portal through the outflow cannula and an anteromedial portal for the working instruments.  A thorough diagnostic arthroscopy was undertaken and the findings are noted above.  The anterior cruciate ligament was stable.  A motorized resector was used to debride unstable chondral flaps from the tibial plateau.  Proliferative scar tissue was debrided anteromedially and anterolaterally.  The arthroscopic instruments were removed.      A standard lateral incision was created.  Hemostasis obtained with electrocautery.  Dissection carried through subcutaneous tissue.  The soft tissue envelope was quite thick and this portion of the procedure was difficult requiring extra  time of dissection.  Dissection was carried out posterior to the biceps femoris.  The peroneal nerve was identified.  A peroneal nerve neurolysis was performed all the way to where the nerve dives into the muscle.  Some of the muscle fascia was released.  This was done to prevent any peroneal nerve injury due to postoperative swelling.  A vessel loop was placed around the nerve and the nerve was gently retracted free of the surgical field.  The fibular collateral ligament was now identified and a stitch was placed through the ligament.  The ligament was lax.  Dissection was carried down to the lateral border of the fibular head to the insertion site of the fibular collateral ligament.  A 6 mm tunnel was reamed from lateral to posteromedial medial.  Care was taken to protect the neurovascular structures with a retractor.  The peroneal nerve was noted to be free of this tunnel.  The fibular collateral ligament was now followed proximally until the anatomic insertion site onto the femur was identified.  A beath pin was placed.  The insertion site of the popliteus tendon was identified in the superior aspect of the footprint.  A parallel guide was used to place a second pin 18 mm from the first pin.  6 mm sockets were now reamed over the top of these 2 pins.      The physician assistant prepared the allografts on the backtable.  A #2 FiberWire was placed in the free ends of each graft.  The grafts fit nicely through a 6 mm tunnel and the 2 grafts combined fit nicely through a 7 mm tunnel.  The grafts were kept moist until implantation.      One of the semitendinosis grafts was placed into the femoral fibular collateral ligament tunnel and fixation was achieved with 6 x 20 mm BioComposite screw achieving excellent purchase.  The graft was routed along the native fibular collateral ligament and through our tunnel in the fibular neck.  The second semitendinosis graft was now placed in the popliteus socket and fixed with a  6 x 20 mm BioComposite screw achieving excellent purchase.  At this point, the tibial tunnel was created.  This is the portion of the case that definitely required 2 skilled Sports Medicine surgeons.  A guide was placed along the posterior tibia.  A small stab wound was made at Gerdy tubercle.  Using the guide as well as fluoroscopy, a guide pin was placed from anterior to posterior in the proximal tibia.  Caution was taken to protect the posterior neurovascular structures.  A 7 mm reamer was used to carefully create the tibial tunnel, again protecting the neurovascular bundles with 2 experienced surgeons.  The passing suture was placed.  The FCL graft that exited post-dural medial fibular head as well as the popliteus graft were pulled through this tibial tunnel.  Prior to placing the FCL graft through the tibial tunnel, we did fix the FCL graft at the fibular head with 6 x 20 mm BioComposite screw.  This was done with the knee at 15 degrees, neutral rotation, and valgus load.  The 2 tails of the graft were now brought out the anterior tibia.  The knee was again placed at 15 degrees of flexion, neutral rotation and valgus load.  Firm tension was placed on the grafts, and the grafts were fixed with a 7 x 20 mm BioComposite screw achieving excellent purchase.  The knee was now examined.  There was full range of motion.  There was no increased opening to varus stress at 0 or 30 degrees.  The incisions were copiously irrigated.  The iliotibial band reapproximated with Vicryl suture.  The biceps femoris window for the fibular tunnel was also reapproximated.  Subcutaneous tissue was closed with 2-0 Vicryl, skin closed with Monocryl.  The patient was placed in a sterile dressing and then placed in a hinged knee brace locked in extension.  She was extubated on the operating room table and taken to the recovery room in good condition.  Within the recovery room, I examined the patient.  She had intact sensation in the first  web.  Her ankle dorsiflexion and EHL function was intact.  Capillary refill was brisk.      DISPOSITION:  The patient will be admitted to 23-hour observation for postoperative pain control and observation.  Her weightbearing status will be touchdown weightbearing for 3 weeks and then partial weightbearing for 3 weeks.  We will leave her in extension until her first postoperative visit on .  We will utilize aspirin for DVT prophylaxis.  Her dressing can be changed prior to discharge on postoperative day 1 or 2.   She will follow up with me on  for a routine recheck.            EMETERIO MEZA MD             D: 2020   T: 2020   MT: ita      Name:     NONA MOYA   MRN:      [id number]        Account:        [id number]   :      1990           Procedure Date: 2020      Document: [id number]

## 2020-07-30 NOTE — PLAN OF CARE
Patient A&O x4, lungs sound clear, Bowel sound active- passing gas and had a BM yesterday, Denied CP, lightheadedness, dizziness, numbness, tingling and SOB, iv saline locked, drinking well and voiding spontaneously without difficulties, able to wiggle toes,dressing clean,dry and intact, hinged brace on, CMS intact, pain tolerable and taking 10 mg of Oxycodone for pain, ice pack applied to knee,  incentive spirometer encouraged and done several times, up to bedside commode with assist of one, TTWB maintained. heels elevated off bed, demonstrates the ability to use call light appropriately, will continue to monitor patient.

## 2020-07-30 NOTE — ANESTHESIA POSTPROCEDURE EVALUATION
Anesthesia POST Procedure Evaluation    Patient: Dariusz Leyva   MRN:     [id number] Gender:   female   Age:    29 year old :      1990        Preoperative Diagnosis: DJD (degenerative joint disease) [M19.90]   Procedure(s):  Posterior Lateral Corner Reconstruction with Allograft  Right Knee Exam Under Anesthesia, Arthroscopy knee and Debridement, Peroneal Nerve Neurolysis   Postop Comments: No value filed.     Anesthesia Type: General, Peripheral Nerve Block          Postop Pain Control: Uneventful            Sign Out: Well controlled pain   PONV: No   Neuro/Psych: Uneventful            Sign Out: Acceptable/Baseline neuro status   Airway/Respiratory: Uneventful            Sign Out: Acceptable/Baseline resp. status   CV/Hemodynamics: Uneventful            Sign Out: Acceptable CV status   Other NRE: NONE   DID A NON-ROUTINE EVENT OCCUR? No         Last Anesthesia Record Vitals:  CRNA VITALS  2020 1801 - 2020 1901      2020             NIBP:  (!) 146/93    Pulse:  99    NIBP Mean:  102    Temp:  36.9  C (98.4  F)    SpO2:  98 %    Resp Rate (observed):  14          Last PACU Vitals:  Vitals Value Taken Time   /77 2020  8:00 PM   Temp 36.9  C (98.4  F) 2020  7:30 PM   Pulse 63 2020  8:00 PM   Resp 29 2020  8:13 PM   SpO2 99 % 2020  7:45 PM   Temp src     NIBP     Pulse     SpO2     Resp     Temp     Ht Rate     Temp 2     Vitals shown include unvalidated device data.      Electronically Signed By: Felicitas Phillips MD, 2020, 8:19 PM

## 2020-07-30 NOTE — PROGRESS NOTES
A/Ox's 4. Pt rated pain as tolerable. Oxycodone and Ice packs given for pain control. Dressing CDI. HKB locked. CMS intact. Tolerated regular diet. Denied any nausea, CP, SOB, lightheadedness or dizziness. Voiding without pain or difficulty with urgency.  Up with SBA.  Resting in bed at this time with call light in reach. Able to make needs known. Continue to monitor.

## 2020-07-31 ENCOUNTER — APPOINTMENT (OUTPATIENT)
Dept: PHYSICAL THERAPY | Facility: CLINIC | Age: 30
End: 2020-07-31
Attending: ORTHOPAEDIC SURGERY
Payer: COMMERCIAL

## 2020-07-31 ENCOUNTER — HOME CARE/HOSPICE - HEALTHEAST (OUTPATIENT)
Dept: HOME HEALTH SERVICES | Facility: HOME HEALTH | Age: 30
End: 2020-07-31

## 2020-07-31 PROCEDURE — 25000132 ZZH RX MED GY IP 250 OP 250 PS 637: Performed by: PHYSICIAN ASSISTANT

## 2020-07-31 PROCEDURE — 25000128 H RX IP 250 OP 636: Performed by: STUDENT IN AN ORGANIZED HEALTH CARE EDUCATION/TRAINING PROGRAM

## 2020-07-31 PROCEDURE — 97116 GAIT TRAINING THERAPY: CPT | Mod: GP,59 | Performed by: PHYSICAL THERAPIST

## 2020-07-31 PROCEDURE — 96376 TX/PRO/DX INJ SAME DRUG ADON: CPT

## 2020-07-31 PROCEDURE — 25000132 ZZH RX MED GY IP 250 OP 250 PS 637: Performed by: CLINICAL NURSE SPECIALIST

## 2020-07-31 PROCEDURE — 25000132 ZZH RX MED GY IP 250 OP 250 PS 637: Performed by: ORTHOPAEDIC SURGERY

## 2020-07-31 PROCEDURE — 25000132 ZZH RX MED GY IP 250 OP 250 PS 637: Performed by: STUDENT IN AN ORGANIZED HEALTH CARE EDUCATION/TRAINING PROGRAM

## 2020-07-31 PROCEDURE — 97161 PT EVAL LOW COMPLEX 20 MIN: CPT | Mod: GP | Performed by: PHYSICAL THERAPIST

## 2020-07-31 PROCEDURE — 97530 THERAPEUTIC ACTIVITIES: CPT | Mod: GP | Performed by: PHYSICAL THERAPIST

## 2020-07-31 PROCEDURE — G0378 HOSPITAL OBSERVATION PER HR: HCPCS

## 2020-07-31 RX ORDER — ACETAMINOPHEN 325 MG/1
975 TABLET ORAL EVERY 6 HOURS PRN
Qty: 1 BOTTLE | Refills: 0 | Status: SHIPPED | OUTPATIENT
Start: 2020-07-31 | End: 2021-03-26

## 2020-07-31 RX ORDER — AMOXICILLIN 250 MG
1-2 CAPSULE ORAL 2 TIMES DAILY PRN
Qty: 30 TABLET | Refills: 0 | Status: SHIPPED | OUTPATIENT
Start: 2020-07-31 | End: 2021-08-27

## 2020-07-31 RX ORDER — OXYCODONE HYDROCHLORIDE 5 MG/1
5-10 TABLET ORAL
Qty: 30 TABLET | Refills: 0 | Status: SHIPPED | OUTPATIENT
Start: 2020-07-31 | End: 2021-08-27

## 2020-07-31 RX ORDER — ACETAMINOPHEN 325 MG/1
975 TABLET ORAL EVERY 6 HOURS PRN
Status: DISCONTINUED | OUTPATIENT
Start: 2020-07-31 | End: 2020-08-01 | Stop reason: HOSPADM

## 2020-07-31 RX ORDER — TIZANIDINE 2 MG/1
2 TABLET ORAL 3 TIMES DAILY PRN
Qty: 30 TABLET | Refills: 0 | Status: SHIPPED | OUTPATIENT
Start: 2020-07-31 | End: 2021-08-27

## 2020-07-31 RX ORDER — ONDANSETRON 4 MG/1
4 TABLET, ORALLY DISINTEGRATING ORAL EVERY 6 HOURS PRN
Qty: 15 TABLET | Refills: 0 | Status: SHIPPED | OUTPATIENT
Start: 2020-07-31 | End: 2021-08-27

## 2020-07-31 RX ADMIN — CEFAZOLIN SODIUM 2 G: 2 INJECTION, SOLUTION INTRAVENOUS at 17:45

## 2020-07-31 RX ADMIN — METHOCARBAMOL 500 MG: 500 TABLET, FILM COATED ORAL at 20:25

## 2020-07-31 RX ADMIN — ACETAMINOPHEN 975 MG: 325 TABLET ORAL at 17:50

## 2020-07-31 RX ADMIN — OXYCODONE HYDROCHLORIDE 10 MG: 5 TABLET ORAL at 08:38

## 2020-07-31 RX ADMIN — CEFAZOLIN SODIUM 2 G: 2 INJECTION, SOLUTION INTRAVENOUS at 01:54

## 2020-07-31 RX ADMIN — METHOCARBAMOL 500 MG: 500 TABLET, FILM COATED ORAL at 13:46

## 2020-07-31 RX ADMIN — OXYCODONE HYDROCHLORIDE 10 MG: 5 TABLET ORAL at 13:47

## 2020-07-31 RX ADMIN — OXYCODONE HYDROCHLORIDE 5 MG: 5 TABLET ORAL at 20:25

## 2020-07-31 RX ADMIN — CEFAZOLIN SODIUM 2 G: 2 INJECTION, SOLUTION INTRAVENOUS at 10:24

## 2020-07-31 RX ADMIN — DOCUSATE SODIUM 50 MG AND SENNOSIDES 8.6 MG 2 TABLET: 8.6; 5 TABLET, FILM COATED ORAL at 08:38

## 2020-07-31 RX ADMIN — BENZOCAINE AND MENTHOL 1 LOZENGE: 15; 3.6 LOZENGE ORAL at 20:25

## 2020-07-31 RX ADMIN — DOCUSATE SODIUM 50 MG AND SENNOSIDES 8.6 MG 2 TABLET: 8.6; 5 TABLET, FILM COATED ORAL at 20:25

## 2020-07-31 RX ADMIN — METHOCARBAMOL 500 MG: 500 TABLET, FILM COATED ORAL at 08:38

## 2020-07-31 RX ADMIN — OXYCODONE HYDROCHLORIDE 10 MG: 5 TABLET ORAL at 05:12

## 2020-07-31 RX ADMIN — ASPIRIN 81 MG: 81 TABLET ORAL at 08:38

## 2020-07-31 RX ADMIN — METHOCARBAMOL 500 MG: 500 TABLET, FILM COATED ORAL at 17:50

## 2020-07-31 RX ADMIN — OXYCODONE HYDROCHLORIDE 10 MG: 5 TABLET ORAL at 01:54

## 2020-07-31 NOTE — PROGRESS NOTES
Care Coordinator Progress Note    Admission Date/Time:  7/29/2020  Attending MD:  Cyril Lebron MD    Data  Chart reviewed, discussed with interdisciplinary team.   Patient was admitted for:    Acute pain of right knee  S/P knee surgery.    Concerns with insurance coverage for discharge needs: unable to meet basic needs.  Current Living Situation: Patient lives alone.  Support System: Uninvolved  Services Involved: Home Care  Transportation at Discharge: TBD  Transportation to Medical Appointments:   - Not applicable  Barriers to Discharge: dependent with mobility/activities of daily living, lack of support system/caregiver and lives alone    Coordination of Care and Referrals: Provided patient/family with options for Home Care.        Assessment  Met with patient at bedside to discuss discharge planing. A home care referral was sent to [facility] for skilled nursing, physical therapy, occupational therapy, HHA and social work consult. Patient has limited income and needs assistance utilizing community resources. [facility] release of information and medical documents faxed. No further discharge concerns at this time. All therapy orders completed and faxed. RNCC available as needed.    [facility]  Phone  [phone]  Fax  [phone]     Christine Youngblood,MSN,MPH,RN   PHN Certified [employer]     DEPARTMENT   [address] [place] [address]  Office: [phone]   Mobile: [phone]  Fax: [phone]  [email or web address]       Plan  Anticipated Discharge Date:  7/31/2020  Anticipated Discharge Plan:  Home with home care    Jasmin Lees RN, BSN  Care Coordinator,   Phone [phone]  Pager [phone]

## 2020-07-31 NOTE — PLAN OF CARE
Discharge Planner PT   Patient plan for discharge: Home   Current status: Pt Min A x 1 for LE assist for bed mobility and SBA for sit<> stand transfers to FWW and Crutches. Pt amb 10 feet with both and found FWW more stable. Session ended with patient back in bed with needs within reach  Barriers to return to prior living situation: none  Recommendations for discharge: Home   Rationale for recommendations: Pt should be able to manage at home with FWW for her needs.       Physical Therapy Discharge Summary    Reason for therapy discharge:    Discharged to home with outpatient therapy.    Progress towards therapy goal(s). See goals on [facility] record for goal details.  Goals partially met.  Barriers to achieving goals:   discharge from facility.    Therapy recommendation(s):    Continued therapy is recommended.  Rationale/Recommendations:  for gait progression and continued WB tolerance. .           Entered by: Dimitrios Linda 07/31/2020 5:13 PM

## 2020-07-31 NOTE — PROGRESS NOTES
A/Ox's 4. Pt rated pain as tolerable. Oxycodone, Robaxin and Ice packs given for pain control. Dressing CDI. HKB locked. CMS intact. Tolerated regular diet. Denied any nausea, CP, SOB, lightheadedness or dizziness. Voiding without pain or difficulty with urgency.  Up with SBA.  Resting in bed at this time with call light in reach. Able to make needs known. Continue to monitor.

## 2020-07-31 NOTE — PLAN OF CARE
"  VS: /53 (BP Location: Left arm)   Pulse 72   Temp 99.6  F (37.6  C) (Oral)   Resp 16   Ht 1.702 m (5' 7\")   Wt 140.3 kg (309 lb 4.9 oz)   LMP 07/19/2020   SpO2 97%   BMI 48.44 kg/m     O2: >90% on RA.   Output: Void spontaneously in the toilet.   Last BM: 7/29 per pt report.    Activity: Ax1 to the toilet, with HKB on locked when ambulating.    Skin: Incision.   Pain: Po oxycodone, Tylenol, Robaxin.    CMS: Intact. Denies.    Dressing: CDI.   Diet: Regular. Tolerated.   LDA: PIV SL.   Equipment: IV pole/ personal belonging, HKB.   Plan: Home with home care when medically stable.    Additional Info:      "

## 2020-07-31 NOTE — UTILIZATION REVIEW
Concurrent stay review; Secondary Review Determination    Under the authority of the Utilization Management Committee, the utilization review process indicated a secondary review on the above patient. The review outcome is based on review of the medical records, discussions with staff, and applying clinical experience noted on the date of the review.    (x) Observation Status Appropriate - Concurrent stay review        RATIONALE FOR DETERMINATION: 29-year-old female with complex orthopedic right knee issues requiring a right knee posterolateral corner reconstruction with allograft with right knee peroneal nerve neural lysis and debridement.  Observation care appropriate to evaluate patient's adequate pain control, mobility assessment and arrange safe discharge planning.    Patient is clinically improving and there is no clear indication to change patient's status to inpatient. The severity of illness, intensity of service provided, expected LOS and risk for adverse outcome make the care appropriate for observation.    This document was produced using voice recognition software    The information on this document is developed by the utilization review team in order for the business office to ensure compliance. This only denotes the appropriateness of proper admission status and does not reflect the quality of care rendered.    The definitions of Inpatient Status and Observation Status used in making the determination above are those provided in the CMS Coverage Manual, Chapter 1 and Chapter 6, section 70.4.    Sincerely,    Nate Mcneal MD  Utilization Review  Physician Advisor  [facility].

## 2020-07-31 NOTE — PROGRESS NOTES
"Orthopaedic Surgery Progress Note 07/31/2020     Subjective:   No acute events overnight. Patient reports doing well. Patient reports moderate pain current regimen. Tolerating PO diet. No N/V. Denies CP/SOB/fever/chills. No BM yet,passing flatus.      Objective:   /53 (BP Location: Left arm)   Pulse 72   Temp 99.6  F (37.6  C) (Oral)   Resp 16   Ht 1.702 m (5' 7\")   Wt 140.3 kg (309 lb 4.9 oz)   LMP 07/19/2020   SpO2 97%   BMI 48.44 kg/m    I/O this shift:  In: -   Out: 250 [Urine:250]  Gen: No acute physical distress. Resting comfortably in bed. Cooperative.  Resp: Breathing comfortably on RA  CV: Nontachycardic  Neuro: No focal deficits appreciated; alert and oriented  MSK:   RLE:  Dressing/ACE is c/d/i.   SILT in femoral, saphenous, sural, deep peroneal, superficial peroneal, and tibial n. dist.   No calf tenderness or swelling.    PT and DP pulses intact and 2+, capillary refill <3 seconds      Labs:  Lab Results   Component Value Date    HGB 13.2 07/29/2020        Assessment & Plan:   29 year old female admitted on 7/29/2020 with right knee posterolateral corner injury, now s/p right knee posterolateral corner reconstruction with allograft, peroneal nerve neurolysis, and arthroscopic debridement on 7/29/20 with Dr. Lebron. Had hoped to discharge patient today, however decided that PT consult was necessary to help patient with ADLs in order to safely discharge. By the time PT was able to see patient, no longer able to discharge patient today.    Activity:  TTWB x3 weeks, keep knee fully extended until first postop visit on 8/6/20  Brace/Splint:  Hinged knee brace locked in full extension, may remove when at rest in bed to apply ice  Wound Care: Dressing changed today, replaced with bulky dressing consisting of adaptic, 4x4s, and ABD pads wrapped in cast padding and ACE wrap, remove ACE for skin checks per protocol.  Labs:   Hgb on POD #1  X-rays:  Intraop  Pain:  Transition from IV to PO meds as " patient tolerates. Tylenol increased today.  GI/FEN:  Begin with clear fluids and ADAT to regular  ID/Abx:  Ancef x 24 hours post op for surgical prophylaxis   PT/OT: ADLs  Consults:  None  Prophylaxis: Aspirin 81mg daily x 4 weeks.    Dispo:  Expect patient to d/c to home with family tomorrow morning  Follow up:  Clinic on 8/6/20 w/ Cyril Lebron MD w/ x-rays needed      KIP Canseco, PA-C  Physician Assistant, Orthopedic Surgery  Pager: [phone]    Please page me directly with any questions/concerns during regular weekday hours before 5pm. If there is no response, or if it is a weekend, holiday, or during evening hours then please page the orthopaedic surgery resident on call.

## 2020-07-31 NOTE — PROGRESS NOTES
Care Coordinator Progress Note    Admission Date/Time:  7/29/2020  Attending MD:  Cyril Lebron MD    Data  Chart reviewed, discussed with interdisciplinary team.   Patient was admitted for:    Acute pain of right knee  S/P knee surgery.    Concerns with insurance coverage for discharge needs: None.  Current Living Situation: Patient lives alone.  Support System: Involved  Services Involved: TBD  Transportation at Discharge: TBD  Transportation to Medical Appointments:   - Not applicable  Barriers to Discharge: NA     Assessment:  [facility] is currently filling out PCA forms. Forms to be faxed back to the below email. RNCC will continue to follow as needed.    Christine Youngblood,MSN,MPH,RN   PHN Certified [facility]     DEPARTMENT   [address] [place] [address]  Office: [phone]   Mobile: [phone]  Fax: [phone]  [email or web address].       Plan  Anticipated Discharge Date:  7/31/2020  Anticipated Discharge Plan:  Home    Jasmin Lees RN, BSN  Care Coordinator, 8A  Phone [phone]  Pager [phone]

## 2020-08-01 ENCOUNTER — PATIENT OUTREACH (OUTPATIENT)
Dept: CARE COORDINATION | Facility: CLINIC | Age: 30
End: 2020-08-01

## 2020-08-01 ENCOUNTER — RECORDS - HEALTHEAST (OUTPATIENT)
Dept: ADMINISTRATIVE | Facility: OTHER | Age: 30
End: 2020-08-01

## 2020-08-01 VITALS
DIASTOLIC BLOOD PRESSURE: 59 MMHG | RESPIRATION RATE: 16 BRPM | WEIGHT: 293 LBS | TEMPERATURE: 98.9 F | HEART RATE: 72 BPM | SYSTOLIC BLOOD PRESSURE: 133 MMHG | BODY MASS INDEX: 45.99 KG/M2 | HEIGHT: 67 IN | OXYGEN SATURATION: 96 %

## 2020-08-01 PROCEDURE — 96376 TX/PRO/DX INJ SAME DRUG ADON: CPT

## 2020-08-01 PROCEDURE — 25000132 ZZH RX MED GY IP 250 OP 250 PS 637: Performed by: ORTHOPAEDIC SURGERY

## 2020-08-01 PROCEDURE — 25000132 ZZH RX MED GY IP 250 OP 250 PS 637: Performed by: STUDENT IN AN ORGANIZED HEALTH CARE EDUCATION/TRAINING PROGRAM

## 2020-08-01 PROCEDURE — G0378 HOSPITAL OBSERVATION PER HR: HCPCS

## 2020-08-01 PROCEDURE — 25000128 H RX IP 250 OP 636: Performed by: STUDENT IN AN ORGANIZED HEALTH CARE EDUCATION/TRAINING PROGRAM

## 2020-08-01 PROCEDURE — 25000132 ZZH RX MED GY IP 250 OP 250 PS 637: Performed by: PHYSICIAN ASSISTANT

## 2020-08-01 PROCEDURE — 25000132 ZZH RX MED GY IP 250 OP 250 PS 637: Performed by: CLINICAL NURSE SPECIALIST

## 2020-08-01 RX ORDER — SODIUM CHLORIDE 9 MG/ML
INJECTION, SOLUTION INTRAVENOUS
Status: DISCONTINUED
Start: 2020-08-01 | End: 2020-08-01 | Stop reason: HOSPADM

## 2020-08-01 RX ADMIN — METHOCARBAMOL 500 MG: 500 TABLET, FILM COATED ORAL at 07:57

## 2020-08-01 RX ADMIN — DOCUSATE SODIUM 50 MG AND SENNOSIDES 8.6 MG 2 TABLET: 8.6; 5 TABLET, FILM COATED ORAL at 07:57

## 2020-08-01 RX ADMIN — CEFAZOLIN SODIUM 2 G: 2 INJECTION, SOLUTION INTRAVENOUS at 01:01

## 2020-08-01 RX ADMIN — OXYCODONE HYDROCHLORIDE 10 MG: 5 TABLET ORAL at 07:57

## 2020-08-01 RX ADMIN — OXYCODONE HYDROCHLORIDE 10 MG: 5 TABLET ORAL at 01:01

## 2020-08-01 RX ADMIN — ASPIRIN 81 MG: 81 TABLET ORAL at 07:57

## 2020-08-01 NOTE — PLAN OF CARE
"VS: /59 (BP Location: Left arm)   Pulse 72   Temp 98.9  F (37.2  C) (Oral)   Resp 16   Ht 1.702 m (5' 7\")   Wt 140.3 kg (309 lb 4.9 oz)   LMP 07/19/2020   SpO2 96%   BMI 48.44 kg/m     O2: >90% on RA.   Output: Void spontaneously in the toilet.   Last BM: 7/29 per pt report.    Activity: Ax1 to the toilet, with HKB on locked when ambulating.    Skin: Incision.   Pain: Po oxycodone, Tylenol, Robaxin.    CMS: Intact. Denies.    Dressing: CDI.   Diet: Regular. Tolerated.   LDA: PIV SL.   Equipment: IV pole/ personal belonging, HKB.   Plan: Home with home care today.   Additional Info:     Pt. discharged at 10:45A to home. Pt. left with personal belongings. Prior to discharge, PIV was removed. Pt. received complete discharge paperwork and medications as filled by discharge pharmacy. Pt. was given times of last dose for all discharge medications in writing on discharge medication sheets.  Discharge teaching included pain management, activity restrictions, dressing changes, and signs and symptoms of infection. Pt. to follow up with ortho team in follow up appointment. Pt. had no further questions at the time of discharge and no unmet needs were identified.  "

## 2020-08-01 NOTE — PLAN OF CARE
Pt is A&Ox4. VSS. LS clear, on RA. BS active, LBM on 7/29/2020. Voiding well. Pain managed with oxycodone q3h. R Knee surgical dressing is c/d/I. Hinged brace in place. Pt up with 1 assist. Pt lose IV access, attempted x2 to place IV, unsuccessful, pt will discharge home today. Continue to monitor.

## 2020-08-01 NOTE — PROGRESS NOTES
"Orthopaedic Surgery Progress Note 08/01/2020     Subjective:   No acute events overnight. Pain well controlled. Working with PT. Tolerating PO diet. No N/V. Denies CP/SOB/fever/chills. Passing flatus.      Objective:   /62   Pulse 72   Temp 98.4  F (36.9  C) (Oral)   Resp 16   Ht 1.702 m (5' 7\")   Wt 140.3 kg (309 lb 4.9 oz)   LMP 07/19/2020   SpO2 97%   BMI 48.44 kg/m    No intake/output data recorded.  Gen: No acute physical distress. Resting comfortably in bed. Cooperative.  Resp: Breathing comfortably on RA  CV: Nontachycardic  Neuro: No focal deficits appreciated; alert and oriented  MSK:   RLE:  Dressing/ACE is c/d/i. HKB in place  SILT in femoral, saphenous, sural, deep peroneal, superficial peroneal, and tibial n. dist.   No calf tenderness or swelling.    PT and DP pulses intact and 2+, capillary refill <3 seconds      Labs:  Lab Results   Component Value Date    HGB 13.2 07/29/2020        Assessment & Plan:   29 year old female admitted on 7/29/2020 with right knee posterolateral corner injury, now s/p right knee posterolateral corner reconstruction with allograft, peroneal nerve neurolysis, and arthroscopic debridement on 7/29/20 with Dr. Lebron. Worked with Therapy and planning to discharge home today    Activity:  TTWB x3 weeks, keep knee fully extended until first postop visit on 8/6/20  Brace/Splint:  Hinged knee brace locked in full extension, may remove when at rest in bed to apply ice  Wound Care: Dressing changed 7/31, replaced with bulky dressing consisting of adaptic, 4x4s, and ABD pads wrapped in cast padding and ACE wrap, remove ACE for skin checks per protocol.  Labs:   prn  X-rays:  complete  Pain:  Transition from IV to PO meds as patient tolerates. Tylenol increased today.  GI/FEN:  regular  ID/Abx:  Ancef x 24 hours post op complete  PT/OT: ADLs  Consults:  PT  Prophylaxis: Aspirin 81mg daily x 4 weeks.    Dispo:  discharge home today  Follow up:  Clinic on 8/6/20 w/ [place]" Cyril Stein MD w/ x-rays needed      Caio Del Rosario MD  PGY-4, Orthopaedic Surgery  Pager: [phone]

## 2020-08-01 NOTE — DISCHARGE SUMMARY
[facility] Discharge Summary    Dariusz Leyva MRN# [id number]   Age: 29 year old YOB: 1990     Date of Admission:  7/29/2020  Date of Discharge::  8/1/2020 11:17 AM  Admitting Physician:  Cyril Lebron MD  Discharge Physician:  Esau Nye PA-C             Admission Diagnoses:   Right knee posterolateral corner injury          Discharge Diagnosis:   Same as admission diagnosis, as well as right knee lateral tibial plateau chondral wear          Procedures:   Procedure(s): Right knee posterolateral corner reconstruction with allograft, with right knee peroneal nerve neurolysis and right knee arthroscopic debridement       No other procedures performed during this admission           Medications Prior to Admission:     No medications prior to admission.             Discharge Medications:     Discharge Medication List as of 8/1/2020 10:16 AM      START taking these medications    Details   ondansetron (ZOFRAN-ODT) 4 MG ODT tab Take 1 tablet (4 mg) by mouth every 6 hours as needed for nausea or vomiting, Disp-15 tablet,R-0, E-Prescribe      oxyCODONE (ROXICODONE) 5 MG tablet Take 1-2 tablets (5-10 mg) by mouth every 3 hours as needed for moderate to severe pain, Disp-30 tablet,R-0, E-Prescribe      senna-docusate (SENOKOT-S/PERICOLACE) 8.6-50 MG tablet Take 1-2 tablets by mouth 2 times daily as needed for constipation, Disp-30 tablet,R-0, E-Prescribe      tiZANidine (ZANAFLEX) 2 MG tablet Take 1 tablet (2 mg) by mouth 3 times daily as needed for muscle spasms, Disp-30 tablet,R-0, E-Prescribe         CONTINUE these medications which have CHANGED    Details   acetaminophen (TYLENOL) 325 MG tablet Take 3 tablets (975 mg) by mouth every 6 hours as needed for pain, Disp-1 Bottle,R-0, E-Prescribe      aspirin (ASA) 81 MG EC tablet Take 2 tablets (162 mg) by mouth daily for 28 days, Disp-56 tablet,R-0, E-Prescribe      ibuprofen (ADVIL/MOTRIN) 600 MG tablet Take 1 tablet (600 mg) by mouth  every 6 hours as needed for other (For mild pain or temperature greater than 102F), Disp-30 tablet,R-0, E-Prescribe         CONTINUE these medications which have NOT CHANGED    Details   cetirizine (ZYRTEC) 10 MG tablet Take 1 tablet (10 mg) by mouth daily, Disp-30 tablet,R-1, E-Prescribe      cyclobenzaprine (FLEXERIL) 5 MG tablet Take 1 tablet (5 mg) by mouth 3 times daily, Disp-30 tablet, R-0, E-Prescribe                   Consultations:   Consultation during this admission received from PT          Brief History of Illness:   29 year old female admitted on 7/29/2020 with right knee posterolateral corner injury, now s/p right knee posterolateral corner reconstruction with allograft, peroneal nerve neurolysis, and arthroscopic debridement on 7/29/20 with Dr. Lebron.           Hospital Course:   The patient's hospital course was unremarkable.  She recovered as anticipated and experienced no post-operative complications.           Discharge Instructions and Follow-Up:   Discharge diet: Return to pre-surgery diet    Discharge activity: TTWB x3 weeks, keep knee fully extended until first postop visit on 8/6/20. Hinged knee brace locked in full extension, may remove when at rest in bed to apply ice.   Discharge follow-up: Follow up with Dr. Lebron on 8/6/2020   Wound care: Dressing changes: every 3-4 days  Keep wound clean and dry  Notify Dr. Casillas's office if any drainage from surgical wounds after 3-4 days following surgery            Discharge Disposition:   Discharged to home      Attestation:  I have reviewed today's vital signs, notes, medications, labs and imaging.  Amount of time performed on this discharge summary: 15 minutes.    Esau Nye PA-C

## 2020-08-02 DIAGNOSIS — Z01.818 PREOP GENERAL PHYSICAL EXAM: ICD-10-CM

## 2020-08-02 DIAGNOSIS — E66.01 MORBID OBESITY (H): Primary | Chronic | ICD-10-CM

## 2020-08-03 NOTE — PROGRESS NOTES
Patient will follow up with Dr. Lebron at [facility]. Dr. Lebron's staff at [facility] will reach out to patient to confirm follow up.

## 2020-08-03 NOTE — PROGRESS NOTES
"[facility]: Post-Discharge Note  SITUATION                                                      Admission:    Admission Date: 07/29/20   Reason for Admission: Right knee posterolateral corner injury  Discharge:   Discharge Date: 08/01/20  Discharge Diagnosis: Right knee posterolateral corner injury  Discharge Service: Orthopedics    BACKGROUND                                                           Brief History of Illness:   29 year old female admitted on 7/29/2020 with right knee posterolateral corner injury, now s/p right knee posterolateral corner reconstruction with allograft, peroneal nerve neurolysis, and arthroscopic debridement on 7/29/20 with Dr. Lebron.           Hospital Course:   The patient's hospital course was unremarkable.  She recovered as anticipated and experienced no post-operative complications.     ASSESSMENT      Discharge Assessment  Patient reports symptoms are: Unchanged(Patient denied new or worsening symptoms. She does voice having break through pain and some balance issues but is \"figuring it out\")  Does the patient have all of their medications?: Yes  Does patient know what their new medications are for?: Yes  Does patient have a follow-up appointment scheduled?: No  Does patient have any other questions or concerns?: No    Post-op  Did the patient have surgery or a procedure: Yes  Incision: healing;closed  Drainage: No  Bleeding: none  Fever: No  Chills: No  Redness: No  Warmth: No  Swelling: No  Incision site pain: No  Eating & Drinking: eating and drinking without complaints/concerns  PO Intake: regular diet  Bowel Function: normal  Urinary Status: voiding without complaint/concerns    PLAN                                                      Outpatient Plan:      Follow up with Dr. Lebron on 8/6/2020    Future Appointments   Date Time Provider Department Center   8/4/2020  7:00 AM Andreea Vásquez, PhD [facility]   8/18/2020  7:00 AM Andreea Vásquez " Flori, PhD [facility]   8/25/2020  7:00 AM Andreea Vásquez, PhD [facility]           Akiko Rivas, CMA

## 2020-08-04 ENCOUNTER — RECORDS - HEALTHEAST (OUTPATIENT)
Dept: ADMINISTRATIVE | Facility: OTHER | Age: 30
End: 2020-08-04

## 2020-08-04 NOTE — PROGRESS NOTES
08/04/20   HOME CARE REFERRAL  [facility]  Phone: [phone]  Fax: [phone]    Referral, demographics, medication list and last office note faxed to [facility] at [phone] who will contact patient to schedule.    Rose Lambert

## 2020-08-04 NOTE — OP NOTE
PREOPERATIVE DIAGNOSIS:   1. Right Posterior Lateral Corner Injury    POSTOPERATIVE DIAGNOSIS:  1. Right Posterior Lateral Corner Injury    PROCEDURE:  1. Examination under anesthesia Right Knee  2. Right Knee arthroscopy  3. Open Right Posterior lateral corner reconstruction with allograft  4. Right peroneal nerve neruolysis    DATE OF SURGERY: 8/4/2020    COSURGEON: Ric Power MD.  I was asked to participate as a co-surgeon in this case given the complex nature of the surgical reconstruction.  This patient had a devastating multi-ligament knee injury requiring reconstruction of the posterior lateral corner with a 2 tailed approach.  The patient's BMI was also over 48 which made the exposure and approach consider the more difficult.  In light of this I was asked to participate as a co-surgeon in this procedure.    COSURGEON: Rodger Lebron MD    ASSISTANT: Car Burns. LYN.  The assistance of Ms. burns was necessary for graft preparation, patient positioning    OPERATIVE INDICATIONS: Dariusz Leyva is a pleasant 29 year old female who I saw through my orthopedic clinic with a history, physical, imaging consistent with a multi-ligament knee injury about her right knee.  She is a patient of my partners, Dr. Rodger Lebron I was asked to participate in her surgical reconstruction given my experience with posterior lateral corner reconstructions and the potential need to perform a tibial based reconstruction.  In light of this I was asked participate as a co-surgeon to the patient's elevated BMI which severely made the approach and placement of the tunnels the graft more difficult..  I reviewed with the patient the risks, benefits, complications, techniques and alternatives to surgery.  We reviewed the expected course of recovery and the potential expected outcomes.  The patient understood both the risks and benefits and desired to proceed despite the risks.    OPERATIVE DETAILS: In the preoperative area the  patient's informed consent was reviewed and they desired to proceed.  The right leg was marked and the patient was in agreement.  The patient was taken to the operating room where a timeout was performed and all parties were in agreement.  Preoperative antibiotics were given within 1 hour of the time of incision.  The patient was placed in the supine position and surrendered to LMA anesthesia.  No tourniquet was applied.  Egg crate was placed beneath the well leg and a side post was utilized.  The operative leg was prepped and draped in the usual sterile fashion.   Examination Under Anesthesia:    Significant opening to varus stress testing was present at both 0 and 30 degrees.  This was grade 2+.  Positive external rotation drawer.  Positive external rotation recurvatum testing.  No pivot shift with the patient asleep in the exam room.  Posterior drawer was intact.  No opening to valgus stress testing.  Given this information we planned to perform a posterior lateral corner reconstruction evaluation of her ACL.  Though the exam under anesthesia suggested that the ACL to be intactTo confirm this arthroscopically.    Anterior medial anterolateral arthroscopic portals were created and diagnostic arthroscopy was performed to find findings: There is no increase opening to valgus stress testing significant opening to varus stress testing was noted with a clear lateral compartment drive-through sign.  There was grade 2 chondrosis possibly grade 3 of the lateral tibial plateau.  Lateral meniscus was intact.  Overall the lateral femoral condyle appeared better preserved.  Medial compartment showed better maintained cartilage surfaces.  Patellofemoral compartment was okay.  ACL showed good tension to probing and good attachment to the lateral wall    At this time given the patient's intact ACL we elected to proceed with her posterior lateral corner reconstruction.  To do this the arthroscope was withdrawn.  And a 15 cm apex  anterior curvilinear incision was made over the lateral thigh carried out to the skin subcutaneous tissues were we quickly identified the IT band.  We then created large posterior based flaps and exposed the biceps femoris.  Biceps femoris was then retracted anteriorly and we identified the peroneal nerve.  We performed extensive peroneal nerve neural lysis.  Given the patient's habitus this was more difficult than usual and this entire process took approximately 2-3 times the normal amount.  With the peroneal nerve out of the way we expose the posterior lateral aspect of the fibular head.  We created a rent in the biceps bursa and placed a traction suture into the avulsed fibular collateral ligament.  Tension was applied to this and we are able to identify position along the fibular head.  A 2.4 mm drill tip pin was then placed in an anterior to posterior medial direction we reamed with a 6 mm reamer.  A passing suture was placed.  We then turned our attention to the femoral side.  We again tensioned the fibular collateral ligament with R2 tensioning suture and we opened the IT band longitudinally.  This allowed us to identify the lateral epicondyle.  We placed a 2.4 mm drill tip pin and we placed a second pin in a parallel fashion in the origin of the popliteus tendon.  Once we are satisfied with this position we reamed both of the sockets with a 6 mm reamer.    At the same time on the back table 2, semi-tendinosis allograft replaced with running locking fiber loop on each tail.. The were tensioned for 20 pounds for 20 minutes to remove any creep.  At this time her graft was brought up to the lateral side and both her fibular collateral ligament and her popliteus tendon graft were docked in the sockets and the femoral side with a fixed in place with a 7 x 20 mm bio composite screws.  Excellent purchase.  We then routed our fibular collateral ligament graft deep to the IT band and placed in a lateral to medial  direction across the fibular head.  With the knee in neutral rotation, approximately 15 degrees of flexion and maximum valgus the graft was tensioned and a 6 x 20 mm bio composite screw was placed.  Excellent purchase.  At this time we used an Adson point hemostat and our popliteus tendon graft was routed deep to the fibular collateral ligament graft this was then combined with the previously placed graft which was repaired popliteal fibular arm and these were seen to fit through a size 7 sizing guide.  At this time C arm intraoperative imaging was used and a 2.4 mm drill tip was placed in an anterior to posterior direction across the tibia.  We confirmed its position on C-arm.  We then reamed with a 7 mm reamer.  A Beath pin was used to pass the suture and autograft allowed in a posterior to anterior direction across the tibia.  Care was taken to not penetrate the popliteal space during reaming and drilling.  At this time final tensioning of her posterior lateral corner graft was performed in over a guidepin a 7 x 20 mm bio composite screw was placed with excellent purchase.  This was done in neutral rotation maximum valgus and approximately 15 to 20 degrees of flexion.  The grafts were cut short after tensioning them independently our final examination showed full range of motion no opening to varus stress testing at 0 30 degrees    A layered closure was then completed with closure of the IT band as well as the biceps bursa.  We protected the peroneal nerve during the closure process.  The skin and subcutaneous tissues were closed the patient transferred recovery room in stable conditions with stable vital signs.    ESTIMATED BLOOD LOSS: 50 mL.    TOURNIQUET TIME: No tourniquet was placed.    COMPLICATIONS: None apparent.    DRAINS: None.    SPECIMENS: None.     POSTOPERATIVE PLAN:  1. The patient will be admitted to  observation status toe-touch weightbearing for 3 weeks then partial weightbearing for 3 weeks.   Stay in extension the first week.  Aspirin for DVT prophylaxis.  Follow-up in approximately 1 to 2 weeks.  Physical therapy to start that time.

## 2020-08-05 ENCOUNTER — TELEPHONE (OUTPATIENT)
Dept: FAMILY MEDICINE | Facility: CLINIC | Age: 30
End: 2020-08-05

## 2020-08-05 ENCOUNTER — HOME CARE/HOSPICE - HEALTHEAST (OUTPATIENT)
Dept: HOME HEALTH SERVICES | Facility: HOME HEALTH | Age: 30
End: 2020-08-05

## 2020-08-05 NOTE — TELEPHONE ENCOUNTER
[facility] phone call message - order or referral request for patient:     Order or referral being requested: skilled nursing 2x a week for 2 weeks, PT and OT to evaul and home health aid 1x a week for 1 week then 2x a week for 1 week      Additional Comments: call back.    OK to leave a message on voice mail? Yes    Primary language: English      needed? No    Call taken on August 5, 2020 at 10:46 AM by Niall Loza

## 2020-08-06 ENCOUNTER — HOME CARE/HOSPICE - HEALTHEAST (OUTPATIENT)
Dept: HOME HEALTH SERVICES | Facility: HOME HEALTH | Age: 30
End: 2020-08-06

## 2020-08-06 ENCOUNTER — TELEPHONE (OUTPATIENT)
Dept: FAMILY MEDICINE | Facility: CLINIC | Age: 30
End: 2020-08-06

## 2020-08-06 NOTE — TELEPHONE ENCOUNTER
[facility] phone call message - order or referral request for patient:     Order or referral being requested:         Additional Comments:[facility] PT 2x a week for 3 weeks     OK to leave a message on voice mail? Yes    Primary language: English      needed? No    Call taken on August 6, 2020 at 12:31 PM by Niall Loza

## 2020-08-06 NOTE — TELEPHONE ENCOUNTER
Message left for HH PT giving verbal orders to [place] PT 2x a week for 3 weeks     Note routed to Dr.Aldrin Cabrera RN

## 2020-08-07 ENCOUNTER — HOME CARE/HOSPICE - HEALTHEAST (OUTPATIENT)
Dept: HOME HEALTH SERVICES | Facility: HOME HEALTH | Age: 30
End: 2020-08-07

## 2020-08-08 ENCOUNTER — HOME CARE/HOSPICE - HEALTHEAST (OUTPATIENT)
Dept: HOME HEALTH SERVICES | Facility: HOME HEALTH | Age: 30
End: 2020-08-08

## 2020-08-10 ENCOUNTER — HOME CARE/HOSPICE - HEALTHEAST (OUTPATIENT)
Dept: HOME HEALTH SERVICES | Facility: HOME HEALTH | Age: 30
End: 2020-08-10

## 2020-08-11 ENCOUNTER — HOME CARE/HOSPICE - HEALTHEAST (OUTPATIENT)
Dept: HOME HEALTH SERVICES | Facility: HOME HEALTH | Age: 30
End: 2020-08-11

## 2020-08-11 ENCOUNTER — TELEPHONE (OUTPATIENT)
Dept: FAMILY MEDICINE | Facility: CLINIC | Age: 30
End: 2020-08-11

## 2020-08-11 NOTE — TELEPHONE ENCOUNTER
[facility] phone call message - order or referral request for patient:     Order or referral being requested: order      Additional Comments: Add on medical social worker consult.    OK to leave a message on voice mail? Yes    Primary language: English      needed? No    Call taken on August 11, 2020 at 9:22 AM by Niall Loza

## 2020-08-11 NOTE — TELEPHONE ENCOUNTER
Verbal order for medical social worker consult given to HHN    Note routed to Dr.Aldrin Cabrera RN

## 2020-08-13 ENCOUNTER — HOME CARE/HOSPICE - HEALTHEAST (OUTPATIENT)
Dept: HOME HEALTH SERVICES | Facility: HOME HEALTH | Age: 30
End: 2020-08-13

## 2020-08-14 ENCOUNTER — COMMUNICATION - HEALTHEAST (OUTPATIENT)
Dept: HOME HEALTH SERVICES | Facility: HOME HEALTH | Age: 30
End: 2020-08-14

## 2020-08-17 ENCOUNTER — TELEPHONE (OUTPATIENT)
Dept: FAMILY MEDICINE | Facility: CLINIC | Age: 30
End: 2020-08-17

## 2020-08-17 ENCOUNTER — HOME CARE/HOSPICE - HEALTHEAST (OUTPATIENT)
Dept: HOME HEALTH SERVICES | Facility: HOME HEALTH | Age: 30
End: 2020-08-17

## 2020-08-17 NOTE — TELEPHONE ENCOUNTER
Verbal orders for OT 1 visit for 1 week fr adaptive equipment training with ADL's given to OT therapist    Note routed to Dr.Aldrin Deborah SEYMOUR

## 2020-08-17 NOTE — TELEPHONE ENCOUNTER
[facility] phone call message - order or referral request for patient:     Order or referral being requested: orders      Additional Comments: OT 1 visit for 1 week fr adaptive equipment training with ADL's    OK to leave a message on voice mail? Yes    Primary language: English      needed? No    Call taken on August 17, 2020 at 3:49 PM by Niall Loza

## 2020-08-18 ENCOUNTER — VIRTUAL VISIT (OUTPATIENT)
Dept: PSYCHIATRY | Facility: CLINIC | Age: 30
End: 2020-08-18
Attending: PSYCHOLOGIST
Payer: COMMERCIAL

## 2020-08-18 DIAGNOSIS — F32.A DEPRESSION: Primary | ICD-10-CM
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
## 2020-08-19 ENCOUNTER — MEDICAL CORRESPONDENCE (OUTPATIENT)
Dept: HEALTH INFORMATION MANAGEMENT | Facility: CLINIC | Age: 30
End: 2020-08-19

## 2020-08-19 ENCOUNTER — HOME CARE/HOSPICE - HEALTHEAST (OUTPATIENT)
Dept: HOME HEALTH SERVICES | Facility: HOME HEALTH | Age: 30
End: 2020-08-19

## 2020-08-20 ENCOUNTER — HOME CARE/HOSPICE - HEALTHEAST (OUTPATIENT)
Dept: HOME HEALTH SERVICES | Facility: HOME HEALTH | Age: 30
End: 2020-08-20

## 2020-08-22 ENCOUNTER — TRANSFERRED RECORDS (OUTPATIENT)
Dept: HEALTH INFORMATION MANAGEMENT | Facility: CLINIC | Age: 30
End: 2020-08-22

## 2020-08-24 ENCOUNTER — HOME CARE/HOSPICE - HEALTHEAST (OUTPATIENT)
Dept: HOME HEALTH SERVICES | Facility: HOME HEALTH | Age: 30
End: 2020-08-24

## 2020-08-26 ENCOUNTER — HOME CARE/HOSPICE - HEALTHEAST (OUTPATIENT)
Dept: HOME HEALTH SERVICES | Facility: HOME HEALTH | Age: 30
End: 2020-08-26

## 2020-09-14 ENCOUNTER — MEDICAL CORRESPONDENCE (OUTPATIENT)
Dept: HEALTH INFORMATION MANAGEMENT | Facility: CLINIC | Age: 30
End: 2020-09-14

## 2020-09-21 ENCOUNTER — OFFICE VISIT (OUTPATIENT)
Dept: FAMILY MEDICINE | Facility: CLINIC | Age: 30
End: 2020-09-21
Payer: COMMERCIAL

## 2020-09-21 VITALS
SYSTOLIC BLOOD PRESSURE: 133 MMHG | DIASTOLIC BLOOD PRESSURE: 88 MMHG | WEIGHT: 293 LBS | BODY MASS INDEX: 49.37 KG/M2 | HEART RATE: 86 BPM | OXYGEN SATURATION: 100 % | TEMPERATURE: 98.3 F | RESPIRATION RATE: 16 BRPM

## 2020-09-21 DIAGNOSIS — J30.89 SEASONAL ALLERGIC RHINITIS DUE TO OTHER ALLERGIC TRIGGER: ICD-10-CM

## 2020-09-21 DIAGNOSIS — Z23 NEED FOR PROPHYLACTIC VACCINATION AND INOCULATION AGAINST INFLUENZA: ICD-10-CM

## 2020-09-21 DIAGNOSIS — L30.4 INTERTRIGO: ICD-10-CM

## 2020-09-21 DIAGNOSIS — R32 URINARY INCONTINENCE, UNSPECIFIED TYPE: ICD-10-CM

## 2020-09-21 DIAGNOSIS — Z11.3 SCREEN FOR STD (SEXUALLY TRANSMITTED DISEASE): Primary | ICD-10-CM

## 2020-09-21 LAB
BACTERIA: NORMAL
CLUE CELLS: NORMAL
MOTILE TRICHOMONAS: NEGATIVE
ODOR: NORMAL
PH WET PREP: NORMAL (ref 3.8–4.5)
WBC WET PREP: <2 (ref 2–5)
YEAST: NORMAL

## 2020-09-21 RX ORDER — KETOCONAZOLE 20 MG/ML
SHAMPOO TOPICAL DAILY PRN
Qty: 120 ML | Refills: 3 | Status: SHIPPED | OUTPATIENT
Start: 2020-09-21 | End: 2021-10-08

## 2020-09-21 RX ORDER — FLUTICASONE PROPIONATE 50 MCG
1 SPRAY, SUSPENSION (ML) NASAL DAILY
Qty: 18.2 ML | Refills: 3 | Status: SHIPPED | OUTPATIENT
Start: 2020-09-21 | End: 2021-04-01

## 2020-09-21 RX ORDER — DICLOFENAC SODIUM 75 MG/1
TABLET, DELAYED RELEASE ORAL 2 TIMES DAILY
COMMUNITY
End: 2022-02-03

## 2020-09-21 RX ORDER — CETIRIZINE HYDROCHLORIDE 10 MG/1
10 TABLET ORAL DAILY
Qty: 30 TABLET | Refills: 4 | Status: SHIPPED | OUTPATIENT
Start: 2020-09-21 | End: 2021-10-04

## 2020-09-21 ASSESSMENT — ANXIETY QUESTIONNAIRES
2. NOT BEING ABLE TO STOP OR CONTROL WORRYING: SEVERAL DAYS
6. BECOMING EASILY ANNOYED OR IRRITABLE: MORE THAN HALF THE DAYS
IF YOU CHECKED OFF ANY PROBLEMS ON THIS QUESTIONNAIRE, HOW DIFFICULT HAVE THESE PROBLEMS MADE IT FOR YOU TO DO YOUR WORK, TAKE CARE OF THINGS AT HOME, OR GET ALONG WITH OTHER PEOPLE: SOMEWHAT DIFFICULT
5. BEING SO RESTLESS THAT IT IS HARD TO SIT STILL: NOT AT ALL
7. FEELING AFRAID AS IF SOMETHING AWFUL MIGHT HAPPEN: NOT AT ALL
3. WORRYING TOO MUCH ABOUT DIFFERENT THINGS: SEVERAL DAYS
GAD7 TOTAL SCORE: 6
1. FEELING NERVOUS, ANXIOUS, OR ON EDGE: SEVERAL DAYS

## 2020-09-21 ASSESSMENT — PATIENT HEALTH QUESTIONNAIRE - PHQ9
5. POOR APPETITE OR OVEREATING: SEVERAL DAYS
SUM OF ALL RESPONSES TO PHQ QUESTIONS 1-9: 8

## 2020-09-21 NOTE — PROGRESS NOTES
"       SUBJECTIVE       Dariusz Leyva is a 30 year old  female with a PMH significant for:     Patient Active Problem List   Diagnosis     Lumbago     Nonallopathic lesion of lumbar region     Nonallopathic lesion of sacral region     Pain in thoracic spine     Nonallopathic lesion of thoracic region     Abnormal Pap smear of cervix     Large tonsils     Morbid obesity (H)     MDD (major depressive disorder)     CARLY (generalized anxiety disorder)     Depression     Lipid screening     Knee pain       She presents with concern for STI.    The patient states that she had a new sexual partner 11 days ago, she states that he called her about a week after that encounter and said that \"his penis felt weird and she needs get checked for infections.\"  She reports that she did not get a more detailed explanation and has not heard anything from the health department    Patient denies any symptoms of any kind at this time.  No abdominal pain, pelvic pain, vaginal discharge or bleeding, lesions in the genital area, fever or chills.  Patient does have remote history of STI previously.    Additionally, patient reports that she has been developing an itchy rash under the area of her breasts.  She states this happens to her after she gets sweaty and is not able to shower anyway.  This happens to her during house chores frequently.  She had an antifungal shampoo that she washed the area with a few weeks ago, that resolved her rash entirely, but she ran out of shampoo.    Patient also endorses seasonal allergy symptoms.  She states that she gets them frequently, she has used over-the-counter medicines for this in the past, with fair results, however she does not have anything in stock in her house right now.  Symptoms include itchy eyes, runny nose.CONSTITUTIONAL: NEGATIVE for fever, chills, change in weight  INTEGUMENTARY/SKIN: NEGATIVE for worrisome rashes, moles or lesions  EYES: NEGATIVE for vision changes or " irritation  ENT/MOUTH: NEGATIVE for ear, mouth and throat problems  RESP: NEGATIVE for significant cough or SOB  BREAST: NEGATIVE for masses, tenderness or discharge  CV: NEGATIVE for chest pain, palpitations or peripheral edema  GI: NEGATIVE for nausea, abdominal pain, heartburn, or change in bowel habits  : NEGATIVE for frequency, dysuria, or hematuria  MUSCULOSKELETAL: NEGATIVE for significant arthralgias or myalgia  NEURO: NEGATIVE for weakness, dizziness or paresthesias  ENDOCRINE: NEGATIVE for temperature intolerance, skin/hair changes  HEME/ALLERGY: NEGATIVE for bleeding problems  PSYCHIATRIC: NEGATIVE for changes in mood or affect    PMH, Medications and Allergies were reviewed and updated as needed.        REVIEW OF SYSTEMS     CONSTITUTIONAL: NEGATIVE for fever, chills, change in weight  INTEGUMENTARY/SKIN: NEGATIVE for worrisome rashes, moles or lesions  EYES: Itchy eyes  ENT/MOUTH: Runny nose  RESP: NEGATIVE for significant cough or SOB  CV: NEGATIVE for chest pain, palpitations or peripheral edema  GI: NEGATIVE for nausea, abdominal pain, heartburn, or change in bowel habits  : NEGATIVE for frequency, dysuria, or hematuria  PSYCHIATRIC: NEGATIVE for changes in mood or affect        OBJECTIVE     Vitals:    09/21/20 1452   BP: 133/88   BP Location: Right arm   Patient Position: Sitting   Cuff Size: Adult Large   Pulse: 86   Resp: 16   Temp: 98.3  F (36.8  C)   TempSrc: Oral   SpO2: 100%   Weight: 143 kg (315 lb 3.2 oz)     Body mass index is 49.37 kg/m .    Constitutional: Awake, alert, cooperative, no apparent distress, and appears stated age.  Eyes: Lids and lashes normal, pupils equal, round and reactive to light, extra ocular muscles intact, sclera clear, conjunctiva normal.  ENT: Normocephalic, without obvious abnormality, atramatic, sinuses nontender on palpation  Hematologic / Lymphatic: No cervical lymphadenopathy and no supraclavicular lymphadenopathy.  Back: no costal vertebral  tenderness.  Lungs: No increased work of breathing, good air exchange, clear to auscultation bilaterally, no crackles or wheezing.  Cardiovascular: Regular rate and rhythm, normal S1 and S2  Abdomen: soft, non-distended, non-tender  Genitourinary: Declined exam  Neuropsychiatric: Normal affect, mood, orientation, memory and insight.  Skin: No rashes, erythema, pallor, petechia or purpura.    Results for orders placed or performed in visit on 09/21/20 (from the past 24 hour(s))   Wet Prep ([facility])   Result Value Ref Range    Yeast Wet Prep None none    Motile Trichomonas Wet Prep Negative Negative    Clue Cells Wet Prep None NONE    WBC WET PREP <2 2 - 5    Bacteria Wet Prep Few None    pH Wet Prep Not performed 3.8 - 4.5    Odor Wet Prep None NONE       ASSESSMENT AND PLAN     1. Screen for STD (sexually transmitted disease)  Patient with exposure to unknown sexually transmitted infection.  We will run full STI panel.  I requested the patient please inform us if she figures out specifically what infection she was exposed to, as some conditions indicate primary treatment of sexual partners.  Patient is currently asymptomatic at this time.  - Wet Prep ([facility])  - Chlamydia/Gono Amplified ([facility])  - Syphilis Screen [place] ([facility])  - HIV Ag/Ab Screen [place] ([facility])    2. Seasonal allergic rhinitis due to other allergic trigger  Patient was refilled occasions for seasonal allergic rhinitis.  She not used nasal sprays in the past, and is interested in trying them.  - fluticasone (FLONASE) 50 MCG/ACT nasal spray; Spray 1 spray into both nostrils daily  Dispense: 18.2 mL; Refill: 3  - cetirizine (ZYRTEC) 10 MG tablet; Take 1 tablet (10 mg) by mouth daily  Dispense: 30 tablet; Refill: 4    3. Intertrigo  Patient with itchy rash beneath the breasts that occurs after there is been sweat accumulation.  Responded well to a antifungal shampoo in the past.  Denies aminuria today, patient states that there is what  seems to be the beginnings of a new rash forming.  We will try ketoconazole improve.  If not get results in this, requested patient return to see a female provider so she can have examination of the area performed.  - ketoconazole (NIZORAL) 2 % external shampoo; Apply topically daily as needed for itching or irritation  Dispense: 120 mL; Refill: 3      I ended our visit today by discussing the patient's diagnoses and recommended treatment. Please refer to today's diagnoses and orders for further details. I briefly discussed the pathophysiology of these conditions and outlined their expected course. I discussed the warning symptoms and signs that indicate an atypical course that would need urgent or emergent care. I also discussed self care strategies for symptom relief. Patient voiced understanding of plan of care and was in full agreement to proceed as discussed.    RTC prn for new or worsening symptoms.    Patient staffed with Dr. Prasanth Hurt MD

## 2020-09-21 NOTE — PROGRESS NOTES
Preceptor attestation:  Vital signs reviewed: /88 (BP Location: Right arm, Patient Position: Sitting, Cuff Size: Adult Large)   Pulse 86   Temp 98.3  F (36.8  C) (Oral)   Resp 16   Wt 143 kg (315 lb 3.2 oz)   LMP 08/20/2020 (Exact Date)   SpO2 100%   BMI 49.37 kg/m      Patient seen, evaluated, and discussed with the resident.  I have verified the content of the note, which accurately reflects my assessment of the patient and the plan of care.    Supervising physician: Marlen Rader MD  [facility]

## 2020-09-21 NOTE — NURSING NOTE
Injectable influenza vaccine documentation    1. Has the patient received the information for the influenza vaccine? YES    2. Does the patient have a severe allergy to eggs (Patients with a severe egg allergy should be assessed by a medical provider, RN, or clinical pharmacist. If they receive the influenza vaccine, please have them observed for 15 minutes.)? No    3. Has the patient had an allergic reaction to previous influenza vaccines? No    4. Has the patient had any severe allergic reactions to past influenza vaccines ? No       5. Does patient have a history of Guillain-[place] syndrome? No              Based on responses above, I administered the influenza vaccine.  November Paw, CMA

## 2020-09-22 ENCOUNTER — VIRTUAL VISIT (OUTPATIENT)
Dept: PSYCHIATRY | Facility: CLINIC | Age: 30
End: 2020-09-22
Attending: PSYCHOLOGIST
Payer: COMMERCIAL

## 2020-09-22 DIAGNOSIS — F32.2 CURRENT SEVERE EPISODE OF MAJOR DEPRESSIVE DISORDER WITHOUT PSYCHOTIC FEATURES, UNSPECIFIED WHETHER RECURRENT (H): Primary | ICD-10-CM

## 2020-09-22 DIAGNOSIS — F41.1 GAD (GENERALIZED ANXIETY DISORDER): ICD-10-CM

## 2020-09-22 ASSESSMENT — ANXIETY QUESTIONNAIRES: GAD7 TOTAL SCORE: 6
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
## 2020-09-23 LAB
C TRACH RRNA SPEC QL NAA+PROBE: NEGATIVE
N GONORRHOEA RRNA SPEC QL NAA+PROBE: NEGATIVE

## 2020-09-29 ENCOUNTER — VIRTUAL VISIT (OUTPATIENT)
Dept: PSYCHIATRY | Facility: CLINIC | Age: 30
End: 2020-09-29
Attending: PSYCHOLOGIST
Payer: COMMERCIAL

## 2020-09-29 DIAGNOSIS — F41.1 GAD (GENERALIZED ANXIETY DISORDER): ICD-10-CM

## 2020-09-29 DIAGNOSIS — F32.A DEPRESSION: Primary | ICD-10-CM
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
## 2020-09-29 NOTE — TELEPHONE ENCOUNTER
MEDICAL RECORDS REQUEST   [facility]  [facility] Clinic  [address] [place] [address]  PHONE: [phone]  Fax: [phone]        FUTURE VISIT INFORMATION                                                   Francescochani Sullivanverónica : 1990 scheduled for future visit at [facility]    APPOINTMENT INFORMATION:    Date: 10/7/20 8:45AM    Provider:  Saumya Renee MD    Reason for Visit/Diagnosis: Urinary incontinence     REFERRAL INFORMATION:    Referring provider:  HARITHA MANTILLA    Specialty: MD    Referring providers clinic:      Clinic contact number:  N/A    RECORDS REQUESTED FOR VISIT                                                     NOTES  STATUS/DETAILS   OFFICE NOTE from referring provider  yes   OFFICE NOTE from other specialist  no   DISCHARGE SUMMARY from hospital  no   DISCHARGE REPORT from the ER  no   OPERATIVE REPORT  no   MEDICATION LIST  no   LABS     URINALYSIS (UA)  yes     PRE-VISIT CHECKLIST      Record collection complete Yes   Appointment appropriately scheduled           (right time/right provider) Yes   MyChart activation Yes   Questionnaire complete If no, please explain: In process      Completed by: Marlen Ibanez

## 2020-10-02 ENCOUNTER — MYC MEDICAL ADVICE (OUTPATIENT)
Dept: FAMILY MEDICINE | Facility: CLINIC | Age: 30
End: 2020-10-02

## 2020-10-02 ENCOUNTER — PRE VISIT (OUTPATIENT)
Dept: UROLOGY | Facility: CLINIC | Age: 30
End: 2020-10-02

## 2020-10-02 NOTE — TELEPHONE ENCOUNTER
Reason for Visit: Consult    Diagnosis: urinary incontinence    Orders/Procedures/Records: in system    Contact Patient: n/a    Rooming Requirements: nikki Pastor LPN  10/02/20  9:16 AM

## 2020-10-05 LAB
HIV 1+2 AB+HIV1 P24 AG SERPL QL IA: NEGATIVE
TREPONEMA ANTIBODY (SYPHILIS): NEGATIVE

## 2020-10-06 ENCOUNTER — VIRTUAL VISIT (OUTPATIENT)
Dept: PSYCHIATRY | Facility: CLINIC | Age: 30
End: 2020-10-06
Attending: PSYCHOLOGIST
Payer: COMMERCIAL

## 2020-10-06 DIAGNOSIS — F33.1 MODERATE EPISODE OF RECURRENT MAJOR DEPRESSIVE DISORDER (H): Primary | ICD-10-CM

## 2020-10-06 PROCEDURE — 90837 PSYTX W PT 60 MINUTES: CPT | Mod: 95 | Performed by: PSYCHOLOGIST
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
## 2020-10-07 ENCOUNTER — VIRTUAL VISIT (OUTPATIENT)
Dept: UROLOGY | Facility: CLINIC | Age: 30
End: 2020-10-07
Payer: COMMERCIAL

## 2020-10-07 ENCOUNTER — PRE VISIT (OUTPATIENT)
Dept: UROLOGY | Facility: CLINIC | Age: 30
End: 2020-10-07

## 2020-10-07 DIAGNOSIS — R32 URINARY INCONTINENCE, UNSPECIFIED TYPE: ICD-10-CM

## 2020-10-07 PROCEDURE — 99203 OFFICE O/P NEW LOW 30 MIN: CPT | Mod: 95 | Performed by: UROLOGY

## 2020-10-07 RX ORDER — OXYBUTYNIN CHLORIDE 5 MG/1
5 TABLET, EXTENDED RELEASE ORAL DAILY
Qty: 30 TABLET | Refills: 11 | Status: SHIPPED | OUTPATIENT
Start: 2020-10-07 | End: 2021-11-23

## 2020-10-07 ASSESSMENT — PAIN SCALES - GENERAL: PAINLEVEL: NO PAIN (0)
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
## 2020-10-07 NOTE — PATIENT INSTRUCTIONS
-referral to PT  -oxybutynin 5 mg ER daily  -MRI of the lumbar spine  -f/u in a month to reassess and review results of MRI

## 2020-10-07 NOTE — NURSING NOTE
Chief Complaint   Patient presents with     Consult     urgency incontinence       not currently breastfeeding. There is no height or weight on file to calculate BMI.    Patient Active Problem List   Diagnosis     Lumbago     Nonallopathic lesion of lumbar region     Nonallopathic lesion of sacral region     Pain in thoracic spine     Nonallopathic lesion of thoracic region     Abnormal Pap smear of cervix     Large tonsils     Morbid obesity (H)     MDD (major depressive disorder)     CARLY (generalized anxiety disorder)     Depression     Lipid screening     Knee pain       Allergies   Allergen Reactions     Nka [No Known Allergies]      Seasonal Allergies Other (See Comments)     Drainage and sometimes break out in hives        Current Outpatient Medications   Medication Sig Dispense Refill     acetaminophen (TYLENOL) 325 MG tablet Take 3 tablets (975 mg) by mouth every 6 hours as needed for pain 1 Bottle 0     cetirizine (ZYRTEC) 10 MG tablet Take 1 tablet (10 mg) by mouth daily 30 tablet 4     cyclobenzaprine (FLEXERIL) 5 MG tablet Take 1 tablet (5 mg) by mouth 3 times daily (Patient taking differently: Take 5 mg by mouth 3 times daily as needed for muscle spasms ) 30 tablet 0     diclofenac (VOLTAREN) 75 MG EC tablet Take by mouth 2 times daily       fluticasone (FLONASE) 50 MCG/ACT nasal spray Spray 1 spray into both nostrils daily 18.2 mL 3     ibuprofen (ADVIL/MOTRIN) 600 MG tablet Take 1 tablet (600 mg) by mouth every 6 hours as needed for other (For mild pain or temperature greater than 102F) 30 tablet 0     ketoconazole (NIZORAL) 2 % external shampoo Apply topically daily as needed for itching or irritation 120 mL 3     ondansetron (ZOFRAN-ODT) 4 MG ODT tab Take 1 tablet (4 mg) by mouth every 6 hours as needed for nausea or vomiting 15 tablet 0     senna-docusate (SENOKOT-S/PERICOLACE) 8.6-50 MG tablet Take 1-2 tablets by mouth 2 times daily as needed for constipation 30 tablet 0     oxyCODONE (ROXICODONE)  5 MG tablet Take 1-2 tablets (5-10 mg) by mouth every 3 hours as needed for moderate to severe pain (Patient not taking: Reported on 9/21/2020) 30 tablet 0     tiZANidine (ZANAFLEX) 2 MG tablet Take 1 tablet (2 mg) by mouth 3 times daily as needed for muscle spasms (Patient not taking: Reported on 9/21/2020) 30 tablet 0       Social History     Tobacco Use     Smoking status: Never Smoker     Smokeless tobacco: Never Used   Substance Use Topics     Alcohol use: Yes     Alcohol/week: 0.0 standard drinks     Comment: 1 per week     Drug use: Yes     Comment: smokes pot once every few day        Lavern Powell CMA  10/7/2020  8:05 AM
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
## 2020-10-07 NOTE — PROGRESS NOTES
"Dariusz Leyva is a 30 year old female who is being evaluated via a billable video visit.      The patient has been notified of following:     \"This video visit will be conducted via a call between you and your physician/provider. We have found that certain health care needs can be provided without the need for an in-person physical exam.  This service lets us provide the care you need with a video conversation.  If a prescription is necessary we can send it directly to your pharmacy.  If lab work is needed we can place an order for that and you can then stop by our lab to have the test done at a later time.    Video visits are billed at different rates depending on your insurance coverage.  Please reach out to your insurance provider with any questions.    If during the course of the call the physician/provider feels a video visit is not appropriate, you will not be charged for this service.\"    Patient has given verbal consent for Video visit? Yes  How would you like to obtain your AVS? MyChart  If you are dropped from the video visit, the video invite should be resent to: Send to e-mail at: [email or web address]  Will anyone else be joining your video visit? No      Video Visit Technology for this patient: Hernan Video Visit- Patient was left in waiting room      Video-Visit Details    Type of service:  Video Visit    Video Start Time: 8:43 AM  Video End Time: 9:02 AM    Originating Location (pt. Location): Home    Distant Location (provider location):  [facility] UROLOGY [facility]     Platform used for Video Visit: Hernan Renee MD        "

## 2020-10-09 ENCOUNTER — TELEPHONE (OUTPATIENT)
Dept: UROLOGY | Facility: CLINIC | Age: 30
End: 2020-10-09

## 2020-10-19 ENCOUNTER — MYC MEDICAL ADVICE (OUTPATIENT)
Dept: UROLOGY | Facility: CLINIC | Age: 30
End: 2020-10-19

## 2020-10-27 ENCOUNTER — VIRTUAL VISIT (OUTPATIENT)
Dept: PSYCHIATRY | Facility: CLINIC | Age: 30
End: 2020-10-27
Attending: PSYCHOLOGIST
Payer: COMMERCIAL

## 2020-10-27 DIAGNOSIS — F33.1 MODERATE EPISODE OF RECURRENT MAJOR DEPRESSIVE DISORDER (H): Primary | ICD-10-CM

## 2020-10-27 PROCEDURE — 90837 PSYTX W PT 60 MINUTES: CPT | Mod: 95 | Performed by: PSYCHOLOGIST

## 2020-10-27 NOTE — PROGRESS NOTES
"OUTPATIENT PSYCHOTHERAPY PROGRESS NOTE    Client Name: Dariusz Leyva   YOB: 1990  Time of Service: 66 minutes  Service Type(s): Individual psychotherapy    Video- Visit Details  Type of service:  video visit for psychotherapy  Time of service:    Date:  10/27/2020    Video Start Time:  7:02am        Video End Time:  8:08am    Reason for video visit:  Patient unable to travel due to Covid-19  Originating Site (patient location):  [facility]   Location- Patient's home  Distant [facility]provider [facility]  Mode of Communication:  Video Conference via [employer]  Consent:  Patient has given verbal consent for video visit?: Yes     Diagnoses:   Unspecified depressive disorder and unspecified anxiety    Goals of therapy: Elevate mood and show evidence of usual energy levels, activities, and socialization level, Reduce overall frequency, intensity, and duration of the anxiety so that daily functioning is not impaired      Individuals Present:   Psychotherapy services during this visit included myself and Dariusz Leyva.    Narrative:  Dariusz reported she was feeling \"okay.\" She indicated she got back into town and it was not too bad because she had wheel chair assistance on the trip. She went on a trip with her friend. She went to meet up with someone she thought might be someone she could have a long term relationship. Before she left on the trip he indicated it was just to be friends. She indicated she tried to make the best of the trip.     Treatment focused on relationship discussion.     Mental Status:  Appearance:  Neatly groomed   Behavior/relationship to examiner/demeanor:  Cooperative and Engaged  Motor activity/EPS:  Within normal limits  Speech rate:  Within normal limits  Speech volume:  Within normal limits  Speech articulation:  Within normal limits  Speech coherence: Within normal limits  Speech spontaneity: Within normal limits  Mood (subjective report):  " "\"okay\"  Affect (objective appearance):  Subdued, some periods of tearfulness  Thought Process (Associations):  Logical and Linear   Thought process (Rate):  Within normal limits   Thought content:  No evidence of suicidal ideation   Abnormal Perception:  None   Insight:  Good   Judgment:  Good     Plan: Continue with goals as outlined above    Andreea Vásquez Psy.D.,L.P  "

## 2020-11-10 ENCOUNTER — VIRTUAL VISIT (OUTPATIENT)
Dept: PSYCHIATRY | Facility: CLINIC | Age: 30
End: 2020-11-10
Attending: PSYCHOLOGIST
Payer: COMMERCIAL

## 2020-11-10 DIAGNOSIS — F33.1 MODERATE EPISODE OF RECURRENT MAJOR DEPRESSIVE DISORDER (H): Primary | ICD-10-CM

## 2020-11-10 PROCEDURE — 90837 PSYTX W PT 60 MINUTES: CPT | Mod: 95 | Performed by: PSYCHOLOGIST

## 2020-11-10 NOTE — PROGRESS NOTES
OUTPATIENT PSYCHOTHERAPY PROGRESS NOTE    Client Name: Dariusz Leyva   YOB: 1990  Time of Service: 54 minutes  Service Type(s): Individual psychotherapy    Video- Visit Details  Type of service:  video visit for psychotherapy  Time of service:    Date:  11/10/2020    Video Start Time:  9:15am        Video End Time:  10:09am    Reason for video visit:  Patient unable to travel due to Covid-19  Originating Site (patient location):  [facility]   Location- Patient's home  Distant [facility]): [facility]  Mode of Communication:  Video Conference via [employer]  Consent:  Patient has given verbal consent for video visit?: Yes     Diagnoses:   Unspecified depressive disorder and unspecified anxiety    Goals of therapy: Elevate mood and show evidence of usual energy levels, activities, and socialization level, Reduce overall frequency, intensity, and duration of the anxiety so that daily functioning is not impaired      Individuals Present:   Psychotherapy services during this visit included myself and Dariusz Leyva.    Narrative:  Provider contacted Dariusz when she had not connected via [employer]. She explained that [employer] was not working for her. Moved appointment to [place]. She reported she feels  drained.  She was supposed to do things today, but she has been feeling ill. She explained it has been hot in her apartment and she has needed to have her air conditioning on.     She has not been getting a lot of sleep. She has had fatigue on occasion. She has concentration concerns recently and she has noticed her memory has been more disrupted. She thinks it might be stress. She endorsed some anhedonia.      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 It did not go well and she is considering giving a notice for self-care purposes. She is unsure her job is secure at her other job. She is feeling stuck.      Mental Status:  Appearance:  Neatly groomed  "  Behavior/relationship to examiner/demeanor:  Cooperative and Engaged  Motor activity/EPS:  Within normal limits  Speech rate:  Within normal limits  Speech volume:  Within normal limits  Speech articulation:  Within normal limits  Speech coherence: Within normal limits  Speech spontaneity: Within normal limits  Mood (subjective report):  \"drained\"  Affect (objective appearance):  Subdued  Thought Process (Associations):  Logical and Linear   Thought process (Rate):  Within normal limits   Thought content:  No evidence of suicidal ideation   Abnormal Perception:  None   Insight:  Good   Judgment:  Good     Plan: Continue with goals as outlined above    Andreea Vásquez Psy.D.,L.P  "

## 2020-11-24 ENCOUNTER — VIRTUAL VISIT (OUTPATIENT)
Dept: PSYCHIATRY | Facility: CLINIC | Age: 30
End: 2020-11-24
Attending: PSYCHOLOGIST
Payer: COMMERCIAL

## 2020-11-24 DIAGNOSIS — F33.1 MODERATE EPISODE OF RECURRENT MAJOR DEPRESSIVE DISORDER (H): Primary | ICD-10-CM

## 2020-11-24 PROCEDURE — 90837 PSYTX W PT 60 MINUTES: CPT | Mod: 95 | Performed by: PSYCHOLOGIST

## 2020-11-24 NOTE — PROGRESS NOTES
OUTPATIENT PSYCHOTHERAPY PROGRESS NOTE    Client Name: Dariusz Leyva   YOB: 1990  Time of Service: 54 minutes  Service Type(s): Individual psychotherapy    Video- Visit Details  Type of service:  video visit for psychotherapy  Time of service:    Date:  11/24/2020    Video Start Time:  9:10am        Video End Time:  10:06am    Reason for video visit:  Patient unable to travel due to Covid-19  Originating Site (patient location):  [facility]   Location- Patient's home  Distant [facility]  Mode of Communication:  Video Conference via [employer]  Consent:  Patient has given verbal consent for video visit?: Yes     Diagnoses:   Unspecified depressive disorder and unspecified anxiety    Goals of therapy: Elevate mood and show evidence of usual energy levels, activities, and socialization level, Reduce overall frequency, intensity, and duration of the anxiety so that daily functioning is not impaired      Individuals Present:   Psychotherapy services during this visit included myself and Dariusz Leyva.    Narrative:  Dariusz reported she was tired, but doing  ok.  Her mood is  mellow.  She indicated she has been going to sleep pretty late. Her job has her scheduled until midnight. She is working that shift two time a week. She is getting about 4-6 hours of sleep. She is experiencing fatigue. She has not been enjoying much.      Dariusz reported concerns related to eating pathology. Discussed local programs that complete evaluations.      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 She explained that it is better because it is one level, but there are less supports in place. She plans to start to look for something else. She is meeting with someone from her school today to discuss alternative jobs.      Mental Status:  Appearance:  Neatly groomed   Behavior/relationship to examiner/demeanor:   "Cooperative and Engaged  Motor activity/EPS:  Within normal limits  Speech rate:  Within normal limits  Speech volume:  Within normal limits  Speech articulation:  Within normal limits  Speech coherence: Within normal limits  Speech spontaneity: Within normal limits  Mood (subjective report):  \"mellow\"  Affect (objective appearance):  Subdued  Thought Process (Associations):  Logical and Linear   Thought process (Rate):  Within normal limits   Thought content:  No evidence of suicidal ideation   Abnormal Perception:  None   Insight:  Good   Judgment:  Good     Plan: Continue with goals as outlined above    Andreea Vásquez Psy.D.,L.P  "

## 2020-11-28 ENCOUNTER — RECORDS - HEALTHEAST (OUTPATIENT)
Dept: ADMINISTRATIVE | Facility: OTHER | Age: 30
End: 2020-11-28

## 2020-12-01 ENCOUNTER — VIRTUAL VISIT (OUTPATIENT)
Dept: PSYCHIATRY | Facility: CLINIC | Age: 30
End: 2020-12-01
Attending: PSYCHOLOGIST
Payer: COMMERCIAL

## 2020-12-01 DIAGNOSIS — F33.1 MODERATE EPISODE OF RECURRENT MAJOR DEPRESSIVE DISORDER (H): Primary | ICD-10-CM

## 2020-12-01 PROCEDURE — 90837 PSYTX W PT 60 MINUTES: CPT | Mod: 95 | Performed by: PSYCHOLOGIST

## 2020-12-01 NOTE — PROGRESS NOTES
OUTPATIENT PSYCHOTHERAPY PROGRESS NOTE    Client Name: Dariusz Leyva   YOB: 1990  Time of Service: 65 minutes  Service Type(s): Individual psychotherapy    Video- Visit Details  Type of service:  video visit for psychotherapy  Time of service:    Date:  12/01/2020    Video Start Time:  9:01am        Video End Time:  10:06am    Reason for video visit:  Patient unable to travel due to Covid-19  Originating Site (patient location):  [facility]   Location- Patient's home  [facility]  Mode of Communication:  Video Conference via [employer]Well  Consent:  Patient has given verbal consent for video visit?: Yes     Diagnoses:   Unspecified depressive disorder and unspecified anxiety    Goals of therapy: Elevate mood and show evidence of usual energy levels, activities, and socialization level, Reduce overall frequency, intensity, and duration of the anxiety so that daily functioning is not impaired      Individuals Present:   Psychotherapy services during this visit included myself and Dariusz Leyva.    Narrative:  Dariusz reported her mood is  stagnate..numb.  She indicated she feels more depressed. She endorsed sleep disturbance due to shift work and she has a hard time winding down. On average she is getting 4-5 hours of sleep. She has not been able to nap. Discussed the impact of pain in her leg - she explained it fluctuates and she has noticed her back has been hurting more. She reported her appetite has been  crappy.  She explained she went all weekend without eating and then she will get cravings for food late at night. She is trying to address this by getting a  that delivers food to the house. She endorsed fatigue and concentration difficulties. She indicated she is trying to learn coding and she found it is hard to focus. She has done some in the past and it feel harder right now. She reported she thinks other things are distracting her now.  "She explained she feels like she is in a cycle of doing what she has to do to survive instead of something she loves. She wants more freedom in her job. Processed her thoughts and feelings about pursuing another occupation. Discussed what she valued in a job. Time was spent discussing how she feels food and purchasing gives brief experience of happiness but it impacts her in the long term.         Mental Status:  Appearance:  Casually groomed   Behavior/relationship to examiner/demeanor:  Cooperative and Engaged  Motor activity/EPS:  Within normal limits  Speech rate:  Within normal limits  Speech volume:  Within normal limits  Speech articulation:  Within normal limits  Speech coherence: Within normal limits  Speech spontaneity: Within normal limits  Mood (subjective report):  \"stagnate..numb\"  Affect (objective appearance):  Subdued  Thought Process (Associations):  Logical and Linear   Thought process (Rate):  Within normal limits   Thought content:  No evidence of suicidal ideation   Abnormal Perception:  None   Insight:  Good   Judgment:  Good     Plan: Continue with goals as outlined above    Andreea Vásquez Psy.D.,L.P  "

## 2020-12-07 ENCOUNTER — VIRTUAL VISIT (OUTPATIENT)
Dept: FAMILY MEDICINE | Facility: CLINIC | Age: 30
End: 2020-12-07
Payer: COMMERCIAL

## 2020-12-07 ENCOUNTER — ALLIED HEALTH/NURSE VISIT (OUTPATIENT)
Dept: FAMILY MEDICINE | Facility: CLINIC | Age: 30
End: 2020-12-07
Payer: COMMERCIAL

## 2020-12-07 VITALS
TEMPERATURE: 98.6 F | RESPIRATION RATE: 16 BRPM | SYSTOLIC BLOOD PRESSURE: 118 MMHG | DIASTOLIC BLOOD PRESSURE: 79 MMHG | OXYGEN SATURATION: 100 % | HEART RATE: 56 BPM

## 2020-12-07 DIAGNOSIS — B00.1 HERPES LABIALIS: ICD-10-CM

## 2020-12-07 DIAGNOSIS — Z20.2 EXPOSURE TO STD: Primary | ICD-10-CM

## 2020-12-07 DIAGNOSIS — Z20.2 EXPOSURE TO STD: ICD-10-CM

## 2020-12-07 LAB
BACTERIA: NORMAL
CLUE CELLS: NORMAL
HIV 1+2 AB+HIV1 P24 AG SERPL QL IA: NEGATIVE
MOTILE TRICHOMONAS: NEGATIVE
ODOR: NORMAL
PH WET PREP: NORMAL (ref 3.8–4.5)
WBC WET PREP: NORMAL (ref 2–5)
YEAST: NORMAL

## 2020-12-07 PROCEDURE — 99214 OFFICE O/P EST MOD 30 MIN: CPT | Mod: 25 | Performed by: STUDENT IN AN ORGANIZED HEALTH CARE EDUCATION/TRAINING PROGRAM

## 2020-12-07 PROCEDURE — 96372 THER/PROPH/DIAG INJ SC/IM: CPT

## 2020-12-07 PROCEDURE — 99207 PR NO BILLABLE SERVICE THIS VISIT: CPT

## 2020-12-07 PROCEDURE — 87210 SMEAR WET MOUNT SALINE/INK: CPT

## 2020-12-07 PROCEDURE — 36415 COLL VENOUS BLD VENIPUNCTURE: CPT

## 2020-12-07 RX ORDER — VALACYCLOVIR HYDROCHLORIDE 1 G/1
1000 TABLET, FILM COATED ORAL 2 TIMES DAILY
Qty: 20 TABLET | Refills: 0 | Status: SHIPPED | OUTPATIENT
Start: 2020-12-07 | End: 2021-11-23

## 2020-12-07 RX ORDER — CEFTRIAXONE SODIUM 250 MG
250 VIAL (EA) INJECTION ONCE
Status: COMPLETED | OUTPATIENT
Start: 2020-12-07 | End: 2020-12-07

## 2020-12-07 RX ORDER — AZITHROMYCIN 500 MG/1
1000 TABLET, FILM COATED ORAL DAILY
Qty: 2 TABLET | Refills: 0 | Status: SHIPPED | OUTPATIENT
Start: 2020-12-07 | End: 2020-12-08

## 2020-12-07 RX ADMIN — Medication 250 MG: at 11:54

## 2020-12-07 NOTE — PROGRESS NOTES
"Family Medicine Video Visit Note  Dariusz is being evaluated via a billable video visit.             Video Visit Consent     Patient was verbally read the following and verbal consent was obtained.  \"Video visits are billed at different rates depending on your insurance coverage. During this emergency period, for some insurers they may be billed the same as an in-person visit.  Please reach out to your insurance provider with any questions.  If during the course of the call the physician/provider feels a video visit is not appropriate, you will not be charged for this service.\"     (Name person giving consent:  Patient   Date verbal consent given:  12/7/2020  Time verbal consent given:  8:04 AM)    Patient would like the video invitation sent by: Send to e-mail at: [email or web address]        Chief Complaint   Patient presents with     Derm Problem     Stopped birth control a couple months ago              HPI     Video Start Time: 8:30 AM    Dariusz presents to clinic today for the following health issues:    Issues with acne    Current Outpatient Medications   Medication Sig Dispense Refill     acetaminophen (TYLENOL) 325 MG tablet Take 3 tablets (975 mg) by mouth every 6 hours as needed for pain 1 Bottle 0     azithromycin (ZITHROMAX) 500 MG tablet Take 2 tablets (1,000 mg) by mouth daily for 1 day 2 tablet 0     cetirizine (ZYRTEC) 10 MG tablet Take 1 tablet (10 mg) by mouth daily 30 tablet 4     cyclobenzaprine (FLEXERIL) 5 MG tablet Take 1 tablet (5 mg) by mouth 3 times daily (Patient taking differently: Take 5 mg by mouth 3 times daily as needed for muscle spasms ) 30 tablet 0     diclofenac (VOLTAREN) 75 MG EC tablet Take by mouth 2 times daily       fluticasone (FLONASE) 50 MCG/ACT nasal spray Spray 1 spray into both nostrils daily 18.2 mL 3     ibuprofen (ADVIL/MOTRIN) 600 MG tablet Take 1 tablet (600 mg) by mouth every 6 hours as needed for other (For mild pain or temperature greater than 102F) 30 " tablet 0     ondansetron (ZOFRAN-ODT) 4 MG ODT tab Take 1 tablet (4 mg) by mouth every 6 hours as needed for nausea or vomiting 15 tablet 0     oxybutynin ER (DITROPAN-XL) 5 MG 24 hr tablet Take 1 tablet (5 mg) by mouth daily 30 tablet 11     senna-docusate (SENOKOT-S/PERICOLACE) 8.6-50 MG tablet Take 1-2 tablets by mouth 2 times daily as needed for constipation 30 tablet 0     valACYclovir (VALTREX) 1000 mg tablet Take 1 tablet (1,000 mg) by mouth 2 times daily for 10 days 20 tablet 0     ketoconazole (NIZORAL) 2 % external shampoo Apply topically daily as needed for itching or irritation 120 mL 3     oxyCODONE (ROXICODONE) 5 MG tablet Take 1-2 tablets (5-10 mg) by mouth every 3 hours as needed for moderate to severe pain (Patient not taking: Reported on 9/21/2020) 30 tablet 0     tiZANidine (ZANAFLEX) 2 MG tablet Take 1 tablet (2 mg) by mouth 3 times daily as needed for muscle spasms (Patient not taking: Reported on 9/21/2020) 30 tablet 0     Allergies   Allergen Reactions     Nka [No Known Allergies]      Seasonal Allergies Other (See Comments)     Drainage and sometimes break out in hives      She is breaking out on the side of her face, near her lips and under her nose. She says it starts to feel warm and tingly then she has bumps on the side of her lips. She has it right now. She hasn't tried Abreva. 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 She noticed that her acne has worsened since she stopped her birth control.  She has never been diagnosed with herpes labialis.    She is worried about STDs and has had gonorrhea/chlamydia in the past. She has had a new sexual partner and is concerned about having STDs because of this    She was thinking about ParaGard, but already has heavy periods. She thought that Mirena might be good for her as well. She stopped taking birth control pills because they worsened her depression. This has since improved since she stopped using NuvaRing.          Review of  "Systems:     See HPI         Physical Exam:     There were no vitals taken for this visit.  Estimated body mass index is 49.37 kg/m  as calculated from the following:    Height as of 7/29/20: 1.702 m (5' 7\").    Weight as of 9/21/20: 143 kg (315 lb 3.2 oz).    GENERAL: Healthy, alert and no distress  EYES: Eyes grossly normal to inspection.  No discharge or erythema, or obvious scleral/conjunctival abnormalities.  RESP: No audible wheeze, cough, or visible cyanosis.  No visible retractions or increased work of breathing.    SKIN: Rough patches of skin around corners of mouth though details difficult to see due to video quality.  NEURO: Cranial nerves grossly intact.  Mentation and speech appropriate for age.  PSYCH: Mentation appears normal, affect normal/bright, judgement and insight intact, normal speech and appearance well-groomed.       Results from the last 24 hoursNo results found for this or any previous visit (from the past 24 hour(s)).          Assessment and Plan   Dariusz was seen today for derm problem.    Diagnoses and all orders for this visit:    Exposure to STD: reviewed that we are not able to do routine gonorrhea and chlamydia screening at this time due to supply shortage. Engaged in shared decision making over risks/benefits of treatment and she would like to be treated for gonorrhea and chlamydia today. She will come in for the ceftriaxone injection today.   -     Wet Prep ([facility]); Future  -     Syphilis Screen [place] ([facility]); Future  -     HIV Ag/Ab Screen [place] [facility]); Future  -     azithromycin (ZITHROMAX) 500 MG tablet; Take 2 tablets (1,000 mg) by mouth daily for 1 day  -     cefTRIAXone (ROCEPHIN) injection 250 mg    Herpes labialis: since this is a video encounter, I am not able to test these today. Will treat empirically. Did review general skin for acne as well as medications available over the counter now, such as adapalene.   -     valACYclovir (VALTREX) 1000 mg tablet; " Take 1 tablet (1,000 mg) by mouth 2 times daily for 10 days        Refilled medications that would be required in the next 3 months.     After Visit Information:  Patient declined AVS       No follow-ups on file.      Video-Visit Details    Type of service:  Video Visit    Video End Time (time video stopped): 8:52 AM    Originating Location (pt. Location): Home    Distant Location (provider location):  [facility]     Platform used for Video Visit: Whim    24 minute phone call.    Caridad Rashid MD  I precepted today with Dr James

## 2020-12-07 NOTE — NURSING NOTE
Clinic Administered Medication Documentation      Injectable Medication Documentation    Patient was given Ceftriaxone Sodium (Rocephin). Prior to medication administration, verified patients identity using patient s name and date of birth. Please see MAR and medication order for additional information. Patient instructed to stay in clinic after the injection but patient declined.      Was entire vial of medication used? Yes  Vial/Syringe: Single dose vial  Expiration Date:  08/01/2022  Was this medication supplied by the patient? No    Name of provider who requested the medication administration: Dr. Rashid  Name of provider on site (faculty or community preceptor) at the time of performing the medication administration: Dr. James    Date of next administration: n/a  Date of next office visit with provider to renew medication plan (must be seen annually): n/a

## 2020-12-08 ENCOUNTER — VIRTUAL VISIT (OUTPATIENT)
Dept: PSYCHIATRY | Facility: CLINIC | Age: 30
End: 2020-12-08
Attending: PSYCHOLOGIST
Payer: COMMERCIAL

## 2020-12-08 DIAGNOSIS — F41.1 GAD (GENERALIZED ANXIETY DISORDER): ICD-10-CM

## 2020-12-08 DIAGNOSIS — F33.1 MODERATE EPISODE OF RECURRENT MAJOR DEPRESSIVE DISORDER (H): Primary | ICD-10-CM

## 2020-12-08 LAB — TREPONEMA ANTIBODY (SYPHILIS): NEGATIVE

## 2020-12-08 PROCEDURE — 90837 PSYTX W PT 60 MINUTES: CPT | Mod: 95 | Performed by: PSYCHOLOGIST

## 2020-12-08 NOTE — PROGRESS NOTES
OUTPATIENT PSYCHOTHERAPY PROGRESS NOTE    Client Name: Dariusz Leyva   YOB: 1990  Time of Service: 70 minutes  Service Type(s): Individual psychotherapy    Video- Visit Details  Type of service:  video visit for psychotherapy  Time of service:    Date:  12/08/2020    Video Start Time:  9:00am        Video End Time:  10:10am    Reason for video visit:  Patient unable to travel due to Covid-19  Originating Site (patient location):  [facility]   Location- Patient's home  Distant Site (provider location): Remote location  Mode of Communication:  Video Conference via [employer]  Consent:  Patient has given verbal consent for video visit?: Yes     Diagnoses:   Unspecified depressive disorder and unspecified anxiety    Goals of therapy: Elevate mood and show evidence of usual energy levels, activities, and socialization level, Reduce overall frequency, intensity, and duration of the anxiety so that daily functioning is not impaired      Individuals Present:   Psychotherapy services during this visit included myself and Dariusz Leyva.    Narrative:  Dariusz reported her mood is  okay.  She indicated that work has had little minor things  here and there.  Referring to difficulties. She is getting 4-6 hours of sleep. She attributes to this to shift work. She is experiencing fatigue and she has some schedule reversal. She reported she is not really enjoying things because she is mentally stuck on  what is vs what could be.  She denied appetite disturbance (she is working on changing her diet to include healthier food).  She has had passive thoughts  what s the point,  but has not had thoughts of wanting to kill herself.     Discussed her thoughts about her future. She explained,  I get my own hopes up and then crush them.  She indicated she believes the pessimism has been there since she was an adolescent. She indicated the more aware of things she becomes, the less optimism she has had.      Provided  "psychoeducation on attention.      Discussed the impact of mood on relationships. Dariusz reported she does not benefit from a lot of the relationships she has. She described some anxiety in social settings based on past experiences.      Dariusz has noticed she can t remember as much anymore and she wonders if it has to do with her attention. She has had difficulties with her memory since she was young, but she has noticed it has been worse since she broke her leg. She struggles recalling visual memories. For example, she will remember going on vacation, but she might not be able to recall the visual aspects.      She talked about being guided by some adults growing up and helped her set a course. Mostly school staff that recognized her intelligence. Her family did not always acknowledge her accomplishments and they might not attend ceremonies where she was recognized. For example, her family forgot to bring her home from her  graduation. Now that she is on her own, she struggles knowing what to do and expected to be in a different situation.      Mental Status:  Appearance:  Casually groomed   Behavior/relationship to examiner/demeanor:  Cooperative and Engaged  Motor activity/EPS:  Within normal limits  Speech rate:  Within normal limits  Speech volume:  Within normal limits  Speech articulation:  Within normal limits  Speech coherence: Within normal limits  Speech spontaneity: Within normal limits  Mood (subjective report):  \"okay\"  Affect (objective appearance): Mood congruent  Thought Process (Associations):  Logical and Linear   Thought process (Rate):  Within normal limits   Thought content: Reported recent passive thoughts of \"what's the point,\" but denied active suicidal ideation and intent to act on these thoughts   Abnormal Perception:  None   Insight:  Good   Judgment:  Good     Plan: Continue with goals as outlined above    Andreea Vásquez Psy.D.,L.P  "

## 2020-12-09 NOTE — RESULT ENCOUNTER NOTE
By the lab, it doesn't look like you have BV. If you are still having symptoms, I'd recommend metronidazole instead of boric acid since it's a more effective treatment. You can send me a MyChart message if you want me to prescribe metronidazole.    Thanks!  Caridad Rashid MD

## 2020-12-09 NOTE — PROGRESS NOTES
Preceptor Attestation:   Patient seen and evaluated via video visit. I discussed the patient with the resident. I have verified the content of the note, which accurately reflects my assessment of the patient and the plan of care.   Supervising Physician:  Donald James MD.

## 2020-12-11 NOTE — PROGRESS NOTES
SUBJECTIVE       Dariusz Leyva is a 27 year old  female with a PMHx significant for   Patient Active Problem List   Diagnosis     Lumbago     Nonallopathic lesion of lumbar region     Nonallopathic lesion of sacral region     Pain in thoracic spine     Nonallopathic lesion of thoracic region     Abnormal Pap smear of cervix     Large tonsils     Morbid obesity (H)     MDD (major depressive disorder)     CARLY (generalized anxiety disorder)     Depression     Who presents today for birth control refill and fatigue.     #1: BC - Here for a refill of her BC. On Nuvaring. Doesn't remember where it was prescribed - maybe [facility]. Has been on it for over a year. LMP was 5/12/18. Last unprotected sex was 3 months ago. Has been using the ring consistently. No concerns about being pregnant. No STD concerns. Having some urinary urgency without burning. No fevers or chills. No history of hypertension, blood clots, or migraine with aura.     #2: Fatigue - Would like thyroid tested, states she has a family history of this. Tired/fatigued all the time. Has had weight gain. No thinning of the hair or skin. No abnormal menstrual bleeding. No blood in stool or dark/tarry stools.  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 Does not think she snores and has no history of sleep apnea.      #3: Would like a list of therapy resources.     ROS: As stated in HPI.    PMH, Medications and Allergies were reviewed and updated as needed.          OBJECTIVE     Vitals:    06/07/18 0925   BP: 116/79   Pulse: 84   Temp: 98.5  F (36.9  C)   TempSrc: Oral   SpO2: 98%   Weight: 282 lb 3.2 oz (128 kg)     Body mass index is 44.61 kg/(m^2).    Gen:  Sitting in exam chair, pleasant, NAD  HEENT: Normocephalic, atraumatic. EOMI, no scleral icterus. Mucous membranes moist  Neck: Supple.  CV:  RRR  Extrem: Warm and well perfused. Strength appears normal  Neuro: Alert, oriented to person, place, and time  Psych: Mood and affect  Anesthesia Pre Eval Note    Anesthesia ROS/Med Hx    Overall Review:  EKG was reviewed       Cardiovascular Review:    Positive for hypertension    Overall Review of Systems Comments:  l caratid stenosis      Relevant Problems   No relevant active problems       Physical Exam     Airway   Mallampati: II  TM Distance: >3 FB  Neck ROM: Full  Neck: Non-tender and Able to place in sniff position  TMJ Mobility: Good    Cardiovascular  Cardiovascular exam normal  Cardio Rhythm: Regular  Cardio Rate: Normal    Head Assessment  Head assessment: Normocephalic and Atraumatic    General Assessment  General Assessment: Alert and oriented and No acute distress    Dental Exam  Dental exam normal    Pulmonary Exam  Pulmonary exam normal  Breath sounds clear to auscultation:  Yes    Abdominal Exam  Abdominal exam normal      Anesthesia Plan    ASA Status: 3    Anesthesia Type: General    Induction: Intravenous  Preferred Airway Type: ETT  Maintenance: Inhalational      Risks Discussed: Nausea, Vomiting and Dental Injury    Post-op Pain Management: Per Surgeon      Checklist  Reviewed: NPO Status, Allergies, Medications, Problem list and Past Med History    Monitors  Discussed risks of the following Invasive monitors with patient: Arterial Line    Informed Consent  The proposed anesthetic plan, including its risks and benefits, have been discussed with the Patient  - along with the risks and benefits of alternatives.  Questions were encouraged and answered and the patient and/or representative understands and agrees to proceed.     Blood Products: Not Anticipated     appropriate    No results found for this or any previous visit (from the past 24 hour(s)).    ASSESSMENT AND PLAN     1. Encounter for initial prescription of vaginal ring hormonal contraceptive  Would like a refill of her Nuvaring. No side effects from it. No concerns about being pregnant. No history of hypertension, blood clots, or migraine with aura.    - Urinalysis, Micro If ([facility])  - HCG Qualitative Urine (UPT)  ([facility])  - etonogestrel-ethinyl estradiol (NUVARING) 0.12-0.015 MG/24HR vaginal ring; Insert 1 ring vaginally every 21 days then remove for 1 week then repeat with new ring.  Dispense: 3 each; Refill: 1    2. Fatigue, unspecified type  Long history of fatigue. Family history of hypothyroidism. No abnormal menstrual bleeding. Will send for Hb and TSH. If negative may consider sleep apnea testing but patient does not want this currently.   - Hemoglobin (HGB) ([facility])  - Thyroid [place] ([facility])    3. Gave resources for therapy    RTC as needed for follow up or sooner if develops new or worsening symptoms.    Discussed with MD Ramos Do, PGY-1  [facility] Medicine

## 2020-12-15 ENCOUNTER — VIRTUAL VISIT (OUTPATIENT)
Dept: PSYCHIATRY | Facility: CLINIC | Age: 30
End: 2020-12-15
Attending: PSYCHOLOGIST
Payer: COMMERCIAL

## 2020-12-15 DIAGNOSIS — F33.1 MODERATE EPISODE OF RECURRENT MAJOR DEPRESSIVE DISORDER (H): Primary | ICD-10-CM

## 2020-12-15 DIAGNOSIS — F41.1 GAD (GENERALIZED ANXIETY DISORDER): ICD-10-CM

## 2020-12-15 PROCEDURE — 90837 PSYTX W PT 60 MINUTES: CPT | Performed by: PSYCHOLOGIST

## 2020-12-15 NOTE — PROGRESS NOTES
OUTPATIENT PSYCHOTHERAPY PROGRESS NOTE    Client Name: Dariusz Leyva   YOB: 1990  Time of Service: 59 minutes  Service Type(s): Individual psychotherapy    Video- Visit Details  Type of service:  video visit for psychotherapy  Time of service:    Date:  12/15/2020    Video Start Time:  9:10am        Video End Time:  10:09am    Reason for video visit:  Patient unable to travel due to Covid-19  Originating Site (patient location):  [facility]   Location- Patient's home  Distant Site (provider location): Remote location  Mode of Communication:  Video Conference via [employer]  Consent:  Patient has given verbal consent for video visit?: Yes     Diagnoses:   Unspecified depressive disorder and unspecified anxiety    Goals of therapy: Elevate mood and show evidence of usual energy levels, activities, and socialization level, Reduce overall frequency, intensity, and duration of the anxiety so that daily functioning is not impaired      Individuals Present:   Psychotherapy services during this visit included myself and Dariusz Leyva.    Narrative:  Dariusz reported her mood has been  up and down.  She indicated she has been feeling overwhelmed with work and relationships. She reported sleep onset insomnia that is usual worse if she works a later shift. She has lower than usual appetite. She will  smoke  to  build up an appetite.  She is not able to concentrate on things really well. For example, if she tries to read or focus on learning. 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 She endorsed fatigue and low energy. She plans to get tested COVID, because she might have been exposed. She also endorsed anhedonia. Discussed how self-consciousness might be impacting her. Also discussed how past relational interactions have impacted her. She had a recent interaction that a boundary was crossed. She reflected on how it is hard for her to set boundaries and it made it really  "difficult. She reported she would feel better if she did not feel abandoned by everyone. Processed how it has affected her expectations of relationships. She is doing somethings      Mental Status:  Appearance:  Casually groomed   Behavior/relationship to examiner/demeanor:  Cooperative and Engaged  Motor activity/EPS:  Within normal limits  Speech rate:  Within normal limits  Speech volume:  Within normal limits  Speech articulation:  Within normal limits  Speech coherence: Within normal limits  Speech spontaneity: Within normal limits  Mood (subjective report):  \"up and down\"  Affect (objective appearance): Mood congruent  Thought Process (Associations):  Logical and Linear   Thought process (Rate):  Within normal limits   Thought content: Reported recent passive ideation, but did not endorse intent  Abnormal Perception:  None   Insight:  Good   Judgment:  Good     Plan: Continue with goals as outlined above    Andreea Vásquez Psy.D.,L.P  "

## 2020-12-20 ENCOUNTER — HEALTH MAINTENANCE LETTER (OUTPATIENT)
Age: 30
End: 2020-12-20

## 2020-12-22 ENCOUNTER — VIRTUAL VISIT (OUTPATIENT)
Dept: PSYCHIATRY | Facility: CLINIC | Age: 30
End: 2020-12-22
Attending: PSYCHOLOGIST
Payer: COMMERCIAL

## 2020-12-22 DIAGNOSIS — F41.1 GAD (GENERALIZED ANXIETY DISORDER): ICD-10-CM

## 2020-12-22 DIAGNOSIS — F33.1 MODERATE EPISODE OF RECURRENT MAJOR DEPRESSIVE DISORDER (H): Primary | ICD-10-CM

## 2020-12-22 PROCEDURE — 90837 PSYTX W PT 60 MINUTES: CPT | Mod: 95 | Performed by: PSYCHOLOGIST

## 2020-12-22 NOTE — PROGRESS NOTES
OUTPATIENT PSYCHOTHERAPY PROGRESS NOTE    Client Name: Dariusz Leyva   YOB: 1990  Time of Service: 66 minutes  Service Type(s): Individual psychotherapy    Video- Visit Details  Type of service:  video visit for psychotherapy  Time of service:    Date:  12/22/2020    Video Start Time:  9:01am        Video End Time:  10:07am    Reason for video visit:  Patient unable to travel due to Covid-19  Originating Site (patient location):  [facility]   Location- Patient's home  Distant Site (provider location): Remote location  Mode of Communication:  Video Conference via [employer]  Consent:  Patient has given verbal consent for video visit?: Yes     Diagnoses:   Unspecified depressive disorder and unspecified anxiety    Goals of therapy: Elevate mood and show evidence of usual energy levels, activities, and socialization level, Reduce overall frequency, intensity, and duration of the anxiety so that daily functioning is not impaired      Individuals Present:   Psychotherapy services during this visit included myself and Dariusz Leyva.    Narrative:  Dariusz reported her mood has been  mostly down.  She indicated she has been in more pain at her job. She was in a situation at work where she did not have a co-worker with her, so it was more stressful. 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 She is trying to work on being optimistic about things, but she does not have a lot of trust in humanity. Discussed expectations in relationships. Treatment focused on self-reflection and encouraging ways for Dariusz to focus on herself.     Mental Status:  Appearance:  Casually groomed   Behavior/relationship to examiner/demeanor:  Cooperative and Engaged  Motor activity/EPS:  Within normal limits  Speech rate:  Within normal limits  Speech volume:  Within normal limits  Speech articulation:  Within normal limits  Speech coherence: Within normal  "limits  Speech spontaneity: Within normal limits  Mood (subjective report):  \"down\"  Affect (objective appearance): Subdued  Thought Process (Associations):  Logical and Linear   Thought process (Rate):  Within normal limits   Thought content: Reported recent passive ideation, but did not endorse intent  Abnormal Perception:  None   Insight:  Good   Judgment:  Good     Plan: Continue with goals as outlined above    Andreea Vásquez Psy.D.,L.P  "

## 2021-01-12 ENCOUNTER — VIRTUAL VISIT (OUTPATIENT)
Dept: PSYCHIATRY | Facility: CLINIC | Age: 31
End: 2021-01-12
Attending: PSYCHOLOGIST
Payer: COMMERCIAL

## 2021-01-12 DIAGNOSIS — F41.1 GAD (GENERALIZED ANXIETY DISORDER): ICD-10-CM

## 2021-01-12 DIAGNOSIS — F33.1 MODERATE EPISODE OF RECURRENT MAJOR DEPRESSIVE DISORDER (H): Primary | ICD-10-CM

## 2021-01-12 PROCEDURE — 90837 PSYTX W PT 60 MINUTES: CPT | Mod: U7 | Performed by: PSYCHOLOGIST

## 2021-01-12 NOTE — PROGRESS NOTES
OUTPATIENT PSYCHOTHERAPY PROGRESS NOTE    Client Name: Dariusz Leyva   YOB: 1990  Time of Service: 65 minutes  Service Type(s): Individual psychotherapy    Video- Visit Details  Type of service:  video visit for psychotherapy  Time of service:    Date:  01/12/2021    Video Start Time:  9:02am        Video End Time:  10:07am    Reason for video visit:  Patient unable to travel due to Covid-19  Originating Site (patient location):  [facility]   Location- Patient's home  Distant Site (provider location):  [facility]  Mode of Communication:  Video Conference via AmWell  Consent:  Patient has given verbal consent for video visit?: Yes    Diagnoses:   Unspecified depressive disorder and unspecified anxiety    Goals of therapy: Elevate mood and show evidence of usual energy levels, activities, and socialization level, Reduce overall frequency, intensity, and duration of the anxiety so that daily functioning is not impaired      Individuals Present:   Psychotherapy services during this visit included myself and Dariusz Leyva.    Narrative:  Dariusz reported her has been  not good.  She has not been sleeping well, she is getting on average about 4-5 hours a night. She attributes this partly to her work schedule. Her appetite is lower than usual. She taking vinegar Jhoana supplements to try and reduce her appetite, but she reported her main reason for taking them is for digestive health. It continues to be hard for her to concentrate. She finds herself in the middle of doing something and then forgets what she is doing. This happens on a daily basis - this is the worst while she is at work. She endorsed fatigue and anhedonia. She denied thoughts of suicide, but she has had morbid ideation.      She reported she was by herself for the holidays, but that did not bother her. She reflected it was more her relationships. She feels that her relationships tend to be one way (she gives, but does not  "feel supported by others). Discussed emotional safety of being alone.     Discussed her health experiences with her leg. She indicated she continues to have chronic pain.      Mental Status:  Appearance:  Casually groomed   Behavior/relationship to examiner/demeanor:  Cooperative and Engaged  Motor activity/EPS:  Within normal limits  Speech rate:  Within normal limits  Speech volume:  Within normal limits  Speech articulation:  Within normal limits  Speech coherence: Within normal limits  Speech spontaneity: Within normal limits  Mood (subjective report):  \"not good\"  Affect (objective appearance): Subdued, periods of dysphoria  Thought Process (Associations):  Logical and Linear   Thought process (Rate):  Within normal limits   Thought content: Endorsed morbid ideation, denied suicidal ideation  Abnormal Perception:  None   Insight:  Good   Judgment:  Good     Plan: Continue with goals as outlined above    Andreea Vásquez Psy.D.,L.P  "

## 2021-01-26 ENCOUNTER — VIRTUAL VISIT (OUTPATIENT)
Dept: PSYCHIATRY | Facility: CLINIC | Age: 31
End: 2021-01-26
Attending: PSYCHOLOGIST
Payer: COMMERCIAL

## 2021-01-26 DIAGNOSIS — F41.1 GAD (GENERALIZED ANXIETY DISORDER): ICD-10-CM

## 2021-01-26 DIAGNOSIS — F33.1 MODERATE EPISODE OF RECURRENT MAJOR DEPRESSIVE DISORDER (H): Primary | ICD-10-CM

## 2021-01-26 PROCEDURE — 90834 PSYTX W PT 45 MINUTES: CPT | Mod: 95 | Performed by: PSYCHOLOGIST

## 2021-01-26 NOTE — PROGRESS NOTES
OUTPATIENT PSYCHOTHERAPY PROGRESS NOTE    Client Name: Dariusz Leyva   YOB: 1990  Time of Service: 48 minutes  Service Type(s): Individual psychotherapy    Video- Visit Details  Type of service:  video visit for psychotherapy  Time of service:    Date:  01/26/2021    Video Start Time:  9:02am        Video End Time:  9:50am    Reason for video visit:  Patient unable to travel due to Covid-19  Originating Site (patient location):  [facility]   Location- Patient's home  Distant Site (provider location):  [facility]  Mode of Communication:  Video Conference via AmWell  Consent:  Patient has given verbal consent for video visit?: Yes    Diagnoses:   Unspecified depressive disorder and unspecified anxiety    Goals of therapy: Elevate mood and show evidence of usual energy levels, activities, and socialization level, Reduce overall frequency, intensity, and duration of the anxiety so that daily functioning is not impaired      Individuals Present:   Psychotherapy services during this visit included myself and Dariusz Leyva.    Narrative:  Dariusz reported she was able to complete what she needed to do in [place], but she was not able to do other things she needed to do. She was hoping to see family. She shared some details from her trip.     Discussed aspects of her relationships in [place] and some of the challenges that occurred. Also discussed the impact of different expectations on relationships.     Mental Status:  Appearance:  Casually groomed   Behavior/relationship to examiner/demeanor:  Cooperative and Engaged  Motor activity/EPS:  Within normal limits  Speech rate:  Within normal limits  Speech volume:  Within normal limits  Speech articulation:  Within normal limits  Speech coherence: Within normal limits  Speech spontaneity: Within normal limits  Affect (objective appearance): Euthymic  Thought Process (Associations):  Logical and Linear   Thought process (Rate):  Within  normal limits   Thought content: Within normal limits  Abnormal Perception:  None   Insight:  Good   Judgment:  Good     Plan: Continue with goals as outlined above    Andreea Vásquez Psy.D.,L.P

## 2021-02-09 ENCOUNTER — VIRTUAL VISIT (OUTPATIENT)
Dept: PSYCHIATRY | Facility: CLINIC | Age: 31
End: 2021-02-09
Attending: PSYCHOLOGIST
Payer: COMMERCIAL

## 2021-02-09 DIAGNOSIS — F41.1 GAD (GENERALIZED ANXIETY DISORDER): ICD-10-CM

## 2021-02-09 DIAGNOSIS — F33.1 MODERATE EPISODE OF RECURRENT MAJOR DEPRESSIVE DISORDER (H): Primary | ICD-10-CM

## 2021-02-09 PROCEDURE — 90837 PSYTX W PT 60 MINUTES: CPT | Mod: 95 | Performed by: PSYCHOLOGIST
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
## 2021-02-16 ENCOUNTER — VIRTUAL VISIT (OUTPATIENT)
Dept: PSYCHIATRY | Facility: CLINIC | Age: 31
End: 2021-02-16
Attending: PSYCHOLOGIST
Payer: COMMERCIAL

## 2021-02-16 DIAGNOSIS — F33.1 MODERATE EPISODE OF RECURRENT MAJOR DEPRESSIVE DISORDER (H): Primary | ICD-10-CM

## 2021-02-16 PROCEDURE — 90837 PSYTX W PT 60 MINUTES: CPT | Mod: 95 | Performed by: PSYCHOLOGIST

## 2021-02-16 NOTE — PROGRESS NOTES
OUTPATIENT PSYCHOTHERAPY PROGRESS NOTE    Client Name: Dariusz Leyva   YOB: 1990  Time of Service: 60 minutes  Service Type(s): Individual psychotherapy    Video- Visit Details  Type of service:  video visit for psychotherapy  Time of service:    Date:  02/16/2021    Video Start Time:  9:00am        Video End Time:  10:00am    Reason for video visit:  Patient unable to travel due to Covid-19  Originating Site (patient location):  [facility]   Location- Patient's home  Distant Site (provider location):  [facility]  Mode of Communication:  Video Conference via AmWell  Consent:  Patient has given verbal consent for video visit?: Yes    Diagnoses:   Unspecified depressive disorder and unspecified anxiety    Goals of therapy: Elevate mood and show evidence of usual energy levels, activities, and socialization level, Reduce overall frequency, intensity, and duration of the anxiety so that daily functioning is not impaired      Individuals Present:   Psychotherapy services during this visit included myself and Dariusz Leyva.    Narrative:  Dariusz reported her mood is  not good.  She explained that yesterday was a really hard day for her. She heard news that was upsetting. She shared some historical context and shared her feelings related to hearing recent news about a person she knew in the past. She also found out she did not get the job she had applied for. She also heard news about her ex. Hearing all this news she felt very unmotivated to do things yesterday. She was feeling better today, but still struggling with reconciling some of the information. Processed her experiences and emotions related to the information.      Mental Status:  Appearance:  Casually groomed   Behavior/relationship to examiner/demeanor:  Cooperative and Engaged  Motor activity/EPS:  Within normal limits  Speech rate:  Within normal limits  Speech volume:  Within normal limits  Speech articulation:  Within  "normal limits  Speech coherence: Within normal limits  Speech spontaneity: Within normal limits  Mood (subjective):  not good\"  Affect (objective appearance): Subdued  Thought Process (Associations):  Logical and Linear   Thought process (Rate):  Within normal limits   Thought content: Within normal limits  Abnormal Perception:  None   Insight:  Good   Judgment:  Good     Plan: Continue with goals as outlined above    Andreea Vásquez Psy.D.,L.P  "

## 2021-02-23 ENCOUNTER — VIRTUAL VISIT (OUTPATIENT)
Dept: PSYCHIATRY | Facility: CLINIC | Age: 31
End: 2021-02-23
Attending: PSYCHOLOGIST
Payer: COMMERCIAL

## 2021-02-23 DIAGNOSIS — F33.1 MODERATE EPISODE OF RECURRENT MAJOR DEPRESSIVE DISORDER (H): Primary | ICD-10-CM

## 2021-02-23 DIAGNOSIS — F41.1 GAD (GENERALIZED ANXIETY DISORDER): ICD-10-CM

## 2021-02-23 PROCEDURE — 90837 PSYTX W PT 60 MINUTES: CPT | Mod: 95 | Performed by: PSYCHOLOGIST
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
## 2021-03-02 NOTE — TELEPHONE ENCOUNTER
Called and left a message to call clinic back and that I will try again later. Ambika Ibanez MA     Yes

## 2021-03-09 ENCOUNTER — VIRTUAL VISIT (OUTPATIENT)
Dept: PSYCHIATRY | Facility: CLINIC | Age: 31
End: 2021-03-09
Attending: PSYCHOLOGIST
Payer: COMMERCIAL

## 2021-03-09 DIAGNOSIS — F41.1 GAD (GENERALIZED ANXIETY DISORDER): ICD-10-CM

## 2021-03-09 DIAGNOSIS — F33.1 MODERATE EPISODE OF RECURRENT MAJOR DEPRESSIVE DISORDER (H): Primary | ICD-10-CM

## 2021-03-09 PROCEDURE — 90837 PSYTX W PT 60 MINUTES: CPT | Mod: 95 | Performed by: PSYCHOLOGIST
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
## 2021-03-16 ENCOUNTER — VIRTUAL VISIT (OUTPATIENT)
Dept: PSYCHIATRY | Facility: CLINIC | Age: 31
End: 2021-03-16
Attending: PSYCHOLOGIST
Payer: COMMERCIAL

## 2021-03-16 DIAGNOSIS — F41.1 GAD (GENERALIZED ANXIETY DISORDER): Primary | ICD-10-CM

## 2021-03-16 DIAGNOSIS — F33.1 MODERATE EPISODE OF RECURRENT MAJOR DEPRESSIVE DISORDER (H): ICD-10-CM

## 2021-03-16 PROCEDURE — 90837 PSYTX W PT 60 MINUTES: CPT | Mod: 95 | Performed by: PSYCHOLOGIST
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
## 2021-03-23 ENCOUNTER — VIRTUAL VISIT (OUTPATIENT)
Dept: PSYCHIATRY | Facility: CLINIC | Age: 31
End: 2021-03-23
Attending: PSYCHOLOGIST
Payer: COMMERCIAL

## 2021-03-23 DIAGNOSIS — F33.1 MODERATE EPISODE OF RECURRENT MAJOR DEPRESSIVE DISORDER (H): Primary | ICD-10-CM

## 2021-03-23 PROCEDURE — 90837 PSYTX W PT 60 MINUTES: CPT | Mod: 95 | Performed by: PSYCHOLOGIST

## 2021-03-26 ENCOUNTER — OFFICE VISIT (OUTPATIENT)
Dept: FAMILY MEDICINE | Facility: CLINIC | Age: 31
End: 2021-03-26
Payer: COMMERCIAL

## 2021-03-26 VITALS
SYSTOLIC BLOOD PRESSURE: 123 MMHG | DIASTOLIC BLOOD PRESSURE: 85 MMHG | HEART RATE: 93 BPM | TEMPERATURE: 98.3 F | WEIGHT: 293 LBS | OXYGEN SATURATION: 100 % | RESPIRATION RATE: 16 BRPM | BODY MASS INDEX: 49.99 KG/M2

## 2021-03-26 DIAGNOSIS — Z30.011 ENCOUNTER FOR INITIAL PRESCRIPTION OF CONTRACEPTIVE PILLS: ICD-10-CM

## 2021-03-26 DIAGNOSIS — R30.0 DYSURIA: ICD-10-CM

## 2021-03-26 DIAGNOSIS — A59.01 TRICHOMONAS VAGINALIS (TV) INFECTION: ICD-10-CM

## 2021-03-26 DIAGNOSIS — R35.89 POLYURIA: Primary | ICD-10-CM

## 2021-03-26 LAB
BACTERIA: NORMAL
BILIRUBIN UR: NEGATIVE MG/DL
BLOOD UR: NEGATIVE MG/DL
CLUE CELLS: NORMAL
GLUCOSE URINE: NEGATIVE
KETONES UR QL: ABNORMAL MG/DL
LEUKOCYTE ESTERASE UR: NEGATIVE
MOTILE TRICHOMONAS: POSITIVE
NITRITE UR QL STRIP: NEGATIVE MG/DL
ODOR: NORMAL
PH UR STRIP: 5.5 [PH] (ref 4.5–8)
PH WET PREP: NORMAL (ref 3.8–4.5)
PROTEIN UR: ABNORMAL MG/DL
SP GR UR STRIP: >=1.03 (ref 1–1.03)
UROBILINOGEN UR STRIP-ACNC: ABNORMAL E.U./DL
WBC WET PREP: NORMAL (ref 2–5)
YEAST: NORMAL

## 2021-03-26 PROCEDURE — 87210 SMEAR WET MOUNT SALINE/INK: CPT | Performed by: STUDENT IN AN ORGANIZED HEALTH CARE EDUCATION/TRAINING PROGRAM

## 2021-03-26 PROCEDURE — 81003 URINALYSIS AUTO W/O SCOPE: CPT | Performed by: STUDENT IN AN ORGANIZED HEALTH CARE EDUCATION/TRAINING PROGRAM

## 2021-03-26 PROCEDURE — 99214 OFFICE O/P EST MOD 30 MIN: CPT | Mod: 25 | Performed by: STUDENT IN AN ORGANIZED HEALTH CARE EDUCATION/TRAINING PROGRAM

## 2021-03-26 RX ORDER — METRONIDAZOLE 500 MG/1
500 TABLET ORAL 2 TIMES DAILY
Qty: 14 TABLET | Refills: 0 | Status: SHIPPED | OUTPATIENT
Start: 2021-03-26 | End: 2021-04-02

## 2021-03-26 RX ORDER — IBUPROFEN 600 MG/1
600 TABLET, FILM COATED ORAL EVERY 6 HOURS PRN
Qty: 30 TABLET | Refills: 0 | Status: SHIPPED | OUTPATIENT
Start: 2021-03-26 | End: 2021-05-14

## 2021-03-26 RX ORDER — ACETAMINOPHEN 325 MG/1
325-650 TABLET ORAL EVERY 6 HOURS PRN
Qty: 90 TABLET | Refills: 3 | Status: SHIPPED | OUTPATIENT
Start: 2021-03-26 | End: 2021-10-06

## 2021-03-26 RX ORDER — CYCLOBENZAPRINE HCL 5 MG
5 TABLET ORAL 3 TIMES DAILY PRN
Qty: 45 TABLET | Refills: 3 | Status: SHIPPED | OUTPATIENT
Start: 2021-03-26 | End: 2022-06-17

## 2021-03-26 NOTE — PATIENT INSTRUCTIONS
- please make a lab only appointment for a urine pregnancy test in 2-3 weeks. If negative, I will send norethindrone to your pharmacy.   - please take metronidazole 500 mg twice a day for 2 weeks.
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
## 2021-03-26 NOTE — PROGRESS NOTES
Preceptor Attestation:    I discussed the patient with the resident and evaluated the patient in person. I have verified the content of the note, which accurately reflects my assessment of the patient and the plan of care.   Supervising Physician:  Chapin Elizabeth MD.

## 2021-03-30 LAB
C TRACH RRNA SPEC QL NAA+PROBE: NEGATIVE
N GONORRHOEA RRNA SPEC QL NAA+PROBE: NEGATIVE

## 2021-03-31 ENCOUNTER — TELEPHONE (OUTPATIENT)
Dept: PSYCHIATRY | Facility: CLINIC | Age: 31
End: 2021-03-31

## 2021-03-31 NOTE — TELEPHONE ENCOUNTER
Patient did not come to virtual visit. Provider contacted patient by phone. Patient explained that their work hours had changed and she forgot to cancel appt. Plan to meet next week for next therapy session.

## 2021-04-01 DIAGNOSIS — J30.89 SEASONAL ALLERGIC RHINITIS DUE TO OTHER ALLERGIC TRIGGER: ICD-10-CM

## 2021-04-01 RX ORDER — FLUTICASONE PROPIONATE 50 MCG
SPRAY, SUSPENSION (ML) NASAL
Qty: 16 G | Refills: 4 | Status: SHIPPED | OUTPATIENT
Start: 2021-04-01 | End: 2021-09-28

## 2021-04-05 NOTE — RESULT ENCOUNTER NOTE
Hector Apple,     Would you be able to call this patient and let her know that her G/C is negative?   Thank you!    Dr. STRINGER

## 2021-04-06 ENCOUNTER — VIRTUAL VISIT (OUTPATIENT)
Dept: PSYCHIATRY | Facility: CLINIC | Age: 31
End: 2021-04-06
Attending: PSYCHOLOGIST
Payer: COMMERCIAL

## 2021-04-06 DIAGNOSIS — F41.1 GAD (GENERALIZED ANXIETY DISORDER): ICD-10-CM

## 2021-04-06 DIAGNOSIS — F33.1 MODERATE EPISODE OF RECURRENT MAJOR DEPRESSIVE DISORDER (H): Primary | ICD-10-CM

## 2021-04-06 PROCEDURE — 90837 PSYTX W PT 60 MINUTES: CPT | Mod: 95 | Performed by: PSYCHOLOGIST
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
## 2021-04-09 DIAGNOSIS — R30.0 DYSURIA: ICD-10-CM

## 2021-04-09 DIAGNOSIS — Z30.011 ENCOUNTER FOR INITIAL PRESCRIPTION OF CONTRACEPTIVE PILLS: ICD-10-CM

## 2021-04-09 DIAGNOSIS — Z30.011 ENCOUNTER FOR INITIAL PRESCRIPTION OF CONTRACEPTIVE PILLS: Primary | ICD-10-CM

## 2021-04-09 LAB
HCG UR QL: NEGATIVE
HIV 1+2 AB+HIV1 P24 AG SERPL QL IA: NEGATIVE

## 2021-04-09 PROCEDURE — 81025 URINE PREGNANCY TEST: CPT

## 2021-04-09 PROCEDURE — 36415 COLL VENOUS BLD VENIPUNCTURE: CPT

## 2021-04-09 RX ORDER — ACETAMINOPHEN AND CODEINE PHOSPHATE 120; 12 MG/5ML; MG/5ML
0.35 SOLUTION ORAL DAILY
Qty: 84 TABLET | Refills: 3 | Status: SHIPPED | OUTPATIENT
Start: 2021-04-09 | End: 2021-11-23

## 2021-04-09 NOTE — PROGRESS NOTES
Patient came in for UPT which was negative. Last clinic visit we discussed starting norethindrone if negative. Please see clinic visit note from 3/26/21 for more details. Will send this to pharmacy today and call patient to update and review proper use.     Benita Behm, MD PGY1  [facility] Res[facility]  04/09/21

## 2021-04-10 LAB — TREPONEMA ANTIBODY (SYPHILIS): NEGATIVE

## 2021-04-12 LAB — HCV AB SER QL: NEGATIVE

## 2021-04-13 ENCOUNTER — VIRTUAL VISIT (OUTPATIENT)
Dept: PSYCHIATRY | Facility: CLINIC | Age: 31
End: 2021-04-13
Attending: PSYCHOLOGIST
Payer: COMMERCIAL

## 2021-04-13 DIAGNOSIS — F33.1 MODERATE EPISODE OF RECURRENT MAJOR DEPRESSIVE DISORDER (H): Primary | ICD-10-CM

## 2021-04-13 DIAGNOSIS — F41.1 GAD (GENERALIZED ANXIETY DISORDER): ICD-10-CM

## 2021-04-13 PROCEDURE — 90837 PSYTX W PT 60 MINUTES: CPT | Mod: 95 | Performed by: PSYCHOLOGIST
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
## 2021-04-24 ENCOUNTER — HEALTH MAINTENANCE LETTER (OUTPATIENT)
Age: 31
End: 2021-04-24

## 2021-05-14 DIAGNOSIS — R52 PAIN: Primary | ICD-10-CM

## 2021-05-14 RX ORDER — IBUPROFEN 600 MG/1
TABLET, FILM COATED ORAL
Qty: 30 TABLET | Refills: 0 | Status: SHIPPED | OUTPATIENT
Start: 2021-05-14 | End: 2021-07-30

## 2021-05-18 ENCOUNTER — OFFICE VISIT (OUTPATIENT)
Dept: FAMILY MEDICINE | Facility: CLINIC | Age: 31
End: 2021-05-18
Payer: COMMERCIAL

## 2021-05-18 VITALS
HEART RATE: 89 BPM | SYSTOLIC BLOOD PRESSURE: 131 MMHG | BODY MASS INDEX: 51.03 KG/M2 | OXYGEN SATURATION: 100 % | RESPIRATION RATE: 20 BRPM | DIASTOLIC BLOOD PRESSURE: 79 MMHG | TEMPERATURE: 98.6 F | WEIGHT: 293 LBS

## 2021-05-18 DIAGNOSIS — N89.8 VAGINAL DISCHARGE: Primary | ICD-10-CM

## 2021-05-18 DIAGNOSIS — R35.0 URINARY FREQUENCY: ICD-10-CM

## 2021-05-18 DIAGNOSIS — Z30.09 GENERAL COUNSELING FOR PRESCRIPTION OF ORAL CONTRACEPTIVES: ICD-10-CM

## 2021-05-18 LAB
BACTERIA: NORMAL
BACTERIA: NORMAL
BILIRUBIN UR: NEGATIVE MG/DL
BLOOD UR: NEGATIVE MG/DL
CASTS: NORMAL /LPF
CLUE CELLS: NORMAL
CRYSTAL URINE: NORMAL /LPF
EPITHELIAL CELLS UR: NORMAL /LPF (ref 0–2)
GLUCOSE URINE: NEGATIVE
HCG UR QL: NEGATIVE
KETONES UR QL: ABNORMAL MG/DL
LEUKOCYTE ESTERASE UR: ABNORMAL
MOTILE TRICHOMONAS: NEGATIVE
MUCOUS URINE: NORMAL LPF
NITRITE UR QL STRIP: NEGATIVE MG/DL
ODOR: NORMAL
PH UR STRIP: 7.5 [PH] (ref 4.5–8)
PH WET PREP: NORMAL (ref 3.8–4.5)
PROTEIN UR: ABNORMAL MG/DL
RBC URINE: NORMAL /HPF
SP GR UR STRIP: 1.02 (ref 1–1.03)
UROBILINOGEN UR STRIP-ACNC: ABNORMAL E.U./DL
WBC URINE: NORMAL /HPF
WBC WET PREP: NORMAL (ref 2–5)
YEAST: NORMAL

## 2021-05-18 PROCEDURE — 81001 URINALYSIS AUTO W/SCOPE: CPT | Performed by: STUDENT IN AN ORGANIZED HEALTH CARE EDUCATION/TRAINING PROGRAM

## 2021-05-18 PROCEDURE — 87210 SMEAR WET MOUNT SALINE/INK: CPT | Performed by: STUDENT IN AN ORGANIZED HEALTH CARE EDUCATION/TRAINING PROGRAM

## 2021-05-18 PROCEDURE — 99214 OFFICE O/P EST MOD 30 MIN: CPT | Mod: GC | Performed by: STUDENT IN AN ORGANIZED HEALTH CARE EDUCATION/TRAINING PROGRAM

## 2021-05-18 PROCEDURE — 81025 URINE PREGNANCY TEST: CPT | Performed by: STUDENT IN AN ORGANIZED HEALTH CARE EDUCATION/TRAINING PROGRAM

## 2021-05-18 RX ORDER — L. ACIDOPHILUS/L.BULGARICUS 1MM CELL
1 TABLET ORAL DAILY
Qty: 90 TABLET | Refills: 1 | Status: SHIPPED | OUTPATIENT
Start: 2021-05-18 | End: 2021-08-27
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
## 2021-05-18 NOTE — PROGRESS NOTES
Preceptor Attestation:    I discussed the patient with the resident and evaluated the patient in person. I have verified the content of the note, which accurately reflects my assessment of the patient and the plan of care.   Supervising Physician:  Keyur Gallagher MD.

## 2021-05-18 NOTE — PATIENT INSTRUCTIONS
One of the probiotics available over the counter is called Culturelle. The generic name is lactobacillus.

## 2021-05-27 VITALS — SYSTOLIC BLOOD PRESSURE: 102 MMHG | HEART RATE: 65 BPM | OXYGEN SATURATION: 97 % | DIASTOLIC BLOOD PRESSURE: 60 MMHG

## 2021-05-27 VITALS
DIASTOLIC BLOOD PRESSURE: 78 MMHG | SYSTOLIC BLOOD PRESSURE: 126 MMHG | RESPIRATION RATE: 16 BRPM | HEART RATE: 79 BPM | TEMPERATURE: 98 F | OXYGEN SATURATION: 99 %

## 2021-05-27 VITALS — SYSTOLIC BLOOD PRESSURE: 124 MMHG | HEART RATE: 73 BPM | OXYGEN SATURATION: 98 % | DIASTOLIC BLOOD PRESSURE: 76 MMHG

## 2021-05-27 VITALS
SYSTOLIC BLOOD PRESSURE: 128 MMHG | RESPIRATION RATE: 16 BRPM | DIASTOLIC BLOOD PRESSURE: 76 MMHG | TEMPERATURE: 97.2 F | OXYGEN SATURATION: 97 % | HEART RATE: 76 BPM

## 2021-05-27 VITALS
RESPIRATION RATE: 18 BRPM | SYSTOLIC BLOOD PRESSURE: 114 MMHG | TEMPERATURE: 97 F | HEART RATE: 90 BPM | DIASTOLIC BLOOD PRESSURE: 62 MMHG

## 2021-05-27 VITALS — SYSTOLIC BLOOD PRESSURE: 128 MMHG | DIASTOLIC BLOOD PRESSURE: 76 MMHG | OXYGEN SATURATION: 98 % | HEART RATE: 76 BPM

## 2021-05-27 VITALS
OXYGEN SATURATION: 97 % | SYSTOLIC BLOOD PRESSURE: 100 MMHG | DIASTOLIC BLOOD PRESSURE: 60 MMHG | HEART RATE: 62 BPM | RESPIRATION RATE: 16 BRPM | TEMPERATURE: 97.1 F

## 2021-05-27 VITALS
DIASTOLIC BLOOD PRESSURE: 91 MMHG | TEMPERATURE: 95.3 F | SYSTOLIC BLOOD PRESSURE: 124 MMHG | HEART RATE: 107 BPM | OXYGEN SATURATION: 98 %

## 2021-05-27 VITALS
OXYGEN SATURATION: 95 % | SYSTOLIC BLOOD PRESSURE: 130 MMHG | TEMPERATURE: 97.6 F | HEART RATE: 94 BPM | RESPIRATION RATE: 16 BRPM | DIASTOLIC BLOOD PRESSURE: 78 MMHG

## 2021-05-27 VITALS — DIASTOLIC BLOOD PRESSURE: 76 MMHG | SYSTOLIC BLOOD PRESSURE: 124 MMHG | HEART RATE: 74 BPM | OXYGEN SATURATION: 98 %

## 2021-05-30 VITALS — WEIGHT: 279 LBS | HEIGHT: 67 IN | BODY MASS INDEX: 43.79 KG/M2

## 2021-06-03 ENCOUNTER — OFFICE VISIT (OUTPATIENT)
Dept: FAMILY MEDICINE | Facility: CLINIC | Age: 31
End: 2021-06-03
Payer: COMMERCIAL

## 2021-06-03 VITALS
RESPIRATION RATE: 20 BRPM | HEART RATE: 64 BPM | SYSTOLIC BLOOD PRESSURE: 123 MMHG | OXYGEN SATURATION: 98 % | TEMPERATURE: 97.1 F | WEIGHT: 293 LBS | BODY MASS INDEX: 52 KG/M2 | DIASTOLIC BLOOD PRESSURE: 69 MMHG

## 2021-06-03 DIAGNOSIS — J02.9 ACUTE PHARYNGITIS, UNSPECIFIED ETIOLOGY: Primary | ICD-10-CM

## 2021-06-03 LAB
S PYO AG THROAT QL IA.RAPID: NEGATIVE
STREP GROUP A PCR: NORMAL

## 2021-06-03 PROCEDURE — 87880 STREP A ASSAY W/OPTIC: CPT | Performed by: STUDENT IN AN ORGANIZED HEALTH CARE EDUCATION/TRAINING PROGRAM

## 2021-06-03 PROCEDURE — 99213 OFFICE O/P EST LOW 20 MIN: CPT | Mod: GC | Performed by: STUDENT IN AN ORGANIZED HEALTH CARE EDUCATION/TRAINING PROGRAM

## 2021-06-03 NOTE — PROGRESS NOTES
[facility] Clinic Visit    Assessment/Plan:  Dariusz was seen today for throat problem.    Diagnoses and all orders for this visit:    Acute pharyngitis, unspecified etiology  Rapid strep negative and highly unlikely based on [facility] criteria. PCR pending Likely Viral URI vs allergies. Symptomatic treatment PRN  -     Rapid Strep Screen (Group) (P )  -     Cancel: Group A Strep Throat ([facility])  -     Group A Strep PCR Throat Swab ([facility])    Options for treatment and follow-up care were reviewed with the patient who was engaged and actively involved in the decision making process, verbalized understanding of the options discussed, and satisfied with the final plan.    Patient was staffed with supervising physician, Dr. James.     Joe Davis MD PGY3  [facility]    Subjective:  Dariusz Leyva is a 30 year old female with a PMHx significant for   Patient Active Problem List   Diagnosis     Lumbago     Nonallopathic lesion of lumbar region     Nonallopathic lesion of sacral region     Pain in thoracic spine     Nonallopathic lesion of thoracic region     Abnormal Pap smear of cervix     Large tonsils     Morbid obesity (H)     MDD (major depressive disorder)     CARLY (generalized anxiety disorder)     Depression     Lipid screening     Knee pain    who presents with concern for strep.    She was sharing food/beverages with her aunt and niece who both recently tested positive for strep. She has a scratchy throat for about a day. No cough, no fever, no rash. She is traveling soon and she is worried that if she does have strep that she may become symptomatic during travel and people with accuse her of having COVID.     No COVID exposures.    ROS:   A complete 12-point ROS was obtained and was negative except as stated above in HPI.    Objective:  Vitals:    06/03/21 0853   BP: 123/69   Pulse: 64   Resp: 20   Temp: 97.1  F (36.2  C)   TempSrc: Tympanic   SpO2: 98%   Weight: (!)  150.6 kg (332 lb)     Body mass index is 52 kg/m .    GEN: NAD, healthy, alert  EYES: grossly normal to inspection,  HENT:  nose & mouth w/o ulcers or lesions, clear oropharynx, MMM  NECK: no LAD,  RESP: CTAB, no w/r/r  SKIN: no suspicious lesions or rashes  PSYCH: mentation appears normal, affect normal/bright    No results found for this or any previous visit (from the past 24 hour(s)).

## 2021-06-09 NOTE — PROGRESS NOTES
Preceptor Attestation:    I discussed the patient with the resident and evaluated the patient in person. I have verified the content of the note, which accurately reflects my assessment of the patient and the plan of care.   Supervising Physician:  Donald James MD.

## 2021-06-10 NOTE — PROGRESS NOTES
Your patient was discharged from [facility] on 8/13/20 because no further skilled need per her request.  Aunt to take over [facility]HA assist with bathing.  PT and OT remain in the home.  Thank you for allowing us to provide care to this patient!  A Discharge summary is available upon requestâ  [phone]

## 2021-06-10 NOTE — TELEPHONE ENCOUNTER
Request for Orders    Who s Requesting: Home Care Occupational Therapist    Orders being requested: OT 1w1 for AE training with ADLs, bladder education    Where to send Orders: Respond through Tim Eugene OTR/L

## 2021-06-10 NOTE — PROGRESS NOTES
HPI: Dariusz Leyva is a 26 y.o. female referred to see me by Destiny Pinzon MD for evaluation of a left back lipoma.  She notes  a several year history of a lump this location described as slowly growing over time, with intermittent sharp shooting pains on that side of her back.  She denies any recent skin changes or fluctuation in size of this mass.  She does have a history of lipoma, having had a previous lipoma resected several inches above this current lipoma.    Allergies:Review of patient's allergies indicates no known allergies.    Past Medical History:   Diagnosis Date     Anxiety      Depression      Headache      Lower back pain        Past Surgical History:   Procedure Laterality Date     CYST REMOVAL      back     tonselectomy         CURRENT MEDS:    Current Outpatient Prescriptions:      cyanocobalamin (VITAMIN B-12) 1000 MCG tablet, Take 1,000 mcg by mouth daily., Disp: , Rfl:      gabapentin (NEURONTIN) 300 MG capsule, Take 300 mg by mouth 3 (three) times a day., Disp: , Rfl:      MULTIVIT &MINERALS/FERROUS FUM (MULTI VITAMIN ORAL), Take 1 tablet by mouth daily., Disp: , Rfl:      NUVARING 0.12-0.015 mg/24 hr vaginal ring, , Disp: , Rfl: 3    Family History   Problem Relation Age of Onset     Cancer Mother      Cancer Maternal Grandmother      Cancer Paternal Grandfather         reports that she has never smoked. She has never used smokeless tobacco. She reports that she drinks alcohol. She reports that she does not use illicit drugs.    Review of Systems:  The 10 point review of systems  is within normal limits except for as mentioned above in the HPI.  General ROS: No complaints or constitutional symptoms  Skin: As noted in HPI  Hematologic/Lymphatic: No symptoms or complaints  Psychiatric: No symptoms or complaints  Endocrine: No excessive fatigue, no hypermetabolic symptoms reported  Respiratory ROS: no cough, shortness of breath, or wheezing  Cardiovascular ROS: no chest pain or dyspnea  "on exertion  Gastrointestinal ROS: Recent ED visit for abdominal pain, has resolved  Musculoskeletal ROS: Lower back soreness  Neurological ROS: no focal neurologic defects reported.        /55  Pulse 78  Resp 18  Ht 5' 7\" (1.702 m)  Wt (!) 279 lb (126.6 kg)  SpO2 100%  BMI 43.7 kg/m2  Body mass index is 43.7 kg/(m^2).    EXAM:  General : Alert, cooperative, appears stated age   Skin: Flat, soft mass over the left mid back, non-fixed to the underlying tissue, nontender with palpation, roughly 4 cm x 3 cm.  Lymphatic: No obvious adenopathy, no swelling   Eyes: No scleral icterus, pupils equal  HENT: no traumatic injury to the head or face, no gross abnormalities  Lungs: Normal respiratory effort, breath sounds equal bilaterally  Heart: Regular rate and rhythm  Abdomen: Soft, nondistended  Musculoskeletal: No obvious swelling,  Neurologic: Grossly intact      LABS:  Lab Results   Component Value Date    WBC 8.3 04/07/2017    WBC 7.8 06/17/2015    HGB 12.6 04/07/2017    HCT 38.6 04/07/2017    MCV 90 04/07/2017     04/07/2017     INR/Prothrombin Time      Lab Results   Component Value Date    ALT 11 04/07/2017    AST 15 04/07/2017    ALKPHOS 54 04/07/2017    BILITOT 0.2 04/07/2017       Assessment/Plan:   1. Lipoma of torso        Dariusz Leyva is a 26 y.o. female with signs and symptoms consistent with a left back lipoma.  I have explained the pathophysiology of lipomas in detail as well as the surgical versus non-operative management strategies.  In particular, I discussed that he do not think her back pain is stemming from this lipoma.  I think more likely than not she has some chronic musculoskeletal pain, likely due in part to her obesity, that is causing her back pain.    The risks of surgery were discussed in detail which include, but are not limited to, bleeding, infection, injury to surrounding structures, the need to convert to an open procedure, blood clots, stroke, heart attack and " death.  Additionally, the risks of non operative management were discussed which include, but are not limited to, slow growth of lipoma over time.    She understands everything which was discussed and has consented to proceed with a lipoma excision.  We will schedule surgery accordingly.       Zoran Baker M.D.   [phone]  [facility] Department of Surgery

## 2021-07-20 ENCOUNTER — TRANSFERRED RECORDS (OUTPATIENT)
Dept: HEALTH INFORMATION MANAGEMENT | Facility: CLINIC | Age: 31
End: 2021-07-20

## 2021-08-05 NOTE — MR AVS SNAPSHOT
After Visit Summary   6/7/2018    Dariusz Leyva    MRN: [id number]           Patient Information     Date Of Birth          1990        Visit Information        Provider Department      6/7/2018 9:20 AM Ramos Brito MD [facility]        Today's Diagnoses     Encounter for initial prescription of vaginal ring hormonal contraceptive    -  1    Fatigue, unspecified type          Care Instructions    [facility]  [address]  [place], [place] [address]  [phone]    [facility]  [address]   [address]  [place] [address]  [phone] (for appointments)  Fax: [phone]    [facility] [address]  [place] [address]  Phone:  [phone]  Fax: [phone]  Hours:  Monday - Friday 8:30 - 5:00pm    [facility]  [address] [place] [address] [address] [place] [address]  [phone]            Follow-ups after your visit        Who to contact     Please call your clinic at [phone] to:    Ask questions about your health    Make or cancel appointments    Discuss your medicines    Learn about your test results    Speak to your doctor            Additional Information About Your Visit        boosk Information     boosk gives you secure access to your electronic health record. If you see a primary care provider, you can also send messages to your care team and make appointments. If you have questions, please call your primary care clinic.  If you do not have a primary care provider, please call [phone] and they will assist you.      boosk is an electronic gateway that provides easy, online access to your medical records. With boosk, you can request a clinic appointment, read your test results, renew a prescription or communicate with your care team.     To access your existing account,  please contact your [facility] or call [phone] for assistance.        Care EveryWhere ID     This is your Care EveryWhere ID. This could be used by other organizations to access your [facility] medical records  [id number]        Your Vitals Were     Pulse Temperature Last Period Pulse Oximetry BMI (Body Mass Index)       84 98.5  F (36.9  C) (Oral) 05/12/2018 (Approximate) 98% 44.61 kg/m2        Blood Pressure from Last 3 Encounters:   06/07/18 116/79   02/23/18 127/75   12/22/17 128/68    Weight from Last 3 Encounters:   06/07/18 282 lb 3.2 oz (128 kg)   02/23/18 276 lb 4 oz (125.3 kg)   12/22/17 281 lb 6 oz (127.6 kg)              We Performed the Following     HCG Qualitative Urine (UPT)  ([facility])     Hemoglobin (HGB) ([facility])     Thyroid Balaton ([facility])     Urinalysis, Micro If ([facility])          Today's Medication Changes          These changes are accurate as of 6/7/18 10:10 AM.  If you have any questions, ask your nurse or doctor.               Start taking these medicines.        Dose/Directions    etonogestrel-ethinyl estradiol 0.12-0.015 MG/24HR vaginal ring   Commonly known as:  NUVARING   Used for:  Encounter for initial prescription of vaginal ring hormonal contraceptive   Started by:  Ramos Brito MD        Insert 1 ring vaginally every 21 days then remove for 1 week then repeat with new ring.   Quantity:  3 each   Refills:  1            Where to get your medicines      These medications were sent to [id number] - [place], [place] - [address] AT [facility] [address]  [address], [place] [address]     Phone:  [phone]     etonogestrel-ethinyl estradiol 0.12-0.015 MG/24HR vaginal ring                Primary Care Provider Office Phone # Fax #    Janeth Rousseau [phone]       [facility] MEDICINE [address] [place] [address]        Equal Access to Services     ELLY ESCOTO AH: Delma melgar  [place], erin bradfordyinkaha, magalys kasarath dale, rosalee leong franklinrudy lawaqasshannon jasvri. So [facility] [phone].    ATENCIÓN: Si patricio fonseca, tiene a bates disposición servicios gratuitos de asistencia lingüística. Reanna al [phone].    We comply with applicable federal civil rights laws and [place] laws. We do not discriminate on the basis of race, color, national origin, age, disability, sex, sexual orientation, or gender identity.            Thank you!     Thank you for choosing [facility]  for your care. Our goal is always to provide you with excellent care. Hearing back from our patients is one way we can continue to improve our services. Please take a few minutes to complete the written survey that you may receive in the mail after your visit with us. Thank you!             Your Updated Medication List - Protect others around you: Learn how to safely use, store and throw away your medicines at www.disposemymeds.org.          This list is accurate as of 6/7/18 10:10 AM.  Always use your most recent med list.                   Brand Name Dispense Instructions for use Diagnosis    etonogestrel-ethinyl estradiol 0.12-0.015 MG/24HR vaginal ring    NUVARING    3 each    Insert 1 ring vaginally every 21 days then remove for 1 week then repeat with new ring.    Encounter for initial prescription of vaginal ring hormonal contraceptive       ibuprofen 600 MG tablet    ADVIL/MOTRIN     Take 600 mg by mouth        oxyCODONE-acetaminophen 7.5-325 MG per tablet    PERCOCET     Take 2 tablets by mouth as needed        TRAMADOL HCL PO              10-Aug-2021 08-Aug-2021

## 2021-08-27 ENCOUNTER — OFFICE VISIT (OUTPATIENT)
Dept: FAMILY MEDICINE | Facility: CLINIC | Age: 31
End: 2021-08-27
Payer: COMMERCIAL

## 2021-08-27 VITALS
BODY MASS INDEX: 45.99 KG/M2 | HEART RATE: 69 BPM | WEIGHT: 293 LBS | TEMPERATURE: 98.1 F | SYSTOLIC BLOOD PRESSURE: 116 MMHG | HEIGHT: 67 IN | DIASTOLIC BLOOD PRESSURE: 81 MMHG

## 2021-08-27 DIAGNOSIS — N39.41 URGENCY INCONTINENCE: Primary | ICD-10-CM

## 2021-08-27 DIAGNOSIS — M54.50 CHRONIC BILATERAL LOW BACK PAIN WITHOUT SCIATICA: ICD-10-CM

## 2021-08-27 DIAGNOSIS — N89.8 VAGINAL DISCHARGE: ICD-10-CM

## 2021-08-27 DIAGNOSIS — G89.29 CHRONIC BILATERAL LOW BACK PAIN WITHOUT SCIATICA: ICD-10-CM

## 2021-08-27 DIAGNOSIS — Z11.3 VENEREAL DISEASE SCREENING: ICD-10-CM

## 2021-08-27 DIAGNOSIS — E66.01 MORBID OBESITY (H): ICD-10-CM

## 2021-08-27 PROBLEM — T84.84XA PAINFUL ORTHOPAEDIC HARDWARE (H): Status: ACTIVE | Noted: 2019-12-16

## 2021-08-27 PROBLEM — S82.141A TIBIAL PLATEAU FRACTURE, RIGHT, CLOSED, INITIAL ENCOUNTER: Status: ACTIVE | Noted: 2019-06-06

## 2021-08-27 PROBLEM — S89.91XA: Status: ACTIVE | Noted: 2020-02-09

## 2021-08-27 LAB
ALBUMIN UR-MCNC: ABNORMAL MG/DL
APPEARANCE UR: CLEAR
BACTERIA #/AREA URNS HPF: ABNORMAL /HPF
BILIRUB UR QL STRIP: NEGATIVE
COLOR UR AUTO: YELLOW
GLUCOSE UR STRIP-MCNC: NEGATIVE MG/DL
HGB UR QL STRIP: ABNORMAL
HIV 1+2 AB+HIV1 P24 AG SERPL QL IA: NEGATIVE
KETONES UR STRIP-MCNC: ABNORMAL MG/DL
LEUKOCYTE ESTERASE UR QL STRIP: NEGATIVE
MUCOUS THREADS #/AREA URNS LPF: PRESENT /LPF
NITRATE UR QL: NEGATIVE
PH UR STRIP: 6 [PH] (ref 5–8)
RBC #/AREA URNS AUTO: ABNORMAL /HPF
SP GR UR STRIP: >=1.03 (ref 1–1.03)
SQUAMOUS #/AREA URNS AUTO: ABNORMAL /LPF
UROBILINOGEN UR STRIP-ACNC: 1 E.U./DL
WBC #/AREA URNS AUTO: ABNORMAL /HPF

## 2021-08-27 PROCEDURE — 36415 COLL VENOUS BLD VENIPUNCTURE: CPT | Performed by: FAMILY MEDICINE

## 2021-08-27 PROCEDURE — 87591 N.GONORRHOEAE DNA AMP PROB: CPT | Performed by: FAMILY MEDICINE

## 2021-08-27 PROCEDURE — 80074 ACUTE HEPATITIS PANEL: CPT | Performed by: FAMILY MEDICINE

## 2021-08-27 PROCEDURE — 99214 OFFICE O/P EST MOD 30 MIN: CPT | Performed by: FAMILY MEDICINE

## 2021-08-27 PROCEDURE — 86780 TREPONEMA PALLIDUM: CPT | Performed by: FAMILY MEDICINE

## 2021-08-27 PROCEDURE — 81001 URINALYSIS AUTO W/SCOPE: CPT | Performed by: FAMILY MEDICINE

## 2021-08-27 PROCEDURE — 87491 CHLMYD TRACH DNA AMP PROBE: CPT | Performed by: FAMILY MEDICINE

## 2021-08-27 PROCEDURE — 87389 HIV-1 AG W/HIV-1&-2 AB AG IA: CPT | Performed by: FAMILY MEDICINE

## 2021-08-27 RX ORDER — L. ACIDOPHILUS/L.BULGARICUS 1MM CELL
1 TABLET ORAL DAILY
Qty: 90 TABLET | Refills: 1 | Status: SHIPPED | OUTPATIENT
Start: 2021-08-27 | End: 2021-11-23

## 2021-08-27 ASSESSMENT — MIFFLIN-ST. JEOR: SCORE: 2222.08

## 2021-08-27 ASSESSMENT — PATIENT HEALTH QUESTIONNAIRE - PHQ9: SUM OF ALL RESPONSES TO PHQ QUESTIONS 1-9: 9
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
## 2021-08-28 LAB — T PALLIDUM AB SER QL: NEGATIVE

## 2021-08-29 LAB
C TRACH DNA SPEC QL PROBE+SIG AMP: NEGATIVE
N GONORRHOEA DNA SPEC QL NAA+PROBE: NEGATIVE

## 2021-08-30 LAB
HAV IGM SERPL QL IA: NEGATIVE
HBV CORE IGM SERPL QL IA: NEGATIVE
HBV SURFACE AG SERPL QL IA: NONREACTIVE
HCV AB SERPL QL IA: NEGATIVE

## 2021-09-20 ENCOUNTER — HOSPITAL ENCOUNTER (OUTPATIENT)
Dept: MRI IMAGING | Facility: HOSPITAL | Age: 31
Discharge: HOME | End: 2021-09-20
Attending: FAMILY MEDICINE | Admitting: FAMILY MEDICINE
Payer: COMMERCIAL

## 2021-09-20 DIAGNOSIS — G89.29 CHRONIC BILATERAL LOW BACK PAIN WITHOUT SCIATICA: ICD-10-CM

## 2021-09-20 DIAGNOSIS — M54.50 CHRONIC BILATERAL LOW BACK PAIN WITHOUT SCIATICA: ICD-10-CM

## 2021-09-20 DIAGNOSIS — N39.41 URGENCY INCONTINENCE: ICD-10-CM

## 2021-09-20 PROCEDURE — 72148 MRI LUMBAR SPINE W/O DYE: CPT

## 2021-09-21 ENCOUNTER — MYC MEDICAL ADVICE (OUTPATIENT)
Dept: UROLOGY | Facility: CLINIC | Age: 31
End: 2021-09-21

## 2021-09-22 ENCOUNTER — VIRTUAL VISIT (OUTPATIENT)
Dept: PSYCHIATRY | Facility: CLINIC | Age: 31
End: 2021-09-22
Attending: PSYCHOLOGIST
Payer: COMMERCIAL

## 2021-09-22 DIAGNOSIS — F41.1 GAD (GENERALIZED ANXIETY DISORDER): ICD-10-CM

## 2021-09-22 DIAGNOSIS — F33.1 MODERATE EPISODE OF RECURRENT MAJOR DEPRESSIVE DISORDER (H): Primary | ICD-10-CM

## 2021-09-22 PROCEDURE — 90834 PSYTX W PT 45 MINUTES: CPT | Mod: 95 | Performed by: PSYCHOLOGIST

## 2021-09-22 NOTE — PROGRESS NOTES
OUTPATIENT PSYCHOTHERAPY PROGRESS NOTE    Client Name: Dariusz Leyva   YOB: 1990  Time of Service: 41 minutes   Service Type(s): Individual psychotherapy    Video- Visit Details  Type of service:  video visit for psychotherapy  Time of service:    Date:  2021    Video Start Time:  7:20am        Video End Time:  8:01am    Reason for video visit:  Patient unable to travel due to Covid-19  Originating Site (patient location):  [facility]   Location- Patient's home  Distant Site (provider location):  [facility]  Mode of Communication:  Video Conference via AmWell  Consent:  Patient has given verbal consent for video visit?: Yes    Diagnoses:   Unspecified depressive disorder and unspecified anxiety    Goals of therapy: Elevate mood and show evidence of usual energy levels, activities, and socialization level, Reduce overall frequency, intensity, and duration of the anxiety so that daily functioning is not impaired      Individuals Present:   Psychotherapy services during this visit included myself and Dariusz Leyva.    Narrative:  Dariusz reported she is  okay.  She indicated that she increased hours at her job and it is a toxic environment. She started the job February of last year. 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 She reported she went on two trips in August. She went to a family reunion and dealt with tension with her sister. She did not find support from her mother. Her grandmother passed two weeks ago. She was not as close to her the last few years. The  was planned on her birthday. She still flew to [place] to go to the . She is back in therapy for both of her knees.     Mental Status:  Appearance:  Casually groomed   Behavior/relationship to examiner/demeanor:  Cooperative and Engaged  Motor  "activity/EPS:  Within normal limits  Speech rate:  Within normal limits  Speech volume:  Within normal limits  Speech articulation:  Within normal limits  Speech coherence: Within normal limits  Speech spontaneity: Within normal limits  Mood (subjective): \"okay\"  Affect (objective appearance): Mood congruent  Thought Process (Associations):  Logical and Linear   Thought process (Rate):  Within normal limits   Thought content: Within normal limits  Abnormal Perception:  None   Insight:  Good   Judgment:  Good     Plan: Continue with goals as outlined above    Andreea Vásquez Psy.D.,L.P  "

## 2021-09-29 ENCOUNTER — VIRTUAL VISIT (OUTPATIENT)
Dept: PSYCHIATRY | Facility: CLINIC | Age: 31
End: 2021-09-29
Attending: PSYCHOLOGIST
Payer: COMMERCIAL

## 2021-09-29 DIAGNOSIS — F33.1 MODERATE EPISODE OF RECURRENT MAJOR DEPRESSIVE DISORDER (H): Primary | ICD-10-CM

## 2021-09-29 PROCEDURE — 90837 PSYTX W PT 60 MINUTES: CPT | Mod: 95 | Performed by: PSYCHOLOGIST

## 2021-09-29 NOTE — PROGRESS NOTES
OUTPATIENT PSYCHOTHERAPY PROGRESS NOTE    Client Name: Dariusz Leyva   YOB: 1990  Time of Service: 57 minutes   Service Type(s): Individual psychotherapy    Video- Visit Details  Type of service:  video visit for psychotherapy  Time of service:    Date:  09/29/2021    Video Start Time:  7:05am        Video End Time:  8:02am    Reason for video visit:  Patient unable to travel due to Covid-19  Originating Site (patient location):  [facility]   Location- Patient's home  Distant Site (provider location):  [facility]  Mode of Communication:  Video Conference via AmWell  Consent:  Patient has given verbal consent for video visit?: Yes    Diagnoses:   Unspecified depressive disorder and unspecified anxiety    Goals of therapy: Elevate mood and show evidence of usual energy levels, activities, and socialization level, Reduce overall frequency, intensity, and duration of the anxiety so that daily functioning is not impaired      Individuals Present:   Psychotherapy services during this visit included myself and Yadirafadumo Gordon.    Narrative:  Dariusz reported that she is having a high school friend visit her. Discussed what she was expecting from the visit and processed feelings. Another friend questioned her decision. Processed this experience.      She reported she has been thinking about how little support she has. She has been more disengaged from family and some friends. She indicated this has been a protective choice.     Discussed what she would like in a long-term relationship. She indicated she has the experience where people expect her to do things in relationships, but others are just interacting socially. People reach out to her to discuss their problems. She has learned she has to depend on herself.      She has noticed that she will distract herself with another pain to avoid a pain in the moment. She reported she does not know herself sometimes, because there are pieces of herself, she  has pushed down so long. She sometimes feels lost and is unsure of her decisions. She has a hard time remember pieces of her past.      Mental Status:  Appearance:  Casually groomed   Behavior/relationship to examiner/demeanor:  Cooperative and Engaged  Motor activity/EPS:  Within normal limits  Speech rate:  Within normal limits  Speech volume:  Within normal limits  Speech articulation:  Within normal limits  Speech coherence: Within normal limits  Speech spontaneity: Within normal limits  Mood (subjective): Not assessed  Affect (objective appearance): Subdued  Thought Process (Associations):  Logical and Linear   Thought process (Rate):  Within normal limits   Thought content: Within normal limits  Abnormal Perception:  None   Insight:  Good   Judgment:  Good     Plan: Continue with goals as outlined above    Andreea Vásquez Psy.D.,L.P

## 2021-10-03 ENCOUNTER — HEALTH MAINTENANCE LETTER (OUTPATIENT)
Age: 31
End: 2021-10-03

## 2021-10-05 DIAGNOSIS — G89.29 CHRONIC KNEE PAIN, UNSPECIFIED LATERALITY: Primary | ICD-10-CM

## 2021-10-05 DIAGNOSIS — M25.569 CHRONIC KNEE PAIN, UNSPECIFIED LATERALITY: Primary | ICD-10-CM

## 2021-10-06 ENCOUNTER — VIRTUAL VISIT (OUTPATIENT)
Dept: PSYCHIATRY | Facility: CLINIC | Age: 31
End: 2021-10-06
Attending: PSYCHOLOGIST
Payer: COMMERCIAL

## 2021-10-06 DIAGNOSIS — F41.1 GAD (GENERALIZED ANXIETY DISORDER): ICD-10-CM

## 2021-10-06 DIAGNOSIS — F33.1 MODERATE EPISODE OF RECURRENT MAJOR DEPRESSIVE DISORDER (H): Primary | ICD-10-CM

## 2021-10-06 PROCEDURE — 90837 PSYTX W PT 60 MINUTES: CPT | Mod: 95 | Performed by: PSYCHOLOGIST

## 2021-10-06 RX ORDER — ACETAMINOPHEN 325 MG/1
TABLET ORAL
Qty: 100 TABLET | Refills: 3 | Status: SHIPPED | OUTPATIENT
Start: 2021-10-06 | End: 2022-10-27

## 2021-10-06 NOTE — PROGRESS NOTES
"OUTPATIENT PSYCHOTHERAPY PROGRESS NOTE    Client Name: Dariusz Leyva   YOB: 1990  Time of Service: 58 minutes   Service Type(s): Individual psychotherapy    Video- Visit Details  Type of service:  video visit for psychotherapy  Time of service:    Date:  10/06/2021    Video Start Time:  7:03am        Video End Time:  8:01am    Reason for video visit:  Patient unable to travel due to Covid-19  Originating Site (patient location):  [facility]   Location- Patient's home  Distant Site (provider location):  [facility]  Mode of Communication:  Video Conference via AmWell  Consent:  Patient has given verbal consent for video visit?: Yes    Diagnoses:   Unspecified depressive disorder and unspecified anxiety    Goals of therapy: Elevate mood and show evidence of usual energy levels, activities, and socialization level, Reduce overall frequency, intensity, and duration of the anxiety so that daily functioning is not impaired      Individuals Present:   Psychotherapy services during this visit included myself and Dariusz Lyeva.    Narrative:  Dariusz reported that she has been sleeping well the last two nights but before that she was having pretty bad insomnia. She is considering going back to school right now. She is also learning coding more.      Her friend is visiting from out of town. She has been enjoying her time with him.      She described a recent trip to see someone she met while on another trip. It did not go well because the other person expected her to change.     Mental Status:  Appearance:  Casually groomed   Behavior/relationship to examiner/demeanor:  Cooperative and Engaged  Motor activity/EPS:  Within normal limits  Speech rate:  Within normal limits  Speech volume:  Within normal limits  Speech articulation:  Within normal limits  Speech coherence: Within normal limits  Speech spontaneity: Within normal limits  Mood (subjective): \"Okay\"  Affect (objective appearance): " Subdued  Thought Process (Associations):  Logical and Linear   Thought process (Rate):  Within normal limits   Thought content: Within normal limits  Abnormal Perception:  None   Insight:  Good   Judgment:  Good     Plan: Continue with goals as outlined above    Andreea Vásquez Psy.D.,L.P

## 2021-10-13 ENCOUNTER — VIRTUAL VISIT (OUTPATIENT)
Dept: PSYCHIATRY | Facility: CLINIC | Age: 31
End: 2021-10-13
Attending: PSYCHOLOGIST
Payer: COMMERCIAL

## 2021-10-13 DIAGNOSIS — F33.1 MODERATE EPISODE OF RECURRENT MAJOR DEPRESSIVE DISORDER (H): Primary | ICD-10-CM

## 2021-10-13 DIAGNOSIS — F41.1 GAD (GENERALIZED ANXIETY DISORDER): ICD-10-CM

## 2021-10-13 PROCEDURE — 90837 PSYTX W PT 60 MINUTES: CPT | Mod: 95 | Performed by: PSYCHOLOGIST

## 2021-10-13 NOTE — PROGRESS NOTES
OUTPATIENT PSYCHOTHERAPY PROGRESS NOTE    Client Name: Dariusz Leyva   YOB: 1990  Time of Service: 58 minutes   Service Type(s): Individual psychotherapy    Video- Visit Details  Type of service:  video visit for psychotherapy  Time of service:    Date:  10/13/2021    Video Start Time:  7:11am        Video End Time:  8:09am    Reason for video visit:  Patient unable to travel due to Covid-19  Originating Site (patient location):  [facility]   Location- Patient's home  Distant Site (provider location):  [facility]  Mode of Communication:  Video Conference via AmWell  Consent:  Patient has given verbal consent for video visit?: Yes    Diagnoses:   Unspecified depressive disorder and unspecified anxiety    Goals of therapy: Elevate mood and show evidence of usual energy levels, activities, and socialization level, Reduce overall frequency, intensity, and duration of the anxiety so that daily functioning is not impaired      Individuals Present:   Psychotherapy services during this visit included myself and Yadirafadumo Gordon.    Narrative:  Dariusz reported her mood has been  content.  She indicated she went on a long trip with her friend. She drove with her friend (who lives in [place]) to [place]. She ended up driving the whole way. There were a lot of stressors on the trip and there was a lot of work on Dariusz. She indicated  I love my friends, but I do not like them.  Dariusz is limiting her contact with her family because it takes so much energy to engage with them. She would like to find some new friend.      Mental Status:  Appearance:  Casually groomed   Behavior/relationship to examiner/demeanor:  Cooperative and Engaged  Motor activity/EPS:  Within normal limits  Speech rate:  Within normal limits  Speech volume:  Within normal limits  Speech articulation:  Within normal limits  Speech coherence: Within normal limits  Speech spontaneity: Within normal limits  Mood (subjective):  "\"Content\"  Affect (objective appearance): Mood congruent  Thought Process (Associations):  Logical and Linear   Thought process (Rate):  Within normal limits   Thought content: Within normal limits  Abnormal Perception:  None   Insight:  Good   Judgment:  Good     Plan: Continue with goals as outlined above    Andreea Vásquez Psy.D.,L.P  "

## 2021-10-20 ENCOUNTER — VIRTUAL VISIT (OUTPATIENT)
Dept: PSYCHIATRY | Facility: CLINIC | Age: 31
End: 2021-10-20
Attending: PSYCHOLOGIST
Payer: COMMERCIAL

## 2021-10-20 DIAGNOSIS — F41.1 GAD (GENERALIZED ANXIETY DISORDER): ICD-10-CM

## 2021-10-20 DIAGNOSIS — F33.1 MODERATE EPISODE OF RECURRENT MAJOR DEPRESSIVE DISORDER (H): Primary | ICD-10-CM

## 2021-10-20 PROCEDURE — 90837 PSYTX W PT 60 MINUTES: CPT | Mod: 95 | Performed by: PSYCHOLOGIST

## 2021-10-20 NOTE — PROGRESS NOTES
OUTPATIENT PSYCHOTHERAPY PROGRESS NOTE    Client Name: Dariusz Leyva   YOB: 1990  Time of Service: 66 minutes   Service Type(s): Individual psychotherapy    Video- Visit Details  Type of service:  video visit for psychotherapy  Time of service:    Date:  10/20/2021    Video Start Time:  7:02am        Video End Time:  8:08am    Reason for video visit:  Patient unable to travel due to Covid-19  Originating Site (patient location):  [facility]   Location- Patient's home  Distant Site (provider location):  [facility]  Mode of Communication:  Video Conference via AmWell  Consent:  Patient has given verbal consent for video visit?: Yes    Diagnoses:   Unspecified depressive disorder and unspecified anxiety    Goals of therapy: Elevate mood and show evidence of usual energy levels, activities, and socialization level, Reduce overall frequency, intensity, and duration of the anxiety so that daily functioning is not impaired      Individuals Present:   Psychotherapy services during this visit included myself and Dariusz Leyva.    Narrative:  Dariusz reported she feels physically like  crap.  She is preparing to move, but she is not sure where she wants to live. 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  Discussed how long-term experiences in this environment.      Discussed recent interactions with a person she met when she was traveling in [place]. He is showing patterns of being critical. She is unsure where this relationship is going.      Talked about what she is looking for in a partner. Enjoy time together - laugh together. Get a place together and share the responsibility.      Reviewed patterns in her past relationships, where people will start out nice and then when they are deeper in the relationship, they turn out to be unkind. She shared how this has impacted her self-esteem. When she is alone, she is  "constantly wondering how she can do things differently and make things better. She indicated she struggles knowing what to do and making decisions. She has noted that she is seeing signs of depression. She is trying to engage in behavioral activation. She tends to keep things to herself because people do not understand. She is trying to do art and play cello but she is not feeling motivated.      Mental Status:  Appearance:  Casually groomed   Behavior/relationship to examiner/demeanor:  Cooperative and Engaged  Motor activity/EPS:  Within normal limits  Speech rate:  Within normal limits  Speech volume:  Within normal limits  Speech articulation:  Within normal limits  Speech coherence: Within normal limits  Speech spontaneity: Within normal limits  Mood (subjective): \"crappy\"  Affect (objective appearance): Generally euthymic, but periods of tearfulness  Thought Process (Associations):  Logical and Linear   Thought process (Rate):  Within normal limits   Thought content: Within normal limits  Abnormal Perception:  None   Insight:  Good   Judgment:  Good     Plan: Continue with goals as outlined above    Andreea Vásquez Psy.D.,L.P  "

## 2021-10-27 ENCOUNTER — VIRTUAL VISIT (OUTPATIENT)
Dept: PSYCHIATRY | Facility: CLINIC | Age: 31
End: 2021-10-27
Attending: PSYCHOLOGIST
Payer: COMMERCIAL

## 2021-10-27 DIAGNOSIS — F41.1 GAD (GENERALIZED ANXIETY DISORDER): ICD-10-CM

## 2021-10-27 DIAGNOSIS — F33.1 MODERATE EPISODE OF RECURRENT MAJOR DEPRESSIVE DISORDER (H): Primary | ICD-10-CM

## 2021-10-27 PROCEDURE — 90837 PSYTX W PT 60 MINUTES: CPT | Mod: 95 | Performed by: PSYCHOLOGIST

## 2021-10-27 NOTE — PROGRESS NOTES
OUTPATIENT PSYCHOTHERAPY PROGRESS NOTE    Client Name: Dariusz Leyva   YOB: 1990  Time of Service: 70 minutes   Service Type(s): Individual psychotherapy    Video- Visit Details  Type of service:  video visit for psychotherapy  Time of service:    Date:  10/27/2021    Video Start Time:  7:01am        Video End Time:  8:10am    Reason for video visit:  Patient unable to travel due to Covid-19  Originating Site (patient location):  [facility]   Location- Patient's home  Distant Site (provider location):  [facility]  Mode of Communication:  Video Conference via AmWell  Consent:  Patient has given verbal consent for video visit?: Yes    Diagnoses:   Unspecified depressive disorder and unspecified anxiety    Goals of therapy: Elevate mood and show evidence of usual energy levels, activities, and socialization level, Reduce overall frequency, intensity, and duration of the anxiety so that daily functioning is not impaired      Individuals Present:   Psychotherapy services during this visit included myself and Yadirafadumo Gordon.    Narrative:  Dariusz reported she feels like she is getting ill. She explained that her apartment has not turned her heat on. She is looking for a place to move to, but she is not finding a place that has a reasonable price.      Discussed work concerns. She has been experiencing some social exclusion. She has heard from people that she looks mean and she has noticed that when she expresses her feelings, she is asked to calm down but others are not.      Dariusz reported she will be starting her master's degree in November (an [employer]).    Mental Status:  Appearance:  Casually groomed   Behavior/relationship to examiner/demeanor:  Cooperative and Engaged  Motor activity/EPS:  Within normal limits  Speech rate:  Within normal limits  Speech volume:  Within normal limits  Speech articulation:  Within normal limits  Speech coherence: Within normal limits  Speech spontaneity: Within  "normal limits  Mood (subjective): \"feel like I am getting ill\"  Affect (objective appearance): Generally euthymic  Thought Process (Associations):  Logical and Linear   Thought process (Rate):  Within normal limits   Thought content: Within normal limits  Abnormal Perception:  None   Insight:  Good   Judgment:  Good     Plan: Continue with goals as outlined above    Andreea Vásquez Psy.D.,L.P  "

## 2021-11-03 ENCOUNTER — BEH TREATMENT PLAN (OUTPATIENT)
Dept: PSYCHIATRY | Facility: CLINIC | Age: 31
End: 2021-11-03

## 2021-11-03 ENCOUNTER — VIRTUAL VISIT (OUTPATIENT)
Dept: PSYCHIATRY | Facility: CLINIC | Age: 31
End: 2021-11-03
Attending: PSYCHOLOGIST
Payer: COMMERCIAL

## 2021-11-03 DIAGNOSIS — F33.1 MODERATE EPISODE OF RECURRENT MAJOR DEPRESSIVE DISORDER (H): Primary | ICD-10-CM

## 2021-11-03 PROCEDURE — 90837 PSYTX W PT 60 MINUTES: CPT | Mod: 95 | Performed by: PSYCHOLOGIST

## 2021-11-03 NOTE — PROGRESS NOTES
"OUTPATIENT TREATMENT PLAN SUMMARY    Date of Treatment Plan: 11/3/21  90-Day Review Date: 2/3/22  Date of Initial Service: 9/17/18       1. DSM-V Diagnosis (include numeric code)  Dysthymic Disorder (F34.1)  2. Current symptoms and circumstances that substantiate the diagnosis:  Most days of the week feels \"depressed\", sleep disturbance, low appetite, anhedonia, fatigue, and morbid ideation (denied suicidal ideation)     She also has some anxiety about going out for errands - if too many people she has concerns that something bad will happen or get stuck in the store    3. How symptoms and/or behaviors are affecting level of function:   Isolation, struggle with self-care at times    4. Risk Assessment:  Suicide:  Assessed Level of Immediate Risk: Low  Ideation: No  Plan:  No  Means: No  Intent: No    Homicide/Violence:  Assessed Level of Immediate Risk: None  Ideation: No  Plan: No  Means: No  Intent: No    If on a medication, please include name and dosage:        Symptom/Problem Measurable Goals Interventions Gains Made   1.negative thinking; 1. In the therapeutic environment and during stressful situations, Dariusz will learn to identify her pessimistic thoughts and challenge them 1.CBT: Problem solving, skill building and psychoeducation 1. N/A   2. Transitioning to a new place (mentally hard) 2. Increase time searching for a new place (reduce avoidance)  2. CBT 2. NA    3. Additional stress from starting school again (procrastination) 3. Reduce procrastination  3. CBT  3. NA       5. Frequency of Sessions: Weekly    6. Discharge and Aftercare Goals: discharge on completion of goals    7. Expected duration of treatment:  To be reassessed in 90 days    8. Participants in therapy plan (family, friends, support network): client and therapist      See scanned document for Acknowledgement of Current Treatment Plan      Regulatory Guidelines for Updating Treatment Plan  [place] Medical Assistance: Reviewed & signed " at least every 90days  Medicare:  Update per policy
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
## 2021-11-17 ENCOUNTER — VIRTUAL VISIT (OUTPATIENT)
Dept: PSYCHIATRY | Facility: CLINIC | Age: 31
End: 2021-11-17
Attending: PSYCHOLOGIST
Payer: COMMERCIAL

## 2021-11-17 DIAGNOSIS — F41.1 GAD (GENERALIZED ANXIETY DISORDER): ICD-10-CM

## 2021-11-17 DIAGNOSIS — F33.1 MODERATE EPISODE OF RECURRENT MAJOR DEPRESSIVE DISORDER (H): Primary | ICD-10-CM

## 2021-11-17 PROCEDURE — 90837 PSYTX W PT 60 MINUTES: CPT | Mod: 95 | Performed by: PSYCHOLOGIST

## 2021-11-17 NOTE — PROGRESS NOTES
OUTPATIENT PSYCHOTHERAPY PROGRESS NOTE    Client Name: Dariusz Leyva   YOB: 1990  Time of Service: 63 minutes   Service Type(s): Individual psychotherapy    Video- Visit Details  Type of service:  video visit for psychotherapy  Time of service:    Date:  11/17/2021    Video Start Time:  8:02am        Video End Time:  9:05am    Reason for video visit:  Patient unable to travel due to Covid-19  Originating Site (patient location):  [facility]   Location- Patient's home  Distant Site (provider location):  [facility]  Mode of Communication:  Video Conference via AmWell  Consent:  Patient has given verbal consent for video visit?: Yes    Diagnoses:   Unspecified depressive disorder and unspecified anxiety    Goals of therapy: Elevate mood and show evidence of usual energy levels, and activities. Manage stress from planned move and transition into [employer] program      Individuals Present:   Psychotherapy services during this visit included myself and Dariusz Leyva.    Narrative:  When asked about her mood, Dariusz reported she has been ill. She has been working. She is considering withdrawing from school. She described two social situations that were awkward with two different people. Processes what to do in these situations.      Dariusz reported be prior to being ill, she was having urges to self-harm. She denied self-harm, but thought to use it to distract it from her pain from being sad and lonely ( rejected ). She indicated since she has had multiple surgeries she does not want to go to the hospital. This is a deterrent.      She shared her father took his two youngest on a trip and did not share this information with her. Dariusz s brothers are both dead and she really wants to maintain being close to her father. She reflected on her past relationship with her parents. She explained that her father leaving her out of the trip/discussion was like an ultimate rejection.     Mental  "Status:  Appearance:  Casually groomed   Behavior/relationship to examiner/demeanor:  Cooperative and Engaged  Motor activity/EPS:  Within normal limits  Speech rate:  Within normal limits  Speech volume:  Within normal limits  Speech articulation:  Within normal limits  Speech coherence: Within normal limits  Speech spontaneity: Within normal limits  Mood (subjective): \"I am sick\"  Affect (objective appearance): Generally euthymic  Thought Process (Associations):  Logical and Linear   Thought process (Rate):  Within normal limits   Thought content: Within normal limits  Abnormal Perception:  None   Insight:  Good   Judgment:  Good     Plan: Continue with goals as outlined above    Andreea Vásquez Psy.D.,L.P  "

## 2021-11-23 ENCOUNTER — OFFICE VISIT (OUTPATIENT)
Dept: FAMILY MEDICINE | Facility: CLINIC | Age: 31
End: 2021-11-23
Payer: COMMERCIAL

## 2021-11-23 VITALS
RESPIRATION RATE: 16 BRPM | WEIGHT: 293 LBS | BODY MASS INDEX: 48.08 KG/M2 | HEART RATE: 90 BPM | OXYGEN SATURATION: 98 % | DIASTOLIC BLOOD PRESSURE: 85 MMHG | TEMPERATURE: 98.2 F | SYSTOLIC BLOOD PRESSURE: 136 MMHG

## 2021-11-23 DIAGNOSIS — Z23 HIGH PRIORITY FOR 2019-NCOV VACCINE: ICD-10-CM

## 2021-11-23 DIAGNOSIS — Z86.16 HISTORY OF COVID-19: Primary | ICD-10-CM

## 2021-11-23 DIAGNOSIS — F41.9 ANXIETY: ICD-10-CM

## 2021-11-23 PROCEDURE — 91300 COVID-19,PF,PFIZER (12+ YRS): CPT | Performed by: STUDENT IN AN ORGANIZED HEALTH CARE EDUCATION/TRAINING PROGRAM

## 2021-11-23 PROCEDURE — 0001A COVID-19,PF,PFIZER (12+ YRS): CPT | Performed by: STUDENT IN AN ORGANIZED HEALTH CARE EDUCATION/TRAINING PROGRAM

## 2021-11-23 PROCEDURE — 99214 OFFICE O/P EST MOD 30 MIN: CPT | Mod: 25 | Performed by: STUDENT IN AN ORGANIZED HEALTH CARE EDUCATION/TRAINING PROGRAM

## 2021-11-23 RX ORDER — HYDROXYZINE HYDROCHLORIDE 10 MG/1
10 TABLET, FILM COATED ORAL 3 TIMES DAILY PRN
Qty: 90 TABLET | Refills: 0 | Status: SHIPPED | OUTPATIENT
Start: 2021-11-23 | End: 2022-02-03

## 2021-11-23 NOTE — PROGRESS NOTES
Assessment & Plan     1. History of COVID-19  She tested positive for Covid on November 12 after an exposure November 5.  She initially had flulike symptoms without fever, but these have mostly resolved.  Her only symptom today is concern for swollen gland, which is unremarkable on exam.  I reassured her that if there is some swelling this is likely related to Covid and that it will improve with time.  Discussed that if she had any difficulty swallowing or breathing she should be seen urgently.  However, in the meantime she is cleared to return to work.  I suspect that this subjective feeling of swollen glands is related to anxiety related to Covid.  Exam is normal today.  Discussed that if symptoms not improve we could consider a CT scan, but at this point would avoid additional exposure to radiation.    2. Anxiety  She has significant anxiety related to health, and has passed out in the past.  Discussed starting with low-dose of hydroxyzine for when she is feeling especially anxious.  She declined any additional resources related to mental health at this time.  - hydrOXYzine (ATARAX) 10 MG tablet; Take 1 tablet (10 mg) by mouth 3 times daily as needed for anxiety  Dispense: 90 tablet; Refill: 0    3. High priority for 2019-nCoV vaccine  She is safe to get a Covid vaccine today.  This is required for her work.  Discussed symptoms to look out for and what to do if she does have them including Tylenol and ibuprofen.  - COVID-19,PF,PFIZER (12+ Yrs PURPLE LABEL)      Ordering of each unique test  Prescription drug management  25 minutes spent on the date of the encounter doing chart review, history and exam, documentation and further activities per the note     Return if symptoms worsen or fail to improve.    Elise Elena MD PGY3  [facility]    Iram Arzola is a 31 year old who presents for the following health issues:    HPI   She presents for Covid follow-up.  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 She was exposed on November 5th. Had allergy-like symptoms that following weekend, followed by flu-like symptoms.  The symptoms included congestion, cough, nausea, vomiting.  She has never had any fever, however she takes Tylenol regularly for leg pain.    For the most part, she is recovered from the symptoms.  However she still says that her glands feel swollen like there is a lump in her throat.  No difficulty breathing or difficulty swallowing.  Also productive wet cough. She has anxiety related to this and anxiety has caused her to pass out several times now.  She was unable to sleep until about 5 AM last night.    Review of Systems   Constitutional, HEENT, cardiovascular, pulmonary, gi and gu systems are negative, except as otherwise noted.      Objective    /85   Pulse 90   Temp 98.2  F (36.8  C)   Resp 16   Wt 139.3 kg (307 lb)   SpO2 98%   BMI 48.08 kg/m    Body mass index is 48.08 kg/m .  Physical Exam   GENERAL: healthy, alert and no distress  HENT: ear canals and TM's normal, nose and mouth without ulcers or lesions  NECK: no adenopathy, no asymmetry, masses, or scars and thyroid normal to palpation  RESP: lungs clear to auscultation - no rales, rhonchi or wheezes  CV: regular rate and rhythm, normal S1 S2, no S3 or S4, no murmur, click or rub, no peripheral edema and peripheral pulses strong

## 2021-11-23 NOTE — PROGRESS NOTES
Preceptor attestation:  Vital signs reviewed: /85   Pulse 90   Temp 98.2  F (36.8  C)   Resp 16   Wt 139.3 kg (307 lb)   SpO2 98%   BMI 48.08 kg/m      Patient seen, evaluated, and discussed with the resident.  I have verified the content of the note, which accurately reflects my assessment of the patient and the plan of care.    Supervising physician: Marlen Rader MD  [facility]

## 2021-11-23 NOTE — PATIENT INSTRUCTIONS
Take 1 pill (10 mg) of hydroxyzine as needed for anxiety. Can take 2 pills if 1 doesn't work.     Keep an eye on the swelling and let us know next week if it doesn't seem to be improving.

## 2021-12-01 ENCOUNTER — VIRTUAL VISIT (OUTPATIENT)
Dept: PSYCHIATRY | Facility: CLINIC | Age: 31
End: 2021-12-01
Attending: PSYCHOLOGIST
Payer: COMMERCIAL

## 2021-12-01 DIAGNOSIS — F33.1 MODERATE EPISODE OF RECURRENT MAJOR DEPRESSIVE DISORDER (H): Primary | ICD-10-CM

## 2021-12-01 DIAGNOSIS — F41.1 GAD (GENERALIZED ANXIETY DISORDER): ICD-10-CM

## 2021-12-01 PROCEDURE — 90837 PSYTX W PT 60 MINUTES: CPT | Mod: 95 | Performed by: PSYCHOLOGIST
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
## 2021-12-08 ENCOUNTER — VIRTUAL VISIT (OUTPATIENT)
Dept: PSYCHIATRY | Facility: CLINIC | Age: 31
End: 2021-12-08
Attending: PSYCHOLOGIST
Payer: COMMERCIAL

## 2021-12-08 DIAGNOSIS — F41.1 GAD (GENERALIZED ANXIETY DISORDER): ICD-10-CM

## 2021-12-08 DIAGNOSIS — F33.1 MODERATE EPISODE OF RECURRENT MAJOR DEPRESSIVE DISORDER (H): Primary | ICD-10-CM

## 2021-12-08 PROCEDURE — 90837 PSYTX W PT 60 MINUTES: CPT | Mod: 95 | Performed by: PSYCHOLOGIST

## 2021-12-09 ENCOUNTER — PRE VISIT (OUTPATIENT)
Dept: UROLOGY | Facility: CLINIC | Age: 31
End: 2021-12-09
Payer: COMMERCIAL

## 2021-12-09 NOTE — CONFIDENTIAL NOTE
Reason for visit: follow-up medication check, Dr. Renee pt last seen 10/07/2020     Relevant information: urinary incontinence    Records/imaging/labs/orders: in [facility]    Pt called: n/a    At Rooming: standard

## 2021-12-29 ENCOUNTER — VIRTUAL VISIT (OUTPATIENT)
Dept: PSYCHIATRY | Facility: CLINIC | Age: 31
End: 2021-12-29
Attending: PSYCHOLOGIST
Payer: COMMERCIAL

## 2021-12-29 DIAGNOSIS — F41.1 GAD (GENERALIZED ANXIETY DISORDER): ICD-10-CM

## 2021-12-29 DIAGNOSIS — F33.1 MODERATE EPISODE OF RECURRENT MAJOR DEPRESSIVE DISORDER (H): Primary | ICD-10-CM

## 2021-12-29 PROCEDURE — 90837 PSYTX W PT 60 MINUTES: CPT | Mod: 95 | Performed by: PSYCHOLOGIST

## 2021-12-29 ASSESSMENT — PATIENT HEALTH QUESTIONNAIRE - PHQ9
SUM OF ALL RESPONSES TO PHQ QUESTIONS 1-9: 10
SUM OF ALL RESPONSES TO PHQ QUESTIONS 1-9: 10
10. IF YOU CHECKED OFF ANY PROBLEMS, HOW DIFFICULT HAVE THESE PROBLEMS MADE IT FOR YOU TO DO YOUR WORK, TAKE CARE OF THINGS AT HOME, OR GET ALONG WITH OTHER PEOPLE: SOMEWHAT DIFFICULT

## 2021-12-29 ASSESSMENT — ANXIETY QUESTIONNAIRES
3. WORRYING TOO MUCH ABOUT DIFFERENT THINGS: SEVERAL DAYS
7. FEELING AFRAID AS IF SOMETHING AWFUL MIGHT HAPPEN: MORE THAN HALF THE DAYS
1. FEELING NERVOUS, ANXIOUS, OR ON EDGE: MORE THAN HALF THE DAYS
2. NOT BEING ABLE TO STOP OR CONTROL WORRYING: SEVERAL DAYS
GAD7 TOTAL SCORE: 10
7. FEELING AFRAID AS IF SOMETHING AWFUL MIGHT HAPPEN: MORE THAN HALF THE DAYS
6. BECOMING EASILY ANNOYED OR IRRITABLE: MORE THAN HALF THE DAYS
5. BEING SO RESTLESS THAT IT IS HARD TO SIT STILL: NOT AT ALL
GAD7 TOTAL SCORE: 10
4. TROUBLE RELAXING: MORE THAN HALF THE DAYS
GAD7 TOTAL SCORE: 10

## 2021-12-29 NOTE — PROGRESS NOTES
"OUTPATIENT PSYCHOTHERAPY PROGRESS NOTE    Client Name: Dariusz Leyva   YOB: 1990  Time of Service: 58 minutes   Service Type(s): Individual psychotherapy    Video- Visit Details  Type of service:  video visit for psychotherapy  Time of service:    Date:  12/29/2021    Video Start Time:  8:08am        Video End Time:  9:06am    Reason for video visit:  Patient unable to travel due to Covid-19  Originating Site (patient location):  [facility]   Location- Patient's home  Distant Site (provider location): [facility]  Mode of Communication:  Video Conference via Am[employer]KS  Consent:  Patient has given verbal consent for video visit?: Yes    Diagnoses:   Unspecified depressive disorder and unspecified anxiety    Goals of therapy: Elevate mood and show evidence of usual energy levels, and activities. Manage stress from planned move and transition into [facility] program      Individuals Present:   Psychotherapy services during this visit included myself and Dariusz Leyva.    Narrative:  Dariusz reported she is not feeling well. She indicated she has been  anxious.  She has been hesitant to go out because she fell. She will need to go in for another surgery for her knee. Discussed relationships and lopsided nature of her friendships.      Mental Status:  Appearance:  Casually groomed   Behavior/relationship to examiner/demeanor:  cooperative and seemed distant at times  Motor activity/EPS:  Within normal limits  Speech rate:  Within normal limits  Speech volume:  Within normal limits  Speech articulation:  Within normal limits  Speech coherence: Within normal limits  Speech spontaneity: Within normal limits  Mood (subjective): \"anxious\" about going out  Affect (objective appearance): Generally euthymic  Thought Process (Associations):  Logical and Linear   Thought process (Rate):  Within normal limits   Thought content: Within normal limits  Abnormal Perception:  None   Insight:  Good   Judgment:  Good "     Plan: Continue with goals as outlined above    Andreea Vásquez Psy.D.,L.P  Answers for HPI/ROS submitted by the patient on 12/29/2021  If you checked off any problems, how difficult have these problems made it for you to do your work, take care of things at home, or get along with other people?: Somewhat difficult  PHQ9 TOTAL SCORE: 10  CARLY 7 TOTAL SCORE: 10

## 2021-12-30 ASSESSMENT — ANXIETY QUESTIONNAIRES: GAD7 TOTAL SCORE: 10

## 2021-12-30 ASSESSMENT — PATIENT HEALTH QUESTIONNAIRE - PHQ9: SUM OF ALL RESPONSES TO PHQ QUESTIONS 1-9: 10

## 2022-01-05 ENCOUNTER — VIRTUAL VISIT (OUTPATIENT)
Dept: PSYCHIATRY | Facility: CLINIC | Age: 32
End: 2022-01-05
Attending: PSYCHOLOGIST
Payer: COMMERCIAL

## 2022-01-05 DIAGNOSIS — F33.1 MODERATE EPISODE OF RECURRENT MAJOR DEPRESSIVE DISORDER (H): Primary | ICD-10-CM

## 2022-01-05 DIAGNOSIS — F41.1 GAD (GENERALIZED ANXIETY DISORDER): ICD-10-CM

## 2022-01-05 PROCEDURE — 90837 PSYTX W PT 60 MINUTES: CPT | Mod: 95 | Performed by: PSYCHOLOGIST

## 2022-01-05 NOTE — PROGRESS NOTES
OUTPATIENT PSYCHOTHERAPY PROGRESS NOTE    Client Name: Dariusz Leyva   YOB: 1990  Time of Service: 72 minutes   Service Type(s): Individual psychotherapy    Video- Visit Details  Type of service:  video visit for psychotherapy  Time of service:    Date:  01/05/2022    Video Start Time:  8:03am        Video End Time:  9:15am    Reason for video visit:  Patient unable to travel due to Covid-19  Originating Site (patient location):  [facility]   Location- Patient's home  Distant Site (provider location): Remote location  Mode of Communication:  Video Conference via [employer]  Consent:  Patient has given verbal consent for video visit?: Yes    Diagnoses:   Unspecified depressive disorder and unspecified anxiety    Goals of therapy: Elevate mood and show evidence of usual energy levels, and activities. Manage stress from planned move and transition into [facility] program      Individuals Present:   Psychotherapy services during this visit included myself and Dariusz Leyva.    Narrative:  Dariusz reported that she was  not sure what her mood was.  She indicated she was tired. She explained she spent this past week mostly working. She does not feel motivated to talk to people even when they reach out to her. She indicated she does not want to engage in her relationships because they have been not reciprocal. She reported she feels  pathetic  in her relationships. She explained she was bullied as a child. She indicated about 4th grade she showed a bully that she was  useful  and they stopped bullying until she stopped showing her  usefulness.       Discussed her coping habits and how she models her father. She explains he  overdoes  things. He will get into something and do more. She reported she tends to buy clothes and bags and other things. Sometimes she never uses them - she feels good receiving things. She explained if she went shopping with someone else, she would buy less.      She suggested that there  are things she wants to change about herself.      She will be taking a trip to [place] Jan 14-17.     Mental Status:  Appearance:  Casually groomed   Behavior/relationship to examiner/demeanor:  cooperative  Motor activity/EPS:  Within normal limits  Speech rate:  Within normal limits  Speech volume:  Within normal limits  Speech articulation:  Within normal limits  Speech coherence: Within normal limits  Speech spontaneity: Within normal limits  Mood (subjective):  not sure what her mood was.   Affect (objective appearance): Generally euthymic  Thought Process (Associations):  Logical and Linear   Thought process (Rate):  Within normal limits   Thought content: Within normal limits  Abnormal Perception:  None   Insight:  Good   Judgment:  Good     Plan: Continue with goals as outlined above    Andreea Vásquez Psy.D.,L.P

## 2022-01-10 ENCOUNTER — OFFICE VISIT (OUTPATIENT)
Dept: FAMILY MEDICINE | Facility: CLINIC | Age: 32
End: 2022-01-10
Payer: COMMERCIAL

## 2022-01-10 VITALS
RESPIRATION RATE: 18 BRPM | TEMPERATURE: 98.2 F | SYSTOLIC BLOOD PRESSURE: 117 MMHG | WEIGHT: 293 LBS | OXYGEN SATURATION: 96 % | HEART RATE: 76 BPM | BODY MASS INDEX: 57.95 KG/M2 | DIASTOLIC BLOOD PRESSURE: 79 MMHG

## 2022-01-10 DIAGNOSIS — Z11.3 SCREEN FOR STD (SEXUALLY TRANSMITTED DISEASE): Primary | ICD-10-CM

## 2022-01-10 DIAGNOSIS — E73.9 DIETARY LACTOSE INTOLERANCE: ICD-10-CM

## 2022-01-10 LAB
ALBUMIN UR-MCNC: NEGATIVE MG/DL
APPEARANCE UR: CLEAR
BILIRUB UR QL STRIP: NEGATIVE
CLUE CELLS: ABNORMAL
COLOR UR AUTO: YELLOW
GLUCOSE UR STRIP-MCNC: NEGATIVE MG/DL
HCG UR QL: NEGATIVE
HGB UR QL STRIP: NEGATIVE
HIV 1+2 AB+HIV1 P24 AG SERPL QL IA: NEGATIVE
KETONES UR STRIP-MCNC: NEGATIVE MG/DL
LEUKOCYTE ESTERASE UR QL STRIP: NEGATIVE
NITRATE UR QL: NEGATIVE
PH UR STRIP: 5.5 [PH] (ref 5–8)
SP GR UR STRIP: >=1.03 (ref 1–1.03)
TRICHOMONAS, WET PREP: ABNORMAL
UROBILINOGEN UR STRIP-ACNC: 0.2 E.U./DL
WBC'S/HIGH POWER FIELD, WET PREP: ABNORMAL
YEAST, WET PREP: ABNORMAL

## 2022-01-10 PROCEDURE — 99213 OFFICE O/P EST LOW 20 MIN: CPT | Mod: GC | Performed by: STUDENT IN AN ORGANIZED HEALTH CARE EDUCATION/TRAINING PROGRAM

## 2022-01-10 PROCEDURE — 87210 SMEAR WET MOUNT SALINE/INK: CPT | Performed by: STUDENT IN AN ORGANIZED HEALTH CARE EDUCATION/TRAINING PROGRAM

## 2022-01-10 PROCEDURE — 86780 TREPONEMA PALLIDUM: CPT | Performed by: STUDENT IN AN ORGANIZED HEALTH CARE EDUCATION/TRAINING PROGRAM

## 2022-01-10 PROCEDURE — 81003 URINALYSIS AUTO W/O SCOPE: CPT | Performed by: STUDENT IN AN ORGANIZED HEALTH CARE EDUCATION/TRAINING PROGRAM

## 2022-01-10 PROCEDURE — 36415 COLL VENOUS BLD VENIPUNCTURE: CPT | Performed by: STUDENT IN AN ORGANIZED HEALTH CARE EDUCATION/TRAINING PROGRAM

## 2022-01-10 PROCEDURE — 81025 URINE PREGNANCY TEST: CPT | Performed by: STUDENT IN AN ORGANIZED HEALTH CARE EDUCATION/TRAINING PROGRAM

## 2022-01-10 PROCEDURE — 87389 HIV-1 AG W/HIV-1&-2 AB AG IA: CPT | Performed by: STUDENT IN AN ORGANIZED HEALTH CARE EDUCATION/TRAINING PROGRAM

## 2022-01-10 PROCEDURE — 87591 N.GONORRHOEAE DNA AMP PROB: CPT | Performed by: STUDENT IN AN ORGANIZED HEALTH CARE EDUCATION/TRAINING PROGRAM

## 2022-01-10 RX ORDER — CHOLECALCIFEROL (VITAMIN D3) 125 MCG
3000 CAPSULE ORAL
Qty: 30 TABLET | Refills: 0 | Status: SHIPPED | OUTPATIENT
Start: 2022-01-10 | End: 2022-02-15

## 2022-01-10 NOTE — PATIENT INSTRUCTIONS
"Thank you for coming into the clinic today!  Here is what we discussed:    Your results will be on MyChart when they come in    Take Lactaid as needed with first bite of meal containing lactose    Schedule a complete physical exam with your PCP, Dr. Hernandes, for a pap smear as well.     If you have any questions, please call the clinic at [phone].     Patient Education     Lactose Intolerance    Lactose is a simple sugar found in milk and dairy products. Lactose is found in dairy products such as milk, cheese, cottage cheese, cream, sour cream, ice cream, sherbet, milk solids, powdered milk, and whey.   Lactose is digested with the help of an enzyme the body makes called  lactase.\" People with lactose intolerance can't digest dairy products. This is because their bodies don't make enough lactase. Undigested lactose in the gut can't be absorbed. This can cause nausea, cramping, bloating, gas, diarrhea, and foul-smelling stools. These symptoms occur within 30 minutes to 2 hours after eating. Symptoms may be mild or severe.   Babies are born with the lactose enzyme, which allows them to absorb breast milk. However, lactose intolerance may appear in children as early as 2 to 5 years old. Even if you have been able to eat dairy products all your life, your body may lose the ability to make the lactose enzyme as you get older. Asians,  Americans, Hispanics, and American Indians are prone to get this problem earlier in life.   Lactose intolerance is different from a milk allergy. A milk allergy is an immune system disorder.   Home care  These guidelines will help you care for yourself at home:     Your body doesn t need dairy products to be healthy. So, if your symptoms are severe, it's best to stop eating dairy products that contain lactose.    If your symptoms are milder, you can probably do well consuming smaller amounts of dairy as long as you combine it with nondairy foods. Yogurt with live cultures may be OK " because it contains enzymes that digest lactose. Butter, margarine, hard and aged cheeses (cheddar, Swiss) contain less lactose and may be OK to eat. You will have to experiment to learn how much dairy you can tolerate without getting symptoms. It may help to keep a food diary.    There are many lactose-free dairy products including milk, ice cream, and cheeses that allow you to still enjoy dairy products. You may also take a lactase enzyme as a supplement that will help you digest dairy products.    Everyone needs calcium and vitamin D in their diet for healthy bones. If you reduce or eliminate dairy from your diet, you need to replace it with other food sources that contain these nutrients such as kale, broccoli, white beans, green beans, lima beans, salmon, soy beans, tofu, or fortified juices. Or, you can take daily supplements.    Learn to read food labels. Also, watch for prepared foods that are made with products that contain lactose (such as bread, cereal, lunch meats, salad dressings, cake and cookie mix, and coffee creamers). However, many people can consume small amounts of lactose without symptoms, so an overly restrictive diet is often not needed.    Lactase enzyme supplements can be obtained over-the-counter. They can help control symptoms if you take the enzymes when you eat lactose.  Other products besides milk and milk products may contain lactose. They may be less obvious, so check the labels carefully. These things may not bother you, but should be considered if you continue to have problems:     Bread and other baked goods    Waffles, pancakes, biscuits, cookies, and mixes to make them    Processed breakfast foods such as doughnuts, frozen waffles and pancakes, toaster pastries, and sweet rolls    Processed breakfast cereals    Instant potatoes, soups, and breakfast drinks    Potato chips, corn chips, and other processed snacks    Processed meats like chang, sausage, hot dogs, and lunch  meats    Margarine    Salad dressings    Liquid and powdered milk-based meal replacements    Protein powders and bars    Candies    Nondairy liquid and powdered coffee creamers    Nondairy whipped toppings  Follow-up care  Follow up with your healthcare provider, or as advised.   When to seek medical advice  Call your healthcare provider right away if any of these occur:     Increasing abdominal pain or swelling    Abdominal pain that moves to the right side of the lower abdomen    Fever of 100.4 F (38 C) or higher, or as directed by your healthcare provider    Repeated vomiting or diarrhea    Blood in the stool or black and tarry stool (depending on the amount of blood, consider calling 911 for emergency help)  [employer] last reviewed this educational content on 8/1/2019 2000-2021 [employer]. All rights reserved. This information is not intended as a substitute for professional medical care. Always follow your healthcare professional's instructions.

## 2022-01-10 NOTE — PROGRESS NOTES
Preceptor Attestation:    I discussed the patient with the resident and evaluated the patient in person. I have verified the content of the note, which accurately reflects my assessment of the patient and the plan of care.   Supervising Physician:  Harshal Hernandez MD.

## 2022-01-10 NOTE — PROGRESS NOTES
"Assessment & Plan     Screen for STD (sexually transmitted disease)  1 new male partner, intermittent contraceptive use of condoms. Clear odor-less vaginal discharge, chronic with no acute change, appears physiologic discharge. No concerning findings on exam, mild suprapubic tenderness but patient is not sure if true tenderness or not. Labs as below, f/u as needed.   - Chlamydia/Gono Amplified  - Wet Prep  - HCG qualitative urine  - HIV Ag/Ab Screen Cascade  - Syphilis Screen Cascade  - UA reflex to Microscopic    Dietary lactose intolerance  Trial of lactaid for symptomatic relief, history is equivocal with lactose intolerance considering intermittent symptoms. May have some component of IBS vs functional diarrhea.    - lactase (LACTAID) 3000 UNIT tablet  Dispense: 30 tablet; Refill: 0    Pt due for preventative visit with Pap smear. Discussed with patient possibility of pap + HPV q5yr (if normal) per ACS guidelines vs pap q3yr (if normal) per AAFP/USPTF guidelines or waiting until 30yrs old to follow       Ordering of each unique test     BMI:   Estimated body mass index is 57.95 kg/m  as calculated from the following:    Height as of 8/27/21: 1.702 m (5' 7\").    Weight as of this encounter: 167.8 kg (370 lb).   Weight management plan: Discussed healthy diet and exercise guidelines    Return in about 9 months (around 10/10/2022) for preventative care visit with Pap smear.    Options for treatment and follow-up care were reviewed with the patient who was engaged and actively involved in the decision making process, verbalized understanding of the options discussed, and satisfied with the final plan.    Patient was staffed with supervising physician, Dr. aHrshal Hernandez, who agrees with the findings and plan.     Oscar Ibanez DO, PGY-2  [facility]    Subjective   Dariusz Leyva is a 31 year old year old female who presents for the following health issues   Past Medical History:   Diagnosis Date     " Depressive disorder      Patient Active Problem List   Diagnosis     Lumbago     Nonallopathic lesion of lumbar region     Nonallopathic lesion of sacral region     Pain in thoracic spine     Nonallopathic lesion of thoracic region     Abnormal Pap smear of cervix     Large tonsils     Morbid obesity (H)     MDD (major depressive disorder)     CARLY (generalized anxiety disorder)     Depression     Lipid screening     Knee pain     Anxiety state     Injury of ligament of right knee     MDD (major depressive disorder), recurrent episode, moderate (H)     Painful orthopaedic hardware (H)     Tibial plateau fracture, right, closed, initial encounter     Chief Complaint   Patient presents with     RECHECK     CK STD/ CR / lower abdominal pain today and not sure it is related to what i ate per pt    Concerns of STI/STDs.  1 new male partner, does not trust him.  Vaginal discharge of clear discharge, not malodorous, chronic with no acute changes.   Denies dysuria.   Some mild abdominal pain, but had [place] yesterday and having loose stools from that. She is unable to differential suprapubic pain from GI pain related to bad [place].     LMP 1/3/22 - 1/5/22. Gets one every month but timing is irregular. No concern of pregnancy, sometimes uses contraceptions but not always.     Denies fever, dyspareunia, chills, myalgias, hematuria, dysuria, urinary frequency, urinary urgency, flank pain.     Lactose intolerance:  Reports intermittent GI issues after eating dairy for few years. Sometimes she would have loose stools 5-10 minutes after meals that include dairy but sometimes she would have issues 8-12hrs after a meal. Other times she would not have issues. Wondering if there are tests or something she can take to help. Denies abdominal pain, abdominal cramping, nausea, vomiting, hematemesis, with dairy.     Review of Systems:  Constitutional, HEENT, cardiovascular, pulmonary, GI, , musculoskeletal, neuro, skin,  endocrine and psych systems are negative, except as otherwise noted.      Objective    /79   Pulse 76   Temp 98.2  F (36.8  C) (Oral)   Resp 18   Wt (!) 167.8 kg (370 lb)   LMP 01/03/2022 (Exact Date)   SpO2 96%   Breastfeeding Unknown   BMI 57.95 kg/m    Body mass index is 57.95 kg/m .    Physical Exam   GENERAL: healthy, alert and no distress  CV: regular rate and rhythm, normal S1 S2, no S3 or S4, no murmur, click or rub, no peripheral edema and peripheral pulses strong  ABDOMEN: soft, mild suprapubic tenderness, no hepatosplenomegaly, no masses and bowel sounds normal  MS: no gross musculoskeletal defects noted, no edema  SKIN: no suspicious lesions or rashes    ----- Service Performed and Documented by Resident or Fellow ------

## 2022-01-11 LAB
N GONORRHOEA DNA SPEC QL NAA+PROBE: NEGATIVE
T PALLIDUM AB SER QL: NONREACTIVE

## 2022-01-12 ENCOUNTER — VIRTUAL VISIT (OUTPATIENT)
Dept: PSYCHIATRY | Facility: CLINIC | Age: 32
End: 2022-01-12
Attending: PSYCHOLOGIST
Payer: COMMERCIAL

## 2022-01-12 DIAGNOSIS — F33.1 MODERATE EPISODE OF RECURRENT MAJOR DEPRESSIVE DISORDER (H): Primary | ICD-10-CM

## 2022-01-12 DIAGNOSIS — F41.1 GAD (GENERALIZED ANXIETY DISORDER): ICD-10-CM

## 2022-01-12 PROCEDURE — 90837 PSYTX W PT 60 MINUTES: CPT | Mod: 95 | Performed by: PSYCHOLOGIST

## 2022-01-12 NOTE — PROGRESS NOTES
OUTPATIENT PSYCHOTHERAPY PROGRESS NOTE    Client Name: Dariusz Leyva   YOB: 1990  Time of Service: 61 minutes   Service Type(s): Individual psychotherapy    Video- Visit Details  Type of service:  video visit for psychotherapy  Time of service:    Date:  01/12/2022    Video Start Time:  8:01am        Video End Time:  9:02am    Reason for video visit:  Patient unable to travel due to Covid-19  Originating Site (patient location):  [facility]   Location- Patient's home  Distant Site (provider location): Remote location  Mode of Communication:  Video Conference via [employer]  Consent:  Patient has given verbal consent for video visit?: Yes    Diagnoses:   Unspecified depressive disorder and unspecified anxiety    Goals of therapy: Elevate mood and show evidence of usual energy levels, and activities. Manage stress from planned move and transition into [facility] program      Individuals Present:   Psychotherapy services during this visit included myself and Dariusz Leyva.    Narrative:  Dariusz reported her mood has been  a lot of up and down.  She is feeling overwhelmed with work. 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 She is also putting in an application for an apartment.      She shared a conversation that she had with a friend, where the friend indicated Dariusz had not been a good friend. Discussed how they saw the situation differently. Dariusz indicated that she expresses her feelings differently.      Discussed how friends do not support her in the same way. She indicated that when she shared about a relationship that ended, there was a sense that she could not grieve for more than a day. The breakup with  FITZ  hit her vulnerabilities that she is  not loveable.  She does not know if she can  be in love with someone again.  She wants to forget about him and move past it.      She is currently talking to someone in [place]. She is going to visit him. They have been vulnerable in their  conversations, but in the past, he did not share everything.     Mental Status:  Appearance:  Casually groomed   Behavior/relationship to examiner/demeanor:  cooperative  Motor activity/EPS:  Within normal limits  Speech rate:  Within normal limits  Speech volume:  Within normal limits  Speech articulation:  Within normal limits  Speech coherence: Within normal limits  Speech spontaneity: Within normal limits  Mood (subjective):  a lot of up and down   Affect (objective appearance): Generally euthymic, but one period of dysphoria in relation to a past relationship  Thought Process (Associations):  Logical and Linear   Thought process (Rate):  Within normal limits   Thought content: Within normal limits  Abnormal Perception:  None   Insight:  Good   Judgment:  Good     Plan: Continue with goals as outlined above    Andreea Vásquez Psy.D.,L.P

## 2022-01-19 ENCOUNTER — VIRTUAL VISIT (OUTPATIENT)
Dept: PSYCHIATRY | Facility: CLINIC | Age: 32
End: 2022-01-19
Attending: PSYCHOLOGIST
Payer: COMMERCIAL

## 2022-01-19 ENCOUNTER — IMMUNIZATION (OUTPATIENT)
Dept: NURSING | Facility: CLINIC | Age: 32
End: 2022-01-19
Attending: STUDENT IN AN ORGANIZED HEALTH CARE EDUCATION/TRAINING PROGRAM
Payer: COMMERCIAL

## 2022-01-19 DIAGNOSIS — F33.1 MODERATE EPISODE OF RECURRENT MAJOR DEPRESSIVE DISORDER (H): Primary | ICD-10-CM

## 2022-01-19 DIAGNOSIS — F41.1 GAD (GENERALIZED ANXIETY DISORDER): ICD-10-CM

## 2022-01-19 PROCEDURE — 0052A COVID-19,PF,PFIZER (12+ YRS): CPT

## 2022-01-19 PROCEDURE — 91305 COVID-19,PF,PFIZER (12+ YRS): CPT

## 2022-01-19 PROCEDURE — 90837 PSYTX W PT 60 MINUTES: CPT | Mod: 95 | Performed by: PSYCHOLOGIST

## 2022-01-19 NOTE — PROGRESS NOTES
"OUTPATIENT PSYCHOTHERAPY PROGRESS NOTE    Client Name: Dariusz Leyva   YOB: 1990  Time of Service: 62 minutes   Service Type(s): Individual psychotherapy    Video- Visit Details  Type of service:  video visit for psychotherapy  Time of service:    Date:  01/19/2022    Video Start Time:  8:01am        Video End Time:  9:03am    Reason for video visit:  Patient unable to travel due to Covid-19  Originating Site (patient location):  [facility]   Location- Patient's home  Distant Site (provider location): Remote location  Mode of Communication:  Video Conference via [employer]  Consent:  Patient has given verbal consent for video visit?: Yes    Diagnoses:   Unspecified depressive disorder and unspecified anxiety    Goals of therapy: Elevate mood and show evidence of usual energy levels, and activities. Manage stress from planned move and transition into [facility] program      Individuals Present:   Psychotherapy services during this visit included myself and Dariusz Leyva.    Narrative:  Dariusz indicated that she enjoyed herself in [place], but the place she was staying did not have adequate heat. She described interactions with the person she was going there to see. She explained she does not Know how to deal with people,  she does not know what the  wrong or right  thing is. She hopes to return to [place] more often to see \"E\". Discussed experiences with giving and how it has come up in past relationships.      Discussed a friendship that she has had since she was a child. She reported that the relationship used to be more codependent. She indicated that she is growing more and that her friend does not seem to.      She shared that she puts a lot into being  enough  but does not feel like it is enough. Questioned her ability to be loved. She only heard  I love you  from her dad and her grandmother. She only recently started hearing from her mom. She has felt constantly rejected her entire life, with the exception of " a few adults that came into her life.      Mental Status:  Appearance:  Casually groomed   Behavior/relationship to examiner/demeanor:  cooperative  Motor activity/EPS:  Within normal limits  Speech rate:  Within normal limits  Speech volume:  Within normal limits  Speech articulation:  Within normal limits  Speech coherence: Within normal limits  Speech spontaneity: Within normal limits  Mood (subjective): Not directly assessed   Affect (objective appearance): Euthymic  Thought Process (Associations):  Logical and Linear   Thought process (Rate):  Within normal limits   Thought content: Within normal limits  Abnormal Perception:  None   Insight:  Good   Judgment:  Good     Plan: Continue with goals as outlined above    Andreea Vásquez Psy.D.,L.P

## 2022-01-26 ENCOUNTER — VIRTUAL VISIT (OUTPATIENT)
Dept: PSYCHIATRY | Facility: CLINIC | Age: 32
End: 2022-01-26
Attending: PSYCHOLOGIST
Payer: COMMERCIAL

## 2022-01-26 DIAGNOSIS — F33.1 MODERATE EPISODE OF RECURRENT MAJOR DEPRESSIVE DISORDER (H): Primary | ICD-10-CM

## 2022-01-26 DIAGNOSIS — F41.1 GAD (GENERALIZED ANXIETY DISORDER): ICD-10-CM

## 2022-01-26 PROCEDURE — 90837 PSYTX W PT 60 MINUTES: CPT | Mod: GT | Performed by: PSYCHOLOGIST

## 2022-01-26 NOTE — PROGRESS NOTES
OUTPATIENT PSYCHOTHERAPY PROGRESS NOTE    Client Name: Dariusz Leyva   YOB: 1990  Time of Service: 64 minutes   Service Type(s): Individual psychotherapy    Video- Visit Details  Type of service:  video visit for psychotherapy  Time of service:    Date:  01/26/2022    Video Start Time:  8:05am        Video End Time:  9:09am    Reason for video visit:  Patient unable to travel due to Covid-19  Originating Site (patient location):  [facility]   Location- Patient's home  Distant Site (provider location): Health system Clinic  Mode of Communication:  Video Conference via [employer]  Consent:  Patient has given verbal consent for video visit?: Yes    Diagnoses:   Unspecified depressive disorder and unspecified anxiety    Goals of therapy: Elevate mood and show evidence of usual energy levels, and activities. Manage stress from planned move and transition into [facility] program      Individuals Present:   Psychotherapy services during this visit included myself and Dariusz Leyva.    Narrative:  Dariusz reported she has been doing a lot of overthinking. She has been trying to determine if she is doing too much or too little in her relationship with  E . If he does not respond right away, she will start to have negative thoughts. She is sensitive to how  E  may respond to what she says. It is easier when they are in person. When she is away, she feels the pressure to be  perfect.  With  J  she tried to be  good enough  for him and stayed connected with him hoping it would come to fruition. One of the things she likes about  E  is that he is vulnerable with her. She is concerned that their physical distance will create a void. Discussed what she wants right now in her life.       Mental Status:  Appearance:  Casually groomed   Behavior/relationship to examiner/demeanor:  cooperative  Motor activity/EPS:  Within normal limits  Speech rate:  Within normal limits  Speech volume:  Within normal limits  Speech articulation:   "Within normal limits  Speech coherence: Within normal limits  Speech spontaneity: Within normal limits  Mood (subjective): \"been doing a lot of overthinking\"  Affect (objective appearance): Euthymic  Thought Process (Associations):  Logical and Linear   Thought process (Rate):  Within normal limits   Thought content: Within normal limits  Abnormal Perception:  None   Insight:  Good   Judgment:  Good     Plan: Continue with goals as outlined above    Andreea Vásquez Psy.D.,L.P  "

## 2022-02-02 ENCOUNTER — VIRTUAL VISIT (OUTPATIENT)
Dept: PSYCHIATRY | Facility: CLINIC | Age: 32
End: 2022-02-02
Attending: PSYCHOLOGIST
Payer: COMMERCIAL

## 2022-02-02 DIAGNOSIS — F33.1 MODERATE EPISODE OF RECURRENT MAJOR DEPRESSIVE DISORDER (H): Primary | ICD-10-CM

## 2022-02-02 DIAGNOSIS — F41.1 GAD (GENERALIZED ANXIETY DISORDER): ICD-10-CM

## 2022-02-02 PROCEDURE — 90837 PSYTX W PT 60 MINUTES: CPT | Mod: GT | Performed by: PSYCHOLOGIST

## 2022-02-02 ASSESSMENT — PATIENT HEALTH QUESTIONNAIRE - PHQ9
10. IF YOU CHECKED OFF ANY PROBLEMS, HOW DIFFICULT HAVE THESE PROBLEMS MADE IT FOR YOU TO DO YOUR WORK, TAKE CARE OF THINGS AT HOME, OR GET ALONG WITH OTHER PEOPLE: SOMEWHAT DIFFICULT
SUM OF ALL RESPONSES TO PHQ QUESTIONS 1-9: 8
SUM OF ALL RESPONSES TO PHQ QUESTIONS 1-9: 8
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
## 2022-02-03 ENCOUNTER — OFFICE VISIT (OUTPATIENT)
Dept: SURGERY | Facility: CLINIC | Age: 32
End: 2022-02-03
Payer: COMMERCIAL

## 2022-02-03 ENCOUNTER — LAB (OUTPATIENT)
Dept: LAB | Facility: CLINIC | Age: 32
End: 2022-02-03

## 2022-02-03 VITALS
DIASTOLIC BLOOD PRESSURE: 72 MMHG | HEIGHT: 67 IN | SYSTOLIC BLOOD PRESSURE: 118 MMHG | BODY MASS INDEX: 45.99 KG/M2 | WEIGHT: 293 LBS

## 2022-02-03 DIAGNOSIS — E66.01 MORBID OBESITY WITH BMI OF 45.0-49.9, ADULT (H): ICD-10-CM

## 2022-02-03 DIAGNOSIS — R63.8 ABNORMAL CRAVING: ICD-10-CM

## 2022-02-03 DIAGNOSIS — E66.01 MORBID OBESITY WITH BMI OF 45.0-49.9, ADULT (H): Primary | ICD-10-CM

## 2022-02-03 PROBLEM — M50.30 DDD (DEGENERATIVE DISC DISEASE), CERVICAL: Status: ACTIVE | Noted: 2022-02-03

## 2022-02-03 PROBLEM — G43.109 MIGRAINE WITH AURA AND WITHOUT STATUS MIGRAINOSUS, NOT INTRACTABLE: Status: ACTIVE | Noted: 2022-02-03

## 2022-02-03 PROBLEM — G89.29 CHRONIC PAIN OF RIGHT KNEE: Status: ACTIVE | Noted: 2020-07-29

## 2022-02-03 PROBLEM — Z74.09 LIMITED MOBILITY: Status: ACTIVE | Noted: 2022-02-03

## 2022-02-03 PROBLEM — E78.5 DYSLIPIDEMIA: Status: ACTIVE | Noted: 2022-02-03

## 2022-02-03 PROBLEM — M25.561 CHRONIC PAIN OF RIGHT KNEE: Status: ACTIVE | Noted: 2020-07-29

## 2022-02-03 PROBLEM — M51.369 DDD (DEGENERATIVE DISC DISEASE), LUMBAR: Status: ACTIVE | Noted: 2022-02-03

## 2022-02-03 LAB
ALBUMIN SERPL-MCNC: 3.5 G/DL (ref 3.5–5)
ALP SERPL-CCNC: 86 U/L (ref 45–120)
ALT SERPL W P-5'-P-CCNC: 14 U/L (ref 0–45)
ANION GAP SERPL CALCULATED.3IONS-SCNC: 9 MMOL/L (ref 5–18)
AST SERPL W P-5'-P-CCNC: 14 U/L (ref 0–40)
BILIRUB SERPL-MCNC: 0.3 MG/DL (ref 0–1)
BUN SERPL-MCNC: 14 MG/DL (ref 8–22)
CALCIUM SERPL-MCNC: 9.5 MG/DL (ref 8.5–10.5)
CHLORIDE BLD-SCNC: 109 MMOL/L (ref 98–107)
CO2 SERPL-SCNC: 22 MMOL/L (ref 22–31)
CREAT SERPL-MCNC: 0.87 MG/DL (ref 0.6–1.1)
FERRITIN SERPL-MCNC: 37 NG/ML (ref 10–130)
GFR SERPL CREATININE-BSD FRML MDRD: >90 ML/MIN/1.73M2
GLUCOSE BLD-MCNC: 93 MG/DL (ref 70–125)
HBA1C MFR BLD: 5.6 % (ref 0–5.6)
POTASSIUM BLD-SCNC: 5.2 MMOL/L (ref 3.5–5)
PROT SERPL-MCNC: 7.2 G/DL (ref 6–8)
PTH-INTACT SERPL-MCNC: 136 PG/ML (ref 10–86)
SODIUM SERPL-SCNC: 140 MMOL/L (ref 136–145)
TSH SERPL DL<=0.005 MIU/L-ACNC: 1.46 UIU/ML (ref 0.3–5)
VIT B12 SERPL-MCNC: 507 PG/ML (ref 213–816)

## 2022-02-03 PROCEDURE — 83970 ASSAY OF PARATHORMONE: CPT

## 2022-02-03 PROCEDURE — 83036 HEMOGLOBIN GLYCOSYLATED A1C: CPT

## 2022-02-03 PROCEDURE — 84425 ASSAY OF VITAMIN B-1: CPT | Mod: 90

## 2022-02-03 PROCEDURE — 99215 OFFICE O/P EST HI 40 MIN: CPT | Performed by: FAMILY MEDICINE

## 2022-02-03 PROCEDURE — 82728 ASSAY OF FERRITIN: CPT

## 2022-02-03 PROCEDURE — 82607 VITAMIN B-12: CPT

## 2022-02-03 PROCEDURE — 99000 SPECIMEN HANDLING OFFICE-LAB: CPT

## 2022-02-03 PROCEDURE — 82306 VITAMIN D 25 HYDROXY: CPT

## 2022-02-03 PROCEDURE — 80053 COMPREHEN METABOLIC PANEL: CPT

## 2022-02-03 PROCEDURE — 36415 COLL VENOUS BLD VENIPUNCTURE: CPT

## 2022-02-03 PROCEDURE — 84443 ASSAY THYROID STIM HORMONE: CPT

## 2022-02-03 RX ORDER — ULIPRISTAL ACETATE 30 MG/1
TABLET ORAL
COMMUNITY
Start: 2022-01-11 | End: 2022-07-22

## 2022-02-03 RX ORDER — NALTREXONE HYDROCHLORIDE 50 MG/1
TABLET, FILM COATED ORAL
Qty: 45 TABLET | Refills: 3 | Status: SHIPPED | OUTPATIENT
Start: 2022-02-03 | End: 2024-06-04

## 2022-02-03 RX ORDER — PHENTERMINE HYDROCHLORIDE 37.5 MG/1
18.75-37.5 TABLET ORAL
Qty: 90 TABLET | Refills: 0 | Status: SHIPPED | OUTPATIENT
Start: 2022-02-03 | End: 2022-04-27

## 2022-02-03 RX ORDER — NAPHAZOLINE HCL/GLYCERIN 0.012-0.2%
1 DROPS OPHTHALMIC (EYE) DAILY
COMMUNITY
Start: 2021-08-27 | End: 2024-06-04

## 2022-02-03 RX ORDER — IBUPROFEN 600 MG/1
TABLET, FILM COATED ORAL
COMMUNITY
Start: 2021-12-23 | End: 2022-10-27

## 2022-02-03 RX ORDER — PHENTERMINE HYDROCHLORIDE 37.5 MG/1
18.75-37.5 TABLET ORAL
Qty: 90 TABLET | Refills: 0 | Status: SHIPPED | OUTPATIENT
Start: 2022-02-03 | End: 2022-02-03

## 2022-02-03 ASSESSMENT — PATIENT HEALTH QUESTIONNAIRE - PHQ9: SUM OF ALL RESPONSES TO PHQ QUESTIONS 1-9: 8

## 2022-02-03 ASSESSMENT — MIFFLIN-ST. JEOR: SCORE: 2195.99

## 2022-02-03 NOTE — PATIENT INSTRUCTIONS
[facility]    Remember to:    -Eat 3 meals a day (not 2, not 5) Chew your food well/SLOW down  -Eat your protein first  -Be a water drinker/Minize liquid calories (no regular pop, no juice) skim or 1% milk OK  -Sleep 7-8 hours each night. Address sleep if problematic  -Stress management is important. Address if problematic  -Move-8000 steps daily Muscle: maintain your muscle mass (strength training 2X/wk)  -Wheat, not white (bread, pasta, crackers, sravani, bagels, tortillas, rice)  -Limit restaurant, cafeteria, take out, drive through to 2 times per week or less  -Minimize caffeine, alcohol, and night-time snacking  -Consider keeping a food diary (i.e. My Fitness Pal, Lose It, or other food tracker)  -Follow up with the dietitian      **Some lean proteins: chicken, turkey, tuna, salmon, crab, fish, shrimp, scallops, lobster, lean cuts of beef and pork, luncheon meats, veggie burgers, beans (black, lima, garbanzo, gregory, kidney, refried), chile, cottage cheese, string cheese, other cheese, eggs, tofu, peanut butter, nuts, vegan crumbles, greek yogurt    MEDICATIONS FOR WEIGHT LOSS    PHENTERMINE (Adipex): approved in 1959 for appetite suppression.  It has stimulant effects and cannot be used with Ritalin, Concerta, or other stimulants.  It is not addictive although it's chemically related to amphetamines.  Amphetamines are addictive. The most common side effects are dry mouth, increased energy and concentration, increased pulse, and constipation.  You should not take phentermine if you have glaucoma, hyperthyroidism, or uncontrolled/untreated hypertension.  $24-$30 for 90 tablets    PHENDIMETRAZINE (Bontril): Appetite suppressant/sympathomimetic.  Controlled substance.  Side effects and contraindications similar to phentermine.  $45-$60 for 3 month supply    TOPIRAMATE (Topamax): Anti-seizure medication, also used to prevent migraines.  Side effects include paresthesia, glaucoma, altered concentration,  attention difficulties, memory and speech problems, metabolic acidosis, depression, increase in body temperature and decrease sweating, kidney stones, and weight loss.  Do not take Topamax while taking Depakote as this can cause high ammonia levels.  You must have reliable birth control as Topamax can cause birth defects.  Discontinue slowly to avoid seizure.  Insurance usually covers Topiramate.    QSYMIA (Phentermine + Topamax):  See above information about phentermine and Topamax.  Most common side effects are paresthesia, dizziness, distortion of taste, insomnia, constipation, and dry mouth.  $150-$220 per month    DIETHYLPROPION (Tenuate): Sympathomimetic amine.  Appetite suppressant.  Doses 25 mg before meals or 75 mg per day.  Most common side effects are hypertension, palpitations, EKG changes, and increased seizures in epileptics.  There can be a possible adverse reaction with alcohol.  $70-$90 per 3 months    XENICAL(Orlistat) (Ady OTC): Approved in 1999.  A fat-blocker.  It reduces absorption of fat by approximately 30%.  It has beneficial effects on lipid levels.  Side effects include diarrhea, abdominal cramping, fecal incontinence, oily spotting, and flatus with discharge.  Side effects are minimized if the patient limits their dietary fat to no more than 30% of their diet.  Patients must take a multivitamin daily to avoid vitamin D, E, A, and K deficiency.  $120 per month    CONTRAVE (naltrexone/bupropion): Approved in 2014.  It is a combination pill including an opioid receptor blocker and a long-standing antidepressant.  Most common side effects include nausea, constipation, headache, vomiting, dizziness, trouble sleeping, dry mouth, and diarrhea.  With all antidepressants watch for mood changes and suicide ideation.  Bupropion has been known to lower the seizure threshold in those prone to seizures.  It should not be used in a patient with a recent history of bulimia. It has been associated with  liver damage from taking higher than recommended doses.  Do not use countrave if you have taken opioid medications or opioid street drugs in the past 7-10 days, if you are currently on opioids, methadone, or if you are pregnant.  Do not use contrave if you have recently stopped using alcohol or benzodiazepines.  Taper off contrave slowly.  Dosing: titrate up to 2 tablets twice daily of the Naltrexone 8 mg/ Bupropion 90 mg tablets.  $200 for 90 tablets    SAXENDA (Liraglutide): A daily injectable (3mg daily) medication used for type 2 diabetes. Glucagon-like peptide-1 (GLP-1) agonist. Contraindications include personal or family history of medullary thyroid cancer or MEN type 2. Acute pancreatitis has been observed in patients taking liraglutide. Liraglutide causes C-cell tumors in rats and mice. It is unknown whether liraglutide causes tumors in humans. Start at 0.6mg, increasing the dose weekly up to 3mg.     VYVANSE (Lisdexamfetamine dimesylate): a CNS stimulant used to treat ADHD. Indicated for the treatment of moderate to severe Binge Eating Disorder in Adults. Contraindicated in patients with known heart disease, structural abnormalities of the heart, serious heart arrhythmias or unexplained syncope. CNS stimulants such as vyvanse may cause manic or psychotic symptoms in patients with BPAD or pre-existing psychosis. Use with caution in patients with Raynaud's phenomenon. Most common side effects include dry mouth, insomnia, decreased appetite, increased heart rate, jittery feeling, constipation and anxiety.     WEGOVY (Semaglutide): A weekly injectable (2.4mg weekly) medication used for type 2 diabetes. Glucagon-like peptide-1 (GLP-1) agonist. Contraindications include personal or family history of medullary thyroid cancer or MEN type 2. Acute pancreatitis has been observed in patients taking Semaglutide. Semaglutide causes C-cell tumors in rats and mice. It is unknown whether Semaglutide causes tumors in  humans. Start at 0.25mg, increasing to 0.5, 1.0, 1.7 then 2.4 monthly.
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
## 2022-02-04 LAB — DEPRECATED CALCIDIOL+CALCIFEROL SERPL-MC: 18 UG/L (ref 30–80)

## 2022-02-08 DIAGNOSIS — R79.89 INCREASED PTH LEVEL: Primary | ICD-10-CM

## 2022-02-08 DIAGNOSIS — E55.9 VITAMIN D DEFICIENCY: ICD-10-CM

## 2022-02-08 LAB — VIT B1 PYROPHOSHATE BLD-SCNC: 137 NMOL/L

## 2022-02-08 RX ORDER — ERGOCALCIFEROL 1.25 MG/1
50000 CAPSULE, LIQUID FILLED ORAL
Qty: 24 CAPSULE | Refills: 0 | Status: SHIPPED | OUTPATIENT
Start: 2022-02-10 | End: 2023-02-10

## 2022-02-09 ENCOUNTER — VIRTUAL VISIT (OUTPATIENT)
Dept: PSYCHIATRY | Facility: CLINIC | Age: 32
End: 2022-02-09
Attending: PSYCHOLOGIST
Payer: COMMERCIAL

## 2022-02-09 ENCOUNTER — BEH TREATMENT PLAN (OUTPATIENT)
Dept: PSYCHIATRY | Facility: CLINIC | Age: 32
End: 2022-02-09
Payer: COMMERCIAL

## 2022-02-09 DIAGNOSIS — F41.1 GAD (GENERALIZED ANXIETY DISORDER): ICD-10-CM

## 2022-02-09 DIAGNOSIS — F33.1 MODERATE EPISODE OF RECURRENT MAJOR DEPRESSIVE DISORDER (H): Primary | ICD-10-CM

## 2022-02-09 PROCEDURE — 90837 PSYTX W PT 60 MINUTES: CPT | Mod: GT | Performed by: PSYCHOLOGIST

## 2022-02-09 ASSESSMENT — PATIENT HEALTH QUESTIONNAIRE - PHQ9
10. IF YOU CHECKED OFF ANY PROBLEMS, HOW DIFFICULT HAVE THESE PROBLEMS MADE IT FOR YOU TO DO YOUR WORK, TAKE CARE OF THINGS AT HOME, OR GET ALONG WITH OTHER PEOPLE: SOMEWHAT DIFFICULT
SUM OF ALL RESPONSES TO PHQ QUESTIONS 1-9: 6
SUM OF ALL RESPONSES TO PHQ QUESTIONS 1-9: 6

## 2022-02-09 NOTE — PROGRESS NOTES
"OUTPATIENT PSYCHOTHERAPY PROGRESS NOTE    Client Name: Dariusz Leyva   YOB: 1990  Time of Service: 70 minutes   Service Type(s): Individual psychotherapy    Video- Visit Details  Type of service:  video visit for psychotherapy  Time of service:    Date:  02/09/2022    Video Start Time:  8:00am        Video End Time:  9:03am    Reason for video visit:  Patient unable to travel due to Covid-19  Originating Site (patient location):  [facility]   Location- Patient's home  Distant Site (provider location): [facility]  Mode of Communication:  Video Conference via [employer]  Consent:  Patient has given verbal consent for video visit?: Yes    Diagnoses:   Unspecified depressive disorder and unspecified anxiety    Goals of therapy: Elevate mood and show evidence of usual energy levels, and activities. Manage stress from planned move and transition into [facility] program      Individuals Present:   Psychotherapy services during this visit included myself and Dariusz Leyva.    Narrative:  Dariusz reported her mood is  okay.  Updated treatment plan. Focus on reducing negative thoughts, reducing school procrastination, and reducing interactions with people who are  toxic . Discussed character traits she is looking for in a relationship. Also discussed values. She is pondering moving for her current relationship. Discussed significance of relationship. She has blocked  M.  She has let him  go.           Mental Status:  Appearance:  Casually groomed   Behavior/relationship to examiner/demeanor:  cooperative  Motor activity/EPS:  Within normal limits  Speech rate:  Within normal limits  Speech volume:  Within normal limits  Speech articulation:  Within normal limits  Speech coherence: Within normal limits  Speech spontaneity: Within normal limits  Mood (subjective): \"okay\"  Affect (objective appearance): Mood congruent  Thought Process (Associations):  Logical and Linear   Thought process (Rate):  Within normal " limits   Thought content: Within normal limits  Abnormal Perception:  None   Insight:  Good   Judgment:  Good     Plan: Continue with goals as outlined above    Andreea Vásquez Psy.D.,L.P      Answers for HPI/ROS submitted by the patient on 2/9/2022  If you checked off any problems, how difficult have these problems made it for you to do your work, take care of things at home, or get along with other people?: Somewhat difficult  PHQ9 TOTAL SCORE: 6

## 2022-02-09 NOTE — PROGRESS NOTES
"OUTPATIENT TREATMENT PLAN SUMMARY    Date of Treatment Plan: 2/9/22  90-Day Review Date: 5/9/22  Date of Initial Service: 9/17/18       1. DSM-V Diagnosis (include numeric code)  Dysthymic Disorder (F34.1)  2. Current symptoms and circumstances that substantiate the diagnosis:  Patient reported that the last time she felt down was four days ago, but still has periods of being \"down\", sleep disturbance (goes to sleep late, wakes early), is on medication to reduce appetite (weight loss program), anhedonia (has been doing things, but not enjoying), chronic fatigue, denied suicidal ideation     She also has some anxiety about she continues to avoid going out, if she goes somewhere she tried to get in and out quickly, she is worried about her relationship and if she might need to make big life changes, fears making the \"wrong decision\"    3. How symptoms and/or behaviors are affecting level of function:   Isolation, struggle with self-care at times, avoiding some relationships and activities    4. Risk Assessment:  Suicide:  Assessed Level of Immediate Risk: Low  Ideation: No  Plan:  No  Means: No  Intent: No    Homicide/Violence:  Assessed Level of Immediate Risk: None  Ideation: No  Plan: No  Means: No  Intent: No    If on a medication, please include name and dosage:        Symptom/Problem Measurable Goals Interventions Gains Made   1.negative thinking 1. In the therapeutic environment and during stressful situations, Dariusz will learn to identify her pessimistic thoughts and challenge them 1.CBT: Problem solving, skill building and psychoeducation 1. She reports insight into her negative thoughts   2.  Additional stress from starting school again (procrastination) 2. Reduce procrastination   2. CBT 2. First class just ended, has A so far   3. Unhealthy relationships (wants to let toxic people \"go\") 3. Per patient report 3. CBT  3. NA       5. Frequency of Sessions: Weekly    6. Discharge and Aftercare Goals: " discharge on completion of goals    7. Expected duration of treatment:  To be reassessed in 90 days    8. Participants in therapy plan (family, friends, support network): client and therapist      See scanned document for Acknowledgement of Current Treatment Plan      Regulatory Guidelines for Updating Treatment Plan  [place] Medical Assistance: Reviewed & signed at least every 90days  Medicare:  Update per policy

## 2022-02-10 ASSESSMENT — PATIENT HEALTH QUESTIONNAIRE - PHQ9: SUM OF ALL RESPONSES TO PHQ QUESTIONS 1-9: 6

## 2022-02-11 ENCOUNTER — VIRTUAL VISIT (OUTPATIENT)
Dept: SURGERY | Facility: CLINIC | Age: 32
End: 2022-02-11
Payer: COMMERCIAL

## 2022-02-11 VITALS — HEIGHT: 67 IN | WEIGHT: 293 LBS | BODY MASS INDEX: 45.99 KG/M2

## 2022-02-11 DIAGNOSIS — Z71.3 NUTRITIONAL COUNSELING: ICD-10-CM

## 2022-02-11 DIAGNOSIS — E66.01 OBESITY, CLASS III, BMI 40-49.9 (MORBID OBESITY) (H): Primary | ICD-10-CM

## 2022-02-11 PROCEDURE — 97802 MEDICAL NUTRITION INDIV IN: CPT | Mod: GT | Performed by: DIETITIAN, REGISTERED

## 2022-02-11 ASSESSMENT — MIFFLIN-ST. JEOR: SCORE: 2132.03

## 2022-02-11 NOTE — PROGRESS NOTES
Dariusz Leyva is a 31 year old who is being evaluated via a billable video visit.      How would you like to obtain your AVS? MyChart  If the video visit is dropped, the invitation should be resent by: Send to e-mail at: [email or web address]  Will anyone else be joining your video visit? No    Video Start Time: 9:26a      Medical Weight Loss Initial Diet Evaluation  Assessment:  Dariusz is presenting today for a new weight management nutrition consultation. Pt has had an initial appointment with Dr. Walker.  Weight loss medication: Phentermine and naltrexone-  No thoughts about food    Personal Goals: issue with stress eating, would like to get down to 220lb  +weight loss to have knee surgery   +states was 298lb before the holiday    Anthropometrics:    Pt's weight is 303 lbs 0 oz  Initial weight: 317lb  Weight change: down 14lb- patient was weighing on home scale  BMI: Body mass index is 47.46 kg/m .   Ideal body weight: 61.6 kg (135 lb 12.9 oz)  Adjusted ideal body weight: 94.5 kg (208 lb 5.2 oz)  Estimated RMR (Assumption-St Jeor equation):  2134 kcals x 1.2 (sedentary) = 2561 kcals (for weight maintenance)    Recommended Protein Intake:  grams of protein/day    Medical History:  Patient Active Problem List   Diagnosis     Lumbago     Nonallopathic lesion of lumbar region     Nonallopathic lesion of sacral region     Pain in thoracic spine     Nonallopathic lesion of thoracic region     Abnormal Pap smear of cervix     Large tonsils     Morbid obesity with BMI of 45.0-49.9, adult (H)     MDD (major depressive disorder)     CARLY (generalized anxiety disorder)     Depression     Lipid screening     Chronic pain of right knee     Anxiety state     Injury of ligament of right knee     MDD (major depressive disorder), recurrent episode, moderate (H)     Painful orthopaedic hardware (H)     Tibial plateau fracture, right, closed, initial encounter     Dyslipidemia     Limited mobility     Migraine with aura  and without status migrainosus, not intractable     DDD (degenerative disc disease), cervical     DDD (degenerative disc disease), lumbar     Increased PTH level     Vitamin D deficiency      HbA1c:  No results found for: HGBA1C    Nutrition History:   Food allergies/intolerances/cultural or religous food customs: Yes cherries, plums, pears, apples- itchy throat-, lactose intolerant- bananas, orange, grapes, kiwi, melon all work ok  Weight loss history: states she is a nocturnal eater- snacking, rarely cooking- night owl  +slow weight gain over the past few years  Vitamins/Mineral Supplementation: probiotic- golde supplements- supplements, ashwagana, etc.   +sleep is varied- not always    Dietary Recall:  Breakfast: packet of low sodium tuna and an orange  Lunch: 4-5p- salad with grilled chicken  Dinner: rest of lunch   Typical Snacks: wasn't hungry- but had a snack  Eating out: has [employer] downstairs    Beverages: water      Nutrition Diagnosis (PES statement):   Overweight/Obesity (NC 3.3) related to overeating and poor lifestyle habits as evidenced by patient report of inconsistent meals, grazing in the evening and BMI 47.46    Nutrition Intervention  1. Food and/or Nutrient Delivery   a. Placed emphasis on importance of developing a healthy meal routine, aiming for 3 meals a day and no snacks.  b. Discussed using a protein supplement as a meal replacement.  2. Nutrition Education   a. Discussed with patient how to build a meal: the importance of including a lean/low fat protein at each meal, include a source of vegetables at a minimum of lunch and dinner and limiting carbohydrate intake to 1 cup per meal.  b. Educated on sources of lean protein, portion sizes, the amount of grams found in each source. Recommend patient to aim for 20-30g protein at each meal.  c. Discussed the importance of adequate hydration, with emphasis on drinking 64oz of water or zero calorie beverages per day.  3. Nutrition Counseling    a. Encouraged importance of developing routine exercise for health benefits and weight loss.    Goals established by patient:   1. Eat 3 meals per day  2. 8000 steps per day- interested in getting a     Handouts provided:  Medical weight loss materials      Assessment/Plan:    Pt will follow up in 1 month(s) with bariatrician and 1 month(s) with dietitian.     Video-Visit Details    Type of service:  Video Visit    Video End Time:10:11 AM    Originating Location (pt. Location): Home    Distant Location (provider location):  [facility] [place]     Platform used for Video Visit: Hernan JONES RD

## 2022-02-11 NOTE — LETTER
2/11/2022         RE: Dariusz Leyva  [address]  [place] [address]        Dear Colleague,    Thank you for referring your patient, Dariusz Leyva, to the [facility] [place]. Please see a copy of my visit note below.    Dariusz Leyva is a 31 year old who is being evaluated via a billable video visit.      How would you like to obtain your AVS? MyChart  If the video visit is dropped, the invitation should be resent by: Send to e-mail at: [email or web address]  Will anyone else be joining your video visit? No    Video Start Time: 9:26a      Medical Weight Loss Initial Diet Evaluation  Assessment:  Dariusz is presenting today for a new weight management nutrition consultation. Pt has had an initial appointment with Dr. Walker.  Weight loss medication: Phentermine and naltrexone-  No thoughts about food    Personal Goals: issue with stress eating, would like to get down to 220lb  +weight loss to have knee surgery   +states was 298lb before the holiday    Anthropometrics:    Pt's weight is 303 lbs 0 oz  Initial weight: 317lb  Weight change: down 14lb- patient was weighing on home scale  BMI: Body mass index is 47.46 kg/m .   Ideal body weight: 61.6 kg (135 lb 12.9 oz)  Adjusted ideal body weight: 94.5 kg (208 lb 5.2 oz)  Estimated RMR (Terrell-St Jeor equation):  2134 kcals x 1.2 (sedentary) = 2561 kcals (for weight maintenance)    Recommended Protein Intake:  grams of protein/day    Medical History:  Patient Active Problem List   Diagnosis     Lumbago     Nonallopathic lesion of lumbar region     Nonallopathic lesion of sacral region     Pain in thoracic spine     Nonallopathic lesion of thoracic region     Abnormal Pap smear of cervix     Large tonsils     Morbid obesity with BMI of 45.0-49.9, adult (H)     MDD (major depressive disorder)     CARLY (generalized anxiety disorder)     Depression     Lipid screening     Chronic pain of right knee      Anxiety state     Injury of ligament of right knee     MDD (major depressive disorder), recurrent episode, moderate (H)     Painful orthopaedic hardware (H)     Tibial plateau fracture, right, closed, initial encounter     Dyslipidemia     Limited mobility     Migraine with aura and without status migrainosus, not intractable     DDD (degenerative disc disease), cervical     DDD (degenerative disc disease), lumbar     Increased PTH level     Vitamin D deficiency      HbA1c:  No results found for: HGBA1C    Nutrition History:   Food allergies/intolerances/cultural or religous food customs: Yes cherries, plums, pears, apples- itchy throat-, lactose intolerant- bananas, orange, grapes, kiwi, melon all work ok  Weight loss history: states she is a nocturnal eater- snacking, rarely cooking- night owl  +slow weight gain over the past few years  Vitamins/Mineral Supplementation: probiotic- golde supplements- supplements, ashwagana, etc.   +sleep is varied- not always    Dietary Recall:  Breakfast: packet of low sodium tuna and an orange  Lunch: 4-5p- salad with grilled chicken  Dinner: rest of lunch   Typical Snacks: wasn't hungry- but had a snack  Eating out: has [employer] downstairs    Beverages: water      Nutrition Diagnosis (PES statement):   Overweight/Obesity (NC 3.3) related to overeating and poor lifestyle habits as evidenced by patient report of inconsistent meals, grazing in the evening and BMI 47.46    Nutrition Intervention  1. Food and/or Nutrient Delivery   a. Placed emphasis on importance of developing a healthy meal routine, aiming for 3 meals a day and no snacks.  b. Discussed using a protein supplement as a meal replacement.  2. Nutrition Education   a. Discussed with patient how to build a meal: the importance of including a lean/low fat protein at each meal, include a source of vegetables at a minimum of lunch and dinner and limiting carbohydrate intake to 1 cup per meal.  b. Educated on sources of  lean protein, portion sizes, the amount of grams found in each source. Recommend patient to aim for 20-30g protein at each meal.  c. Discussed the importance of adequate hydration, with emphasis on drinking 64oz of water or zero calorie beverages per day.  3. Nutrition Counseling   a. Encouraged importance of developing routine exercise for health benefits and weight loss.    Goals established by patient:   1. Eat 3 meals per day  2. 8000 steps per day- interested in getting a     Handouts provided:  Medical weight loss materials      Assessment/Plan:    Pt will follow up in 1 month(s) with bariatrician and 1 month(s) with dietitian.     Video-Visit Details    Type of service:  Video Visit    Video End Time:10:11 AM    Originating Location (pt. Location): Home    Distant Location (provider location):  [facility] [place]     Platform used for Video Visit: Hernan JONES RD        Again, thank you for allowing me to participate in the care of your patient.        Sincerely,        DEMETRIUS JONES RD

## 2022-02-14 ENCOUNTER — VIRTUAL VISIT (OUTPATIENT)
Dept: SURGERY | Facility: CLINIC | Age: 32
End: 2022-02-14
Payer: COMMERCIAL

## 2022-02-14 DIAGNOSIS — E66.9 OBESITY: Primary | ICD-10-CM

## 2022-02-14 DIAGNOSIS — E73.9 DIETARY LACTOSE INTOLERANCE: ICD-10-CM

## 2022-02-14 PROCEDURE — 99207 PR MEDICAL WEIGHT MANAGEMENT PROGRAM ENROLLMENT: CPT
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
## 2022-02-15 RX ORDER — CHOLECALCIFEROL (VITAMIN D3) 125 MCG
3000 CAPSULE ORAL
Qty: 30 TABLET | Refills: 0 | Status: SHIPPED | OUTPATIENT
Start: 2022-02-15

## 2022-02-23 ENCOUNTER — VIRTUAL VISIT (OUTPATIENT)
Dept: PSYCHIATRY | Facility: CLINIC | Age: 32
End: 2022-02-23
Attending: PSYCHOLOGIST
Payer: COMMERCIAL

## 2022-02-23 DIAGNOSIS — F33.1 MODERATE EPISODE OF RECURRENT MAJOR DEPRESSIVE DISORDER (H): Primary | ICD-10-CM

## 2022-02-23 DIAGNOSIS — F41.1 GAD (GENERALIZED ANXIETY DISORDER): ICD-10-CM

## 2022-02-23 PROCEDURE — 90837 PSYTX W PT 60 MINUTES: CPT | Mod: GT | Performed by: PSYCHOLOGIST
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
## 2022-02-24 ASSESSMENT — PATIENT HEALTH QUESTIONNAIRE - PHQ9: SUM OF ALL RESPONSES TO PHQ QUESTIONS 1-9: 10

## 2022-03-02 ENCOUNTER — VIRTUAL VISIT (OUTPATIENT)
Dept: PSYCHIATRY | Facility: CLINIC | Age: 32
End: 2022-03-02
Attending: PSYCHOLOGIST
Payer: COMMERCIAL

## 2022-03-02 ENCOUNTER — VIRTUAL VISIT (OUTPATIENT)
Dept: FAMILY MEDICINE | Facility: CLINIC | Age: 32
End: 2022-03-02
Payer: COMMERCIAL

## 2022-03-02 DIAGNOSIS — F33.1 MODERATE EPISODE OF RECURRENT MAJOR DEPRESSIVE DISORDER (H): Primary | ICD-10-CM

## 2022-03-02 DIAGNOSIS — F41.1 GAD (GENERALIZED ANXIETY DISORDER): ICD-10-CM

## 2022-03-02 DIAGNOSIS — E66.9 OBESITY: Primary | ICD-10-CM

## 2022-03-02 PROCEDURE — 99207 PR MWM HEALTH COACH NO CHARGE: CPT

## 2022-03-02 PROCEDURE — 90837 PSYTX W PT 60 MINUTES: CPT | Mod: GT | Performed by: PSYCHOLOGIST

## 2022-03-02 ASSESSMENT — PATIENT HEALTH QUESTIONNAIRE - PHQ9
SUM OF ALL RESPONSES TO PHQ QUESTIONS 1-9: 5
SUM OF ALL RESPONSES TO PHQ QUESTIONS 1-9: 5
10. IF YOU CHECKED OFF ANY PROBLEMS, HOW DIFFICULT HAVE THESE PROBLEMS MADE IT FOR YOU TO DO YOUR WORK, TAKE CARE OF THINGS AT HOME, OR GET ALONG WITH OTHER PEOPLE: SOMEWHAT DIFFICULT
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
## 2022-03-02 NOTE — PROGRESS NOTES
OUTPATIENT PSYCHOTHERAPY PROGRESS NOTE    Client Name: Dariusz Leyva   YOB: 1990  Time of Service: 61 minutes   Service Type(s): Individual psychotherapy    Video- Visit Details  Type of service:  video visit for psychotherapy  Time of service:    Date:  03/02/2022    Video Start Time:  8:03am        Video End Time:  9:04am    Reason for video visit:  Patient unable to travel due to Covid-19  Originating Site (patient location):  [facility]   Location- Patient's home  Distant Site (provider location): [facility]  Mode of Communication:  Video Conference via [employer]  Consent:  Patient has given verbal consent for video visit?: Yes    Diagnoses:   Unspecified depressive disorder and unspecified anxiety    Goals of therapy: Elevate mood and show evidence of usual energy levels, and activities. Manage stress from planned move and transition into TU program      Individuals Present:   Psychotherapy services during this visit included myself and Dariusz Leyva.    Narrative:  Dariusz reported she is trying to work on her  detachment issues.  She explained it is hard for her to let people go when they are not good for her. Discussed complex relationship she has. She is noticing more anxiety lately - she described  echoing thoughts  or hearing something on the radio about relationships that trigger anxiety. She shared she has a tendency to listen to authority, but at the same time she realizes she knows she does not need an authority in their life. She wants to be able to know her decisions are good and sound. She reported she values a partner who will be invested in raising their children.        Mental Status:  Appearance:  Casually groomed   Behavior/relationship to examiner/demeanor:  cooperative  Motor activity/EPS:  Within normal limits  Speech rate:  Within normal limits  Speech volume:  Within normal limits  Speech articulation:  Within normal limits  Speech coherence: Within normal  "limits  Speech spontaneity: Within normal limits  Mood (subjective): \"anxious\"  Affect (objective appearance): Euthymic  Thought Process (Associations):  Logical and Linear   Thought process (Rate):  Within normal limits   Thought content: Within normal limits  Abnormal Perception:  None   Insight:  Good   Judgment:  Good     Plan: Continue with goals as outlined above    Andreea Vásquez Psy.D.,L.P    Answers for HPI/ROS submitted by the patient on 3/2/2022  If you checked off any problems, how difficult have these problems made it for you to do your work, take care of things at home, or get along with other people?: Somewhat difficult  PHQ9 TOTAL SCORE: 5        "

## 2022-03-03 ASSESSMENT — PATIENT HEALTH QUESTIONNAIRE - PHQ9: SUM OF ALL RESPONSES TO PHQ QUESTIONS 1-9: 5

## 2022-03-07 ENCOUNTER — OFFICE VISIT (OUTPATIENT)
Dept: FAMILY MEDICINE | Facility: CLINIC | Age: 32
End: 2022-03-07
Payer: COMMERCIAL

## 2022-03-07 VITALS
OXYGEN SATURATION: 99 % | HEART RATE: 81 BPM | BODY MASS INDEX: 44.41 KG/M2 | SYSTOLIC BLOOD PRESSURE: 122 MMHG | HEIGHT: 68 IN | WEIGHT: 293 LBS | RESPIRATION RATE: 20 BRPM | DIASTOLIC BLOOD PRESSURE: 86 MMHG | TEMPERATURE: 98 F

## 2022-03-07 DIAGNOSIS — N91.2 ABSENCE OF MENSTRUATION: ICD-10-CM

## 2022-03-07 DIAGNOSIS — Z00.00 HEALTHCARE MAINTENANCE: ICD-10-CM

## 2022-03-07 DIAGNOSIS — Z11.3 VENEREAL DISEASE SCREENING: ICD-10-CM

## 2022-03-07 DIAGNOSIS — Z11.3 SCREEN FOR STD (SEXUALLY TRANSMITTED DISEASE): Primary | ICD-10-CM

## 2022-03-07 LAB
CLUE CELLS: ABNORMAL
HCG UR QL: NEGATIVE
TRICHOMONAS, WET PREP: ABNORMAL
WBC'S/HIGH POWER FIELD, WET PREP: ABNORMAL
YEAST, WET PREP: ABNORMAL

## 2022-03-07 PROCEDURE — 87591 N.GONORRHOEAE DNA AMP PROB: CPT

## 2022-03-07 PROCEDURE — 87491 CHLMYD TRACH DNA AMP PROBE: CPT

## 2022-03-07 PROCEDURE — G0123 SCREEN CERV/VAG THIN LAYER: HCPCS

## 2022-03-07 PROCEDURE — 81025 URINE PREGNANCY TEST: CPT

## 2022-03-07 PROCEDURE — 87210 SMEAR WET MOUNT SALINE/INK: CPT

## 2022-03-07 PROCEDURE — 99213 OFFICE O/P EST LOW 20 MIN: CPT | Mod: GC

## 2022-03-07 RX ORDER — ETONOGESTREL AND ETHINYL ESTRADIOL VAGINAL RING .015; .12 MG/D; MG/D
1 RING VAGINAL
Qty: 3 EACH | Refills: 3 | Status: SHIPPED | OUTPATIENT
Start: 2022-03-07

## 2022-03-07 NOTE — PROGRESS NOTES
Assessment & Plan     #Screen for STD (sexually transmitted disease)  Asymptomatic, would like to check. Male sexual partner, inconsistent condom use, uses plan B. LMP was 3/5. Would like to not test for HIV. No specific concerns.   - Wet preparation  - Syphilis Screen Cascade; Future  - Chlamydia Amplified; Future  - Neisseria gonorrhoeae PCR; Future    #Healthcare maintenance  Pap smear performed today with normal exam.  Patient has history of ASCUS and HPV+ in 3/2015. Follow-up in 2016 and 2017, normal cytology and negative HPV. Per acog algorithm, she now is at routine screening. Cytology alone q3 years. History and exam normal with no specific concerns at this time.  Patient would like to begin birth control, had previously used NuvaRing and tolerated well.  2 years ago had switch to OCPs to see if they would be less hormone however would like something she does not have to remember consistently.  Patient has no concerning personal or family history of cancers or stroke, has a history of migraine though without aura.  Patient is familiar with NuvaRing, all questions answered.  No concerns at this time.  - GYN Cytology  - etonogestrel-ethinyl estradiol (NUVARING) 0.12-0.015 MG/24HR vaginal ring; Place 1 each vaginally every 28 days  Dispense: 3 each; Refill: 3  - Education provided about backup contraception use    Betsy Hernandes MD  [facility]   [facility] 31 [facility] who presents for the following health issues    -Pap smear: Had a history of abnormal Pap smear, ASCUS, with 2 normal Pap smears following.  Discussed that she is now in routine screening category.  Patient would like to get a Pap smear done today    - STD check: no symptoms, no pain, no dysuria, no abnormal spotting, no change in discharge.  Patient reports no fevers, chills, myalgias.  No recent history of STD use.  No known exposures, and no specific concerns today.  Patient is sexually active with  "one male partner, and is consistent condom use, uses Plan B as contraceptive.    -Birth control: Patient used a contraceptive ring 2 years ago which she liked and tolerated well.  She had switch to OCPs because she likes the idea of possibly less hormone though did not like having to remember to take something daily.  She had since stopped taking her OCPs and is now using Plan B as her contraceptive though would like to start the NuvaRing today.  Patient has no personal or family history of blood clots, cancer.  Patient has a history of migraine though with no aura. LMP: 2/28 - 3/4    Review of Systems   As above      Objective    /86 (BP Location: Left arm, Patient Position: Sitting, Cuff Size: Adult Large)   Pulse 81   Temp 98  F (36.7  C) (Oral)   Resp 20   Ht 1.73 m (5' 8.1\")   Wt 145.8 kg (321 lb 6.4 oz)   SpO2 99%   BMI 48.73 kg/m    Body mass index is 48.73 kg/m .  Physical Exam  Exam conducted with a chaperone present.   Constitutional:       General: She is not in acute distress.     Appearance: Normal appearance. She is not ill-appearing.   Eyes:      Extraocular Movements: Extraocular movements intact.      Conjunctiva/sclera: Conjunctivae normal.   Pulmonary:      Effort: Pulmonary effort is normal. No respiratory distress.   Genitourinary:     General: Normal vulva.      Exam position: Supine.      Pubic Area: No rash.       Vagina: Normal. No vaginal discharge or tenderness.      Cervix: No cervical bleeding.      Uterus: Normal.       Adnexa: Right adnexa normal and left adnexa normal.   Neurological:      General: No focal deficit present.      Mental Status: She is alert and oriented to person, place, and time.      Gait: Gait normal.   Psychiatric:         Mood and Affect: Mood normal.         Behavior: Behavior normal.                "

## 2022-03-07 NOTE — PATIENT INSTRUCTIONS
Thank you for coming in today!    Results may take a few days. Next pap smear due in 3 years.

## 2022-03-07 NOTE — LETTER
March 11, 2022      Dariusz Leyva  [address]   [place] [address]        Neeru Dariusz,     I hope you're well. I wanted to communicate with you the results of the tests that we did.     The laboratory results show no chlamydia or gonorrheae infection. The pap smear was also normal. All great news! Please let me know if you have any other questions or concerns.     Thank you!   Betsy Hernandes MD PGY1       Resulted Orders   GYN Cytology   Result Value Ref Range    Interpretation        Negative for Intraepithelial Lesion or Malignancy (NILM)    Comment         Papanicolaou Test Limitations:  Cervical cytology is a screening test with limited sensitivity, and regular screening is critical for cancer prevention.  Pap tests are primarily effective for the diagnosis/prevention of squamous cell carcinoma, not adenocarcinoma or other cancers.        Specimen Adequacy       Satisfactory for evaluation, endocervical/transformation zone component present    Clinical Information       none      Reflex Testing Yes if ASCUS     Previous Abnormal?       No      Performing Labs       The technical component of this testing was completed at [facility]     HCG qualitative urine   Result Value Ref Range    hCG Urine Qualitative Negative Negative      Comment:      This test is for screening purposes.  Results should be interpreted along with the clinical picture.  Confirmation testing is available if warranted by ordering OOB249, HCG Quantitative Pregnancy.   Chlamydia Amplified   Result Value Ref Range    Chlamydia trachomatis Negative Negative      Comment:      A negative result by transcription mediated amplification does not preclude the presence of C. trachomatis infection because results are dependent on proper and adequate collection, absence of inhibitors and sufficient rRNA to be detected.   Neisseria gonorrhoeae PCR   Result Value Ref Range    Neisseria gonorrhoeae Negative  Negative      Comment:      Negative for N. gonorrhoeae rRNA by transcription mediated amplification. A negative result by transcription mediated amplification does not preclude the presence of C. trachomatis infection because results are dependent on proper and adequate collection, absence of inhibitors and sufficient rRNA to be detected.       If you have any questions or concerns, please call the clinic at the number listed above.       Sincerely,      Betsy Hernandes MD PGY1

## 2022-03-07 NOTE — PROGRESS NOTES
"Preceptor Attestation:  Vitals:    03/07/22 0846   BP: 122/86   BP Location: Left arm   Patient Position: Sitting   Cuff Size: Adult Large   Pulse: 81   Resp: 20   Temp: 98  F (36.7  C)   TempSrc: Oral   SpO2: 99%   Weight: 145.8 kg (321 lb 6.4 oz)   Height: 1.73 m (5' 8.1\")          I discussed the patient with the resident and evaluated the patient in person. I have verified the content of the note, which accurately reflects my assessment of the patient and the plan of care.   Supervising Physician:  Margy Nino MD    "

## 2022-03-08 LAB
C TRACH DNA SPEC QL NAA+PROBE: NEGATIVE
N GONORRHOEA DNA SPEC QL NAA+PROBE: NEGATIVE

## 2022-03-09 LAB
BKR LAB AP GYN ADEQUACY: NORMAL
BKR LAB AP GYN INTERPRETATION: NORMAL
BKR LAB AP HPV REFLEX: NORMAL
BKR LAB AP PREVIOUS ABNORMAL: NORMAL
PATH REPORT.COMMENTS IMP SPEC: NORMAL
PATH REPORT.COMMENTS IMP SPEC: NORMAL
PATH REPORT.RELEVANT HX SPEC: NORMAL

## 2022-03-10 ENCOUNTER — VIRTUAL VISIT (OUTPATIENT)
Dept: FAMILY MEDICINE | Facility: CLINIC | Age: 32
End: 2022-03-10
Payer: COMMERCIAL

## 2022-03-10 DIAGNOSIS — E66.9 OBESITY: Primary | ICD-10-CM

## 2022-03-10 PROCEDURE — 99207 PR MWM HEALTH COACH NO CHARGE: CPT
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
## 2022-03-16 ENCOUNTER — VIRTUAL VISIT (OUTPATIENT)
Dept: PSYCHIATRY | Facility: CLINIC | Age: 32
End: 2022-03-16
Attending: PSYCHOLOGIST
Payer: COMMERCIAL

## 2022-03-16 DIAGNOSIS — F33.1 MODERATE EPISODE OF RECURRENT MAJOR DEPRESSIVE DISORDER (H): Primary | ICD-10-CM

## 2022-03-16 PROCEDURE — 90837 PSYTX W PT 60 MINUTES: CPT | Mod: GT | Performed by: PSYCHOLOGIST

## 2022-03-16 ASSESSMENT — PATIENT HEALTH QUESTIONNAIRE - PHQ9
SUM OF ALL RESPONSES TO PHQ QUESTIONS 1-9: 7
SUM OF ALL RESPONSES TO PHQ QUESTIONS 1-9: 7
10. IF YOU CHECKED OFF ANY PROBLEMS, HOW DIFFICULT HAVE THESE PROBLEMS MADE IT FOR YOU TO DO YOUR WORK, TAKE CARE OF THINGS AT HOME, OR GET ALONG WITH OTHER PEOPLE: SOMEWHAT DIFFICULT
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
## 2022-03-17 ASSESSMENT — PATIENT HEALTH QUESTIONNAIRE - PHQ9: SUM OF ALL RESPONSES TO PHQ QUESTIONS 1-9: 7

## 2022-03-23 ENCOUNTER — VIRTUAL VISIT (OUTPATIENT)
Dept: PSYCHIATRY | Facility: CLINIC | Age: 32
End: 2022-03-23
Attending: PSYCHOLOGIST
Payer: COMMERCIAL

## 2022-03-23 ENCOUNTER — VIRTUAL VISIT (OUTPATIENT)
Dept: FAMILY MEDICINE | Facility: CLINIC | Age: 32
End: 2022-03-23
Payer: COMMERCIAL

## 2022-03-23 DIAGNOSIS — E66.9 OBESITY: Primary | ICD-10-CM

## 2022-03-23 DIAGNOSIS — F33.1 MODERATE EPISODE OF RECURRENT MAJOR DEPRESSIVE DISORDER (H): Primary | ICD-10-CM

## 2022-03-23 PROCEDURE — 90837 PSYTX W PT 60 MINUTES: CPT | Mod: GT | Performed by: PSYCHOLOGIST

## 2022-03-23 PROCEDURE — 99207 PR MWM HEALTH COACH NO CHARGE: CPT

## 2022-03-23 ASSESSMENT — PATIENT HEALTH QUESTIONNAIRE - PHQ9
10. IF YOU CHECKED OFF ANY PROBLEMS, HOW DIFFICULT HAVE THESE PROBLEMS MADE IT FOR YOU TO DO YOUR WORK, TAKE CARE OF THINGS AT HOME, OR GET ALONG WITH OTHER PEOPLE: SOMEWHAT DIFFICULT
SUM OF ALL RESPONSES TO PHQ QUESTIONS 1-9: 5
SUM OF ALL RESPONSES TO PHQ QUESTIONS 1-9: 5
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
## 2022-03-23 NOTE — PROGRESS NOTES
"OUTPATIENT PSYCHOTHERAPY PROGRESS NOTE    Client Name: Dariusz Leyva   YOB: 1990  Time of Service: 58 minutes   Service Type(s): Individual psychotherapy    Video- Visit Details  Type of service:  video visit for psychotherapy  Time of service:    Date:  03/23/2022    Video Start Time:  8:01am        Video End Time:  8:59am    Reason for video visit:  Patient unable to travel due to Covid-19  Originating Site (patient location):  [facility]   Location- Patient's home  Distant Site (provider location): [facility]  Mode of Communication:  Video Conference via [employer]Well  Consent:  Patient has given verbal consent for video visit?: Yes    Diagnoses:   Unspecified depressive disorder and unspecified anxiety    Goals of therapy: Elevate mood and show evidence of usual energy levels, and activities. Manage stress from planned move and transition into [facility] program      Individuals Present:   Psychotherapy services during this visit included myself and Dariusz Leyva.    Narrative:  Dariusz reported that her trip was  okay.  She still does not have clarity on the depth of their relationship. She described the situation as confusing. She feels like she is being treated like an  afterthought,   not enough.  She feels like she can t  measure up.  Discussed the possibility that she is holding back. She wonders if  E  sees her as  fragile.  Discussed showing authenticity. She reported she does not want to be in a  situationship  - in other words she wants to be in a relationship that has true feelings.      Mental Status:  Appearance:  Casually groomed   Behavior/relationship to examiner/demeanor:  cooperative  Motor activity/EPS:  Within normal limits  Speech rate:  Within normal limits  Speech volume:  Within normal limits  Speech articulation:  Within normal limits  Speech coherence: Within normal limits  Speech spontaneity: Within normal limits  Mood (subjective): \"okay\"  Affect (objective appearance): " Mood congruent  Thought Process (Associations):  Logical and Linear   Thought process (Rate):  Within normal limits   Thought content: Within normal limits  Abnormal Perception:  None   Insight:  Good   Judgment:  Good     Plan: Continue with goals as outlined above    Andreea Vásquez Psy.D.,L.P      Answers for HPI/ROS submitted by the patient on 3/23/2022  If you checked off any problems, how difficult have these problems made it for you to do your work, take care of things at home, or get along with other people?: Somewhat difficult  PHQ9 TOTAL SCORE: 5

## 2022-03-24 ASSESSMENT — PATIENT HEALTH QUESTIONNAIRE - PHQ9: SUM OF ALL RESPONSES TO PHQ QUESTIONS 1-9: 5

## 2022-03-28 ENCOUNTER — VIRTUAL VISIT (OUTPATIENT)
Dept: SURGERY | Facility: CLINIC | Age: 32
End: 2022-03-28
Payer: COMMERCIAL

## 2022-03-28 VITALS — WEIGHT: 293 LBS | HEIGHT: 68 IN | BODY MASS INDEX: 44.41 KG/M2

## 2022-03-28 DIAGNOSIS — E66.01 OBESITY, CLASS III, BMI 40-49.9 (MORBID OBESITY) (H): Primary | ICD-10-CM

## 2022-03-28 DIAGNOSIS — R63.8 ABNORMAL CRAVING: ICD-10-CM

## 2022-03-28 PROCEDURE — 97803 MED NUTRITION INDIV SUBSEQ: CPT | Performed by: DIETITIAN, REGISTERED

## 2022-03-28 NOTE — LETTER
3/28/2022         RE: Dariusz Leyva  [address]  [place] [address]        Dear Colleague,    Thank you for referring your patient, Dariusz Leyva, to the [facility] [place]. Please see a copy of my visit note below.    Dariusz Leyva is a 31 year old who is being evaluated via a billable video visit.      How would you like to obtain your AVS? MyChart  If the video visit is dropped, the invitation should be resent by: Send to e-mail at: [email or web address]  Will anyone else be joining your video visit? No      Video Start Time: 5:09p      Medical  Weight Loss Follow-Up Diet Evaluation  Assessment:  Dariusz is presenting today for a follow up weight management nutrition consultation. Pt has had an initial appointment with Dr. Walker  Weight loss medication: Phentermine and naltrexone- stopped naltrexone due to lack of appetite in the evening  Pt's weight is 307 lbs 0 oz  Initial weight: 317lb  Weight change: down 10lb  BMI: Body mass index is 46.54 kg/m .  Ideal body weight: 64.1 kg (141 lb 6.1 oz)  Adjusted ideal body weight: 96.8 kg (213 lb 6.2 oz)    Estimated RMR (Theodosia-St Jeor equation):   2134 kcals x 1.2 (sedentary) = 2561 kcals (for weight maintenance)  Recommended Protein Intake:  grams of protein/day  Patient Active Problem List:  Patient Active Problem List   Diagnosis     Lumbago     Nonallopathic lesion of lumbar region     Nonallopathic lesion of sacral region     Pain in thoracic spine     Nonallopathic lesion of thoracic region     Abnormal Pap smear of cervix     Large tonsils     Morbid obesity with BMI of 45.0-49.9, adult (H)     MDD (major depressive disorder)     CARLY (generalized anxiety disorder)     Depression     Lipid screening     Chronic pain of right knee     Anxiety state     Injury of ligament of right knee     MDD (major depressive disorder), recurrent episode, moderate (H)     Painful orthopaedic hardware  (H)     Tibial plateau fracture, right, closed, initial encounter     Dyslipidemia     Limited mobility     Migraine with aura and without status migrainosus, not intractable     DDD (degenerative disc disease), cervical     DDD (degenerative disc disease), lumbar     Increased PTH level     Vitamin D deficiency     Progress on goals from last visit: stopped taking naltrexone due to loss of appetite  +not craving soda as much   +frustrated with lack of appetite especially when trying to eat three meals per day  +typically doing a protein shake once a day- considering trying a meal subscription service such as hello fresh  +reports struggling with anxiety and depression while balancing a full time job and getting her masters- finding it hard to be motivated at times.    Dietary Recall:  Breakfast: almond butter and banana protein shake  Lunch: veggies with mix and water  Typical snacks: banana with peanut butter- trying to expand her food choices a bit  Eating out: less because she finds she is eating less  [employer]- salmon salad  Exercise: went to the gym once- still sore   +needed to decrease steps   Nutrition Diagnosis:    Overweight/Obesity (NC 3.3) related to overeating and poor lifestyle habits as evidenced by patient report of inconsistent meals, grazing in the evening and BMI 46.54      Intervention:  1. Food and/or nutrient delivery: encouraged consistency with diet- making small changes when able especially when working on mental health  2. Nutrition counseling: support for continued success, goal setting    Monitoring/Evaluation:    Goals:  1. 30 min walking 7 days per week  2. Gym 2 times per week    Patient to follow up in 1 month(s) with bariatrician and 2 month(s) with RD      Video-Visit Details    Type of service:  Video Visit    Video End Time:5:33 PM    Originating Location (pt. Location): Home    Distant Location (provider location):  [facility]  [employer]     Platform used for Video Visit: Hernan JONES RD        Again, thank you for allowing me to participate in the care of your patient.        Sincerely,        DEMETRIUS JONES RD

## 2022-03-28 NOTE — PROGRESS NOTES
Dariusz Leyva is a 31 year old who is being evaluated via a billable video visit.      How would you like to obtain your AVS? MyChart  If the video visit is dropped, the invitation should be resent by: Send to e-mail at: [email or web address]  Will anyone else be joining your video visit? No      Video Start Time: 5:09p      Medical  Weight Loss Follow-Up Diet Evaluation  Assessment:  Dariusz is presenting today for a follow up weight management nutrition consultation. Pt has had an initial appointment with Dr. Walker  Weight loss medication: Phentermine and naltrexone- stopped naltrexone due to lack of appetite in the evening  Pt's weight is 307 lbs 0 oz  Initial weight: 317lb  Weight change: down 10lb  BMI: Body mass index is 46.54 kg/m .  Ideal body weight: 64.1 kg (141 lb 6.1 oz)  Adjusted ideal body weight: 96.8 kg (213 lb 6.2 oz)    Estimated RMR (Frio-St Jeor equation):   2134 kcals x 1.2 (sedentary) = 2561 kcals (for weight maintenance)  Recommended Protein Intake:  grams of protein/day  Patient Active Problem List:  Patient Active Problem List   Diagnosis     Lumbago     Nonallopathic lesion of lumbar region     Nonallopathic lesion of sacral region     Pain in thoracic spine     Nonallopathic lesion of thoracic region     Abnormal Pap smear of cervix     Large tonsils     Morbid obesity with BMI of 45.0-49.9, adult (H)     MDD (major depressive disorder)     CARLY (generalized anxiety disorder)     Depression     Lipid screening     Chronic pain of right knee     Anxiety state     Injury of ligament of right knee     MDD (major depressive disorder), recurrent episode, moderate (H)     Painful orthopaedic hardware (H)     Tibial plateau fracture, right, closed, initial encounter     Dyslipidemia     Limited mobility     Migraine with aura and without status migrainosus, not intractable     DDD (degenerative disc disease), cervical     DDD (degenerative disc disease), lumbar     Increased PTH  level     Vitamin D deficiency     Progress on goals from last visit: stopped taking naltrexone due to loss of appetite  +not craving soda as much   +frustrated with lack of appetite especially when trying to eat three meals per day  +typically doing a protein shake once a day- considering trying a meal subscription service such as hello fresh  +reports struggling with anxiety and depression while balancing a full time job and getting her masters- finding it hard to be motivated at times.    Dietary Recall:  Breakfast: almond butter and banana protein shake  Lunch: veggies with mix and water  Typical snacks: banana with peanut butter- trying to expand her food choices a bit  Eating out: less because she finds she is eating less  Olive garden- salmon salad  Exercise: went to the gym once- still sore   +needed to decrease steps   Nutrition Diagnosis:    Overweight/Obesity (NC 3.3) related to overeating and poor lifestyle habits as evidenced by patient report of inconsistent meals, grazing in the evening and BMI 46.54      Intervention:  1. Food and/or nutrient delivery: encouraged consistency with diet- making small changes when able especially when working on mental health  2. Nutrition counseling: support for continued success, goal setting    Monitoring/Evaluation:    Goals:  1. 30 min walking 7 days per week  2. Gym 2 times per week    Patient to follow up in 1 month(s) with bariatrician and 2 month(s) with CHARISSE      Video-Visit Details    Type of service:  Video Visit    Video End Time:5:33 PM    Originating Location (pt. Location): Home    Distant Location (provider location):  [facility] SURGERY [facility] AND BARIATRICS CARE [place]     Platform used for Video Visit: Hernan JONES RD

## 2022-03-30 ENCOUNTER — VIRTUAL VISIT (OUTPATIENT)
Dept: PSYCHIATRY | Facility: CLINIC | Age: 32
End: 2022-03-30
Attending: PSYCHOLOGIST
Payer: COMMERCIAL

## 2022-03-30 DIAGNOSIS — F41.1 GAD (GENERALIZED ANXIETY DISORDER): ICD-10-CM

## 2022-03-30 DIAGNOSIS — F33.1 MODERATE EPISODE OF RECURRENT MAJOR DEPRESSIVE DISORDER (H): Primary | ICD-10-CM

## 2022-03-30 PROCEDURE — 90837 PSYTX W PT 60 MINUTES: CPT | Mod: GT | Performed by: PSYCHOLOGIST

## 2022-03-30 ASSESSMENT — PATIENT HEALTH QUESTIONNAIRE - PHQ9
SUM OF ALL RESPONSES TO PHQ QUESTIONS 1-9: 5
10. IF YOU CHECKED OFF ANY PROBLEMS, HOW DIFFICULT HAVE THESE PROBLEMS MADE IT FOR YOU TO DO YOUR WORK, TAKE CARE OF THINGS AT HOME, OR GET ALONG WITH OTHER PEOPLE: SOMEWHAT DIFFICULT
SUM OF ALL RESPONSES TO PHQ QUESTIONS 1-9: 5
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
## 2022-03-31 ASSESSMENT — PATIENT HEALTH QUESTIONNAIRE - PHQ9: SUM OF ALL RESPONSES TO PHQ QUESTIONS 1-9: 5

## 2022-04-06 ENCOUNTER — VIRTUAL VISIT (OUTPATIENT)
Dept: PSYCHIATRY | Facility: CLINIC | Age: 32
End: 2022-04-06
Attending: PSYCHOLOGIST
Payer: COMMERCIAL

## 2022-04-06 DIAGNOSIS — F33.1 MODERATE EPISODE OF RECURRENT MAJOR DEPRESSIVE DISORDER (H): Primary | ICD-10-CM

## 2022-04-06 DIAGNOSIS — F41.1 GAD (GENERALIZED ANXIETY DISORDER): ICD-10-CM

## 2022-04-06 PROCEDURE — 90837 PSYTX W PT 60 MINUTES: CPT | Mod: GT | Performed by: PSYCHOLOGIST

## 2022-04-06 NOTE — PROGRESS NOTES
"OUTPATIENT PSYCHOTHERAPY PROGRESS NOTE    Client Name: Dariusz Leyva   YOB: 1990  Time of Service: 65 minutes   Service Type(s): Individual psychotherapy    Video- Visit Details  Type of service:  video visit for psychotherapy  Time of service:    Date:  04/06/2022    Video Start Time:  8:00am        Video End Time:  9:05am    Reason for video visit:  Patient unable to travel due to Covid-19  Originating Site (patient location):  [facility]   Location- Patient's home  Distant Site (provider location): [facility]  Mode of Communication:  Video Conference via [employer]  Consent:  Patient has given verbal consent for video visit?: Yes    Diagnoses:   Unspecified depressive disorder and unspecified anxiety    Goals of therapy: Elevate mood and show evidence of usual energy levels, and activities. Manage stress from planned move and transition into TU program      Individuals Present:   Psychotherapy services during this visit included myself and Dariusz Leyva.    Narrative:  Dariusz reported her mood is  more on the negative side lately, including anger/frustration.  She went to [place] this weekend. The event she had gone for had been cancelled. She had a period when  E  was not returning her calls. She had a discussion with him about their relationship.  Discussed her current process of assessing relationships in her life.     Mental Status:  Appearance:  Casually groomed   Behavior/relationship to examiner/demeanor:  cooperative  Motor activity/EPS:  Within normal limits  Speech rate:  Within normal limits  Speech volume:  Within normal limits  Speech articulation:  Within normal limits  Speech coherence: Within normal limits  Speech spontaneity: Within normal limits  Mood (subjective): \"more on the negative side lately, including anger/frustration\"  Affect (objective appearance): Generally euthymic  Thought Process (Associations):  Logical and Linear   Thought process (Rate):  Within normal " limits   Thought content: Within normal limits  Abnormal Perception:  None   Insight:  Good   Judgment:  Good     Plan: Continue with goals as outlined above    Andreea Vásquez Psy.D.,L.P

## 2022-04-11 ENCOUNTER — VIRTUAL VISIT (OUTPATIENT)
Dept: SURGERY | Facility: CLINIC | Age: 32
End: 2022-04-11
Payer: COMMERCIAL

## 2022-04-11 DIAGNOSIS — E66.9 OBESITY: Primary | ICD-10-CM

## 2022-04-11 PROCEDURE — 99207 PR MWM HEALTH COACH NO CHARGE: CPT
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
## 2022-04-13 ENCOUNTER — VIRTUAL VISIT (OUTPATIENT)
Dept: PSYCHIATRY | Facility: CLINIC | Age: 32
End: 2022-04-13
Attending: PSYCHOLOGIST
Payer: COMMERCIAL

## 2022-04-13 DIAGNOSIS — F33.1 MODERATE EPISODE OF RECURRENT MAJOR DEPRESSIVE DISORDER (H): Primary | ICD-10-CM

## 2022-04-13 DIAGNOSIS — F41.1 GAD (GENERALIZED ANXIETY DISORDER): ICD-10-CM

## 2022-04-13 PROCEDURE — 90837 PSYTX W PT 60 MINUTES: CPT | Mod: GT | Performed by: PSYCHOLOGIST

## 2022-04-13 NOTE — PROGRESS NOTES
OUTPATIENT PSYCHOTHERAPY PROGRESS NOTE    Client Name: Dariusz Leyva   YOB: 1990  Time of Service: 59 minutes   Service Type(s): Individual psychotherapy    Video- Visit Details  Type of service:  video visit for psychotherapy  Time of service:    Date:  04/13/2022    Video Start Time:  8:05am        Video End Time:  9:04am    Reason for video visit:  Patient unable to travel due to Covid-19  Originating Site (patient location):  [facility]   Location- Patient's home  Distant Site (provider location): [facility]  Mode of Communication:  Video Conference via [employer]  Consent:  Patient has given verbal consent for video visit?: Yes    Diagnoses:   Unspecified depressive disorder and unspecified anxiety    Goals of therapy: Elevate mood and show evidence of usual energy levels, and activities. Manage stress from planned move and transition into TU program      Individuals Present:   Psychotherapy services during this visit included myself and Dariusz Leyva.    Narrative:  Dariusz reported her experiences with the weight loss program. She has felt lonely the last few days. She reflected since the man  E ; she has been seeing is back at work - there is less communication. She shared about her disability. She is considering purpose in her life. She feels like she does not fit - she feels awkward at events/ gatherings. She does not feel like she has anyone - she feels selfish asking friends to do things. She feels like burden. She reported getting a job when she was 16 was to try and get her mother to warm up to her because she was trying to help out around the house. When she would get awards - her parents would not come. She does not have pictures from her graduations. She feels unwanted and unloved. She compresses herself so other people are not uncomfortable.     Mental Status:  Appearance:  Casually groomed   Behavior/relationship to examiner/demeanor:  cooperative  Motor activity/EPS:  Within  normal limits  Speech rate:  Within normal limits  Speech volume:  Within normal limits  Speech articulation:  Within normal limits  Speech coherence: Within normal limits  Speech spontaneity: Within normal limits  Mood (subjective): Not directly assessed  Affect (objective appearance): Subdued  Thought Process (Associations):  Logical and Linear   Thought process (Rate):  Within normal limits   Thought content: Within normal limits  Abnormal Perception:  None   Insight:  Good   Judgment:  Good     Plan: Continue with goals as outlined above    Andreea Vásquez Psy.D.,L.P

## 2022-04-20 ENCOUNTER — VIRTUAL VISIT (OUTPATIENT)
Dept: PSYCHIATRY | Facility: CLINIC | Age: 32
End: 2022-04-20
Attending: PSYCHOLOGIST
Payer: COMMERCIAL

## 2022-04-20 DIAGNOSIS — F33.1 MODERATE EPISODE OF RECURRENT MAJOR DEPRESSIVE DISORDER (H): Primary | ICD-10-CM

## 2022-04-20 DIAGNOSIS — F41.1 GAD (GENERALIZED ANXIETY DISORDER): ICD-10-CM

## 2022-04-20 PROCEDURE — 90837 PSYTX W PT 60 MINUTES: CPT | Mod: GT | Performed by: PSYCHOLOGIST

## 2022-04-20 NOTE — PROGRESS NOTES
OUTPATIENT PSYCHOTHERAPY PROGRESS NOTE    Client Name: Dariusz Leyva   YOB: 1990  Time of Service: 67 minutes   Service Type(s): Individual psychotherapy    Video- Visit Details  Type of service:  video visit for psychotherapy  Time of service:    Date:  04/20/2022    Video Start Time:  8:00am        Video End Time:  9:07am    Reason for video visit:  Patient unable to travel due to Covid-19  Originating Site (patient location):  [facility]   Location- Patient's home  Distant Site (provider location): [facility]  Mode of Communication:  Video Conference via [employer]  Consent:  Patient has given verbal consent for video visit?: Yes    Diagnoses:   Unspecified depressive disorder and unspecified anxiety    Goals of therapy: Elevate mood and show evidence of usual energy levels, and activities. Manage stress from planned move and transition into TU program      Individuals Present:   Psychotherapy services during this visit included myself and Dariusz Leyav.    Narrative:  Dariusz reported her mood has been  not so good.  She has been spending some time in bed. Discussed recent interaction with her ex.  E  has been inconsistent with communication. She is changing her interaction pattern and contacting him less. She is still getting mixed messages from him. She is trying to get a sense of his level of commitment. Discussed rational and emotional processing and how it impacts our interpretations.      Dariusz reported she has been considering what she might do for Mother's Day. She also shared a conversation with her mother. Discussed her experience of needing to be independent and get a job at 16 to help her family. She was seen as the  responsible one.  She shared what holding this label meant - siblings resented and additional responsibilities.      Mental Status:  Appearance:  Casually groomed   Behavior/relationship to examiner/demeanor:  cooperative  Motor activity/EPS:  Within normal  "limits  Speech rate:  Within normal limits  Speech volume:  Within normal limits  Speech articulation:  Within normal limits  Speech coherence: Within normal limits  Speech spontaneity: Within normal limits  Mood (subjective): \"not so good\"  Affect (objective appearance): Generally euthymic  Thought Process (Associations):  Logical and Linear   Thought process (Rate):  Within normal limits   Thought content: Within normal limits  Abnormal Perception:  None   Insight:  Good   Judgment:  Good     Plan: Continue with goals as outlined above    Andreea Vásquez Psy.D.,L.P          "

## 2022-04-27 ENCOUNTER — TELEPHONE (OUTPATIENT)
Dept: SURGERY | Facility: CLINIC | Age: 32
End: 2022-04-27

## 2022-04-27 ENCOUNTER — VIRTUAL VISIT (OUTPATIENT)
Dept: SURGERY | Facility: CLINIC | Age: 32
End: 2022-04-27
Payer: COMMERCIAL

## 2022-04-27 VITALS — HEIGHT: 68 IN | BODY MASS INDEX: 44.41 KG/M2 | WEIGHT: 293 LBS

## 2022-04-27 DIAGNOSIS — Z74.09 LIMITED MOBILITY: ICD-10-CM

## 2022-04-27 DIAGNOSIS — E66.01 OBESITY, CLASS III, BMI 40-49.9 (MORBID OBESITY) (H): Primary | ICD-10-CM

## 2022-04-27 DIAGNOSIS — G89.29 CHRONIC PAIN OF RIGHT KNEE: ICD-10-CM

## 2022-04-27 DIAGNOSIS — M25.561 CHRONIC PAIN OF RIGHT KNEE: ICD-10-CM

## 2022-04-27 PROCEDURE — 99215 OFFICE O/P EST HI 40 MIN: CPT | Mod: GT | Performed by: FAMILY MEDICINE

## 2022-04-27 RX ORDER — FLUTICASONE PROPIONATE 50 MCG
1 SPRAY, SUSPENSION (ML) NASAL
COMMUNITY
Start: 2022-03-28 | End: 2022-06-29

## 2022-04-27 RX ORDER — PHENTERMINE HYDROCHLORIDE 37.5 MG/1
18.75-37.5 TABLET ORAL
Qty: 90 TABLET | Refills: 1 | Status: SHIPPED | OUTPATIENT
Start: 2022-04-27 | End: 2024-06-04

## 2022-04-27 RX ORDER — CETIRIZINE HYDROCHLORIDE 10 MG/1
10 TABLET ORAL DAILY
COMMUNITY
Start: 2021-10-04 | End: 2022-06-29

## 2022-04-27 RX ORDER — SEMAGLUTIDE 0.25 MG/.5ML
0.25 INJECTION, SOLUTION SUBCUTANEOUS WEEKLY
Qty: 2 ML | Refills: 0 | Status: SHIPPED | OUTPATIENT
Start: 2022-04-27 | End: 2022-05-25

## 2022-04-27 RX ORDER — PHENTERMINE HYDROCHLORIDE 37.5 MG/1
37.5 TABLET ORAL
COMMUNITY
Start: 2022-02-03 | End: 2024-06-04

## 2022-04-27 RX ORDER — KRILL/OM-3/DHA/EPA/PHOSPHO/AST 1000-230MG
9000 CAPSULE ORAL
COMMUNITY
Start: 2022-03-28 | End: 2024-06-04

## 2022-04-27 NOTE — LETTER
4/27/2022         RE: Dariusz Leyva  [address]  [place] [address]        Dear Colleague,    Thank you for referring your patient, Dariusz Leyva, to the [facility] [place]. Please see a copy of my visit note below.    Dariusz Leyva is 31 year old  female who presents for a billable video visit today.    How would you like to obtain your AVS? MyChart  If dropped from the video visit, the video invitation should be resent by: [phone]  Will anyone else be joining your video visit? No       Video Start Time: 8:33    Are there any specific questions or needs that you would like addressed at your visit today? Would like to quit the program, is also taking meds much less frequently due to major loss of appetite    Bariatric Follow-up    31 year old  female BMI:Body mass index is 45.16 kg/m .    Weight:   Wt Readings from Last 1 Encounters:   04/27/22 134.7 kg (297 lb)    pounds    Comorbidities:  Patient Active Problem List   Diagnosis     Lumbago     Nonallopathic lesion of lumbar region     Nonallopathic lesion of sacral region     Pain in thoracic spine     Nonallopathic lesion of thoracic region     Abnormal Pap smear of cervix     Large tonsils     Morbid obesity with BMI of 45.0-49.9, adult (H)     MDD (major depressive disorder)     CARLY (generalized anxiety disorder)     Depression     Lipid screening     Chronic pain of right knee     Anxiety state     Injury of ligament of right knee     MDD (major depressive disorder), recurrent episode, moderate (H)     Painful orthopaedic hardware (H)     Tibial plateau fracture, right, closed, initial encounter     Dyslipidemia     Limited mobility     Migraine with aura and without status migrainosus, not intractable     DDD (degenerative disc disease), cervical     DDD (degenerative disc disease), lumbar     Increased PTH level     Vitamin D deficiency         Interim: 20# weight loss. Feels it was  "her juicing more than the 24 week program. Significant problems with her right knee. She has \"bone on bone\" and cartilage damage. She needs an osteotomy of her right femur. Started seeing a Chiropractor 3X/wk for her back. Started PT once a week. Has bands to strengthen her legs, doing leg presses at PT, squats, stairs, leg lifts. Has a f/u in September. They want her to lose another 47# before surgery to 250#. Does not want bariatric surgery. Feels the 24 week program is not personalized. Feels if she has the willpower she can do it herself. Feels the medications take her appetite away completely so she doesn't take them.    Plan:  DIET  Encouraged protein first and discussed the physiologic rationale. Encouraged stopping pop and leimonade   EXERCISE continue PT    PHARMACOTHERAPY Discussed GLP-1 RA, metformin. Continue vitamin D    Encouraged to continue seeking support for the grief that she feels with the limitations of her right knee. She wants to stop the 24 week program but was encouraged to keep her appointment with Karyn ROSE     -We reviewed her medications and whether associated with weight gain.    Current Outpatient Medications:      acetaminophen (TYLENOL) 325 MG tablet, TAKE 1-2 TABLETS(325-650MG) BY MOUTH EVERY 6 HOURS AS NEEDED FOR MILD PAIN, Disp: 100 tablet, Rfl: 3     cetirizine (ZYRTEC) 10 MG tablet, Take 10 mg by mouth daily, Disp: , Rfl:      cyclobenzaprine (FLEXERIL) 5 MG tablet, Take 1 tablet (5 mg) by mouth 3 times daily as needed for muscle spasms, Disp: 45 tablet, Rfl: 3     MONO 30 MG tablet, TAKE 1 TABLET BY MOUTH AS SOON AS POSSIBLE WITHIN 5 DAYS AFTER UNPROTECTED INTERCOURSE OR CONTRACEPTIVE FAILURE, Disp: , Rfl:      etonogestrel-ethinyl estradiol (NUVARING) 0.12-0.015 MG/24HR vaginal ring, Place 1 each vaginally every 28 days, Disp: 3 each, Rfl: 3     fluticasone (FLONASE) 50 MCG/ACT nasal spray, 1 spray, Disp: , Rfl:      Glucosamine-Chondroit-Vit C-Mn (GLUCOSAMINE 1500 " COMPLEX PO), , Disp: , Rfl:      ibuprofen (ADVIL/MOTRIN) 600 MG tablet, TAKE 1 TABLET BY MOUTH EVERY 6 HOURS AS NEEDED FOR MILD PAIN OR FEVER, Disp: , Rfl:      lactase (LACTAID) 3000 UNIT tablet, Take 1 tablet (3,000 Units) by mouth 3 times daily (with meals), Disp: 30 tablet, Rfl: 0     LACTOSE FAST ACTING RELIEF 9000 units TABS tablet, Take 9,000 Units by mouth 3 times daily (with meals), Disp: , Rfl:      naltrexone (DEPADE/REVIA) 50 MG tablet, Take 1/2 tablet with evening meal, Disp: 45 tablet, Rfl: 3     NEW MED, PT takes all the LEONARD chewy vitamins -apple cider vinegar -ashwaganda -superfruits -supergreens, Disp: , Rfl:      phentermine (ADIPEX-P) 37.5 MG tablet, Take 37.5 mg by mouth, Disp: , Rfl:      phentermine (ADIPEX-P) 37.5 MG tablet, Take 0.5-1 tablets (18.75-37.5 mg) by mouth every morning (before breakfast), Disp: 90 tablet, Rfl: 1     Probiotic Product (SUPER PROBIOTIC) CAPS, Take 1 tablet by mouth daily, Disp: , Rfl:      Semaglutide-Weight Management (WEGOVY) 0.25 MG/0.5ML SOAJ, Inject 0.25 mg Subcutaneous once a week for 28 days, Disp: 2 mL, Rfl: 0     vitamin D2 (ERGOCALCIFEROL) 78764 units (1250 mcg) capsule, Take 1 capsule (50,000 Units) by mouth twice a week, Disp: 24 capsule, Rfl: 0      We discussed [facility] Bariatric Basics including:  -eating 3 meals daily  -eating protein first  -eating slowly, chewing food well  -avoiding/limiting calorie containing beverages  -choosing wheat, not white with breads, crackers, pastas, sravani, bagels, tortillas, rice  -limiting restaurant or cafeteria eating to twice a week or less  -We discussed the importance of restorative sleep and stress management in maintaining a healthy weight.  -We discussed insulin resistance and glycemic index as it relates to appetite and weight control  -We discussed the National Weight Control Registry healthy weight maintenance strategies and ways to optimize metabolism.  -We discussed the importance of physical activity  "including cardiovascular and strength training in maintaining a healthier weight and explored viable options.    Most recent labs:  Lab Results   Component Value Date    HGB 13.2 07/29/2020    HCT 38.5 01/13/2020    MCV 92.1 01/13/2020     Lab Results   Component Value Date    CHOL 235.5 (H) 07/20/2020     Lab Results   Component Value Date    HDL 56.0 07/20/2020     Lab Results   Component Value Date    TRIG 101.0 07/20/2020     Lab Results   Component Value Date    ALT 14 02/03/2022    AST 14 02/03/2022    ALKPHOS 86 02/03/2022     Lab Results   Component Value Date    TSH 1.46 02/03/2022       DIETARY HISTORY  Meals Per Day: yes  Eating Protein First?: not really  Food Diary: B:water, eggs, fruit, turkey kielbasa  L:shake with greens and flax and PB powder D:chicken and salad kit-  Snacks Per Day: \"I am a snacker\"  Typical Snack: nothing healthy. \"there are no healthy snacks for me\" sometimes oranges or berries. Chips  Fluid Intake:trying a gallon a day alkaline water  Portion Control: problematic. Improved from initial visit  Calorie Containing Beverages: juicing. Drinking soda. Not on a daily basis. Pepsi or Sprite. Lemonade  Alcohol per week: rare  Typical Protein Food Choices: variety  Choosing Whole Grains:   Grocery Shopping is done by: herself  Food Preparation is done by: herself  Meals at Restaurant/Cafeteria/Take Out Per Week: rare  Eating at the Table:   TV is Off During Meals:     Positive Changes Since Last Visit: maintaining 20# weight loss on phentermine and topamax  Struggling With: exercise    Knowledgeable in Reading Food Labels:   Getting Adequate Protein: yes  Sleeping 7-8 hours/day interrupted  Stress management therapy    PHYSICAL ACTIVITY PATTERNS:  Cardiovascular: PT  Strength Training: PT    REVIEW OF SYSTEMS  GENERAL/CONSTITUTIONAL:  Fatigue: yes  CARDIOVASCULAR:  Chest Pain with Exertion: no  PULMONARY:  Dyspnea on exertion: NA  CPAP Use: no  Tobacco Use: " "no  GASTROINTESTINAL:  GERD/Heartburn: yes  UROLOGIC:  Urinary Symptoms: nocturia  NEUROLOGIC:  Headaches: yes  Paresthesias: no  PSYCHIATRIC:  Moods: anxiety and depression  MUSCULOSKELETAL/RHEUMATOLOGIC  Arthralgias: yes  Myalgias: yes  ENDOCRINE:  Monitoring Blood Sugars: no  Sugars Well Controlled: yes  DERMATOLOGIC:  Rashes: no    PHYSICAL EXAM: (most recent vitals and today's stated weight)  Vitals: Ht 1.727 m (5' 8\")   Wt 134.7 kg (297 lb)   BMI 45.16 kg/m        GEN: Pleasant and in no acute distress  PSYCH: A&OX3,     I have reviewed the note as documented above.  This accurately captures the substance of my conversation with the patient.  Thank you for the opportunity to participate in the care of your patient.    Ann Walker MD, FAAFP  [facility]  Diplomate, American Board of Obesity Medicine    Total time spent on the date of this encounter doing: chart review, review of test results, patient visit, physical exam, education, counseling, developing plan of care, and documenting = 40 minutes.          Video-Visit Details    Type of service:  Video Visit    Video End Time (time video stopped): 9:13  Originating Location (pt. Location): Home    Distant Location (provider location):  [facility] SURGERY [facility] BARIATR[facility]     Platform used for Video Visit: Hernan      Again, thank you for allowing me to participate in the care of your patient.        Sincerely,        Ann Walker MD    "

## 2022-04-27 NOTE — TELEPHONE ENCOUNTER
Reason for Call:  Other     Detailed comments: Patient has been in the 24week program since February and would like to stop doing the 24week program. And she has some questions on the billing portion of it $499.00. She talked to billing and they said they cant help her and she would need to speak to a nurse. Please give patient a call back and advise.     Phone Number Patient can be reached at: Cell number on file:    Telephone Information:   Mobile [phone]       Best Time: Anytime     Can we leave a detailed message on this number? YES    Call taken on 4/27/2022 at 9:46 AM by Myrtle Shoemaker         Class II - visualization of the soft palate, fauces, and uvula

## 2022-04-27 NOTE — TELEPHONE ENCOUNTER
We discussed that since she has already started the program and had 5 of her visits so far, that it can't be refunded if she decides to stop.  She stated that the program didn't feel very personal to her and was frustrated that one of the providers didn't even know how to pronounce her name after a couple visits with her.  I gave her space to voice her frustrations and encouraged her that we have other health coached and dietitians in the program if she doesn't feel comfortable at this point with the people she has been working with.  She was open to that idea but wasn't in a place that she could reschedule any appointments at this time.  She will call when she is in a safe spot to do this.      Mirian Guillen RN

## 2022-04-27 NOTE — PROGRESS NOTES
"Dariusz eLyva is 31 year old  female who presents for a billable video visit today.    How would you like to obtain your AVS? MyChart  If dropped from the video visit, the video invitation should be resent by: [phone]  Will anyone else be joining your video visit? No       Video Start Time: 8:33    Are there any specific questions or needs that you would like addressed at your visit today? Would like to quit the program, is also taking meds much less frequently due to major loss of appetite    Bariatric Follow-up    31 year old  female BMI:Body mass index is 45.16 kg/m .    Weight:   Wt Readings from Last 1 Encounters:   04/27/22 134.7 kg (297 lb)    pounds    Comorbidities:  Patient Active Problem List   Diagnosis     Lumbago     Nonallopathic lesion of lumbar region     Nonallopathic lesion of sacral region     Pain in thoracic spine     Nonallopathic lesion of thoracic region     Abnormal Pap smear of cervix     Large tonsils     Morbid obesity with BMI of 45.0-49.9, adult (H)     MDD (major depressive disorder)     CARLY (generalized anxiety disorder)     Depression     Lipid screening     Chronic pain of right knee     Anxiety state     Injury of ligament of right knee     MDD (major depressive disorder), recurrent episode, moderate (H)     Painful orthopaedic hardware (H)     Tibial plateau fracture, right, closed, initial encounter     Dyslipidemia     Limited mobility     Migraine with aura and without status migrainosus, not intractable     DDD (degenerative disc disease), cervical     DDD (degenerative disc disease), lumbar     Increased PTH level     Vitamin D deficiency         Interim: 20# weight loss. Feels it was her juicing more than the 24 week program. Significant problems with her right knee. She has \"bone on bone\" and cartilage damage. She needs an osteotomy of her right femur. Started seeing a Chiropractor 3X/wk for her back. Started PT once a week. Has bands to strengthen her legs, doing " leg presses at PT, squats, stairs, leg lifts. Has a f/u in September. They want her to lose another 47# before surgery to 250#. Does not want bariatric surgery. Feels the 24 week program is not personalized. Feels if she has the willpower she can do it herself. Feels the medications take her appetite away completely so she doesn't take them.    Plan:  DIET  Encouraged protein first and discussed the physiologic rationale. Encouraged stopping pop and leimonade   EXERCISE continue PT    PHARMACOTHERAPY Discussed GLP-1 RA, metformin. Continue vitamin D    Encouraged to continue seeking support for the grief that she feels with the limitations of her right knee. She wants to stop the 24 week program but was encouraged to keep her appointment with Karyn ROSE     -We reviewed her medications and whether associated with weight gain.    Current Outpatient Medications:      acetaminophen (TYLENOL) 325 MG tablet, TAKE 1-2 TABLETS(325-650MG) BY MOUTH EVERY 6 HOURS AS NEEDED FOR MILD PAIN, Disp: 100 tablet, Rfl: 3     cetirizine (ZYRTEC) 10 MG tablet, Take 10 mg by mouth daily, Disp: , Rfl:      cyclobenzaprine (FLEXERIL) 5 MG tablet, Take 1 tablet (5 mg) by mouth 3 times daily as needed for muscle spasms, Disp: 45 tablet, Rfl: 3     MONO 30 MG tablet, TAKE 1 TABLET BY MOUTH AS SOON AS POSSIBLE WITHIN 5 DAYS AFTER UNPROTECTED INTERCOURSE OR CONTRACEPTIVE FAILURE, Disp: , Rfl:      etonogestrel-ethinyl estradiol (NUVARING) 0.12-0.015 MG/24HR vaginal ring, Place 1 each vaginally every 28 days, Disp: 3 each, Rfl: 3     fluticasone (FLONASE) 50 MCG/ACT nasal spray, 1 spray, Disp: , Rfl:      Glucosamine-Chondroit-Vit C-Mn (GLUCOSAMINE 1500 COMPLEX PO), , Disp: , Rfl:      ibuprofen (ADVIL/MOTRIN) 600 MG tablet, TAKE 1 TABLET BY MOUTH EVERY 6 HOURS AS NEEDED FOR MILD PAIN OR FEVER, Disp: , Rfl:      lactase (LACTAID) 3000 UNIT tablet, Take 1 tablet (3,000 Units) by mouth 3 times daily (with meals), Disp: 30 tablet, Rfl: 0      LACTOSE FAST ACTING RELIEF 9000 units TABS tablet, Take 9,000 Units by mouth 3 times daily (with meals), Disp: , Rfl:      naltrexone (DEPADE/REVIA) 50 MG tablet, Take 1/2 tablet with evening meal, Disp: 45 tablet, Rfl: 3     NEW MED, PT takes all the LEONARD chewy vitamins -apple cider vinegar -ashwaganda -superfruits -supergreens, Disp: , Rfl:      phentermine (ADIPEX-P) 37.5 MG tablet, Take 37.5 mg by mouth, Disp: , Rfl:      phentermine (ADIPEX-P) 37.5 MG tablet, Take 0.5-1 tablets (18.75-37.5 mg) by mouth every morning (before breakfast), Disp: 90 tablet, Rfl: 1     Probiotic Product (SUPER PROBIOTIC) CAPS, Take 1 tablet by mouth daily, Disp: , Rfl:      Semaglutide-Weight Management (WEGOVY) 0.25 MG/0.5ML SOAJ, Inject 0.25 mg Subcutaneous once a week for 28 days, Disp: 2 mL, Rfl: 0     vitamin D2 (ERGOCALCIFEROL) 66106 units (1250 mcg) capsule, Take 1 capsule (50,000 Units) by mouth twice a week, Disp: 24 capsule, Rfl: 0      We discussed [facility] Bariatric Basics including:  -eating 3 meals daily  -eating protein first  -eating slowly, chewing food well  -avoiding/limiting calorie containing beverages  -choosing wheat, not white with breads, crackers, pastas, sravani, bagels, tortillas, rice  -limiting restaurant or cafeteria eating to twice a week or less  -We discussed the importance of restorative sleep and stress management in maintaining a healthy weight.  -We discussed insulin resistance and glycemic index as it relates to appetite and weight control  -We discussed the National Weight Control Registry healthy weight maintenance strategies and ways to optimize metabolism.  -We discussed the importance of physical activity including cardiovascular and strength training in maintaining a healthier weight and explored viable options.    Most recent labs:  Lab Results   Component Value Date    HGB 13.2 07/29/2020    HCT 38.5 01/13/2020    MCV 92.1 01/13/2020     Lab Results   Component Value Date    CHOL 235.5  "(H) 07/20/2020     Lab Results   Component Value Date    HDL 56.0 07/20/2020     Lab Results   Component Value Date    TRIG 101.0 07/20/2020     Lab Results   Component Value Date    ALT 14 02/03/2022    AST 14 02/03/2022    ALKPHOS 86 02/03/2022     Lab Results   Component Value Date    TSH 1.46 02/03/2022       DIETARY HISTORY  Meals Per Day: yes  Eating Protein First?: not really  Food Diary: B:water, eggs, fruit, turkey kielbasa  L:shake with greens and flax and PB powder D:chicken and salad kit-  Snacks Per Day: \"I am a snacker\"  Typical Snack: nothing healthy. \"there are no healthy snacks for me\" sometimes oranges or berries. Chips  Fluid Intake:trying a gallon a day alkaline water  Portion Control: problematic. Improved from initial visit  Calorie Containing Beverages: juicing. Drinking soda. Not on a daily basis. Pepsi or Sprite. Lemonade  Alcohol per week: rare  Typical Protein Food Choices: variety  Choosing Whole Grains:   Grocery Shopping is done by: herself  Food Preparation is done by: herself  Meals at Restaurant/Cafeteria/Take Out Per Week: rare  Eating at the Table:   TV is Off During Meals:     Positive Changes Since Last Visit: maintaining 20# weight loss on phentermine and topamax  Struggling With: exercise    Knowledgeable in Reading Food Labels:   Getting Adequate Protein: yes  Sleeping 7-8 hours/day interrupted  Stress management therapy    PHYSICAL ACTIVITY PATTERNS:  Cardiovascular: PT  Strength Training: PT    REVIEW OF SYSTEMS  GENERAL/CONSTITUTIONAL:  Fatigue: yes  CARDIOVASCULAR:  Chest Pain with Exertion: no  PULMONARY:  Dyspnea on exertion: NA  CPAP Use: no  Tobacco Use: no  GASTROINTESTINAL:  GERD/Heartburn: yes  UROLOGIC:  Urinary Symptoms: nocturia  NEUROLOGIC:  Headaches: yes  Paresthesias: no  PSYCHIATRIC:  Moods: anxiety and depression  MUSCULOSKELETAL/RHEUMATOLOGIC  Arthralgias: yes  Myalgias: yes  ENDOCRINE:  Monitoring Blood Sugars: no  Sugars Well Controlled: " "yes  DERMATOLOGIC:  Rashes: no    PHYSICAL EXAM: (most recent vitals and today's stated weight)  Vitals: Ht 1.727 m (5' 8\")   Wt 134.7 kg (297 lb)   BMI 45.16 kg/m        GEN: Pleasant and in no acute distress  PSYCH: A&OX3,     I have reviewed the note as documented above.  This accurately captures the substance of my conversation with the patient.  Thank you for the opportunity to participate in the care of your patient.    Ann Walker MD, FAAFP  [facility]  Diplomate, American Board of Obesity Medicine    Total time spent on the date of this encounter doing: chart review, review of test results, patient visit, physical exam, education, counseling, developing plan of care, and documenting = 40 minutes.          Video-Visit Details    Type of service:  Video Visit    Video End Time (time video stopped): 9:13  Originating Location (pt. Location): Home    Distant Location (provider location):  [facility] SURGERY [facility] AND BARIATRICS [facility]     Platform used for Video Visit: Hernan  "

## 2022-04-27 NOTE — PROGRESS NOTES
"Preceptor attestation:  Vital signs reviewed: /78   Pulse 60   Temp 97.7  F (36.5  C) (Oral)   Resp 16   Ht 1.702 m (5' 7\")   Wt 137.9 kg (304 lb)   SpO2 99%   BMI 47.61 kg/m      Patient seen, evaluated, and discussed with the resident.  I have verified the content of the note, which accurately reflects my assessment of the patient and the plan of care.    Supervising physician: Marlen Rader MD  [facility]  " Pt assisted to bathroom to empty his ileostomy bag

## 2022-04-28 ENCOUNTER — TELEPHONE (OUTPATIENT)
Dept: SURGERY | Facility: CLINIC | Age: 32
End: 2022-04-28
Payer: COMMERCIAL

## 2022-04-28 NOTE — TELEPHONE ENCOUNTER
Prior Authorization Retail Medication Request    Medication/Dose: Wegovy 0.25mg/0.5ml Auto-injectors    Key: [id number]    Pharmacy Information (if different than what is on RX)  Name:  [facility]  Phone:  [phone]

## 2022-05-03 ASSESSMENT — PATIENT HEALTH QUESTIONNAIRE - PHQ9
SUM OF ALL RESPONSES TO PHQ QUESTIONS 1-9: 2
SUM OF ALL RESPONSES TO PHQ QUESTIONS 1-9: 2
10. IF YOU CHECKED OFF ANY PROBLEMS, HOW DIFFICULT HAVE THESE PROBLEMS MADE IT FOR YOU TO DO YOUR WORK, TAKE CARE OF THINGS AT HOME, OR GET ALONG WITH OTHER PEOPLE: NOT DIFFICULT AT ALL

## 2022-05-03 NOTE — TELEPHONE ENCOUNTER
Prior Authorization Approval    Authorization Effective Date: 4/3/2022  Authorization Expiration Date: 11/3/2022  Medication: Wegovy 0.25mg/0.5ml Auto-injectors-APPROVED  Approved Dose/Quantity:   Reference #:     Insurance Company: [employer] - Phone [phone] Fax [phone]  Expected CoPay:       CoPay Card Available:      Foundation Assistance Needed:    Which Pharmacy is filling the prescription (Not needed for infusion/clinic administered): [id number] - [place], [place] - [address] [place] [address] N AT [facility] [address]  Pharmacy Notified: Yes  Patient Notified: No    **Instructed pharmacy to notify patient when script is ready to /ship.**

## 2022-05-03 NOTE — TELEPHONE ENCOUNTER
Central Prior Authorization Team   Phone: [phone]      PA Initiation    Medication: Wegovy 0.25mg/0.5ml Auto-injectors  Insurance Company: [employer] - Phone [phone] Fax [phone]  Pharmacy Filling the Rx: [id number] - [place], [place] - [address] [place] [address] N AT [facility] [address]  Filling Pharmacy Phone: [phone]  Filling Pharmacy Fax:    Start Date: 5/3/2022

## 2022-05-04 ENCOUNTER — VIRTUAL VISIT (OUTPATIENT)
Dept: PSYCHIATRY | Facility: CLINIC | Age: 32
End: 2022-05-04
Attending: PSYCHOLOGIST
Payer: COMMERCIAL

## 2022-05-04 DIAGNOSIS — F33.1 MODERATE EPISODE OF RECURRENT MAJOR DEPRESSIVE DISORDER (H): Primary | ICD-10-CM

## 2022-05-04 DIAGNOSIS — F41.1 GAD (GENERALIZED ANXIETY DISORDER): ICD-10-CM

## 2022-05-04 PROCEDURE — 90837 PSYTX W PT 60 MINUTES: CPT | Mod: GT | Performed by: PSYCHOLOGIST

## 2022-05-04 ASSESSMENT — PATIENT HEALTH QUESTIONNAIRE - PHQ9: SUM OF ALL RESPONSES TO PHQ QUESTIONS 1-9: 2

## 2022-05-04 NOTE — PROGRESS NOTES
OUTPATIENT PSYCHOTHERAPY PROGRESS NOTE    Client Name: Dariusz Leyva   YOB: 1990  Time of Service: 64 minutes   Service Type(s): Individual psychotherapy    Video- Visit Details  Type of service:  video visit for psychotherapy  Time of service:    Date:  05/04/2022    Video Start Time:  8:01am        Video End Time:  9:05am    Reason for video visit:  Patient unable to travel due to Covid-19  Originating Site (patient location):  [facility]   Location- Patient's home  [facility]  Mode of Communication:  Video Conference via [employer]  Consent:  Patient has given verbal consent for video visit?: Yes    Diagnoses:   Unspecified depressive disorder and unspecified anxiety    Goals of therapy: Elevate mood and show evidence of usual energy levels, and activities. Manage stress from planned move and transition into TU program      Individuals Present:   Psychotherapy services during this visit included myself and Dariusz Leyva.    Narrative:  Yadira reported her mood has been  fluctuating.  Currently she is  so-so.  She reported the last few weeks she feels rejected socially. She has not been invited to things lately and she has seen her friends hanging out. She did see a friend for pooja on Sunday.      She has been focusing on her health. She started a gym membership. She is meeting with a  and she hopes to see them on a regular basis.      She is not looking forward to her birthday, because of her past history of experiences. She had a  sweet sixteen  and she invited almost her whole class and only three people came. The history continued with disappointments. She stated it reminds her  I am not wanted by anybody.       She saw a medium on Friday. It offered assurance about a relationship in [place]. He has not been contacting her, but randomly contacted her yesterday. Everything was  normal  to him. He made sure he emphasized that he was single on the phone  "call. Her sadness is turning into anger. She did not end it, because  there was nothing to end.       Mental Status:  Appearance:  Casually groomed   Behavior/relationship to examiner/demeanor:  cooperative  Motor activity/EPS:  Within normal limits  Speech rate:  Within normal limits  Speech volume:  Within normal limits  Speech articulation:  Within normal limits  Speech coherence: Within normal limits  Speech spontaneity: Within normal limits  Mood (subjective): \"so-so\"  Affect (objective appearance): Subdued  Thought Process (Associations):  Logical and Linear   Thought process (Rate):  Within normal limits   Thought content: Within normal limits  Abnormal Perception:  None   Insight:  Good   Judgment:  Good     Plan: Continue with goals as outlined above    Andreea Vásquez Psy.D.,L.P        Answers for HPI/ROS submitted by the patient on 5/3/2022  If you checked off any problems, how difficult have these problems made it for you to do your work, take care of things at home, or get along with other people?: Not difficult at all  PHQ9 TOTAL SCORE: 2      "

## 2022-05-06 ENCOUNTER — TELEPHONE (OUTPATIENT)
Dept: ENDOCRINOLOGY | Facility: CLINIC | Age: 32
End: 2022-05-06
Payer: COMMERCIAL

## 2022-05-06 NOTE — TELEPHONE ENCOUNTER
Called pt and got her scheduled for a hc visit with me on May 12 at 8am    It will be a video visit.    She was seeing janes hilliard And said in general she wanted the program to be more personalized. I will find out more when we have our session      She mentioned one time missing the hc call and being frustrated

## 2022-05-11 ENCOUNTER — VIRTUAL VISIT (OUTPATIENT)
Dept: PSYCHIATRY | Facility: CLINIC | Age: 32
End: 2022-05-11
Attending: PSYCHOLOGIST
Payer: COMMERCIAL

## 2022-05-11 DIAGNOSIS — F33.1 MODERATE EPISODE OF RECURRENT MAJOR DEPRESSIVE DISORDER (H): Primary | ICD-10-CM

## 2022-05-11 PROCEDURE — 90837 PSYTX W PT 60 MINUTES: CPT | Mod: GT | Performed by: PSYCHOLOGIST

## 2022-05-11 ASSESSMENT — PATIENT HEALTH QUESTIONNAIRE - PHQ9
SUM OF ALL RESPONSES TO PHQ QUESTIONS 1-9: 4
10. IF YOU CHECKED OFF ANY PROBLEMS, HOW DIFFICULT HAVE THESE PROBLEMS MADE IT FOR YOU TO DO YOUR WORK, TAKE CARE OF THINGS AT HOME, OR GET ALONG WITH OTHER PEOPLE: SOMEWHAT DIFFICULT
SUM OF ALL RESPONSES TO PHQ QUESTIONS 1-9: 4

## 2022-05-11 NOTE — PROGRESS NOTES
OUTPATIENT PSYCHOTHERAPY PROGRESS NOTE    Client Name: Dariusz Leyva   YOB: 1990  Time of Service: 62 minutes   Service Type(s): Individual psychotherapy    Video- Visit Details  Type of service:  video visit for psychotherapy  Time of service:    Date:  05/11/2022    Video Start Time:  8:05am        Video End Time:  9:07am    Reason for video visit:  Patient unable to travel due to Covid-19  Originating Site (patient location):  [facility]   Location- Patient's home  Distant Site (provider location): [facility]  Mode of Communication:  Video Conference via [employer]  Consent:  Patient has given verbal consent for video visit?: Yes    Diagnoses:   Unspecified depressive disorder and unspecified anxiety    Goals of therapy: Elevate mood and show evidence of usual energy levels, and activities. Manage stress from planned move and transition into TU program      Individuals Present:   Psychotherapy services during this visit included myself and Dariusz Leyva.    Narrative:  Dariusz reported her mood is  okay.  She did not get a lot of sleep last because she had to keep getting up. She started a diet yesterday, so she is limited on what she can eat. She indicated she has not been doing a lot, but she went to [place] for the weekend. Her plans did not go as expected.      She did not call her mother on Mother s Day. Discussed her relationship with her mother. She recalls trying so hard to please her parents, but they did not attend her games, concerts, etc.     She wonders about why people acknowledge people who have harmed her and don t recognize her pain.     She would like to disappear - get away from people.      She shared that people from her past are trying to connect with her right now and she is not sure what she thinks about it.       Mental Status:  Appearance:  Casually groomed   Behavior/relationship to examiner/demeanor:  cooperative  Motor activity/EPS:  Within normal limits  Speech  "rate:  Within normal limits  Speech volume:  Within normal limits  Speech articulation:  Within normal limits  Speech coherence: Within normal limits  Speech spontaneity: Within normal limits  Mood (subjective): \"okay\"  Affect (objective appearance): Euthymic  Thought Process (Associations):  Logical and Linear   Thought process (Rate):  Within normal limits   Thought content: Within normal limits  Abnormal Perception:  None   Insight:  Good   Judgment:  Good     Plan: Continue with goals as outlined above    Andreea Vásquez Psy.D.,L.P      Answers for HPI/ROS submitted by the patient on 5/11/2022  If you checked off any problems, how difficult have these problems made it for you to do your work, take care of things at home, or get along with other people?: Somewhat difficult  PHQ9 TOTAL SCORE: 4        "

## 2022-05-12 ENCOUNTER — VIRTUAL VISIT (OUTPATIENT)
Dept: ENDOCRINOLOGY | Facility: CLINIC | Age: 32
End: 2022-05-12
Payer: COMMERCIAL

## 2022-05-12 DIAGNOSIS — E66.9 OBESITY: Primary | ICD-10-CM

## 2022-05-12 PROCEDURE — 99207 PR NO CHARGE LOS: CPT

## 2022-05-12 ASSESSMENT — PATIENT HEALTH QUESTIONNAIRE - PHQ9: SUM OF ALL RESPONSES TO PHQ QUESTIONS 1-9: 4

## 2022-05-12 NOTE — LETTER
"5/12/2022       RE: Dariusz Leyva  [address]  [place] [address]     Dear Colleague,    Thank you for referring your patient, Dariusz Leyva, to the [facility] [place] [facility]. Please see a copy of my visit note below.    ABHINAV BENJAMIN    Progress Notes    Return Weight Management Health Coaching Note    Health  Visit #: 5    ASSESSMENT:  \"katie toledo\"    Initial Weight: 317 lb     Current Weight (lbs): went back up, she says, but not sure her weight    Saw Phuong for health coaching; sees CHARISSE Boss and Dr Walker.    Grade Date is August, 6 visits; do 2 per month    Tried video visit, but pt not there, so will try to call now. DID PHONE  Today    PILLAR(S) DISCUSSED IN THIS VISIT:  Just trying to lose weight; not a choice for me.   Seeing a PT twice per week, at [facility].  Wants to stay motivated and continue to lose weight.    Needs the in person connection to stay motivated so [facility] is a good fit.   Does not want or need anything virtual, has been isolated and wants to seek out in person.  Needs a community around me.  From [place], had a lot of community there but likes it here better. Really hard growing up there. She is more independent here and has more opportunity but Hard to make friends and date here. And, will feel more confident as she loses weight.     GOALS: May 12, 2022  I am active 30 minutes per day. ([facility] PT 2 days, 3 days on my own; walk around [facility] once week.    I make sure I have in person connection at [facility]  weekly.    How can I manage pain?  Anti-inflamatory diet, or tumeric, ask my DrCassyabout options for pain, medical cannibis. (consider limiting marijuana use so have more energy).         NEXT HEALTH  VISIT with ABHINAV: MAY 26, 8am, VIDEO  To schedule your next visit, please call [phone].      TIME:  30 minutes spent with patient, including charting " time.       SIGNATURE:  Mirian Flores  Lead Health   [facility]  [facility] [place]

## 2022-05-12 NOTE — PROGRESS NOTES
"MIRIAN FLORES    Progress Notes    Return Weight Management Health Coaching Note    Health  Visit #: 5    ASSESSMENT:  \"katie toledo\"    Initial Weight: 317 lb     Current Weight (lbs): went back up, she says, but not sure her weight    Saw Phuong for health coaching; sees CHARISSE Boss and Dr Walker.    Grade Date is August, 6 visits; do 2 per month    Tried video visit, but pt not there, so will try to call now. DID PHONE  Today    PILLAR(S) DISCUSSED IN THIS VISIT:  Just trying to lose weight; not a choice for me.   Seeing a PT twice per week, at [facility].  Wants to stay motivated and continue to lose weight.    Needs the in person connection to stay motivated so [facility] is a good fit.   Does not want or need anything virtual, has been isolated and wants to seek out in person.  Needs a community around me.  From [place], had a lot of community there but likes it here better. Really hard growing up there. She is more independent here and has more opportunity but Hard to make friends and date here. And, will feel more confident as she loses weight.     GOALS: May 12, 2022  I am active 30 minutes per day. ([facility] PT 2 days, 3 days on my own; walk around [place], [facility] once week.    I make sure I have in person connection at [facility]  weekly.    How can I manage pain?  Anti-inflamatory diet, or tumeric, ask my DrCassyabout options for pain, medical cannibis. (consider limiting marijuana use so have more energy).         NEXT HEALTH  VISIT with MIRIAN: MAY 26, 8am, VIDEO  To schedule your next visit, please call [phone].      TIME:  30 minutes spent with patient, including charting time.       SIGNATURE:  Mirian Flores  Lead Health   [facility]  [facility] [place]   "

## 2022-05-15 ENCOUNTER — HEALTH MAINTENANCE LETTER (OUTPATIENT)
Age: 32
End: 2022-05-15

## 2022-05-16 ENCOUNTER — VIRTUAL VISIT (OUTPATIENT)
Dept: SURGERY | Facility: CLINIC | Age: 32
End: 2022-05-16
Payer: COMMERCIAL

## 2022-05-16 DIAGNOSIS — R63.8 ABNORMAL CRAVING: ICD-10-CM

## 2022-05-16 DIAGNOSIS — E66.01 OBESITY, CLASS III, BMI 40-49.9 (MORBID OBESITY) (H): Primary | ICD-10-CM

## 2022-05-16 PROCEDURE — 97803 MED NUTRITION INDIV SUBSEQ: CPT | Mod: TEL | Performed by: DIETITIAN, REGISTERED

## 2022-05-16 NOTE — PROGRESS NOTES
Dariusz Leyva is a 31 year old who is being evaluated via a billable telephone visit.      What phone number would you like to be contacted at? [phone]  How would you like to obtain your AVS? [facility]      Medical  Weight Loss Follow-Up Diet Evaluation  Assessment:  Dariusz is presenting today for a follow up weight management nutrition consultation. Pt has had an initial appointment with Dr. Walker  Weight loss medication: wegovy- nausea and migraines  Pt's weight is 297  Initial weight: 317lb  Weight change: down 20lb    BMI: Body mass index is 45.16 kg/m .  Ideal body weight: 63.9 kg (140 lb 14 oz)  Adjusted ideal body weight: 92.2 kg (203 lb 5.2 oz)    Estimated RMR ([place]-St Jeor equation):   2100 kcals x 1.2 (sedentary) = 2500 kcals (for weight maintenance)  Recommended Protein Intake:  grams of protein/day  Patient Active Problem List:  Patient Active Problem List   Diagnosis     Lumbago     Nonallopathic lesion of lumbar region     Nonallopathic lesion of sacral region     Pain in thoracic spine     Nonallopathic lesion of thoracic region     Abnormal Pap smear of cervix     Large tonsils     Morbid obesity with BMI of 45.0-49.9, adult (H)     MDD (major depressive disorder)     CARLY (generalized anxiety disorder)     Depression     Lipid screening     Chronic pain of right knee     Anxiety state     Injury of ligament of right knee     MDD (major depressive disorder), recurrent episode, moderate (H)     Painful orthopaedic hardware (H)     Tibial plateau fracture, right, closed, initial encounter     Dyslipidemia     Limited mobility     Migraine with aura and without status migrainosus, not intractable     DDD (degenerative disc disease), cervical     DDD (degenerative disc disease), lumbar     Increased PTH level     Vitamin D deficiency     Progress on goals from last visit: states she started a different detox program- meal replacement shakes for hormone balance- started on Tuesday  +  shakes and veggies along with vitamins- tomorrow will start adding in protein (2-4oz servings per day), 2 cups veggies (some are unlimited), 2 cups of fruit per day- going to be doing this for 1 month along with hormone shakes    Beverages: 1 gallon of water per day- having to go to the bathroom a lot  Exercise: working with  at [facility] twice a week    Nutrition Diagnosis:    Overweight/Obesity (NC 3.3) related to overeating and poor lifestyle habits as evidenced by patient report of inconsistent meals, grazing in the evening and BMI 45.16      Intervention:  1. Food and/or nutrient delivery: encouraged her to keep up with the diet she is on- transitioning to real foods and supplementing with shakes- she states she is bringing together all the components from each plan that she likes to figure out what best works for her  2. Nutrition counseling: support for continued success    Monitoring/Evaluation:    Goals:  1. Track what she likes from each plan and bring to next visit to help find best plan for her    Patient to follow up in 1 month(s) with CHARISSE        Phone call duration: 30 minutes    DEMETRIUS JONES RD

## 2022-05-16 NOTE — LETTER
5/16/2022         RE: Dariusz Leyva  [address]  [place] [address]        Dear Colleague,    Thank you for referring your patient, Dariusz Leyva, to the [facility] [place]. Please see a copy of my visit note below.    Dariusz Leyva is a 31 year old who is being evaluated via a billable telephone visit.      What phone number would you like to be contacted at? [phone]  How would you like to obtain your AVS? [facility]      Medical  Weight Loss Follow-Up Diet Evaluation  Assessment:  Dariusz is presenting today for a follow up weight management nutrition consultation. Pt has had an initial appointment with Dr. Walker  Weight loss medication: wegovy- nausea and migraines  Pt's weight is 297  Initial weight: 317lb  Weight change: down 20lb    BMI: Body mass index is 45.16 kg/m .  Ideal body weight: 63.9 kg (140 lb 14 oz)  Adjusted ideal body weight: 92.2 kg (203 lb 5.2 oz)    Estimated RMR ([place]-St Jeor equation):   2100 kcals x 1.2 (sedentary) = 2500 kcals (for weight maintenance)  Recommended Protein Intake:  grams of protein/day  Patient Active Problem List:  Patient Active Problem List   Diagnosis     Lumbago     Nonallopathic lesion of lumbar region     Nonallopathic lesion of sacral region     Pain in thoracic spine     Nonallopathic lesion of thoracic region     Abnormal Pap smear of cervix     Large tonsils     Morbid obesity with BMI of 45.0-49.9, adult (H)     MDD (major depressive disorder)     CARLY (generalized anxiety disorder)     Depression     Lipid screening     Chronic pain of right knee     Anxiety state     Injury of ligament of right knee     MDD (major depressive disorder), recurrent episode, moderate (H)     Painful orthopaedic hardware (H)     Tibial plateau fracture, right, closed, initial encounter     Dyslipidemia     Limited mobility     Migraine with aura and without status migrainosus, not intractable     DDD  (degenerative disc disease), cervical     DDD (degenerative disc disease), lumbar     Increased PTH level     Vitamin D deficiency     Progress on goals from last visit: states she started a different detox program- meal replacement shakes for hormone balance- started on Tuesday  + shakes and veggies along with vitamins- tomorrow will start adding in protein (2-4oz servings per day), 2 cups veggies (some are unlimited), 2 cups of fruit per day- going to be doing this for 1 month along with hormone shakes    Beverages: 1 gallon of water per day- having to go to the bathroom a lot  Exercise: working with  at [facility] twice a week    Nutrition Diagnosis:    Overweight/Obesity (NC 3.3) related to overeating and poor lifestyle habits as evidenced by patient report of inconsistent meals, grazing in the evening and BMI 45.16      Intervention:  1. Food and/or nutrient delivery: encouraged her to keep up with the diet she is on- transitioning to real foods and supplementing with shakes- she states she is bringing together all the components from each plan that she likes to figure out what best works for her  2. Nutrition counseling: support for continued success    Monitoring/Evaluation:    Goals:  1. Track what she likes from each plan and bring to next visit to help find best plan for her    Patient to follow up in 1 month(s) with CHARISSE        Phone call duration: 30 minutes    DEMETRIUS JONES RD        Again, thank you for allowing me to participate in the care of your patient.        Sincerely,        DEMETRIUS JONES RD

## 2022-05-17 VITALS — WEIGHT: 293 LBS | HEIGHT: 68 IN | BODY MASS INDEX: 44.41 KG/M2

## 2022-05-18 ENCOUNTER — VIRTUAL VISIT (OUTPATIENT)
Dept: PSYCHIATRY | Facility: CLINIC | Age: 32
End: 2022-05-18
Attending: PSYCHOLOGIST
Payer: COMMERCIAL

## 2022-05-18 DIAGNOSIS — F41.1 GAD (GENERALIZED ANXIETY DISORDER): ICD-10-CM

## 2022-05-18 DIAGNOSIS — F33.1 MODERATE EPISODE OF RECURRENT MAJOR DEPRESSIVE DISORDER (H): Primary | ICD-10-CM

## 2022-05-18 PROCEDURE — 90837 PSYTX W PT 60 MINUTES: CPT | Mod: GT | Performed by: PSYCHOLOGIST

## 2022-05-18 ASSESSMENT — PATIENT HEALTH QUESTIONNAIRE - PHQ9
SUM OF ALL RESPONSES TO PHQ QUESTIONS 1-9: 3
SUM OF ALL RESPONSES TO PHQ QUESTIONS 1-9: 3
10. IF YOU CHECKED OFF ANY PROBLEMS, HOW DIFFICULT HAVE THESE PROBLEMS MADE IT FOR YOU TO DO YOUR WORK, TAKE CARE OF THINGS AT HOME, OR GET ALONG WITH OTHER PEOPLE: SOMEWHAT DIFFICULT

## 2022-05-18 NOTE — PROGRESS NOTES
OUTPATIENT PSYCHOTHERAPY PROGRESS NOTE    Client Name: Dariusz Leyva   YOB: 1990  Time of Service: 68 minutes   Service Type(s): Individual psychotherapy    Video- Visit Details  Type of service:  video visit for psychotherapy  Time of service:    Date:  05/18/2022    Video Start Time:  8:01am        Video End Time:  9:09am    Reason for video visit:  Patient unable to travel due to Covid-19  Originating Site (patient location):  [facility]   Location- Patient's home  Distant Site (provider location): [facility]  Mode of Communication:  Video Conference via [employer]  Consent:  Patient has given verbal consent for video visit?: Yes    Diagnoses:   Unspecified depressive disorder and unspecified anxiety    Goals of therapy: Elevate mood and show evidence of usual energy levels, and activities. Manage stress from planned move and transition into TU program      Individuals Present:   Psychotherapy services during this visit included myself and Dariusz Leyva.    Narrative:  Dariusz reported she was  alright.  She is working out at a gym with a . She denied sleep and appetite disturbance (on weight loss program), anhedonia, and suicidal ideation. She has been at a  boiling point  of sadness and anger. Her anger is very intense, but she has no intent to take action. She shared a message she posted on social media.      One of her brother s significant relationships reached out to her family and indicated her niece is interested in meeting her family. She is unsure if she wants to get involved.      Discussed recent interactions with  E.       She went to see her cousin in a play and experienced panic at the event. She described experiencing similar things in the context of her relationships - when she thinks of  speaking her truth.       Discussed ending a relationship and how it came about.      Mental Status:  Appearance:  Casually groomed   Behavior/relationship to examiner/demeanor:   "cooperative  Motor activity/EPS:  Within normal limits  Speech rate:  Within normal limits  Speech volume:  Within normal limits  Speech articulation:  Within normal limits  Speech coherence: Within normal limits  Speech spontaneity: Within normal limits  Mood (subjective): \"alright\"  Affect (objective appearance): Euthymic  Thought Process (Associations):  Logical and Linear   Thought process (Rate):  Within normal limits   Thought content: Within normal limits  Abnormal Perception:  None   Insight:  Good   Judgment:  Good     Plan: Continue with goals as outlined above    Andreea Vásquez Psy.D.,L.P      Answers for HPI/ROS submitted by the patient on 5/18/2022  If you checked off any problems, how difficult have these problems made it for you to do your work, take care of things at home, or get along with other people?: Somewhat difficult  PHQ9 TOTAL SCORE: 3      "

## 2022-05-25 ENCOUNTER — VIRTUAL VISIT (OUTPATIENT)
Dept: PSYCHIATRY | Facility: CLINIC | Age: 32
End: 2022-05-25
Attending: PSYCHOLOGIST
Payer: COMMERCIAL

## 2022-05-25 DIAGNOSIS — F33.1 MODERATE EPISODE OF RECURRENT MAJOR DEPRESSIVE DISORDER (H): Primary | ICD-10-CM

## 2022-05-25 DIAGNOSIS — F41.1 GAD (GENERALIZED ANXIETY DISORDER): ICD-10-CM

## 2022-05-25 PROCEDURE — 90837 PSYTX W PT 60 MINUTES: CPT | Mod: GT | Performed by: PSYCHOLOGIST

## 2022-05-25 ASSESSMENT — PATIENT HEALTH QUESTIONNAIRE - PHQ9
SUM OF ALL RESPONSES TO PHQ QUESTIONS 1-9: 3
10. IF YOU CHECKED OFF ANY PROBLEMS, HOW DIFFICULT HAVE THESE PROBLEMS MADE IT FOR YOU TO DO YOUR WORK, TAKE CARE OF THINGS AT HOME, OR GET ALONG WITH OTHER PEOPLE: SOMEWHAT DIFFICULT
SUM OF ALL RESPONSES TO PHQ QUESTIONS 1-9: 3

## 2022-05-25 NOTE — PROGRESS NOTES
OUTPATIENT PSYCHOTHERAPY PROGRESS NOTE    Client Name: Dariusz Leyva   YOB: 1990  Time of Service: 55 minutes   Service Type(s): Individual psychotherapy    Video- Visit Details  Type of service:  video visit for psychotherapy  Time of service:    Date:  05/25/2022    Video Start Time:  8:08am        Video End Time:  9:03am    Reason for video visit:  Patient unable to travel due to Covid-19  Originating Site (patient location):  [facility]   Location- Patient's home  Distant Site (provider location): [facility]  Mode of Communication:  Video Conference via [employer]  Consent:  Patient has given verbal consent for video visit?: Yes    Diagnoses:   Unspecified depressive disorder and unspecified anxiety    Goals of therapy: Elevate mood and show evidence of usual energy levels, and activities. Manage stress from planned move and transition into TU program      Individuals Present:   Psychotherapy services during this visit included myself and Dariusz Leyva.    Narrative:  Dariusz reported her mood has been  okay.  She reported she has been alone for the most part. She has been focusing more on losing weight. She is making progress, but she is worried she will reach a stuck point. She struggled with sleep last night. She only has had about an hour of sleep.      Discussed views on optimism. Discussed that she gets a sense about people and she does not know what it is right away, but later it becomes clear they do not mean well.      She shared that she has urges to help people, but she recognizes that it should be in a mutual situation.      She had a conversation with  E  recently.      Mental Status:  Appearance:  Casually groomed   Behavior/relationship to examiner/demeanor:  cooperative  Motor activity/EPS:  Within normal limits  Speech rate:  Within normal limits  Speech volume:  Within normal limits  Speech articulation:  Within normal limits  Speech coherence: Within normal limits  Speech  "spontaneity: Within normal limits  Mood (subjective): \"okay\"  Affect (objective appearance): Euthymic  Thought Process (Associations):  Logical and Linear   Thought process (Rate):  Within normal limits   Thought content: Within normal limits  Abnormal Perception:  None   Insight:  Good   Judgment:  Good     Plan: Continue with goals as outlined above    Andreea Vásquez Psy.D.,L.P      Answers for HPI/ROS submitted by the patient on 5/25/2022  If you checked off any problems, how difficult have these problems made it for you to do your work, take care of things at home, or get along with other people?: Somewhat difficult  PHQ9 TOTAL SCORE: 3        "

## 2022-05-26 ENCOUNTER — VIRTUAL VISIT (OUTPATIENT)
Dept: ENDOCRINOLOGY | Facility: CLINIC | Age: 32
End: 2022-05-26
Payer: COMMERCIAL

## 2022-05-26 DIAGNOSIS — E66.9 OBESITY: Primary | ICD-10-CM

## 2022-05-26 PROCEDURE — 99207 PR NO CHARGE LOS: CPT

## 2022-05-26 NOTE — PROGRESS NOTES
ABHINAV BENJAMIN    Progress Notes    Return Weight Management Health Coaching Note    Health  Visit #: 6      ASSESSMENT:  Initial Weight:317     Current Weight (lbs): 295 lb        PREVIOUS GOAL(S) REVIEW:  I am active 30 minutes per day. (Lifetime PT 2 days, 3 days on my own; walk around [place] once week. MEETING     I make sure I have in person connection at [facility]  weekly. IN PROGRESS     How can I manage pain?  Anti-inflamatory diet, or tumeric, ask my Dr.about options for pain, medical cannibis. (consider limiting marijuana use so have more energy).  IN PROGRESS         PILLAR(S) DISCUSSED IN THIS VISIT:      Reviewed her goals for exercise, and pain management; her priority is to to move every day.  Discussed how she is in the chapter of self care/me first and how through being clear and grounded and healthy,  New strong friendships will develop       GOALS:  May 26  1.) Reminder: I move every day addy on the grey rainy days, or the days I don't feel like it.   2.) I am active 30 minutes per day. (Lifetime PT 2 days, 3 days on my own; walk around [place] once week.   3.) Use ice for the pain  4.) Reminder: I am in the  chapter of self care/me first right now; I trust that through being clear and grounded and healthy, that new strong friendships will develop. Which current friendships do I want to foster/ which do I want to let go of? Where may I meet like-minded people?    NEW HEALTH  VISIT:  June 9, 10am    FOLLOW-UP:  To schedule your next visit, please call [phone].      TIME:    30 minutes spent with patient, including charting time.       SIGNATURE:  Abhinav Benjamin  Lead Health   [facility] Weight Managment  [facility]

## 2022-05-26 NOTE — LETTER
5/26/2022       RE: Dariusz Leyva  [address]  [place] [address]     Dear Colleague,    Thank you for referring your patient, Dariusz Leyva, to the [facility] WEIGHT MANAGEMENT CLINIC [place] at [facility]. Please see a copy of my visit note below.    ABHINAV BENJAMIN    Progress Notes    Return Weight Management Health Coaching Note    Health  Visit #: 6      ASSESSMENT:  Initial Weight:317     Current Weight (lbs): 295 lb        PREVIOUS GOAL(S) REVIEW:  I am active 30 minutes per day. (Lifetime PT 2 days, 3 days on my own; walk around [place] once week. MEETING     I make sure I have in person connection at [facility]  weekly. IN PROGRESS     How can I manage pain?  Anti-inflamatory diet, or tumeric, ask my Dr.about options for pain, medical cannibis. (consider limiting marijuana use so have more energy).  IN PROGRESS         PILLAR(S) DISCUSSED IN THIS VISIT:      Reviewed her goals for exercise, and pain management; her priority is to to move every day.  Discussed how she is in the chapter of self care/me first and how through being clear and grounded and healthy,  New strong friendships will develop       GOALS:  May 26  1.) Reminder: I move every day addy on the grey rainy days, or the days I don't feel like it.   2.) I am active 30 minutes per day. (Lifetime PT 2 days, 3 days on my own; walk around [place] once week.   3.) Use ice for the pain  4.) Reminder: I am in the  chapter of self care/me first right now; I trust that through being clear and grounded and healthy, that new strong friendships will develop. Which current friendships do I want to foster/ which do I want to let go of? Where may I meet like-minded people?    NEW HEALTH  VISIT:  June 9, 10am    FOLLOW-UP:  To schedule your next visit, please call [phone].      TIME:    30 minutes spent with patient, including charting  time.       SIGNATURE:  Mirian Flores  Lead Health   [facility] Weight Managment  [facility] [place]

## 2022-06-01 ENCOUNTER — VIRTUAL VISIT (OUTPATIENT)
Dept: PSYCHIATRY | Facility: CLINIC | Age: 32
End: 2022-06-01
Attending: PSYCHOLOGIST
Payer: COMMERCIAL

## 2022-06-01 DIAGNOSIS — F33.1 MODERATE EPISODE OF RECURRENT MAJOR DEPRESSIVE DISORDER (H): Primary | ICD-10-CM

## 2022-06-01 DIAGNOSIS — F41.1 GAD (GENERALIZED ANXIETY DISORDER): ICD-10-CM

## 2022-06-01 PROCEDURE — 90837 PSYTX W PT 60 MINUTES: CPT | Mod: GT | Performed by: PSYCHOLOGIST

## 2022-06-01 NOTE — PROGRESS NOTES
OUTPATIENT PSYCHOTHERAPY PROGRESS NOTE    Client Name: Dariusz Leyva   YOB: 1990  Time of Service: 62 minutes   Service Type(s): Individual psychotherapy    Video- Visit Details  Type of service:  video visit for psychotherapy  Time of service:    Date:  06/01/2022    Video Start Time:  8:00am        Video End Time:  9:02am    Reason for video visit:  Patient unable to travel due to Covid-19  Originating Site (patient location):  [facility]   Location- Patient's home  Distant Site (provider location): [facility]  Mode of Communication:  Video Conference via [employer]  Consent:  Patient has given verbal consent for video visit?: Yes    Diagnoses:   Unspecified depressive disorder and unspecified anxiety    Goals of therapy: Elevate mood and show evidence of usual energy levels, and activities. Manage stress from planned move and transition into [facility] program      Individuals Present:   Psychotherapy services during this visit included myself and Dariusz Leyva.    Narrative:  Dariusz reported her mood is  in the middle and then low.  She has been experiencing insomnia for about a week. She thinks it might be due to the extended sunlight. She denied appetite concerns, although the meal plan that she is on has some shakes she describes as disgusting. She is struggling with the plan and attributes part of it to her mood. She reported her overeating has been attached to emotions.      She described a period in her life where she was homeless and when she got her own place, the only thing she could think of to reward herself was food. Discussed food insecurity and her change in relationship with food. She also shared that she  dealt with years of commentary about her weight.       Discussed her relationship with her family and her dating life. She reported when she loves someone her judgment gets clouded. She does not trust that someone will not disappoint her.      Mental Status:  Appearance:  Casually  "groomed   Behavior/relationship to examiner/demeanor:  cooperative  Motor activity/EPS:  Within normal limits  Speech rate:  Within normal limits  Speech volume:  Within normal limits  Speech articulation:  Within normal limits  Speech coherence: Within normal limits  Speech spontaneity: Within normal limits  Mood (subjective): \"in the middle and then low\"  Affect (objective appearance): Euthymic  Thought Process (Associations):  Logical and Linear   Thought process (Rate):  Within normal limits   Thought content: Within normal limits  Abnormal Perception:  None   Insight:  Good   Judgment:  Good     Plan: Continue with goals as outlined above    Andreea Vásquez Psy.D.,L.P          "

## 2022-06-08 ENCOUNTER — VIRTUAL VISIT (OUTPATIENT)
Dept: PSYCHIATRY | Facility: CLINIC | Age: 32
End: 2022-06-08
Attending: PSYCHOLOGIST
Payer: COMMERCIAL

## 2022-06-08 DIAGNOSIS — F33.1 MODERATE EPISODE OF RECURRENT MAJOR DEPRESSIVE DISORDER (H): Primary | ICD-10-CM

## 2022-06-08 DIAGNOSIS — F41.1 GAD (GENERALIZED ANXIETY DISORDER): ICD-10-CM

## 2022-06-08 PROCEDURE — 90837 PSYTX W PT 60 MINUTES: CPT | Mod: GT | Performed by: PSYCHOLOGIST

## 2022-06-08 NOTE — PROGRESS NOTES
OUTPATIENT PSYCHOTHERAPY PROGRESS NOTE    Client Name: Dariusz Leyva   YOB: 1990  Time of Service: 63 minutes   Service Type(s): Individual psychotherapy    Video- Visit Details  Type of service:  video visit for psychotherapy  Time of service:    Date:  06/08/2022    Video Start Time:  8:00am        Video End Time:  9:03am    Reason for video visit:  Patient unable to travel due to Covid-19  Originating Site (patient location):  [facility]   Location- Patient's home  Distant Site (provider location): [facility]  Mode of Communication:  Video Conference via [employer]  Consent:  Patient has given verbal consent for video visit?: Yes    Diagnoses:   Unspecified depressive disorder and unspecified anxiety    Goals of therapy: Elevate mood and show evidence of usual energy levels, and activities. Manage stress from planned move and transition into TU program      Individuals Present:   Psychotherapy services during this visit included myself and Dariusz Leyva.    Narrative:  Dariusz reported she is  not sure  about her mood. She has been sleeping okay. She denied appetite disturbance, and anhedonia. She went to a party on Friday and an art festival.      The party she went to was a birthday for someone she did not know. She was invited by a friend s friend. She indicated that she felt awkward during portions of the party.      Discussed a past relationship with  S.  She explained that he was not honest in their relationship, even though she was open in the relationship. She just received a friend request from a woman he has a baby with.      She is unsure she can  like  someone again. She reported she has had too many situations where people have hurt her. She reported she can only take so much disappointment.      She does not like how people feel like they can treat her. She reported  I have a constant reminder that no one wants to be with me.        Mental Status:  Appearance:  Casually groomed  "  Behavior/relationship to examiner/demeanor:  cooperative  Motor activity/EPS:  Within normal limits  Speech rate:  Within normal limits  Speech volume:  Within normal limits  Speech articulation:  Within normal limits  Speech coherence: Within normal limits  Speech spontaneity: Within normal limits  Mood (subjective): \"not sure\"  Affect (objective appearance): Subdued  Thought Process (Associations):  Logical and Linear   Thought process (Rate):  Within normal limits   Thought content: Within normal limits  Abnormal Perception:  None   Insight:  Good   Judgment:  Good     Plan: Continue with goals as outlined above    Andreea Vásquez Psy.D.,L.P        "

## 2022-06-09 ENCOUNTER — VIRTUAL VISIT (OUTPATIENT)
Dept: ENDOCRINOLOGY | Facility: CLINIC | Age: 32
End: 2022-06-09

## 2022-06-09 DIAGNOSIS — E66.9 OBESITY: Primary | ICD-10-CM

## 2022-06-09 PROCEDURE — 99207 PR NO CHARGE LOS: CPT

## 2022-06-09 NOTE — LETTER
6/9/2022       RE: Dariusz Leyva  [address]  [place] [address]     Dear Colleague,    Thank you for referring your patient, Dariusz Leyva, to the [facility] WEIGHT MANAGEMENT CLINIC [place] at [facility]. Please see a copy of my visit note below.    MIRIAN BENJAMIN    Progress Notes    Return Weight Management Health Coaching Note    Health  Visit #: 7      ASSESSMENT:  Initial Weight: 317     Current Weight (lbs): 295 (last 295)      PREVIOUS GOAL(S) REVIEW:   Active 30 min per day; In progress    PILLAR(S) DISCUSSED IN THIS VISIT:   Feels like she hit a wall and can't get under 295.  Will take a 2 month break from school to see if she feels less overwhelmed.  Sees therapist for anxiety but does not take meds bc says she does not like side effects.  Not interested in mindfulness bc feels it is too gimmiky.  Has knee surgery consult on Sept 1; has to be at 250 pounds min.  Pt was emotional about her knee pain and limitations and the long journey it will take to do surgery and heal.       GOALS:  June 9  Find some inspirational podcasts to listen to.    Exercise 30 minutes per day for my emotional and physical well-being.    Acknowledge the two sides of myself; Offer compassion and love to the part in pain and suffering; and see the strong, safe person who is there too.    Consider looking into community resources and support. Where can I find like-minded people for support and connection.     Make an apt with Dr Walker/ Medication refill    Consider working outside my home at times, for a broader perspective      Next 24 week visits   RD Visit with Karyn on Angela 15, 10am  Health  visit with Mirian, June 23, 11am      FOLLOW-UP:  To schedule your next visit, please call [phone].      TIME:    30 minutes spent with patient, including charting time.       SIGNATURE:  Mirian Benjamin  Lead Health   [facility] Weight  Managment  [facility] [place]

## 2022-06-09 NOTE — PROGRESS NOTES
MIRIAN BENJAMIN    Progress Notes    Return Weight Management Health Coaching Note    Health  Visit #: 7      ASSESSMENT:  Initial Weight: 317     Current Weight (lbs): 295 (last 295)      PREVIOUS GOAL(S) REVIEW:   Active 30 min per day; In progress    PILLAR(S) DISCUSSED IN THIS VISIT:   Feels like she hit a wall and can't get under 295.  Will take a 2 month break from school to see if she feels less overwhelmed.  Sees therapist for anxiety but does not take meds bc says she does not like side effects.  Not interested in mindfulness bc feels it is too gimmiky.  Has knee surgery consult on Sept 1; has to be at 250 pounds min.  Pt was emotional about her knee pain and limitations and the long journey it will take to do surgery and heal.       GOALS:  June 9  Find some inspirational podcasts to listen to.    Exercise 30 minutes per day for my emotional and physical well-being.    Acknowledge the two sides of myself; Offer compassion and love to the part in pain and suffering; and see the strong, safe person who is there too.    Consider looking into community resources and support. Where can I find like-minded people for support and connection.     Make an apt with Dr Walker/ Medication refill    Consider working outside my home at times, for a broader perspective      Next 24 week visits   RD Visit with Karyn on Angela 15, 10am  Health  visit with Mirian, June 23, 11am      FOLLOW-UP:  To schedule your next visit, please call [phone].      TIME:    30 minutes spent with patient, including charting time.       SIGNATURE:  Mirian Benjamin  Lead Health   [facility] Weight Managment  [facility] [place]

## 2022-06-14 DIAGNOSIS — E66.01 MORBID OBESITY WITH BMI OF 45.0-49.9, ADULT (H): Primary | ICD-10-CM

## 2022-06-14 RX ORDER — SEMAGLUTIDE 0.5 MG/.5ML
0.5 INJECTION, SOLUTION SUBCUTANEOUS WEEKLY
Qty: 2 ML | Refills: 0 | Status: SHIPPED | OUTPATIENT
Start: 2022-06-14 | End: 2022-07-12

## 2022-06-15 ENCOUNTER — VIRTUAL VISIT (OUTPATIENT)
Dept: SURGERY | Facility: CLINIC | Age: 32
End: 2022-06-15
Payer: COMMERCIAL

## 2022-06-15 VITALS — WEIGHT: 289.9 LBS | BODY MASS INDEX: 44.08 KG/M2

## 2022-06-15 DIAGNOSIS — R63.8 ABNORMAL CRAVING: ICD-10-CM

## 2022-06-15 DIAGNOSIS — E66.01 OBESITY, CLASS III, BMI 40-49.9 (MORBID OBESITY) (H): Primary | ICD-10-CM

## 2022-06-15 PROCEDURE — 97803 MED NUTRITION INDIV SUBSEQ: CPT | Mod: GT | Performed by: DIETITIAN, REGISTERED

## 2022-06-15 NOTE — PROGRESS NOTES
Dariusz Leyva is a 31 year old who is being evaluated via a billable video visit.      How would you like to obtain your AVS? MyChart  If the video visit is dropped, the invitation should be resent by: Send to e-mail at: [email or web address]  Will anyone else be joining your video visit? No      Video Start Time: 9:52 AM      Medical  Weight Loss Follow-Up Diet Evaluation  Assessment:  Dariusz is presenting today for a follow up weight management nutrition consultation. Pt has had an initial appointment with Dr. Walker  Weight loss medication: wegovy- diarrhea and nausea- stopped taking  Pt's weight is 289 lbs 14.4 oz  Initial weight: 317lb  Weight change: down 27.1lb    BMI: Body mass index is 44.08 kg/m .  Ideal body weight: 63.9 kg (140 lb 14 oz)  Adjusted ideal body weight: 92.2 kg (203 lb 5.2 oz)    Estimated RMR (Nicollet-St Jeor equation):   2090 kcals x 1.2 (sedentary) = 2450 kcals (for weight maintenance)  Recommended Protein Intake:  grams of protein/day  Patient Active Problem List:  Patient Active Problem List   Diagnosis     Lumbago     Nonallopathic lesion of lumbar region     Nonallopathic lesion of sacral region     Pain in thoracic spine     Nonallopathic lesion of thoracic region     Abnormal Pap smear of cervix     Large tonsils     Morbid obesity with BMI of 45.0-49.9, adult (H)     MDD (major depressive disorder)     CARLY (generalized anxiety disorder)     Depression     Lipid screening     Chronic pain of right knee     Anxiety state     Injury of ligament of right knee     MDD (major depressive disorder), recurrent episode, moderate (H)     Painful orthopaedic hardware (H)     Tibial plateau fracture, right, closed, initial encounter     Dyslipidemia     Limited mobility     Migraine with aura and without status migrainosus, not intractable     DDD (degenerative disc disease), cervical     DDD (degenerative disc disease), lumbar     Increased PTH level     Vitamin D deficiency  "    Progress on goals from last visit: doing a detox meal replacement- adding fruit and veggie powder and actual fruit and estrogen supplement- supplement for at least 1 meal per day  +skipping lunch  +not feeling hungry- most days has to force self to eat  +noticing that triggers for food is depression and anxiety    Dietary Recall:  Breakfast: protein powder with fruit  Lunch:usually skips  Dinner: rotisserie hen, spinach and beans  Beverages: zero calorie soda  Exercise: working with  twice a week, managed to get once on her own last weekend    Nutrition Diagnosis:    Overweight/Obesity (NC 3.3) related to overeating and poor lifestyle habits as evidenced by patient report of inconsistent meals, grazing in the evening and BMI 44.08        Intervention:  1. Food and/or nutrient delivery: discussed strategies for eating on the road, list of fast food choices were provided along with suggestions for gas stations  2. Nutrition counseling: motivational interviewing- discussed strategies for coping with social situations    Monitoring/Evaluation:    Goals:  1. 1 structured \"flexible\" day  2. Would like to get to an exercise class    Patient to follow up in 1 month(s) with RD      Video-Visit Details    Type of service:  Video Visit    Video End Time:10:30a    Originating Location (pt. Location): Home    Distant Location (provider location):  [facility] SURGERY [facility] AND BARIATRICS CARE [place]     Platform used for Video Visit: Hernan JONES RD    "

## 2022-06-15 NOTE — LETTER
6/15/2022         RE: Dariusz Leyva  [address]  [place] [address]        Dear Colleague,    Thank you for referring your patient, Dariusz Leyva, to the [facility] SURGERY CLINIC AND BARIATRICS CARE [place]. Please see a copy of my visit note below.    Dariusz Leyva is a 31 year old who is being evaluated via a billable video visit.      How would you like to obtain your AVS? MyChart  If the video visit is dropped, the invitation should be resent by: Send to e-mail at: [email or web address]  Will anyone else be joining your video visit? No      Video Start Time: 9:52 AM      Medical  Weight Loss Follow-Up Diet Evaluation  Assessment:  Dariusz is presenting today for a follow up weight management nutrition consultation. Pt has had an initial appointment with Dr. Walker  Weight loss medication: wegovy- diarrhea and nausea- stopped taking  Pt's weight is 289 lbs 14.4 oz  Initial weight: 317lb  Weight change: down 27.1lb    BMI: Body mass index is 44.08 kg/m .  Ideal body weight: 63.9 kg (140 lb 14 oz)  Adjusted ideal body weight: 92.2 kg (203 lb 5.2 oz)    Estimated RMR (Vesuvius-St Jeor equation):   2090 kcals x 1.2 (sedentary) = 2450 kcals (for weight maintenance)  Recommended Protein Intake:  grams of protein/day  Patient Active Problem List:  Patient Active Problem List   Diagnosis     Lumbago     Nonallopathic lesion of lumbar region     Nonallopathic lesion of sacral region     Pain in thoracic spine     Nonallopathic lesion of thoracic region     Abnormal Pap smear of cervix     Large tonsils     Morbid obesity with BMI of 45.0-49.9, adult (H)     MDD (major depressive disorder)     CARLY (generalized anxiety disorder)     Depression     Lipid screening     Chronic pain of right knee     Anxiety state     Injury of ligament of right knee     MDD (major depressive disorder), recurrent episode, moderate (H)     Painful orthopaedic hardware (H)     Tibial plateau fracture,  "right, closed, initial encounter     Dyslipidemia     Limited mobility     Migraine with aura and without status migrainosus, not intractable     DDD (degenerative disc disease), cervical     DDD (degenerative disc disease), lumbar     Increased PTH level     Vitamin D deficiency     Progress on goals from last visit: doing a detox meal replacement- adding fruit and veggie powder and actual fruit and estrogen supplement- supplement for at least 1 meal per day  +skipping lunch  +not feeling hungry- most days has to force self to eat  +noticing that triggers for food is depression and anxiety    Dietary Recall:  Breakfast: protein powder with fruit  Lunch:usually skips  Dinner: rotisserie hen, spinach and beans  Beverages: zero calorie soda  Exercise: working with  twice a week, managed to get once on her own last weekend    Nutrition Diagnosis:    Overweight/Obesity (NC 3.3) related to overeating and poor lifestyle habits as evidenced by patient report of inconsistent meals, grazing in the evening and BMI 44.08        Intervention:  1. Food and/or nutrient delivery: discussed strategies for eating on the road, list of fast food choices were provided along with suggestions for gas stations  2. Nutrition counseling: motivational interviewing- discussed strategies for coping with social situations    Monitoring/Evaluation:    Goals:  1. 1 structured \"flexible\" day  2. Would like to get to an exercise class    Patient to follow up in 1 month(s) with CHARISSE      Video-Visit Details    Type of service:  Video Visit    Video End Time:10:30a    Originating Location (pt. Location): Home    Distant Location (provider location):  [facility] SURGERY CLINIC AND BARIATRICS CARE [place]     Platform used for Video Visit: Hernan JONES RD        Again, thank you for allowing me to participate in the care of your patient.        Sincerely,        DEMETRIUS JONES RD    "

## 2022-06-22 ENCOUNTER — VIRTUAL VISIT (OUTPATIENT)
Dept: PSYCHIATRY | Facility: CLINIC | Age: 32
End: 2022-06-22
Attending: PSYCHOLOGIST
Payer: COMMERCIAL

## 2022-06-22 DIAGNOSIS — F33.1 MODERATE EPISODE OF RECURRENT MAJOR DEPRESSIVE DISORDER (H): Primary | ICD-10-CM

## 2022-06-22 DIAGNOSIS — F41.1 GAD (GENERALIZED ANXIETY DISORDER): ICD-10-CM

## 2022-06-22 PROCEDURE — 90837 PSYTX W PT 60 MINUTES: CPT | Mod: GT | Performed by: PSYCHOLOGIST

## 2022-06-22 NOTE — PROGRESS NOTES
OUTPATIENT PSYCHOTHERAPY PROGRESS NOTE    Client Name: Dariusz Leyva   YOB: 1990  Time of Service: 60 minutes   Service Type(s): Individual psychotherapy    Video- Visit Details  Type of service:  video visit for psychotherapy  Time of service:    Date:  06/22/2022    Video Start Time:  8:02am        Video End Time:  9:02am    Reason for video visit:  Patient unable to travel due to Covid-19  Originating Site (patient location):  [facility]   Location- Patient's home  Distant Site (provider location): [facility]  Mode of Communication:  Video Conference via [employer]  Consent:  Patient has given verbal consent for video visit?: Yes    Diagnoses:   Unspecified depressive disorder and unspecified anxiety    Goals of therapy: Elevate mood and show evidence of usual energy levels, and activities. Manage stress from planned move and transition into TU program      Individuals Present:   Psychotherapy services during this visit included myself and Dariusz Leyva.    Narrative:  Dariusz reported her mood has been  okay.  She gave a presentation in [place] since we met last. She reported feeling lethargic/tired while in [place]. She reported that her grandmother is being constantly harassed by her landlord.      Discussed her relationship with her father. It has been different since her brothers were murdered. She said,  I feel disregarded, you can t compete with the dead.      She discussed how she is managing loneliness. She indicated there are days she is not thinking about it and other s where she is very focused on it.      She would like to be in a caring relationship. Discussed how the reciprocity does not exist in her current relationships.      Additional Information:  The patient did not report medication changes.     Mental Status:  Appearance:  Casually groomed   Behavior/relationship to examiner/demeanor:  cooperative  Motor activity/EPS:  Within normal limits  Speech rate:  Within normal  "limits  Speech volume:  Within normal limits  Speech articulation:  Within normal limits  Speech coherence: Within normal limits  Speech spontaneity: Within normal limits  Mood (subjective): \"okay\"  Affect (objective appearance): Mood congruent  Thought Process (Associations):  Logical and Linear   Thought process (Rate):  Within normal limits   Thought content: Within normal limits  Abnormal Perception:  None   Insight:  Good   Judgment:  Good     Plan: Continue with goals as outlined above    Andreea Vásquez Psy.D.,L.P    Answers for HPI/ROS submitted by the patient on 6/22/2022  If you checked off any problems, how difficult have these problems made it for you to do your work, take care of things at home, or get along with other people?: Somewhat difficult  PHQ9 TOTAL SCORE: 5      "

## 2022-06-23 ENCOUNTER — VIRTUAL VISIT (OUTPATIENT)
Dept: ENDOCRINOLOGY | Facility: CLINIC | Age: 32
End: 2022-06-23
Payer: COMMERCIAL

## 2022-06-23 DIAGNOSIS — E66.9 OBESITY: Primary | ICD-10-CM

## 2022-06-23 PROCEDURE — 99207 PR NO CHARGE LOS: CPT

## 2022-06-23 NOTE — LETTER
6/23/2022       RE: Dariusz Leyva  [address]  [place] [address]     Dear Colleague,    Thank you for referring your patient, Dariusz Leyva, to the [facility] MANAG[facility] [place] at [facility]. Please see a copy of my visit note below.    MIRIAN FLORES    Progress Notes    Return Weight Management Health Coaching Note    Health  Visit #: 8  24 week    ASSESSMENT:  Initial Weight: 317     Current Weight (lbs): (last 289; down 27 pounds)    Refill from Dr Walker with highest dose of Wegovy    Saw CHARISSE Vaughan and was 289 lb at last visit.    3 coaching visits left    Wegovy out of stock so msg Dr Walker to see where she can get it. Gives her bad side effects, diahrea and nausea but feels she needs it at times, depending on if she plateaus, life circumstances.    Pointed out her success of being down 27 pounds. If a friend asked you how you did it, she would say:  More movement  Meal Replacements  Making Better Food Choices  Stop Eating at night  Avoids Carbs and Sugar/ no bake goods  Ok to make mistakes  Having a good reason! I do this bc it is about my health. I need to do this. Less pain, to be able to get knee surgery.    Traveling is hard but got tips from CHARISSE and will focus on what I can control and spring right back to my plan when I am back in town.    Energy thurman  Turkey sticks for protein  Pickles  Drink mostly water and pass on pop      NEXT HEALTH  VISIT:  July 7, 9:30am      FOLLOW-UP:  To schedule your next visit, please call [phone].      TIME:    30 minutes spent with patient, including charting time.       SIGNATURE:  Mirian Flores  Lead Health   [facility]  [facility]

## 2022-06-23 NOTE — PROGRESS NOTES
ABHINAV FLORES    Progress Notes    Return Weight Management Health Coaching Note    Health  Visit #: 8  24 week    ASSESSMENT:  Initial Weight: 317     Current Weight (lbs): (last 289; down 27 pounds)    Refill from Dr Walker with highest dose of Wegovy    Saw CHARISSE Vaughan and was 289 lb at last visit.    3 coaching visits left    Wegovy out of stock so msg Dr Walker to see where she can get it. Gives her bad side effects, diahrea and nausea but feels she needs it at times, depending on if she plateaus, life circumstances.    Pointed out her success of being down 27 pounds. If a friend asked you how you did it, she would say:  More movement  Meal Replacements  Making Better Food Choices  Stop Eating at night  Avoids Carbs and Sugar/ no bake goods  Ok to make mistakes  Having a good reason! I do this bc it is about my health. I need to do this. Less pain, to be able to get knee surgery.    Traveling is hard but got tips from CHARISSE and will focus on what I can control and spring right back to my plan when I am back in town.    Energy thurman  Turkey sticks for protein  Pickles  Drink mostly water and pass on pop      NEXT HEALTH  VISIT:  July 7, 9:30am      FOLLOW-UP:  To schedule your next visit, please call [phone].      TIME:    30 minutes spent with patient, including charting time.       SIGNATURE:  Abhinav Flores  Lead Health   [facility]  [facility] [place]

## 2022-06-28 DIAGNOSIS — Z11.3 ROUTINE SCREENING FOR STI (SEXUALLY TRANSMITTED INFECTION): Primary | ICD-10-CM

## 2022-06-29 ENCOUNTER — OFFICE VISIT (OUTPATIENT)
Dept: FAMILY MEDICINE | Facility: CLINIC | Age: 32
End: 2022-06-29
Payer: COMMERCIAL

## 2022-06-29 ENCOUNTER — VIRTUAL VISIT (OUTPATIENT)
Dept: PSYCHIATRY | Facility: CLINIC | Age: 32
End: 2022-06-29
Attending: PSYCHOLOGIST
Payer: COMMERCIAL

## 2022-06-29 VITALS
SYSTOLIC BLOOD PRESSURE: 121 MMHG | HEART RATE: 78 BPM | WEIGHT: 293 LBS | DIASTOLIC BLOOD PRESSURE: 78 MMHG | BODY MASS INDEX: 46.1 KG/M2 | OXYGEN SATURATION: 99 % | TEMPERATURE: 98.3 F | RESPIRATION RATE: 16 BRPM

## 2022-06-29 DIAGNOSIS — F33.1 MODERATE EPISODE OF RECURRENT MAJOR DEPRESSIVE DISORDER (H): Primary | ICD-10-CM

## 2022-06-29 DIAGNOSIS — Z11.3 ROUTINE SCREENING FOR STI (SEXUALLY TRANSMITTED INFECTION): Primary | ICD-10-CM

## 2022-06-29 DIAGNOSIS — J30.89 SEASONAL ALLERGIC RHINITIS DUE TO OTHER ALLERGIC TRIGGER: ICD-10-CM

## 2022-06-29 DIAGNOSIS — F41.1 GAD (GENERALIZED ANXIETY DISORDER): ICD-10-CM

## 2022-06-29 LAB
CLUE CELLS: ABNORMAL
T PALLIDUM AB SER QL: NONREACTIVE
TRICHOMONAS, WET PREP: ABNORMAL
WBC'S/HIGH POWER FIELD, WET PREP: ABNORMAL
YEAST, WET PREP: ABNORMAL

## 2022-06-29 PROCEDURE — 36415 COLL VENOUS BLD VENIPUNCTURE: CPT | Performed by: STUDENT IN AN ORGANIZED HEALTH CARE EDUCATION/TRAINING PROGRAM

## 2022-06-29 PROCEDURE — 87340 HEPATITIS B SURFACE AG IA: CPT | Performed by: STUDENT IN AN ORGANIZED HEALTH CARE EDUCATION/TRAINING PROGRAM

## 2022-06-29 PROCEDURE — 86706 HEP B SURFACE ANTIBODY: CPT | Performed by: STUDENT IN AN ORGANIZED HEALTH CARE EDUCATION/TRAINING PROGRAM

## 2022-06-29 PROCEDURE — 87210 SMEAR WET MOUNT SALINE/INK: CPT | Performed by: STUDENT IN AN ORGANIZED HEALTH CARE EDUCATION/TRAINING PROGRAM

## 2022-06-29 PROCEDURE — 87389 HIV-1 AG W/HIV-1&-2 AB AG IA: CPT | Performed by: STUDENT IN AN ORGANIZED HEALTH CARE EDUCATION/TRAINING PROGRAM

## 2022-06-29 PROCEDURE — 86803 HEPATITIS C AB TEST: CPT | Performed by: STUDENT IN AN ORGANIZED HEALTH CARE EDUCATION/TRAINING PROGRAM

## 2022-06-29 PROCEDURE — 87491 CHLMYD TRACH DNA AMP PROBE: CPT | Performed by: STUDENT IN AN ORGANIZED HEALTH CARE EDUCATION/TRAINING PROGRAM

## 2022-06-29 PROCEDURE — 86780 TREPONEMA PALLIDUM: CPT | Performed by: STUDENT IN AN ORGANIZED HEALTH CARE EDUCATION/TRAINING PROGRAM

## 2022-06-29 PROCEDURE — 87591 N.GONORRHOEAE DNA AMP PROB: CPT | Performed by: STUDENT IN AN ORGANIZED HEALTH CARE EDUCATION/TRAINING PROGRAM

## 2022-06-29 PROCEDURE — 99213 OFFICE O/P EST LOW 20 MIN: CPT | Mod: GC | Performed by: STUDENT IN AN ORGANIZED HEALTH CARE EDUCATION/TRAINING PROGRAM

## 2022-06-29 PROCEDURE — 90837 PSYTX W PT 60 MINUTES: CPT | Mod: GT | Performed by: PSYCHOLOGIST

## 2022-06-29 RX ORDER — FLUTICASONE PROPIONATE 50 MCG
2 SPRAY, SUSPENSION (ML) NASAL DAILY PRN
Qty: 18.2 ML | Refills: 11 | Status: SHIPPED | OUTPATIENT
Start: 2022-06-29

## 2022-06-29 RX ORDER — CETIRIZINE HYDROCHLORIDE 10 MG/1
10 TABLET ORAL DAILY
Qty: 90 TABLET | Refills: 3 | Status: SHIPPED | OUTPATIENT
Start: 2022-06-29 | End: 2023-09-28

## 2022-06-29 RX ORDER — PNV NO.95/FERROUS FUM/FOLIC AC 28MG-0.8MG
1 TABLET ORAL DAILY
Qty: 90 TABLET | Refills: 3 | Status: SHIPPED | OUTPATIENT
Start: 2022-06-29 | End: 2024-06-04

## 2022-06-29 ASSESSMENT — PATIENT HEALTH QUESTIONNAIRE - PHQ9
SUM OF ALL RESPONSES TO PHQ QUESTIONS 1-9: 6
10. IF YOU CHECKED OFF ANY PROBLEMS, HOW DIFFICULT HAVE THESE PROBLEMS MADE IT FOR YOU TO DO YOUR WORK, TAKE CARE OF THINGS AT HOME, OR GET ALONG WITH OTHER PEOPLE: SOMEWHAT DIFFICULT
SUM OF ALL RESPONSES TO PHQ QUESTIONS 1-9: 6

## 2022-06-29 NOTE — PATIENT INSTRUCTIONS
Dear Dariusz Leyva,    1. Today we discussed STI screening.  2. I will call you with your lab results.  3. Start routine prenatal vitamin.      Please call [facility] with any questions or concerns.    Best regard,    Marito Reno MD      [facility]  Phone: [phone]  Address: [address] [place] [address]

## 2022-06-29 NOTE — PROGRESS NOTES
Assessment and Plan      Dariusz was seen today for other.    Diagnoses and all orders for this visit:    Routine screening for STI (sexually transmitted infection)  Asymptomatic screening for STI. STI panel listed below negative. However, notable for no hepatitis B immunity; recommended repeating series. Uses condoms, but no other contraception; not interested in discussing. Agreeable to starting pre-madi vitamin in case of unintentional pregnancy.  -     Wet Prep  -     Chlamydia trachomatis PCR  -     Neisseria gonorrhoeae PCR  -     Hepatitis C antibody  -     Hepatitis B surface antigen  -     Hepatitis B Surface Antibody  -     HIV Antigen Antibody Combo  -     Treponema Abs w Reflex to RPR and Titer  -     Prenatal Vit-Fe Fumarate-FA (PRENATAL VITAMIN) 27-0.8 MG TABS; Take 1 tablet by mouth daily    Seasonal allergic rhinitis due to other allergic trigger  Notes well controlled on PRN zyrtec and flonase; refilled.  -     cetirizine (ZYRTEC) 10 MG tablet; Take 1 tablet (10 mg) by mouth daily  -     fluticasone (FLONASE) 50 MCG/ACT nasal spray; Spray 2 sprays into both nostrils daily as needed for rhinitis or allergies    Options for treatment and follow-up care were reviewed with the patient. Patient engaged in the decision making process and verbalized understanding of the options discussed and agreed with the final plan.    Patient was staffed with attending physician Dr. Grewal.    Marito Reno MD PGY-2           HPI       Dariusz Leyva is a 31 year old year old female w/ PMH of   Patient Active Problem List   Diagnosis     Lumbago     Nonallopathic lesion of lumbar region     Nonallopathic lesion of sacral region     Pain in thoracic spine     Nonallopathic lesion of thoracic region     Abnormal Pap smear of cervix     Large tonsils     Morbid obesity with BMI of 45.0-49.9, adult (H)     MDD (major depressive disorder)     CARLY (generalized anxiety disorder)     Depression     Lipid screening      Chronic pain of right knee     Anxiety state     Injury of ligament of right knee     MDD (major depressive disorder), recurrent episode, moderate (H)     Painful orthopaedic hardware (H)     Tibial plateau fracture, right, closed, initial encounter     Dyslipidemia     Limited mobility     Migraine with aura and without status migrainosus, not intractable     DDD (degenerative disc disease), cervical     DDD (degenerative disc disease), lumbar     Increased PTH level     Vitamin D deficiency    who presents for   Chief Complaint   Patient presents with     other     Vaginal Symptoms    Description:  Vaginal Discharge: none   Itching (Pruritis): no  Burning sensation: no  Odor: no    Accompanying Signs & Symptoms:  Pain with Urination:no  Abdominal Pain: no  Fever: no    History:   Sexually active: Yes Details: male; uses condom  New Partner: no  Possibility of Pregnancy:  No    Precipitating factors:   Recent Antibiotic Use: no            Review of Systems:   7 point ROS negative other than as specified above.         Physical Exam:   /78 (BP Location: Right arm, Patient Position: Sitting)   Pulse 78   Temp 98.3  F (36.8  C) (Oral)   Resp 16   Wt 137.5 kg (303 lb 3.2 oz)   SpO2 99%   BMI 46.10 kg/m      Vital signs normal except BMI     Exam:  Constitutional: alert, no distress, and cooperative  Head: Normocephalic  ENT: throat normal without erythema or exudate  Cardiovascular: RRR w/o audible murmur  Respiratory: bilateral clear lungs w/o wheezing, crackles or rhonchi; breathing comfortably on RA  Gastrointestinal: Abdomen soft, obese, non-tender. BS normal.  : Declined by patient per preference  Musculoskeletal: extremities normal- no gross deformities noted, gait normal, and normal muscle tone  Skin: no suspicious lesions or rashes  Neurologic: grossly normal CN; normal strength, sensation, & tone  Psychiatric: mentation appears normal and affect normal      Results:      Results from this  visit  Results for orders placed or performed in visit on 06/29/22   Hepatitis C antibody     Status: Normal   Result Value Ref Range    Hepatitis C Antibody Nonreactive Nonreactive    Narrative    Assay performance characteristics have not been established for newborns, infants, and children.   Hepatitis B surface antigen     Status: Normal   Result Value Ref Range    Hepatitis B Surface Antigen Nonreactive Nonreactive   Hepatitis B Surface Antibody     Status: None   Result Value Ref Range    Hepatitis B Surface Antibody 7.58 <8.00 m[IU]/mL   HIV Antigen Antibody Combo     Status: Normal   Result Value Ref Range    HIV Antigen Antibody Combo Nonreactive Nonreactive   Treponema Abs w Reflex to RPR and Titer     Status: Normal   Result Value Ref Range    Treponema Antibody Total Nonreactive Nonreactive   Wet Prep     Status: Abnormal    Specimen: Vagina; Swab   Result Value Ref Range    Trichomonas Absent Absent    Yeast Absent Absent    Clue Cells Absent Absent    WBCs/high power field 2+ (A) None    Narrative    Bacteria moderate, odor none   Chlamydia trachomatis PCR     Status: Normal    Specimen: Urine, Voided   Result Value Ref Range    Chlamydia trachomatis Negative Negative   Neisseria gonorrhoeae PCR     Status: Normal    Specimen: Urine, Voided   Result Value Ref Range    Neisseria gonorrhoeae Negative Negative

## 2022-06-29 NOTE — PROGRESS NOTES
Preceptor Attestation:    I discussed the patient with the resident and evaluated the patient in person. I have verified the content of the note, which accurately reflects my assessment of the patient and the plan of care.   Supervising Physician:  Chadwick Grewal DO.

## 2022-06-29 NOTE — PROGRESS NOTES
OUTPATIENT PSYCHOTHERAPY PROGRESS NOTE    Client Name: Dariusz Leyva   YOB: 1990  Time of Service: 66 minutes   Service Type(s): Individual psychotherapy    Video- Visit Details  Type of service:  video visit for psychotherapy  Time of service:    Date:  06/29/2022    Video Start Time:  8:05am        Video End Time:  9:11am    Reason for video visit:  Patient unable to travel due to Covid-19  Originating Site (patient location):  [facility]   Location- Patient's home  Distant Site (provider location): Remote location  Mode of Communication:  Video Conference via [employer]  Consent:  Patient has given verbal consent for video visit?: Yes    Diagnoses:   Unspecified depressive disorder and unspecified anxiety    Goals of therapy: Elevate mood and show evidence of usual energy levels, and activities. Manage stress from planned move and transition into [facility] program      Individuals Present:   Psychotherapy services during this visit included myself and Dariusz Leyva.    Narrative:  Dariusz reported her mood was  okay.  She has gone to [place] twice in the last two weeks. It has been hectic. People have told her that she has a  problem  with being nice. She does not think there is a concern with being nice.      She had a call from  S  in [place] after not hearing from him since February. People have a pattern of losing touch with her and in her experience coming back in her life until they get what they want and then stop talking again.  S  has some of these patterns.      She had another person reach out to her that she has not heard from since about 2014/15. This person had broken her trust by sharing her private information at work.      She reported that she agreed to go to [facility] with  M  her ex. She does not see this relationship as having long term relationship potential.     She talked to  E  in [place] recently. She thought this relationship had potential at one point. But she is trying to let this  " love go.     Additional Information:  The patient did not report medication changes.     Mental Status:  Appearance:  Casually groomed   Behavior/relationship to examiner/demeanor:  cooperative  Motor activity/EPS:  Within normal limits  Speech rate:  Within normal limits  Speech volume:  Within normal limits  Speech articulation:  Within normal limits  Speech coherence: Within normal limits  Speech spontaneity: Within normal limits  Mood (subjective): \"okay\"  Affect (objective appearance): Mood congruent  Thought Process (Associations):  Logical and Linear   Thought process (Rate):  Within normal limits   Thought content: Within normal limits  Abnormal Perception:  None   Insight:  Good   Judgment:  Good     Plan: Continue with goals as outlined above    Andreea Vásquez Psy.D.,L.P    Answers for HPI/ROS submitted by the patient on 6/29/2022  If you checked off any problems, how difficult have these problems made it for you to do your work, take care of things at home, or get along with other people?: Somewhat difficult  PHQ9 TOTAL SCORE: 6        "

## 2022-06-30 LAB
C TRACH DNA SPEC QL NAA+PROBE: NEGATIVE
HBV SURFACE AB SERPL IA-ACNC: 7.58 M[IU]/ML
HBV SURFACE AG SERPL QL IA: NONREACTIVE
HCV AB SERPL QL IA: NONREACTIVE
HIV 1+2 AB+HIV1 P24 AG SERPL QL IA: NONREACTIVE
N GONORRHOEA DNA SPEC QL NAA+PROBE: NEGATIVE

## 2022-07-06 ENCOUNTER — TELEPHONE (OUTPATIENT)
Dept: PSYCHIATRY | Facility: CLINIC | Age: 32
End: 2022-07-06

## 2022-07-06 NOTE — TELEPHONE ENCOUNTER
Provider sent links for Dariusz's appt today, but Dariusz is in [place]. Advised could not do therapy while she was in a different state. Briefly discussed what we might discuss in our next session.

## 2022-07-08 ENCOUNTER — VIRTUAL VISIT (OUTPATIENT)
Dept: ENDOCRINOLOGY | Facility: CLINIC | Age: 32
End: 2022-07-08

## 2022-07-08 DIAGNOSIS — E66.9 OBESITY: Primary | ICD-10-CM

## 2022-07-08 PROCEDURE — 99207 PR NO CHARGE LOS: CPT

## 2022-07-08 NOTE — LETTER
7/8/2022       RE: Dariusz Leyva  [address]  [place] [address]     Dear Colleague,    Thank you for referring your patient, Dariusz Leyva, to the [facility] MANAGEMENT [facility]IN[facility] [place] at [facility]. Please see a copy of my visit note below.    MIRIAN FLORES    Progress Notes    Return Weight Management Health Coaching Note    Health  Visit #: 9      ASSESSMENT:  Initial Weight: 317     Current Weight (lbs): Has not weighed herself; was on vacation  (last 289)      PILLAR(S) DISCUSSED IN THIS VISIT:      At the [employer] store now buying healthy food. Will get right back to her healthy eating.    Realizes she wants to exercise daily but hard with work, and school assignments.  Takes a big chunk of time out of my day.    Knee hurt after swimming which pt thought was odd.      GOALS:  July 8, 2022  Continue to see   two days per week at the gym.    Look into a less crowded place to walk.  [place]; [place], [facility], [facility].    Clean my car once per week.    Start injections per Dr Walker's prescription    Look into an ortho knee apt.    Daily Mood Booster;  CBD and Body Butter for pain ([facility])    Self-Compassion Self-Talk (talk to yourself with compassion  Like you would a close friend or child).  Selfcompassion.org (website)  Yoga/Meditation: Murray Borden on [employer] or at [employer]  Yin Yoga or Yoga [place],     Schedule a Hot Yoga Class for Next Week in the morning, before noon.     Try out wellbeats at home  Lay out my yoga mat    NEXT HEALTH  VISIT  July 22, 1pm      FOLLOW-UP:  To schedule your next visit, please call [phone].      TIME:  30 minutes spent with patient, including charting time.       SIGNATURE:  Mirian Flores  Lead Health   [facility]  [facility] [place]

## 2022-07-10 DIAGNOSIS — E66.01 MORBID OBESITY WITH BMI OF 45.0-49.9, ADULT (H): Primary | ICD-10-CM

## 2022-07-10 RX ORDER — PEN NEEDLE, DIABETIC 30 GX5/16"
1 NEEDLE, DISPOSABLE MISCELLANEOUS DAILY
Qty: 100 EACH | Refills: 3 | Status: SHIPPED | OUTPATIENT
Start: 2022-07-10 | End: 2023-07-10

## 2022-07-13 ENCOUNTER — VIRTUAL VISIT (OUTPATIENT)
Dept: PSYCHIATRY | Facility: CLINIC | Age: 32
End: 2022-07-13
Attending: PSYCHOLOGIST
Payer: COMMERCIAL

## 2022-07-13 DIAGNOSIS — F33.1 MODERATE EPISODE OF RECURRENT MAJOR DEPRESSIVE DISORDER (H): Primary | ICD-10-CM

## 2022-07-13 DIAGNOSIS — F41.1 GAD (GENERALIZED ANXIETY DISORDER): ICD-10-CM

## 2022-07-13 PROCEDURE — 90837 PSYTX W PT 60 MINUTES: CPT | Mod: GT | Performed by: PSYCHOLOGIST

## 2022-07-13 NOTE — PROGRESS NOTES
"OUTPATIENT PSYCHOTHERAPY PROGRESS NOTE    Client Name: Dariusz Leyva   YOB: 1990  Time of Service: 67 minutes   Service Type(s): Individual psychotherapy    Video- Visit Details  Type of service:  video visit for psychotherapy  Time of service:    Date:  07/13/2022    Video Start Time:  8:03am        Video End Time:  9:12am    Reason for video visit:  Patient unable to travel due to Covid-19  Originating Site (patient location):  [facility]   Location- Patient's home  Distant Site (provider location): Remote location  Mode of Communication:  Video Conference via [employer]  Consent:  Patient has given verbal consent for video visit?: Yes    Diagnoses:   Unspecified depressive disorder and unspecified anxiety    Goals of therapy: Elevate mood and show evidence of usual energy levels, and activities. Manage stress from planned move and transition into TU program      Individuals Present:   Psychotherapy services during this visit included myself and Dariusz Leyva.    Narrative:  Dariusz reported her mood was \"good.\" Discussed her experiences on her most recent trip. She spent time with someone she knew from 7 years ago when she returned home. He crossed some boundaries and she told her \"truth.\" She is \"tired\"of being mad at herself for what other people do to her.    She described times when people would share memories she does not remember. She indicated that someone might say they were at her house and she won't remember they were ever there. She also reports times when someone might say - call me when you get home and when she gets home she forgets who asked her. She indicated it has worsened over time. She does not remember much from one of her relationships - just primarily the emotions (fear and frustration).       Additional Information:  The patient did not report medication changes.     Mental Status:  Appearance:  Casually groomed   Behavior/relationship to examiner/demeanor:  " "cooperative  Motor activity/EPS:  Within normal limits  Speech rate:  Within normal limits  Speech volume:  Within normal limits  Speech articulation:  Within normal limits  Speech coherence: Within normal limits  Speech spontaneity: Within normal limits  Mood (subjective): \"good\"  Affect (objective appearance): Euthymic  Thought Process (Associations):  Logical and Linear   Thought process (Rate):  Within normal limits   Thought content: Within normal limits  Abnormal Perception:  None   Insight:  Good   Judgment:  Good     Plan: Continue with goals as outlined above    Andreea Vásquez Psy.D.,L.P    Answers for HPI/ROS submitted by the patient on 6/29/2022  If you checked off any problems, how difficult have these problems made it for you to do your work, take care of things at home, or get along with other people?: Somewhat difficult  PHQ9 TOTAL SCORE: 6      "

## 2022-07-19 ENCOUNTER — MYC MEDICAL ADVICE (OUTPATIENT)
Dept: SURGERY | Facility: CLINIC | Age: 32
End: 2022-07-19

## 2022-07-19 DIAGNOSIS — E66.01 MORBID OBESITY WITH BMI OF 45.0-49.9, ADULT (H): ICD-10-CM

## 2022-07-19 DIAGNOSIS — R79.89 INCREASED PTH LEVEL: Primary | ICD-10-CM

## 2022-07-19 DIAGNOSIS — E55.9 VITAMIN D DEFICIENCY: ICD-10-CM

## 2022-07-20 ENCOUNTER — BEH TREATMENT PLAN (OUTPATIENT)
Dept: PSYCHIATRY | Facility: CLINIC | Age: 32
End: 2022-07-20

## 2022-07-20 ENCOUNTER — VIRTUAL VISIT (OUTPATIENT)
Dept: PSYCHIATRY | Facility: CLINIC | Age: 32
End: 2022-07-20
Attending: PSYCHOLOGIST
Payer: COMMERCIAL

## 2022-07-20 DIAGNOSIS — F41.1 GAD (GENERALIZED ANXIETY DISORDER): ICD-10-CM

## 2022-07-20 DIAGNOSIS — F33.1 MODERATE EPISODE OF RECURRENT MAJOR DEPRESSIVE DISORDER (H): Primary | ICD-10-CM

## 2022-07-20 PROCEDURE — 90837 PSYTX W PT 60 MINUTES: CPT | Mod: GT | Performed by: PSYCHOLOGIST

## 2022-07-20 NOTE — PROGRESS NOTES
OUTPATIENT PSYCHOTHERAPY PROGRESS NOTE    Client Name: Dariusz Leyva   YOB: 1990  Time of Service: 69 minutes   Service Type(s): Individual psychotherapy    Video- Visit Details  Type of service:  video visit for psychotherapy  Time of service:    Date:  07/20/2022    Video Start Time:  8:00am        Video End Time:  9:09am    Reason for video visit:  Patient unable to travel due to Covid-19  Originating Site (patient location):  [facility]   Location- Patient's home  Distant Site (provider location): Remote location  Mode of Communication:  Video Conference via [employer]  Consent:  Patient has given verbal consent for video visit?: Yes    Diagnoses:   Unspecified depressive disorder and unspecified anxiety    Goals of therapy: Elevate mood and show evidence of usual energy levels, and activities.      Individuals Present:   Psychotherapy services during this visit included myself and Dariusz Leyva.    Narrative:  Dariusz reported her mood as  content.  She denied sleep disturbance. Her ex - M  called her - she reported he acted like everything was normal after not interacting with her on her last trip with him. He asked if she wants to do something on the weekend. Updated her treatment plan.     Discussed her past relationship with  M.  He had a tendency to be  mean  towards her in the relationship and she can remember having hesitancy to talk to him.      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 She is wondering about the stability of her job.      Additional Information:  The patient did not report medication changes.     Mental Status:  Appearance:  Casually groomed   Behavior/relationship to examiner/demeanor:  cooperative  Motor activity/EPS:  Within normal limits  Speech rate:  Within normal limits  Speech volume:  Within normal limits  Speech articulation:  Within normal limits  Speech coherence: Within normal limits  Speech spontaneity: Within normal limits  Mood  "(subjective): \"content\"  Affect (objective appearance): Euthymic  Thought Process (Associations):  Logical and Linear   Thought process (Rate):  Within normal limits   Thought content: Within normal limits  Abnormal Perception:  None   Insight:  Good   Judgment:  Good     Plan: Continue with goals as outlined above    Andreea Vásquez Psy.D.,L.P      "

## 2022-07-20 NOTE — PROGRESS NOTES
"OUTPATIENT TREATMENT PLAN SUMMARY    Date of Treatment Plan: 7/20/22  90-Day Review Date: 10/20/22  Date of Initial Service: 9/17/18       1. DSM-V Diagnosis (include numeric code)  Dysthymic Disorder (F34.1)    2. Current symptoms and circumstances that substantiate the diagnosis:  Patient reported that     She has sudden bouts of sadness, but they do not last for long because she distracts herself. She has been sleeping pretty well - but she has fatigue. She is on a new medicine to lose weight. She is doing things, but her pain in her leg can interfere with enjoyment. She is unsure about her future. She is feeling lost. She denied suicidal ideation. Indicated she experiences some apathy.       She also has anxiety. When things are not going well she feels overwhelmed.       3. How symptoms and/or behaviors are affecting level of function:   Isolation, struggle with self-care at times, avoiding some relationships and activities    4. Risk Assessment:  Suicide:  Assessed Level of Immediate Risk: Low  Ideation: No  Plan:  No  Means: No  Intent: No    Homicide/Violence:  Assessed Level of Immediate Risk: None  Ideation: No  Plan: No  Means: No  Intent: No    If on a medication, please include name and dosage: No psychotropic medications        Symptom/Problem Measurable Goals Interventions Gains Made   1.negative thinking 1. In the therapeutic environment and during stressful situations, Dariusz will learn to identify her pessimistic thoughts and challenge them 1.CBT: Problem solving, skill building and psychoeducation 1. She reports insight into her negative thoughts   2.  Emotional numbness 2. Mood improvement per patient report.   2. Emotional awareness, grounding techniques 2. NA   3. Unhealthy relationships (wants to let toxic people \"go\") 3. Per patient report 3. CBT  3. Still working on this, especially those she was closer to.       5. Frequency of Sessions: Weekly    6. Discharge and Aftercare Goals: " discharge on completion of goals    7. Expected duration of treatment:  To be reassessed in 90 days    8. Participants in therapy plan (family, friends, support network): client and therapist      See scanned document for Acknowledgement of Current Treatment Plan      Regulatory Guidelines for Updating Treatment Plan  [place] Medical Assistance: Reviewed & signed at least every 90days  Medicare:  Update per policy

## 2022-07-22 ENCOUNTER — VIRTUAL VISIT (OUTPATIENT)
Dept: ENDOCRINOLOGY | Facility: CLINIC | Age: 32
End: 2022-07-22

## 2022-07-22 ENCOUNTER — TELEPHONE (OUTPATIENT)
Dept: FAMILY MEDICINE | Facility: CLINIC | Age: 32
End: 2022-07-22

## 2022-07-22 DIAGNOSIS — Z00.00 HEALTHCARE MAINTENANCE: Primary | ICD-10-CM

## 2022-07-22 DIAGNOSIS — E66.9 OBESITY: Primary | ICD-10-CM

## 2022-07-22 PROCEDURE — 99207 PR NO CHARGE LOS: CPT

## 2022-07-22 RX ORDER — ULIPRISTAL ACETATE 30 MG/1
TABLET ORAL
Qty: 1 TABLET | Refills: 0 | Status: SHIPPED | OUTPATIENT
Start: 2022-07-22 | End: 2022-11-29

## 2022-07-22 NOTE — TELEPHONE ENCOUNTER
[facility] phone call message- patient requesting a refill:    Full Medication Name: alejandro    Dose: 30mg    Pharmacy confirmed as   [facility] on [address]     : Yes    Additional Comments: she needs a refill sent     OK to leave a message on voice mail?       Primary language: English      needed? No    Call taken on July 22, 2022 at 1:54 PM by Niall Loza

## 2022-07-22 NOTE — LETTER
7/22/2022       RE: Dariusz Leyva  [address]  [place] [address]     Dear Colleague,    Thank you for referring your patient, Dariusz Leyva, to the [facility] WEIGHT MANAGEMENT [facility]INIC [place] [facility]. Please see a copy of my visit note below.    ABHINAV BENJAMIN    Progress Notes    Return Weight Management Health Coaching Note    Health  Visit #: 10      ASSESSMENT:  Initial Weight:317   Current Weight (lbs):  289 (last 289)    Dr Walker    PREVIOUS GOAL(S) REVIEW:  Continue to see   two days per week at the gym.     Look into a less crowded place to walk.  [place]; [place], [facility], [facility].     Clean my car once per week.     Start injections per Dr Walker's prescription     Look into an ortho knee apt.     Daily Mood Booster;  CBD and Body Butter for pain ([facility])     Self-Compassion Self-Talk (talk to yourself with compassion  Like you would a close friend or child).  [email or web address] (website)  Yoga/Meditation: Murray Borden on Wellbeats or at [employer]  Yin Yoga or Yoga Nidra,      Schedule a Hot Yoga Class for Next Week in the morning, before noon.      Try out wellbeats at home     GOALS:  July 22, 2022    1.) Journal/Reflect on what is my Career Vision. What are the organizations/environments that I want to be involved in    2.) Make a Schedule and Stick to it. I will not be distracted during my scheduled hours. I am available after a certain time to do other things.     3.) Self-Compassion Self-Talk (talk to yourself with compassion  Like you would a close friend or child).  [email or web address] (website)    4.) Continue with [employer] Group Classes and PT.      NEXT HEALTH  VISIT WITH ABHINAV:  AUGUST 12, 1pm (this will be our last health coaching visit, but you can always purchase 3 coaching sessions for $99, a health  3 pack. Participate in our virtual support  group and keep seeing our Dieticians and Dr Walker.      FOLLOW-UP:  To schedule your next visit, please call [phone].      TIME:    30 minutes spent with patient, including charting time.       SIGNATURE:  Mirian Flores  Lead Health   [facility]  [facility] [place]

## 2022-07-22 NOTE — PROGRESS NOTES
ABHINAV FLORES    Progress Notes    Return Weight Management Health Coaching Note    Health  Visit #: 10      ASSESSMENT:  Initial Weight:317   Current Weight (lbs):  289 (last 289)    Dr Walker    PREVIOUS GOAL(S) REVIEW:  Continue to see   two days per week at the gym.     Look into a less crowded place to walk.  [place]; [place], [facility], [facility] neighborhoods.     Clean my car once per week.     Start injections per Dr Walker's prescription     Look into an ortho knee apt.     Daily Mood Booster;  CBD and Body Butter for pain ([facility])     Self-Compassion Self-Talk (talk to yourself with compassion  Like you would a close friend or child).  [email or web address] (website)  Yoga/Meditation: Murray Borden on Wellbeats or at [employer]  Yin Yoga or Yoga Nidra,      Schedule a Hot Yoga Class for Next Week in the morning, before noon.      Try out wellbeats at home     GOALS:  July 22, 2022    1.) Journal/Reflect on what is my Career Vision. What are the organizations/environments that I want to be involved in    2.) Make a Schedule and Stick to it. I will not be distracted during my scheduled hours. I am available after a certain time to do other things.     3.) Self-Compassion Self-Talk (talk to yourself with compassion  Like you would a close friend or child).  [email or web address] (website)    4.) Continue with [employer] Group Classes and PT.      NEXT HEALTH  VISIT WITH ABHINAV:  AUGUST 12, 1pm (this will be our last health coaching visit, but you can always purchase 3 coaching sessions for $99, a health  3 pack. Participate in our virtual support group and keep seeing our Dieticians and Dr Walker.      FOLLOW-UP:  To schedule your next visit, please call [phone].      TIME:    30 minutes spent with patient, including charting time.       SIGNATURE:  Abhinav Flores  Lead Health   [facility]  [facility]

## 2022-07-27 ENCOUNTER — VIRTUAL VISIT (OUTPATIENT)
Dept: SURGERY | Facility: CLINIC | Age: 32
End: 2022-07-27
Payer: COMMERCIAL

## 2022-07-27 ENCOUNTER — VIRTUAL VISIT (OUTPATIENT)
Dept: PSYCHIATRY | Facility: CLINIC | Age: 32
End: 2022-07-27
Attending: PSYCHOLOGIST
Payer: COMMERCIAL

## 2022-07-27 VITALS — BODY MASS INDEX: 44.55 KG/M2 | WEIGHT: 293 LBS

## 2022-07-27 DIAGNOSIS — R63.8 ABNORMAL CRAVING: ICD-10-CM

## 2022-07-27 DIAGNOSIS — F33.1 MODERATE EPISODE OF RECURRENT MAJOR DEPRESSIVE DISORDER (H): Primary | ICD-10-CM

## 2022-07-27 DIAGNOSIS — E66.01 OBESITY, CLASS III, BMI 40-49.9 (MORBID OBESITY) (H): Primary | ICD-10-CM

## 2022-07-27 DIAGNOSIS — F41.1 GAD (GENERALIZED ANXIETY DISORDER): ICD-10-CM

## 2022-07-27 PROCEDURE — 97803 MED NUTRITION INDIV SUBSEQ: CPT | Mod: GT | Performed by: DIETITIAN, REGISTERED

## 2022-07-27 PROCEDURE — 90834 PSYTX W PT 45 MINUTES: CPT | Mod: GT | Performed by: PSYCHOLOGIST

## 2022-07-27 NOTE — LETTER
7/27/2022         RE: Dariusz Leyva  [address]  [place] [address]        Dear Colleague,    Thank you for referring your patient, Dariusz Leyva, to the [facility] SURGERY CLINIC AND BARIATRICS CARE [place]. Please see a copy of my visit note below.    Dariusz Leyva is a 31 year old who is being evaluated via a billable video visit.      How would you like to obtain your AVS? MyChart  If the video visit is dropped, the invitation should be resent by: Send to e-mail at: [email or web address]  Will anyone else be joining your video visit? No      Video Start Time: 2:30p      Medical  Weight Loss Follow-Up Diet Evaluation  Assessment:  Dariusz is presenting today for a follow up weight management nutrition consultation. Pt has had an initial appointment with Dr. Walker  Weight loss medication: saxenda- states she doesn't have any appetite  _needs to be to 250lb in order to have surgery  Pt's weight is 293 lbs 0 oz  Initial weight: 317lb  Weight change: down 24lb    BMI: Body mass index is 44.55 kg/m .  Ideal body weight: 63.9 kg (140 lb 14 oz)  Adjusted ideal body weight: 93.4 kg (205 lb 12.9 oz)    Estimated RMR ([place]-St Jeor equation):   2090 kcals x 1.2 (sedentary) = 2450 kcals (for weight maintenance)  Recommended Protein Intake:  grams of protein/day  Patient Active Problem List:  Patient Active Problem List   Diagnosis     Lumbago     Nonallopathic lesion of lumbar region     Nonallopathic lesion of sacral region     Pain in thoracic spine     Nonallopathic lesion of thoracic region     Abnormal Pap smear of cervix     Large tonsils     Morbid obesity with BMI of 45.0-49.9, adult (H)     MDD (major depressive disorder)     CARLY (generalized anxiety disorder)     Depression     Lipid screening     Chronic pain of right knee     Anxiety state     Injury of ligament of right knee     MDD (major depressive disorder), recurrent episode, moderate (H)     Painful orthopaedic  hardware (H)     Tibial plateau fracture, right, closed, initial encounter     Dyslipidemia     Limited mobility     Migraine with aura and without status migrainosus, not intractable     DDD (degenerative disc disease), cervical     DDD (degenerative disc disease), lumbar     Increased PTH level     Vitamin D deficiency     Progress on goals from last visit: weight is stable, building muscle- noticing body changes    Dietary Recall:  Lunch:fruit and geeta seed pouches, pickled carrots, tuna packs  Exercise: had to take a step back from training due to financial reasons    Nutrition Diagnosis:    Overweight/Obesity (NC 3.3) related to overeating and poor lifestyle habits as evidenced by patient report of inconsistent meals, grazing in the evening and BMI 44.55      Intervention:  1. Food and/or nutrient delivery: discussed simplifying meals- protein, veggies and carb- aiming for at least 20g protein per meal, trying to get a protein shake in when not feeling very hungry  2. Nutrition counseling: support for continued success- management of expectations around weight loss    Monitoring/Evaluation:    Goals:  1. Go a month without potato chips  2. Eat three meals per day- try protein shake when not feeling very hungry    Patient to follow up in  1 month(s) with CHARISSE      Video-Visit Details    Type of service:  Video Visit    Video End Time:2:55 PM    Originating Location (pt. Location): Home    Distant Location (provider location):  [facility] SURGERY CLINIC AND BARIATRICS CARE [place]     Platform used for Video Visit: Hernan JONES RD        Again, thank you for allowing me to participate in the care of your patient.        Sincerely,        DEMETRIUS JONES RD

## 2022-07-27 NOTE — PROGRESS NOTES
Dariusz Leyva is a 31 year old who is being evaluated via a billable video visit.      How would you like to obtain your AVS? MyChart  If the video visit is dropped, the invitation should be resent by: Send to e-mail at: [email or web address]  Will anyone else be joining your video visit? No      Video Start Time: 2:30p      Medical  Weight Loss Follow-Up Diet Evaluation  Assessment:  Dariusz is presenting today for a follow up weight management nutrition consultation. Pt has had an initial appointment with Dr. Walker  Weight loss medication: saxenda- states she doesn't have any appetite  _needs to be to 250lb in order to have surgery  Pt's weight is 293 lbs 0 oz  Initial weight: 317lb  Weight change: down 24lb    BMI: Body mass index is 44.55 kg/m .  Ideal body weight: 63.9 kg (140 lb 14 oz)  Adjusted ideal body weight: 93.4 kg (205 lb 12.9 oz)    Estimated RMR ([place]-St Jeor equation):   2090 kcals x 1.2 (sedentary) = 2450 kcals (for weight maintenance)  Recommended Protein Intake:  grams of protein/day  Patient Active Problem List:  Patient Active Problem List   Diagnosis     Lumbago     Nonallopathic lesion of lumbar region     Nonallopathic lesion of sacral region     Pain in thoracic spine     Nonallopathic lesion of thoracic region     Abnormal Pap smear of cervix     Large tonsils     Morbid obesity with BMI of 45.0-49.9, adult (H)     MDD (major depressive disorder)     CARLY (generalized anxiety disorder)     Depression     Lipid screening     Chronic pain of right knee     Anxiety state     Injury of ligament of right knee     MDD (major depressive disorder), recurrent episode, moderate (H)     Painful orthopaedic hardware (H)     Tibial plateau fracture, right, closed, initial encounter     Dyslipidemia     Limited mobility     Migraine with aura and without status migrainosus, not intractable     DDD (degenerative disc disease), cervical     DDD (degenerative disc disease), lumbar      Increased PTH level     Vitamin D deficiency     Progress on goals from last visit: weight is stable, building muscle- noticing body changes    Dietary Recall:  Lunch:fruit and geeta seed pouches, pickled carrots, tuna packs  Exercise: had to take a step back from training due to financial reasons    Nutrition Diagnosis:    Overweight/Obesity (NC 3.3) related to overeating and poor lifestyle habits as evidenced by patient report of inconsistent meals, grazing in the evening and BMI 44.55      Intervention:  1. Food and/or nutrient delivery: discussed simplifying meals- protein, veggies and carb- aiming for at least 20g protein per meal, trying to get a protein shake in when not feeling very hungry  2. Nutrition counseling: support for continued success- management of expectations around weight loss    Monitoring/Evaluation:    Goals:  1. Go a month without potato chips  2. Eat three meals per day- try protein shake when not feeling very hungry    Patient to follow up in  1 month(s) with CHARISSE      Video-Visit Details    Type of service:  Video Visit    Video End Time:2:55 PM    Originating Location (pt. Location): Home    Distant Location (provider location):  [facility] SURGERY CLINIC AND BARIATRICS CARE [place]     Platform used for Video Visit: Hernan JONES RD

## 2022-07-27 NOTE — PROGRESS NOTES
OUTPATIENT PSYCHOTHERAPY PROGRESS NOTE    Client Name: Dariusz Leyva   YOB: 1990  Time of Service: 49 minutes   Service Type(s): Individual psychotherapy    Video- Visit Details  Type of service:  video visit for psychotherapy  Time of service:    Date:  07/27/2022    Video Start Time:  9:11 am        Video End Time:  10:00am    Reason for video visit:  Patient unable to travel due to Covid-19  Originating Site (patient location):  [facility]   Location- Patient's home  Distant Site (provider location): Remote location  Mode of Communication:  Video Conference via [employer]  Consent:  Patient has given verbal consent for video visit?: Yes    Diagnoses:   Unspecified depressive disorder and unspecified anxiety    Goals of therapy: Elevate mood and show evidence of usual energy levels, and activities.      Individuals Present:   Psychotherapy services during this visit included myself and Dariusz Leyva.    Narrative:  Dariusz reported that the job she has been working for a while has become more unstable and she had to start a second job to increase her income. She was still working on that job when we joined the session, so we decided to switch our appointment to 9am. Provider will reconnect with Dariusz at that time.      Discussed her current financial situation. She shared about a relationship she is in. She wants to be with someone who wants to be with her.      Additional Information:  The patient did not report medication changes.     Mental Status:  Appearance:  Casually groomed   Behavior/relationship to examiner/demeanor:  cooperative  Motor activity/EPS:  Within normal limits  Speech rate:  Within normal limits  Speech volume:  Within normal limits  Speech articulation:  Within normal limits  Speech coherence: Within normal limits  Speech spontaneity: Within normal limits  Mood (subjective): not assessed  Affect (objective appearance): Fatigued/ stressed  Thought Process (Associations):   Logical and Linear   Thought process (Rate):  Within normal limits   Thought content: Within normal limits  Abnormal Perception:  None   Insight:  Good   Judgment:  Good     Plan: Continue with goals as outlined above    Andreea Vásquez Psy.D.,L.P

## 2022-08-03 ENCOUNTER — VIRTUAL VISIT (OUTPATIENT)
Dept: PSYCHIATRY | Facility: CLINIC | Age: 32
End: 2022-08-03
Attending: PSYCHOLOGIST
Payer: COMMERCIAL

## 2022-08-03 DIAGNOSIS — F33.1 MODERATE EPISODE OF RECURRENT MAJOR DEPRESSIVE DISORDER (H): Primary | ICD-10-CM

## 2022-08-03 DIAGNOSIS — F41.1 GAD (GENERALIZED ANXIETY DISORDER): ICD-10-CM

## 2022-08-03 PROCEDURE — 90837 PSYTX W PT 60 MINUTES: CPT | Mod: GT | Performed by: PSYCHOLOGIST

## 2022-08-03 ASSESSMENT — PATIENT HEALTH QUESTIONNAIRE - PHQ9
10. IF YOU CHECKED OFF ANY PROBLEMS, HOW DIFFICULT HAVE THESE PROBLEMS MADE IT FOR YOU TO DO YOUR WORK, TAKE CARE OF THINGS AT HOME, OR GET ALONG WITH OTHER PEOPLE: SOMEWHAT DIFFICULT
SUM OF ALL RESPONSES TO PHQ QUESTIONS 1-9: 9
SUM OF ALL RESPONSES TO PHQ QUESTIONS 1-9: 9

## 2022-08-03 NOTE — PROGRESS NOTES
"OUTPATIENT PSYCHOTHERAPY PROGRESS NOTE    Client Name: Dariusz Leyva   YOB: 1990  Time of Service: 58 minutes   Service Type(s): Individual psychotherapy    Video- Visit Details  Type of service:  video visit for psychotherapy  Time of service:    Date:  08/03/2022    Video Start Time:  8:05 am        Video End Time:  9:03 am    Reason for video visit:  Patient unable to travel due to Covid-19  Originating Site (patient location):  [facility]   Location- Patient's home  Distant Site (provider location): Remote location  Mode of Communication:  Video Conference via [employer]  Consent:  Patient has given verbal consent for video visit?: Yes    Diagnoses:   Unspecified depressive disorder and unspecified anxiety    Goals of therapy: Elevate mood and show evidence of usual energy levels, and activities.      Individuals Present:   Psychotherapy services during this visit included myself and Dariusz Leyva.    Narrative:  Dariusz reported that she took out a line of credit to help her until she can get more steady pay. 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 She reported her mood is  okay.   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  She reported she would not act on her thoughts.     She is considering doing freelance work, but she has not been successful in the past. Discussed current stressors with limited income and relationships.     Additional Information:  The patient did not report medication changes.     Mental Status:  Appearance:  Casually groomed   Behavior/relationship to examiner/demeanor:  cooperative  Motor activity/EPS:  Within normal limits  Speech rate:  Within normal limits  Speech volume:  Within normal limits  Speech articulation:  Within normal limits  Speech coherence: Within normal limits  Speech spontaneity: Within normal limits  Mood (subjective): \"okay\"  Affect (objective appearance): Mood congruent  Thought " Process (Associations):  Logical and Linear   Thought process (Rate):  Within normal limits   Thought content: Within normal limits  Abnormal Perception:  None   Insight:  Good   Judgment:  Good     Plan: Continue with goals as outlined above    Andreea Vásquez Psy.D.,L.P  Answers for HPI/ROS submitted by the patient on 8/3/2022  If you checked off any problems, how difficult have these problems made it for you to do your work, take care of things at home, or get along with other people?: Somewhat difficult  PHQ9 TOTAL SCORE: 9

## 2022-08-10 ENCOUNTER — VIRTUAL VISIT (OUTPATIENT)
Dept: PSYCHIATRY | Facility: CLINIC | Age: 32
End: 2022-08-10
Attending: PSYCHOLOGIST
Payer: COMMERCIAL

## 2022-08-10 DIAGNOSIS — F41.1 GAD (GENERALIZED ANXIETY DISORDER): ICD-10-CM

## 2022-08-10 DIAGNOSIS — F33.1 MODERATE EPISODE OF RECURRENT MAJOR DEPRESSIVE DISORDER (H): Primary | ICD-10-CM

## 2022-08-10 PROCEDURE — 90837 PSYTX W PT 60 MINUTES: CPT | Mod: GT | Performed by: PSYCHOLOGIST

## 2022-08-10 ASSESSMENT — PATIENT HEALTH QUESTIONNAIRE - PHQ9
SUM OF ALL RESPONSES TO PHQ QUESTIONS 1-9: 12
SUM OF ALL RESPONSES TO PHQ QUESTIONS 1-9: 12
10. IF YOU CHECKED OFF ANY PROBLEMS, HOW DIFFICULT HAVE THESE PROBLEMS MADE IT FOR YOU TO DO YOUR WORK, TAKE CARE OF THINGS AT HOME, OR GET ALONG WITH OTHER PEOPLE: VERY DIFFICULT

## 2022-08-10 NOTE — PROGRESS NOTES
"OUTPATIENT PSYCHOTHERAPY PROGRESS NOTE    Client Name: Dariusz Leyva   YOB: 1990  Time of Service: 57 minutes   Service Type(s): Individual psychotherapy    Video- Visit Details  Type of service:  video visit for psychotherapy  Time of service:    Date:  08/10/2022    Video Start Time:  8:02 am        Video End Time:  9:01 am    Reason for video visit:  Patient unable to travel due to Covid-19  Originating Site (patient location):  [facility]   Location- Patient's home  Distant Site (provider location): Remote location  Mode of Communication:  Video Conference via [employer]  Consent:  Patient has given verbal consent for video visit?: Yes    Diagnoses:   Unspecified depressive disorder and unspecified anxiety    Goals of therapy: Elevate mood and show evidence of usual energy levels, and activities.      Individuals Present:   Psychotherapy services during this visit included myself and Dariusz Leyva.    Narrative:  Dariusz reported she was \"okay.  She has only been sleeping about 4 hours a night - she is not sure how long. She is being kept up by worry and anxiety. She is concerned primarily because of her financial situation. She talked to her boss yesterday and he asked if she could do the work of another position. He has not been able to pay her.     She explained her family is also struggling financially and with other concerns. She does not see anyone around her prospering.  She feels selfish if she were to reach out for moral support.      Discussed past relationship and recent interaction with someone she met and she had not seen for a long time. Also discussed some patterns she has seen in relationships.      Additional Information:  The patient did not report medication changes.     Mental Status:  Appearance:  Casually groomed   Behavior/relationship to examiner/demeanor:  cooperative  Motor activity/EPS:  Within normal limits  Speech rate:  Within normal limits  Speech volume:  Within " "normal limits  Speech articulation:  Within normal limits  Speech coherence: Within normal limits  Speech spontaneity: Within normal limits  Mood (subjective): \"okay\"  Affect (objective appearance): Mood congruent  Thought Process (Associations):  Logical and Linear   Thought process (Rate):  Within normal limits   Thought content: Within normal limits  Abnormal Perception:  None   Insight:  Good   Judgment:  Good     Plan: Continue with goals as outlined above    Andreea Vásquez Psy.D.,L.P      Answers for HPI/ROS submitted by the patient on 8/10/2022  If you checked off any problems, how difficult have these problems made it for you to do your work, take care of things at home, or get along with other people?: Very difficult  PHQ9 TOTAL SCORE: 12      "

## 2022-08-16 ENCOUNTER — OFFICE VISIT (OUTPATIENT)
Dept: FAMILY MEDICINE | Facility: CLINIC | Age: 32
End: 2022-08-16
Payer: COMMERCIAL

## 2022-08-16 VITALS
BODY MASS INDEX: 44.41 KG/M2 | HEART RATE: 70 BPM | DIASTOLIC BLOOD PRESSURE: 80 MMHG | HEIGHT: 68 IN | OXYGEN SATURATION: 100 % | SYSTOLIC BLOOD PRESSURE: 115 MMHG | WEIGHT: 293 LBS | TEMPERATURE: 97 F | RESPIRATION RATE: 18 BRPM

## 2022-08-16 DIAGNOSIS — Z72.51 UNPROTECTED SEXUAL INTERCOURSE: Primary | ICD-10-CM

## 2022-08-16 DIAGNOSIS — N76.0 BACTERIAL VAGINOSIS: ICD-10-CM

## 2022-08-16 DIAGNOSIS — B96.89 BACTERIAL VAGINOSIS: ICD-10-CM

## 2022-08-16 LAB
CLUE CELLS: PRESENT
HCG UR QL: NEGATIVE
TRICHOMONAS, WET PREP: ABNORMAL
WBC'S/HIGH POWER FIELD, WET PREP: ABNORMAL
YEAST, WET PREP: ABNORMAL

## 2022-08-16 PROCEDURE — 99214 OFFICE O/P EST MOD 30 MIN: CPT | Performed by: FAMILY MEDICINE

## 2022-08-16 PROCEDURE — 81025 URINE PREGNANCY TEST: CPT | Performed by: FAMILY MEDICINE

## 2022-08-16 PROCEDURE — 87210 SMEAR WET MOUNT SALINE/INK: CPT | Performed by: FAMILY MEDICINE

## 2022-08-16 RX ORDER — METRONIDAZOLE 500 MG/1
500 TABLET ORAL 2 TIMES DAILY
Qty: 14 TABLET | Refills: 0 | Status: SHIPPED | OUTPATIENT
Start: 2022-08-16 | End: 2022-08-23

## 2022-08-16 NOTE — PROGRESS NOTES
"jas  S: Dariusz Leyva is a 31 year old female who returns for follow up of STD check, She had STD check resenty/all normal. . Apparently this time condom BROKE /had to take plan B     Patients states that main concern today is pregnancy detection and vaginal discharge     PMHX/PSHX/MEDS/ALLERGIES/SHX/FHX reviewed and updated in Epic.      ROS:  General: No fevers, chills  Head: No headache  Ears: No acute change in hearing.    CV: No chest pain or palpitations.  Resp: No shortness of breath.  No cough. No hemoptysis.  GI: No nausea, vomiting, constipation, diarrhea  : No urinary pains    O: /80 (BP Location: Left arm, Patient Position: Sitting, Cuff Size: Adult Large)   Pulse 70   Temp 97  F (36.1  C) (Tympanic)   Resp 18   Ht 1.715 m (5' 7.5\")   Wt 134.3 kg (296 lb)   LMP 08/07/2022   SpO2 100%   BMI 45.68 kg/m     Gen:  Well nourished and in NAD    CV:  RRR  - no murmurs, rubs, or gallups,   Pulm:  CTAB, no wheezes/rales/rhonchi, good air entry   ABD: soft, nontender, no masses, no rebound, BS intact throughout  Extrem: no cyanosis, edema or clubbing  Psych: Euthymic      (Z72.51) Unprotected sexual intercourse  (primary encounter diagnosis)  Comment: Had plan B Menses on 8/7/22  Plan: Pregnancy test   Pregnancy prevention disused   condoms given    2.  Vaginal discharge; wet prep    .    RTC for  coordination of care or sooner if develops new or worsening symptoms.    Donald James MD      "

## 2022-08-25 ENCOUNTER — VIRTUAL VISIT (OUTPATIENT)
Dept: SURGERY | Facility: CLINIC | Age: 32
End: 2022-08-25
Payer: COMMERCIAL

## 2022-08-25 VITALS — HEIGHT: 68 IN | BODY MASS INDEX: 44.41 KG/M2 | WEIGHT: 293 LBS

## 2022-08-25 DIAGNOSIS — R63.8 ABNORMAL CRAVING: ICD-10-CM

## 2022-08-25 DIAGNOSIS — E66.01 OBESITY, CLASS III, BMI 40-49.9 (MORBID OBESITY) (H): Primary | ICD-10-CM

## 2022-08-25 PROCEDURE — 97803 MED NUTRITION INDIV SUBSEQ: CPT | Mod: GT | Performed by: DIETITIAN, REGISTERED

## 2022-08-25 NOTE — LETTER
8/25/2022         RE: Dariusz Leyva  [address]  [place] [address]        Dear Colleague,    Thank you for referring your patient, Dariusz Leyva, to the [facility] [place]. Please see a copy of my visit note below.    Dariusz Leyva is a 31 year old who is being evaluated via a billable video visit.      How would you like to obtain your AVS? MyChart  If the video visit is dropped, the invitation should be resent by: Send to e-mail at: [email or web address]  Will anyone else be joining your video visit? No      Video Start Time: 9:33 AM      Medical  Weight Loss Follow-Up Diet Evaluation  Assessment:  Dariusz is presenting today for a follow up weight management nutrition consultation. Pt has had an initial appointment with Dr. Walker  Weight loss medication: saxenda.   Pt's weight is 296 lbs 0 oz  Initial weight: 317lb  Weight change: down 21lb  BMI: Body mass index is 45.68 kg/m .  Ideal body weight: 62.7 kg (138 lb 5.4 oz)  Adjusted ideal body weight: 91.4 kg (201 lb 6.5 oz)    Estimated RMR (Gibson-St Jeor equation):   2090 kcals x 1.2 (sedentary) = 2450 kcals (for weight maintenance)  Recommended Protein Intake:  grams of protein/day  Patient Active Problem List:  Patient Active Problem List   Diagnosis     Lumbago     Nonallopathic lesion of lumbar region     Nonallopathic lesion of sacral region     Pain in thoracic spine     Nonallopathic lesion of thoracic region     Abnormal Pap smear of cervix     Large tonsils     Morbid obesity with BMI of 45.0-49.9, adult (H)     MDD (major depressive disorder)     CARLY (generalized anxiety disorder)     Depression     Lipid screening     Chronic pain of right knee     Anxiety state     Injury of ligament of right knee     MDD (major depressive disorder), recurrent episode, moderate (H)     Painful orthopaedic hardware (H)     Tibial plateau fracture, right, closed, initial encounter      Dyslipidemia     Limited mobility     Migraine with aura and without status migrainosus, not intractable     DDD (degenerative disc disease), cervical     DDD (degenerative disc disease), lumbar     Increased PTH level     Vitamin D deficiency       Progress on goals from last visit: 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 No potato chips for a month- goal not met  -sleep pattern has been off- waking up much later in the day- this is having an impact on eating schedule and routine  -patient reports battling mentally with foods she knows she should eat and foods that bring her javier to eat- discussed ways to bring these two together such as using a dip with veggies, etc.       Dietary Recall:  Breakfast: protein shake   Has been eating rice, beans and barbacoa for meals the past few days    Nutrition Diagnosis:    Overweight/Obesity (NC 3.3) related to overeating and poor lifestyle habits as evidenced by patient report of inconsistent meals, grazing in the evening and BMI 45.68    Intervention:  1. Food and/or nutrient delivery: discussed ways to bring more javier to more nutrient dense foods, adding structure to meals by eating every 5-6 hours  2. Nutrition counseling: motivational interviewing    Monitoring/Evaluation:    Goals:  1. Figure out a routine and scheduling meals  2. Planning    Patient to follow up in 1 month(s) with CHARISSE      Video-Visit Details    Type of service:  Video Visit    Video End Time:10:01 AM    Originating Location (pt. Location): Home    Distant Location (provider location):  [facility] [place]     Platform used for Video Visit: Hernan JONES RD        Again, thank you for allowing me to participate in the care of your patient.        Sincerely,        DEMETRIUS JONES RD

## 2022-08-25 NOTE — PROGRESS NOTES
Dariusz Leyva is a 31 year old who is being evaluated via a billable video visit.      How would you like to obtain your AVS? MyChart  If the video visit is dropped, the invitation should be resent by: Send to e-mail at: [email or web address]  Will anyone else be joining your video visit? No      Video Start Time: 9:33 AM      Medical  Weight Loss Follow-Up Diet Evaluation  Assessment:  Dariusz is presenting today for a follow up weight management nutrition consultation. Pt has had an initial appointment with Dr. Walker  Weight loss medication: saxenda.   Pt's weight is 296 lbs 0 oz  Initial weight: 317lb  Weight change: down 21lb  BMI: Body mass index is 45.68 kg/m .  Ideal body weight: 62.7 kg (138 lb 5.4 oz)  Adjusted ideal body weight: 91.4 kg (201 lb 6.5 oz)    Estimated RMR (Bridgeton-St Jeor equation):   2090 kcals x 1.2 (sedentary) = 2450 kcals (for weight maintenance)  Recommended Protein Intake:  grams of protein/day  Patient Active Problem List:  Patient Active Problem List   Diagnosis     Lumbago     Nonallopathic lesion of lumbar region     Nonallopathic lesion of sacral region     Pain in thoracic spine     Nonallopathic lesion of thoracic region     Abnormal Pap smear of cervix     Large tonsils     Morbid obesity with BMI of 45.0-49.9, adult (H)     MDD (major depressive disorder)     CARLY (generalized anxiety disorder)     Depression     Lipid screening     Chronic pain of right knee     Anxiety state     Injury of ligament of right knee     MDD (major depressive disorder), recurrent episode, moderate (H)     Painful orthopaedic hardware (H)     Tibial plateau fracture, right, closed, initial encounter     Dyslipidemia     Limited mobility     Migraine with aura and without status migrainosus, not intractable     DDD (degenerative disc disease), cervical     DDD (degenerative disc disease), lumbar     Increased PTH level     Vitamin D deficiency       Progress on goals from last visit: took  a break from [employer], getting things resolved with work, feels like weight is stuck  60 day challenge at [employer]- exercises, meal plan- starts Sept 10   Goals:  1. No potato chips for a month- goal not met  -sleep pattern has been off- waking up much later in the day- this is having an impact on eating schedule and routine  -patient reports battling mentally with foods she knows she should eat and foods that bring her javier to eat- discussed ways to bring these two together such as using a dip with veggies, etc.       Dietary Recall:  Breakfast: protein shake   Has been eating rice, beans and barbacoa for meals the past few days    Nutrition Diagnosis:    Overweight/Obesity (NC 3.3) related to overeating and poor lifestyle habits as evidenced by patient report of inconsistent meals, grazing in the evening and BMI 45.68    Intervention:  1. Food and/or nutrient delivery: discussed ways to bring more javier to more nutrient dense foods, adding structure to meals by eating every 5-6 hours  2. Nutrition counseling: motivational interviewing    Monitoring/Evaluation:    Goals:  1. Figure out a routine and scheduling meals  2. Planning    Patient to follow up in 1 month(s) with CHARISSE      Video-Visit Details    Type of service:  Video Visit    Video End Time:10:01 AM    Originating Location (pt. Location): Home    Distant Location (provider location):  [facility] [place]     Platform used for Video Visit: Hernan JONES RD

## 2022-08-31 ENCOUNTER — VIRTUAL VISIT (OUTPATIENT)
Dept: PSYCHIATRY | Facility: CLINIC | Age: 32
End: 2022-08-31
Attending: PSYCHOLOGIST
Payer: COMMERCIAL

## 2022-08-31 DIAGNOSIS — F41.1 GAD (GENERALIZED ANXIETY DISORDER): Primary | ICD-10-CM

## 2022-08-31 DIAGNOSIS — F33.1 MODERATE EPISODE OF RECURRENT MAJOR DEPRESSIVE DISORDER (H): ICD-10-CM

## 2022-08-31 PROCEDURE — 90837 PSYTX W PT 60 MINUTES: CPT | Mod: GT | Performed by: PSYCHOLOGIST

## 2022-08-31 NOTE — PROGRESS NOTES
OUTPATIENT PSYCHOTHERAPY PROGRESS NOTE    Client Name: Dariusz Leyva   YOB: 1990  Time of Service: 61 minutes   Service Type(s): Individual psychotherapy    Video- Visit Details  Type of service:  video visit for psychotherapy  Time of service:    Date:  08/31/2022    Video Start Time:  8:00 am        Video End Time:  9:01 am    Reason for video visit:  Patient unable to travel due to Covid-19  Originating Site (patient location):  [facility]   Location- Patient's home  Distant Site (provider location): Remote location  Mode of Communication:  Video Conference via [employer]  Consent:  Patient has given verbal consent for video visit?: Yes    Diagnoses:   Unspecified depressive disorder and unspecified anxiety    Goals of therapy: Elevate mood and show evidence of usual energy levels, and activities.      Individuals Present:   Psychotherapy services during this visit included myself and Dariusz Leyva.    Narrative:  Dariusz reported she is  okay, mostly tired.  She is struggling getting to sleep  at a decent time.  She reported is it usually after midnight/1 or 2am and then waking about 6am. She is not sure why her sleep is disrupted. Her appetite has been  all over the place,  ranging from no appetite to overindulge. Her daily schedule is all over the place - the only things she has structure around is cello lessons and seeing a  once a week. Her mood has been initially  frustration and sadness  now more like  confusion and sadness.  She endorsed anhedonia and fatigue. She denied suicidal ideation.      She explained that she is having sharp stabbing pain from the bottom of her foot through her entire leg. She wonders if it could be from driving. She will be seeing her orthopedic surgeon on Sept 1st. She is still trying to lose weight for her surgery. She is supposed to be getting an osteotomy.      She would like to move out of [place] - she would like to move somewhere where she can get  "away from people. She has a desire to have a fresh start, but she would like to have her surgery first. She is considering selling some of her clothes in preparation.      She still has inconsistency with her employment. She has enough for this month, but she is concerned for the future. She is unsure what to do next. Discussed her work history and preferences.      She went to two family reunions and did not feel connected. She did not feel close to anyone.      Additional Information:  The patient did not report medication changes.     Mental Status:  Appearance:  Casually groomed   Behavior/relationship to examiner/demeanor:  cooperative  Motor activity/EPS:  Within normal limits  Speech rate:  Within normal limits  Speech volume:  Within normal limits  Speech articulation:  Within normal limits  Speech coherence: Within normal limits  Speech spontaneity: Within normal limits  Mood (subjective): \"okay, mostly tired\"  Affect (objective appearance): Euthymia  Thought Process (Associations):  Logical and Linear   Thought process (Rate):  Within normal limits   Thought content: Within normal limits  Abnormal Perception:  None   Insight:  Good   Judgment:  Good     Plan: Continue with goals as outlined above    Andreea Vásquez Psy.D.,L.P          Answers for HPI/ROS submitted by the patient on 8/31/2022  If you checked off any problems, how difficult have these problems made it for you to do your work, take care of things at home, or get along with other people?: Somewhat difficult  PHQ9 TOTAL SCORE: 9      "

## 2022-09-07 ENCOUNTER — VIRTUAL VISIT (OUTPATIENT)
Dept: PSYCHIATRY | Facility: CLINIC | Age: 32
End: 2022-09-07
Attending: PSYCHOLOGIST
Payer: COMMERCIAL

## 2022-09-07 DIAGNOSIS — F41.1 GAD (GENERALIZED ANXIETY DISORDER): Primary | ICD-10-CM

## 2022-09-07 DIAGNOSIS — F33.1 MODERATE EPISODE OF RECURRENT MAJOR DEPRESSIVE DISORDER (H): ICD-10-CM

## 2022-09-07 PROCEDURE — 90837 PSYTX W PT 60 MINUTES: CPT | Mod: GT | Performed by: PSYCHOLOGIST

## 2022-09-07 NOTE — PROGRESS NOTES
OUTPATIENT PSYCHOTHERAPY PROGRESS NOTE    Client Name: Dariusz Leyva   YOB: 1990  Time of Service: 59 minutes   Service Type(s): Individual psychotherapy    Video- Visit Details  Type of service:  video visit for psychotherapy  Time of service:    Date:  09/07/2022    Video Start Time:  8:02 am        Video End Time:  9:01 am    Reason for video visit:  Patient unable to travel due to Covid-19  Originating Site (patient location):  [facility]   Location- Patient's home  Distant Site (provider location): [facility]  Mode of Communication:  Video Conference via [employer]  Consent:  Patient has given verbal consent for video visit?: Yes    Diagnoses:   Unspecified depressive disorder and unspecified anxiety    Goals of therapy: Elevate mood and show evidence of usual energy levels, and activities.      Individuals Present:   Psychotherapy services during this visit included myself and Dariusz Leyva.    Narrative:  Dariusz reported she  does not know  how she is feeling. She endorsed anhedonia, fatigue, appetite disturbance on some days. She denied sleep disturbance and suicidal ideation. She rated her stress level at 90 (scale from 1-100, 100 being highest). She endorsed anxiety - she does not like where she is right now.      She reflected that she is almost 32 and in the past, she had pressure on herself to be  something.  She has let go of some of this, but her recent experiences have her feeling  worthless.  She has significant financial stress and is concerned about an upcoming trip.      She does not feel comfortable going to her parents for anything - love, advice, etc.      She does not feel purpose, which makes her feel empty.     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     She has an upcoming date, but she is not optimistic.     Additional Information:  The patient did not report medication changes.     Mental Status:  Appearance:  Casually groomed  "  Behavior/relationship to examiner/demeanor:  cooperative  Motor activity/EPS:  Within normal limits  Speech rate:  Within normal limits  Speech volume:  Within normal limits  Speech articulation:  Within normal limits  Speech coherence: Within normal limits  Speech spontaneity: Within normal limits  Mood (subjective): \"does not know\"  Affect (objective appearance): Subdued, periods of dysphoria  Thought Process (Associations):  Logical and Linear   Thought process (Rate):  Within normal limits   Thought content: Within normal limits  Abnormal Perception:  None   Insight:  Good   Judgment:  Good     Plan: Continue with goals as outlined above    Andreea Vásquez Psy.D.,L.P          "

## 2022-09-11 ENCOUNTER — HEALTH MAINTENANCE LETTER (OUTPATIENT)
Age: 32
End: 2022-09-11

## 2022-09-14 ENCOUNTER — VIRTUAL VISIT (OUTPATIENT)
Dept: PSYCHIATRY | Facility: CLINIC | Age: 32
End: 2022-09-14
Attending: PSYCHOLOGIST
Payer: COMMERCIAL

## 2022-09-14 DIAGNOSIS — F41.1 GAD (GENERALIZED ANXIETY DISORDER): Primary | ICD-10-CM

## 2022-09-14 DIAGNOSIS — F33.1 MODERATE EPISODE OF RECURRENT MAJOR DEPRESSIVE DISORDER (H): ICD-10-CM

## 2022-09-14 PROCEDURE — 90837 PSYTX W PT 60 MINUTES: CPT | Mod: GT | Performed by: PSYCHOLOGIST

## 2022-09-14 NOTE — PROGRESS NOTES
OUTPATIENT PSYCHOTHERAPY PROGRESS NOTE    Client Name: Dariusz Leyva   YOB: 1990  Time of Service: 64 minutes   Service Type(s): Individual psychotherapy    Video- Visit Details  Type of service:  video visit for psychotherapy  Time of service:    Date:  09/14/2022    Video Start Time:  8:02 am        Video End Time:  9:06 am    Reason for video visit:  Patient unable to travel due to Covid-19  Originating Site (patient location):  [facility]   Location- Patient's home  Distant Site (provider location): remote location  Mode of Communication:  Video Conference via [employer]  Consent:  Patient has given verbal consent for video visit?: Yes    Diagnoses:   Unspecified depressive disorder and unspecified anxiety    Goals of therapy: Elevate mood and show evidence of usual energy levels, and activities.      Individuals Present:   Psychotherapy services during this visit included myself and Dariusz Leyva.    Narrative:  Dariusz reported she was feeling  a little tired.  She reported she has been sleeping for the most part. She is experiencing fatigue for about half the day. She did go to [place] last week because she could not find her passport. She had suicidal ideation - she explained it was not just because of not finding her passport, but that was the catalyst. She reported that it has passed and she is no longer having these thoughts. She denied appetite disturbance - she started a 60-day challenge at her gym.      She and her friend decided to go to [place] to celebrate her birthday. She experienced racist microaggressions on the plane.  She passed out while she was on the train - an ambulance was called. She reported she enjoyed a portion of her birthday even with all the difficulties. Discussed previous experiences she had on her birthdays. There has been a series of negative events.     She saw  E while in [place]. He committed to seeing her again while she was there, but he continued to put off the  "meeting and eventually he did not fulfill his commitment. She expressed a desire to be accepted for who she is and his behavior does not demonstrate this. She questions his ability to follow through on more long-term commitments he has discussed. When they are together, he demonstrates the right characteristics, but he is not consistent enough.     Additional Information:  The patient did not report medication changes.     Mental Status:  Appearance:  Casually groomed   Behavior/relationship to examiner/demeanor:  cooperative  Motor activity/EPS:  Within normal limits  Speech rate:  Within normal limits  Speech volume:  Within normal limits  Speech articulation:  Within normal limits  Speech coherence: Within normal limits  Speech spontaneity: Within normal limits  Mood (subjective): \"a little tired\"  Affect (objective appearance): Subdued  Thought Process (Associations):  Logical and Linear   Thought process (Rate):  Within normal limits   Thought content: Within normal limits  Abnormal Perception:  None   Insight:  Good   Judgment:  Good     Plan: Continue with goals as outlined above    Andreea Vásquez Psy.D.,L.P        "

## 2022-09-21 ENCOUNTER — VIRTUAL VISIT (OUTPATIENT)
Dept: PSYCHIATRY | Facility: CLINIC | Age: 32
End: 2022-09-21
Attending: PSYCHOLOGIST
Payer: COMMERCIAL

## 2022-09-21 DIAGNOSIS — F33.1 MODERATE EPISODE OF RECURRENT MAJOR DEPRESSIVE DISORDER (H): Primary | ICD-10-CM

## 2022-09-21 PROCEDURE — 90837 PSYTX W PT 60 MINUTES: CPT | Mod: GT | Performed by: PSYCHOLOGIST

## 2022-09-21 NOTE — PROGRESS NOTES
OUTPATIENT PSYCHOTHERAPY PROGRESS NOTE    Client Name: Dariusz Leyva   YOB: 1990  Time of Service: 61 minutes   Service Type(s): Individual psychotherapy    Video- Visit Details  Type of service:  video visit for psychotherapy  Time of service:    Date:  09/21/2022    Video Start Time:  8:02 am        Video End Time:  9:03 am    Reason for video visit:  Patient unable to travel due to Covid-19  Originating Site (patient location):  [facility]   Location- Patient's home  Distant Site (provider location): [facility]  Mode of Communication:  Video Conference via [employer]  Consent:  Patient has given verbal consent for video visit?: Yes    Diagnoses:   Unspecified depressive disorder and unspecified anxiety    Goals of therapy: Elevate mood and show evidence of usual energy levels, and activities.      Individuals Present:   Psychotherapy services during this visit included myself and Dariusz Leyva.    Narrative:  Yadira reported that her mood has been  not the best.  She only slept about 4-5 hours last night - she reported  I just don t have any energy. The low energy has lasted about a week. She went out on Saturday for [email or web address] and it turned into a day where she was out all day. She reported she wished she had not gone out.  She explained she could not afford to go out. She is not enjoying things anymore. She has had thoughts of thinking it might not be better to not be alive, she denied thoughts of killing herself and intent. She has had thought of being  incompetent, disregarded, unimportant.  She said,  it is hard to care for yourself when you feel no one else cares about you.  It is hard for her to put things into her relationships. She explained that people will say they care and they love her, but she does not see it in their actions. She denied appetite disturbance.      She rated her stress level as 89 (1-100 scale) this week. She tried to gather a bunch of clothes and shoes to try and  "sell. She did not have much success. She has two cars and she is considering letting one go, but her other car did not start.     Discussed her experiences as a child that led to reinforcing  people pleaser.        Additional Information:  The patient did not report medication changes.     Mental Status:  Appearance:  Casually groomed   Behavior/relationship to examiner/demeanor:  cooperative  Motor activity/EPS:  Within normal limits  Speech rate:  Within normal limits  Speech volume:  Within normal limits  Speech articulation:  Within normal limits  Speech coherence: Within normal limits  Speech spontaneity: Within normal limits  Mood (subjective): \"not the best\"  Affect (objective appearance): Dysphoric  Thought Process (Associations):  Logical and Linear   Thought process (Rate):  Within normal limits   Thought content: Within normal limits  Abnormal Perception:  None   Insight:  Good   Judgment:  Good     Plan: Continue with goals as outlined above    Andreea Vásquez Psy.D.,L.P      "

## 2022-09-28 ENCOUNTER — VIRTUAL VISIT (OUTPATIENT)
Dept: PSYCHIATRY | Facility: CLINIC | Age: 32
End: 2022-09-28
Attending: PSYCHOLOGIST
Payer: COMMERCIAL

## 2022-09-28 DIAGNOSIS — F41.1 GAD (GENERALIZED ANXIETY DISORDER): ICD-10-CM

## 2022-09-28 DIAGNOSIS — F33.1 MODERATE EPISODE OF RECURRENT MAJOR DEPRESSIVE DISORDER (H): Primary | ICD-10-CM

## 2022-09-28 PROCEDURE — 90832 PSYTX W PT 30 MINUTES: CPT | Mod: GT | Performed by: PSYCHOLOGIST

## 2022-09-28 NOTE — PROGRESS NOTES
OUTPATIENT PSYCHOTHERAPY PROGRESS NOTE    Client Name: Dariusz Leyva   YOB: 1990  Time of Service: 26 minutes   Service Type(s): Individual psychotherapy    Video- Visit Details  Type of service:  video visit for psychotherapy  Time of service:    Date:  09/28/2022    Video Start Time:  8:03 am        Video End Time:  8:31 am    Reason for video visit:  Patient unable to travel due to Covid-19  Originating Site (patient location):  [facility]   Location- Patient's home  Distant Site (provider location): [facility]  Mode of Communication:  Video Conference via [employer]  Consent:  Patient has given verbal consent for video visit?: Yes    Diagnoses:   Unspecified depressive disorder and unspecified anxiety    Goals of therapy: Elevate mood and show evidence of usual energy levels, and activities.      Individuals Present:   Psychotherapy services during this visit included myself and Dariusz Leyva.    Narrative:  Dariusz reported her mood has been  half and half.  She explained sometimes up, sometimes down. She indicated that she is not as sad as she was last week. She has been distracted more - time with friends. She is able to get to sleep, but if she gets up to go to the bathroom, she will be up after that. She endorsed fatigue. She denied appetite disturbance, anhedonia, and suicidal ideation. She reported her stress level is at 80 out of 100, 100 being the most stressed.      She is starting to get paid again from her job, but it is not full pay yet. She is looking for a second job to fill in. She is currently looking for a parttime second job, but she would consider a full time.      She talked to  E  this week. She reported that he does not seem aware of the way he behaves adds to her stress. She reported that it is hard to be confident about relationships.      She does not remember what it is like to be in a relationship anymore. Discussed tendency for people to reflect negatively on  "ourselves in the context of relationships.      Additional Information:  The patient did not report medication changes.     Mental Status:  Appearance:  Casually groomed   Behavior/relationship to examiner/demeanor:  cooperative  Motor activity/EPS:  Within normal limits  Speech rate:  Within normal limits  Speech volume:  Within normal limits  Speech articulation:  Within normal limits  Speech coherence: Within normal limits  Speech spontaneity: Within normal limits  Mood (subjective): \"half and half\"  Affect (objective appearance): Euthymic  Thought Process (Associations):  Logical and Linear   Thought process (Rate):  Within normal limits   Thought content: Within normal limits  Abnormal Perception:  None   Insight:  Good   Judgment:  Good     Plan: Continue with goals as outlined above    Andreea Vásquez Psy.D.,L.P    "

## 2022-10-05 ENCOUNTER — VIRTUAL VISIT (OUTPATIENT)
Dept: PSYCHIATRY | Facility: CLINIC | Age: 32
End: 2022-10-05
Attending: PSYCHOLOGIST
Payer: COMMERCIAL

## 2022-10-05 DIAGNOSIS — F33.1 MODERATE EPISODE OF RECURRENT MAJOR DEPRESSIVE DISORDER (H): Primary | ICD-10-CM

## 2022-10-05 DIAGNOSIS — F41.1 GAD (GENERALIZED ANXIETY DISORDER): ICD-10-CM

## 2022-10-05 PROCEDURE — 90837 PSYTX W PT 60 MINUTES: CPT | Mod: GT | Performed by: PSYCHOLOGIST

## 2022-10-05 ASSESSMENT — ANXIETY QUESTIONNAIRES
7. FEELING AFRAID AS IF SOMETHING AWFUL MIGHT HAPPEN: SEVERAL DAYS
7. FEELING AFRAID AS IF SOMETHING AWFUL MIGHT HAPPEN: SEVERAL DAYS
6. BECOMING EASILY ANNOYED OR IRRITABLE: MORE THAN HALF THE DAYS
3. WORRYING TOO MUCH ABOUT DIFFERENT THINGS: MORE THAN HALF THE DAYS
GAD7 TOTAL SCORE: 9
2. NOT BEING ABLE TO STOP OR CONTROL WORRYING: MORE THAN HALF THE DAYS
1. FEELING NERVOUS, ANXIOUS, OR ON EDGE: SEVERAL DAYS
5. BEING SO RESTLESS THAT IT IS HARD TO SIT STILL: NOT AT ALL
8. IF YOU CHECKED OFF ANY PROBLEMS, HOW DIFFICULT HAVE THESE MADE IT FOR YOU TO DO YOUR WORK, TAKE CARE OF THINGS AT HOME, OR GET ALONG WITH OTHER PEOPLE?: SOMEWHAT DIFFICULT
IF YOU CHECKED OFF ANY PROBLEMS ON THIS QUESTIONNAIRE, HOW DIFFICULT HAVE THESE PROBLEMS MADE IT FOR YOU TO DO YOUR WORK, TAKE CARE OF THINGS AT HOME, OR GET ALONG WITH OTHER PEOPLE: SOMEWHAT DIFFICULT
GAD7 TOTAL SCORE: 9
4. TROUBLE RELAXING: SEVERAL DAYS
GAD7 TOTAL SCORE: 9

## 2022-10-05 ASSESSMENT — PATIENT HEALTH QUESTIONNAIRE - PHQ9
SUM OF ALL RESPONSES TO PHQ QUESTIONS 1-9: 13
SUM OF ALL RESPONSES TO PHQ QUESTIONS 1-9: 13
10. IF YOU CHECKED OFF ANY PROBLEMS, HOW DIFFICULT HAVE THESE PROBLEMS MADE IT FOR YOU TO DO YOUR WORK, TAKE CARE OF THINGS AT HOME, OR GET ALONG WITH OTHER PEOPLE: SOMEWHAT DIFFICULT

## 2022-10-05 NOTE — PROGRESS NOTES
OUTPATIENT PSYCHOTHERAPY PROGRESS NOTE    Client Name: Dariusz Leyva   YOB: 1990  Time of Service: 69 minutes   Service Type(s): Individual psychotherapy    Video- Visit Details  Type of service:  video visit for psychotherapy  Time of service:    Date:  10/05/2022    Video Start Time:  8:02 am        Video End Time:  9:11 am    Reason for video visit:  Patient unable to travel due to Covid-19  Originating Site (patient location):  [facility]   Location- Patient's home  Distant Site (provider location): Remote location  Mode of Communication:  Video Conference via Tour Desk  Consent:  Patient has given verbal consent for video visit?: Yes    Diagnoses:   Unspecified depressive disorder and unspecified anxiety    Goals of therapy: Elevate mood and show evidence of usual energy levels, and activities.      Individuals Present:   Psychotherapy services during this visit included myself and Dariusz Leyva.    Narrative:  Dariusz reported her mood was  at a mid-point,  but there are periods where she moves to the more negative side of things. She reported mid sleep insomnia - waking and not being able to go back to sleep. She is unsure about how much sleep she is getting. She reported she has been overeating or not eating at all. She is able to enjoy some of the events she takes part in, but there are periods in those activities that she is not feeling great. For example, she went [place] [employer] [place] - there was a lot of walking and eventually her leg/knee had pain. That led to her feeling  miserable  but she does not want to let her friends know. She tries to enjoy it. She also recognized she did not have the finances to enjoy it. She explained -  it is hard to enjoy things in the body she is in.  Dariusz reported that she has thoughts like  it would not hurt to be dead,  but she does not want to kill herself. She rated her stress/anxiety at a 0, until  life starts happening for the day.  She  "gets overwhelmed with the expectations of the day. Or if she does not understand something she feel anger.      She has been going to staffing agencies to try to get a second job. She is not hearing back from them as of yet.      Talked about how processing things alone is exhausting, but connecting with others has its costs as well.      She reported being unsure about what people think about her and she would like to have feedback. Discussed what it might look like for her to be content - 2 bedroom, 2 bath place, a job that meets financial means, and she has a partner who is present (she does not have to question that they want to be with her).  She envisions - vacations, date nights -  shit upper middle class people do.  Somewhere she does not feel a lot of shame either. She reported feeling shame about having people come to her home.      Additional Information:  The patient did not report medication changes.     Mental Status:  Appearance:  Casually groomed   Behavior/relationship to examiner/demeanor:  cooperative  Motor activity/EPS:  Within normal limits  Speech rate:  Within normal limits  Speech volume:  Within normal limits  Speech articulation:  Within normal limits  Speech coherence: Within normal limits  Speech spontaneity: Within normal limits  Mood (subjective): \"at a mid-point\"   Affect (objective appearance): Mood congruent  Thought Process (Associations):  Logical and Linear   Thought process (Rate):  Within normal limits   Thought content: Within normal limits  Abnormal Perception:  None   Insight:  Good   Judgment:  Good     Plan: Continue with goals as outlined above    Andreea Vásquez Psy.D.,L.P  Answers for HPI/ROS submitted by the patient on 10/5/2022  If you checked off any problems, how difficult have these problems made it for you to do your work, take care of things at home, or get along with other people?: Somewhat difficult  PHQ9 TOTAL SCORE: 13  CARLY 7 TOTAL SCORE: 9      "

## 2022-10-07 ENCOUNTER — VIRTUAL VISIT (OUTPATIENT)
Dept: SURGERY | Facility: CLINIC | Age: 32
End: 2022-10-07
Payer: COMMERCIAL

## 2022-10-07 VITALS — HEIGHT: 68 IN | BODY MASS INDEX: 43.5 KG/M2 | WEIGHT: 287 LBS

## 2022-10-07 DIAGNOSIS — E66.01 OBESITY, CLASS III, BMI 40-49.9 (MORBID OBESITY) (H): Primary | ICD-10-CM

## 2022-10-07 DIAGNOSIS — R63.8 ABNORMAL CRAVING: ICD-10-CM

## 2022-10-07 PROCEDURE — 97803 MED NUTRITION INDIV SUBSEQ: CPT | Mod: TEL | Performed by: DIETITIAN, REGISTERED

## 2022-10-07 NOTE — PROGRESS NOTES
OUTPATIENT PSYCHOTHERAPY PROGRESS NOTE    Client Name: Dariusz Leyva   YOB: 1990  Time of Service: 61 minutes   Service Type(s): Individual psychotherapy    VIDEO VISIT  Dariusz Leyva is a 32 year old who is being evaluated via a billable video visit.      Telehealth Details  Type of service:  psychotherapy  Time of service:    Start Time:  8:08am    End Time:  9:09am    Reason for Telehealth Visit: Patient has requested telehealth visit  Originating Site (patient location):  [facility]   Location- Patient's home  Distant Site (provider location):  On-site  Mode of Communication:  [place]      Diagnoses:   Unspecified depressive disorder and unspecified anxiety    Goals of therapy: Elevate mood and show evidence of usual energy levels, and activities.      Individuals Present:   Psychotherapy services during this visit included myself and Yadirafadumo Gordon.    Narrative:  Dariusz reported her mood has been  up and down.  She has been sleeping a little bit later than usual. She has had suicidal thoughts, but she has no intent to act.  I don t want to act on it, because you don t know what you are doing. And you don t know what is going to come up.  She reached out to her friend on Sunday as a resource. She tries to look on the brighter side of stuff.      She reported not feeling connected to anyone anymore. She recently joined an online dating site. She has met a few people on there. She is unsure about the people she has met thus far.      She is still struggling with financial stress. It has been three weeks since her employer last paid her. She has been applying for other jobs, but has not had an interview yet. 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       Additional Information:  The patient did not report medication changes.     Mental Status:  Appearance:  Casually groomed   Behavior/relationship to examiner/demeanor:  cooperative  Motor  "activity/EPS:  Within normal limits  Speech rate:  Within normal limits  Speech volume:  Within normal limits  Speech articulation:  Within normal limits  Speech coherence: Within normal limits  Speech spontaneity: Within normal limits  Mood (subjective): \"up and down\"   Affect (objective appearance): Generally euthymic, at one point she became teary  Thought Process (Associations):  Logical and Linear   Thought process (Rate):  Within normal limits   Thought content: Within normal limits  Abnormal Perception:  None   Insight:  Good   Judgment:  Good     Plan: Continue with goals as outlined above    Andreea Vásquez Psy.D.,L.P        Answers for HPI/ROS submitted by the patient on 10/19/2022  If you checked off any problems, how difficult have these problems made it for you to do your work, take care of things at home, or get along with other people?: Very difficult  PHQ9 TOTAL SCORE: 9      "

## 2022-10-07 NOTE — PROGRESS NOTES
Dariusz Leyva is a 32 year old who is being evaluated via a billable telephone visit.      What phone number would you like to be contacted at? [phone]  How would you like to obtain your AVS? [facility]      Medical  Weight Loss Follow-Up Diet Evaluation  Assessment:  Dariusz is presenting today for a follow up weight management nutrition consultation. Pt has had an initial appointment with Dr. Walker  Weight loss medication: saxenda- hadn't taken it for for a while and took the full 3mg on Wednesday- feeling really nauseous  Pt's weight is 287 lbs 0 oz  Initial weight: 317lb  Weight change: down 30lb    BMI: Body mass index is 44.29 kg/m .  Ideal body weight: 62.7 kg (138 lb 5.4 oz)  Adjusted ideal body weight: 91.4 kg (201 lb 6.5 oz)    Estimated RMR (Louin-St Jeor equation):   2064 kcals x 1.2 (sedentary) = 2477 kcals (for weight maintenance)  Recommended Protein Intake:  grams of protein/day  Patient Active Problem List:  Patient Active Problem List   Diagnosis     Lumbago     Nonallopathic lesion of lumbar region     Nonallopathic lesion of sacral region     Pain in thoracic spine     Nonallopathic lesion of thoracic region     Abnormal Pap smear of cervix     Large tonsils     Morbid obesity with BMI of 45.0-49.9, adult (H)     MDD (major depressive disorder)     CARLY (generalized anxiety disorder)     Depression     Lipid screening     Chronic pain of right knee     Anxiety state     Injury of ligament of right knee     MDD (major depressive disorder), recurrent episode, moderate (H)     Painful orthopaedic hardware (H)     Tibial plateau fracture, right, closed, initial encounter     Dyslipidemia     Limited mobility     Migraine with aura and without status migrainosus, not intractable     DDD (degenerative disc disease), cervical     DDD (degenerative disc disease), lumbar     Increased PTH level     Vitamin D deficiency     Progress on goals from last visit: states things are going up and up  down  -still trying to get to the gym- challenge hasn't been going well  -states she is really struggling with depression and is working on basics like brushing teeth and getting out of the bed  -doing some frozen meals, egg bites with avocado, spinach, onion and some type of meat  Beverages: 120oz water    Nutrition Diagnosis:    Overweight/Obesity (NC 3.3) related to overeating and poor lifestyle habits as evidenced by patient report of inconsistent meals, grazing in the evening and BMI 44.29      Intervention:  1. Food and/or nutrient delivery: discussed quick meals to have on hand in times of depression or illness  2. Nutrition counseling: goal setting    Monitoring/Evaluation:    Goals:  1. Consistently go to the gym  2. Plan ahead for meals when not feeling well    Patient to follow up in 2 month(s) with RD        Phone call duration: 20 minutes    DEMETRIUS JONES RD

## 2022-10-12 ENCOUNTER — TELEPHONE (OUTPATIENT)
Dept: PSYCHIATRY | Facility: CLINIC | Age: 32
End: 2022-10-12

## 2022-10-12 NOTE — TELEPHONE ENCOUNTER
Providers sent links for patient's 8am telehealth appt and waited 15 minutes. When patient did not arrive for visit, provider attempted calling number on record, but was unable to reach patient or leave message. Provider ended appt at 8:25

## 2022-10-19 ENCOUNTER — VIRTUAL VISIT (OUTPATIENT)
Dept: PSYCHIATRY | Facility: CLINIC | Age: 32
End: 2022-10-19
Attending: PSYCHOLOGIST
Payer: COMMERCIAL

## 2022-10-19 DIAGNOSIS — F41.1 GAD (GENERALIZED ANXIETY DISORDER): ICD-10-CM

## 2022-10-19 DIAGNOSIS — F33.1 MODERATE EPISODE OF RECURRENT MAJOR DEPRESSIVE DISORDER (H): Primary | ICD-10-CM

## 2022-10-19 PROCEDURE — 90837 PSYTX W PT 60 MINUTES: CPT | Mod: GT | Performed by: PSYCHOLOGIST

## 2022-10-19 ASSESSMENT — PATIENT HEALTH QUESTIONNAIRE - PHQ9
SUM OF ALL RESPONSES TO PHQ QUESTIONS 1-9: 9
10. IF YOU CHECKED OFF ANY PROBLEMS, HOW DIFFICULT HAVE THESE PROBLEMS MADE IT FOR YOU TO DO YOUR WORK, TAKE CARE OF THINGS AT HOME, OR GET ALONG WITH OTHER PEOPLE: VERY DIFFICULT
SUM OF ALL RESPONSES TO PHQ QUESTIONS 1-9: 9

## 2022-10-26 ENCOUNTER — VIRTUAL VISIT (OUTPATIENT)
Dept: PSYCHIATRY | Facility: CLINIC | Age: 32
End: 2022-10-26
Attending: PSYCHOLOGIST
Payer: COMMERCIAL

## 2022-10-26 DIAGNOSIS — F41.1 GAD (GENERALIZED ANXIETY DISORDER): ICD-10-CM

## 2022-10-26 DIAGNOSIS — F33.1 MODERATE EPISODE OF RECURRENT MAJOR DEPRESSIVE DISORDER (H): Primary | ICD-10-CM

## 2022-10-26 PROCEDURE — 90837 PSYTX W PT 60 MINUTES: CPT | Mod: GT | Performed by: PSYCHOLOGIST

## 2022-10-26 ASSESSMENT — PATIENT HEALTH QUESTIONNAIRE - PHQ9
SUM OF ALL RESPONSES TO PHQ QUESTIONS 1-9: 9
10. IF YOU CHECKED OFF ANY PROBLEMS, HOW DIFFICULT HAVE THESE PROBLEMS MADE IT FOR YOU TO DO YOUR WORK, TAKE CARE OF THINGS AT HOME, OR GET ALONG WITH OTHER PEOPLE: SOMEWHAT DIFFICULT
SUM OF ALL RESPONSES TO PHQ QUESTIONS 1-9: 9

## 2022-10-26 NOTE — PROGRESS NOTES
OUTPATIENT PSYCHOTHERAPY PROGRESS NOTE    Client Name: Dariusz Leyva   YOB: 1990  Time of Service: 59 minutes   Service Type(s): Individual psychotherapy    VIDEO VISIT  Dariusz Leyva is a 32 year old who is being evaluated via a billable video visit.      Telehealth Details  Type of service:  psychotherapy  Time of service:    Start Time:  8:00am    End Time: 8:59am    Reason for Telehealth Visit: Patient has requested telehealth visit  Originating Site (patient location):  [facility]   Location- Patient's home  Distant Site (provider location):  On-site  Mode of Communication:  [place]      Diagnoses:   Unspecified depressive disorder and unspecified anxiety    Goals of therapy: Elevate mood and show evidence of usual energy levels, and activities.      Individuals Present:   Psychotherapy services during this visit included myself and Yadirafadumo Gordon.    Narrative:  Yadira reported her mood is  all over the place.  It ranges from depressed to content. She was up late last night working on homework, so she was up late last night. She has not been home as much. She endorsed some fatigue. She has enjoyed things  somewhat.  She denied appetite disturbance and suicidal ideation. She rated her stress at a 90 out of 100 (100 being highest). She is stressed by finances and school.      She may need to cancel next week because she has a work training. She is interviewing for other jobs and she may do those jobs instead if she gets them. These are not jobs she wants for long term. She has anxiety about going back to work with people because her previous job she could do from home.      She went over her budget yesterday. She determined what her old job still owes her and what she owes and she would be in a better position.      She is dating again. She is hesitant to feel connected to the individuals she is dating because of her past experiences. She explained that she has been with men who were not  "emotionally available and that was protective in a way because she did not need to be. Now she is dating some men that are wanting to be with her more and she wonders about how she will respond.      Additional Information:  The patient did not report medication changes.     Mental Status:  Appearance:  Casually groomed   Behavior/relationship to examiner/demeanor:  cooperative  Motor activity/EPS:  Within normal limits  Speech rate:  Within normal limits  Speech volume:  Within normal limits  Speech articulation:  Within normal limits  Speech coherence: Within normal limits  Speech spontaneity: Within normal limits  Mood (subjective): \"all over the place\"   Affect (objective appearance): Euthymic  Thought Process (Associations):  Logical and Linear   Thought process (Rate):  Within normal limits   Thought content: Within normal limits  Abnormal Perception:  None   Insight:  Good   Judgment:  Good     Plan: Continue with goals as outlined above    Andreea Vásquez Psy.D.,L.P            Answers for HPI/ROS submitted by the patient on 10/26/2022  If you checked off any problems, how difficult have these problems made it for you to do your work, take care of things at home, or get along with other people?: Somewhat difficult  PHQ9 TOTAL SCORE: 9      "

## 2022-10-27 ENCOUNTER — OFFICE VISIT (OUTPATIENT)
Dept: FAMILY MEDICINE | Facility: CLINIC | Age: 32
End: 2022-10-27
Payer: COMMERCIAL

## 2022-10-27 VITALS
TEMPERATURE: 97.2 F | BODY MASS INDEX: 43.95 KG/M2 | OXYGEN SATURATION: 100 % | RESPIRATION RATE: 18 BRPM | WEIGHT: 290 LBS | SYSTOLIC BLOOD PRESSURE: 129 MMHG | HEART RATE: 62 BPM | HEIGHT: 68 IN | DIASTOLIC BLOOD PRESSURE: 79 MMHG

## 2022-10-27 DIAGNOSIS — G89.29 CHRONIC KNEE PAIN, UNSPECIFIED LATERALITY: ICD-10-CM

## 2022-10-27 DIAGNOSIS — Z71.1 CONCERN ABOUT STD IN FEMALE WITHOUT DIAGNOSIS: Primary | ICD-10-CM

## 2022-10-27 DIAGNOSIS — J02.9 SORE THROAT: ICD-10-CM

## 2022-10-27 DIAGNOSIS — M25.569 CHRONIC KNEE PAIN, UNSPECIFIED LATERALITY: ICD-10-CM

## 2022-10-27 LAB
ALBUMIN UR-MCNC: NEGATIVE MG/DL
APPEARANCE UR: CLEAR
BACTERIA #/AREA URNS HPF: ABNORMAL /HPF
BILIRUB UR QL STRIP: NEGATIVE
CLUE CELLS: ABNORMAL
COLOR UR AUTO: YELLOW
DEPRECATED S PYO AG THROAT QL EIA: NEGATIVE
GLUCOSE UR STRIP-MCNC: NEGATIVE MG/DL
GROUP A STREP BY PCR: NOT DETECTED
HGB UR QL STRIP: ABNORMAL
KETONES UR STRIP-MCNC: NEGATIVE MG/DL
LEUKOCYTE ESTERASE UR QL STRIP: NEGATIVE
MUCOUS THREADS #/AREA URNS LPF: PRESENT /LPF
NITRATE UR QL: NEGATIVE
PH UR STRIP: 5.5 [PH] (ref 5–8)
RBC #/AREA URNS AUTO: ABNORMAL /HPF
SP GR UR STRIP: >=1.03 (ref 1–1.03)
SQUAMOUS #/AREA URNS AUTO: ABNORMAL /LPF
TRICHOMONAS, WET PREP: ABNORMAL
UROBILINOGEN UR STRIP-ACNC: 0.2 E.U./DL
WBC #/AREA URNS AUTO: ABNORMAL /HPF
WBC'S/HIGH POWER FIELD, WET PREP: ABNORMAL
YEAST, WET PREP: ABNORMAL

## 2022-10-27 PROCEDURE — 81001 URINALYSIS AUTO W/SCOPE: CPT | Performed by: STUDENT IN AN ORGANIZED HEALTH CARE EDUCATION/TRAINING PROGRAM

## 2022-10-27 PROCEDURE — 99213 OFFICE O/P EST LOW 20 MIN: CPT | Mod: GC | Performed by: STUDENT IN AN ORGANIZED HEALTH CARE EDUCATION/TRAINING PROGRAM

## 2022-10-27 PROCEDURE — 87591 N.GONORRHOEAE DNA AMP PROB: CPT | Performed by: STUDENT IN AN ORGANIZED HEALTH CARE EDUCATION/TRAINING PROGRAM

## 2022-10-27 PROCEDURE — 87651 STREP A DNA AMP PROBE: CPT | Performed by: STUDENT IN AN ORGANIZED HEALTH CARE EDUCATION/TRAINING PROGRAM

## 2022-10-27 PROCEDURE — 87210 SMEAR WET MOUNT SALINE/INK: CPT | Performed by: STUDENT IN AN ORGANIZED HEALTH CARE EDUCATION/TRAINING PROGRAM

## 2022-10-27 PROCEDURE — 87491 CHLMYD TRACH DNA AMP PROBE: CPT | Performed by: STUDENT IN AN ORGANIZED HEALTH CARE EDUCATION/TRAINING PROGRAM

## 2022-10-27 RX ORDER — ACETAMINOPHEN 325 MG/1
TABLET ORAL
Qty: 100 TABLET | Refills: 3 | Status: SHIPPED | OUTPATIENT
Start: 2022-10-27 | End: 2024-01-02

## 2022-10-27 NOTE — PROGRESS NOTES
"Assessment & Plan   Concern about STD in female without diagnosis  Asymptomatic but will test. Pt to come in for blood draw for HIV and syphilis screens.   ADDENDUM: Negative on testing   - Wet preparation  - UA reflex to Microscopic  - Neisseria Gonorrhoeae PCR  - Chlamydia trachomatis PCR  - Syphilis Screen Cascade (RPR/VDRL)  - HIV Antigen Antibody Combo  - Urine Microscopic Exam    Sore throat  CENTOR score 0, will swab per pt request despite recommendations.   - Streptococcus A Rapid Screen w/Reflex to PCR  - Group A Streptococcus PCR Throat Swab    Ordering of each unique test     BMI:   Estimated body mass index is 44.75 kg/m  as calculated from the following:    Height as of this encounter: 1.715 m (5' 7.5\").    Weight as of this encounter: 131.5 kg (290 lb).   Weight management plan: Discussed healthy diet and exercise guidelines    There are no Patient Instructions on file for this visit.    Follow Up:  No follow-ups on file.    Options for treatment and follow-up care were reviewed with the patient who was engaged and actively involved in the decision making process, verbalized understanding of the options discussed, and satisfied with the final plan.    Patient was staffed with supervising physician, Dr. Marlen Rader, who agrees with the findings and plan.     Oscar Ibanez DO, PGY-3  [facility]      Subjective   Dariusz Leyva is a 32 year old year old female who presents for the following health issues   Past Medical History:   Diagnosis Date     DDD (degenerative disc disease), cervical 02/03/2022     Depressive disorder      Dyslipidemia      Increased PTH level      Vitamin D deficiency      Patient Active Problem List   Diagnosis     Lumbago     Nonallopathic lesion of lumbar region     Nonallopathic lesion of sacral region     Pain in thoracic spine     Nonallopathic lesion of thoracic region     Abnormal Pap smear of cervix     Large tonsils     Morbid obesity with BMI of " "45.0-49.9, adult (H)     MDD (major depressive disorder)     CARLY (generalized anxiety disorder)     Depression     Lipid screening     Chronic pain of right knee     Anxiety state     Injury of ligament of right knee     MDD (major depressive disorder), recurrent episode, moderate (H)     Painful orthopaedic hardware (H)     Tibial plateau fracture, right, closed, initial encounter     Dyslipidemia     Limited mobility     Migraine with aura and without status migrainosus, not intractable     DDD (degenerative disc disease), cervical     DDD (degenerative disc disease), lumbar     Increased PTH level     Vitamin D deficiency     Chief Complaint   Patient presents with     std ck     Male partner had sore throat, he blamed pt for his symptoms as they had oral intercourse. Trina male partner having any  issues.     Currently on her menstrual cycle  Has no issues otherwise  Denies vaginal irritation, dysuria, urinary freq/urgency, abdominal pain, chest pain, dyspnea, fever, chills.   Some vaginal discharge that she has normally, clear and non-malodorous.       Review of Systems:   Constitutional, HEENT, cardiovascular, pulmonary, GI, , musculoskeletal, neuro, skin, endocrine and psych systems are negative, except as otherwise noted.     Objective     /79 (BP Location: Right arm, Patient Position: Sitting, Cuff Size: Adult Large)   Pulse 62   Temp 97.2  F (36.2  C) (Tympanic)   Resp 18   Ht 1.715 m (5' 7.5\")   Wt 131.5 kg (290 lb)   LMP 10/25/2022   SpO2 100%   BMI 44.75 kg/m    Body mass index is 44.75 kg/m .    Physical Exam  Cardiovascular:      Rate and Rhythm: Normal rate and regular rhythm.      Pulses: Normal pulses.      Heart sounds: Normal heart sounds.   Pulmonary:      Effort: Pulmonary effort is normal.      Breath sounds: Normal breath sounds.   Abdominal:      General: Abdomen is flat. Bowel sounds are normal. There is no distension.      Palpations: Abdomen is soft. There is no mass. "      Tenderness: There is no abdominal tenderness.      Hernia: No hernia is present.        ----- Service Performed and Documented by Resident or Fellow ------

## 2022-10-27 NOTE — PROGRESS NOTES
"Preceptor attestation:  Vital signs reviewed: /79 (BP Location: Right arm, Patient Position: Sitting, Cuff Size: Adult Large)   Pulse 62   Temp 97.2  F (36.2  C) (Tympanic)   Resp 18   Ht 1.715 m (5' 7.5\")   Wt 131.5 kg (290 lb)   LMP 10/25/2022   SpO2 100%   BMI 44.75 kg/m      Patient seen, evaluated, and discussed with the resident.  I have verified the content of the note, which accurately reflects my assessment of the patient and the plan of care.    Supervising physician: Marlen Rader MD  [facility]    "

## 2022-10-28 LAB
C TRACH DNA SPEC QL NAA+PROBE: NEGATIVE
N GONORRHOEA DNA SPEC QL NAA+PROBE: NEGATIVE

## 2022-11-02 ENCOUNTER — VIRTUAL VISIT (OUTPATIENT)
Dept: PSYCHIATRY | Facility: CLINIC | Age: 32
End: 2022-11-02
Attending: PSYCHOLOGIST
Payer: COMMERCIAL

## 2022-11-02 DIAGNOSIS — F33.1 MODERATE EPISODE OF RECURRENT MAJOR DEPRESSIVE DISORDER (H): Primary | ICD-10-CM

## 2022-11-02 DIAGNOSIS — F41.1 GAD (GENERALIZED ANXIETY DISORDER): ICD-10-CM

## 2022-11-02 PROCEDURE — 90837 PSYTX W PT 60 MINUTES: CPT | Mod: GT | Performed by: PSYCHOLOGIST

## 2022-11-02 ASSESSMENT — ANXIETY QUESTIONNAIRES
GAD7 TOTAL SCORE: 7
8. IF YOU CHECKED OFF ANY PROBLEMS, HOW DIFFICULT HAVE THESE MADE IT FOR YOU TO DO YOUR WORK, TAKE CARE OF THINGS AT HOME, OR GET ALONG WITH OTHER PEOPLE?: SOMEWHAT DIFFICULT
IF YOU CHECKED OFF ANY PROBLEMS ON THIS QUESTIONNAIRE, HOW DIFFICULT HAVE THESE PROBLEMS MADE IT FOR YOU TO DO YOUR WORK, TAKE CARE OF THINGS AT HOME, OR GET ALONG WITH OTHER PEOPLE: SOMEWHAT DIFFICULT
GAD7 TOTAL SCORE: 7
7. FEELING AFRAID AS IF SOMETHING AWFUL MIGHT HAPPEN: NOT AT ALL
GAD7 TOTAL SCORE: 7
1. FEELING NERVOUS, ANXIOUS, OR ON EDGE: MORE THAN HALF THE DAYS
GAD7 TOTAL SCORE: 7
GAD7 TOTAL SCORE: 7
1. FEELING NERVOUS, ANXIOUS, OR ON EDGE: MORE THAN HALF THE DAYS
GAD7 TOTAL SCORE: 7
3. WORRYING TOO MUCH ABOUT DIFFERENT THINGS: MORE THAN HALF THE DAYS
6. BECOMING EASILY ANNOYED OR IRRITABLE: SEVERAL DAYS
4. TROUBLE RELAXING: SEVERAL DAYS
2. NOT BEING ABLE TO STOP OR CONTROL WORRYING: SEVERAL DAYS
IF YOU CHECKED OFF ANY PROBLEMS ON THIS QUESTIONNAIRE, HOW DIFFICULT HAVE THESE PROBLEMS MADE IT FOR YOU TO DO YOUR WORK, TAKE CARE OF THINGS AT HOME, OR GET ALONG WITH OTHER PEOPLE: SOMEWHAT DIFFICULT
7. FEELING AFRAID AS IF SOMETHING AWFUL MIGHT HAPPEN: NOT AT ALL
4. TROUBLE RELAXING: SEVERAL DAYS
5. BEING SO RESTLESS THAT IT IS HARD TO SIT STILL: NOT AT ALL
2. NOT BEING ABLE TO STOP OR CONTROL WORRYING: SEVERAL DAYS
8. IF YOU CHECKED OFF ANY PROBLEMS, HOW DIFFICULT HAVE THESE MADE IT FOR YOU TO DO YOUR WORK, TAKE CARE OF THINGS AT HOME, OR GET ALONG WITH OTHER PEOPLE?: SOMEWHAT DIFFICULT
6. BECOMING EASILY ANNOYED OR IRRITABLE: SEVERAL DAYS
5. BEING SO RESTLESS THAT IT IS HARD TO SIT STILL: NOT AT ALL
3. WORRYING TOO MUCH ABOUT DIFFERENT THINGS: MORE THAN HALF THE DAYS

## 2022-11-02 NOTE — PROGRESS NOTES
OUTPATIENT PSYCHOTHERAPY PROGRESS NOTE    Client Name: Dariusz Leyva   YOB: 1990  Time of Service: 60 minutes   Service Type(s): Individual psychotherapy    VIDEO VISIT  Dariusz Leyva is a 32 year old who is being evaluated via a billable video visit.      Telehealth Details  Type of service:  psychotherapy  Time of service:    Start Time:  8:00am    End Time: 8:44am    Start Time:  8:47am    End Time: 9:03am  This provider lost audio briefly during the meeting, so our session had a break as noted in the times above.     Reason for Telehealth Visit: Patient has requested telehealth visit  Originating Site (patient location):  [facility]   Location- Patient's home  Distant Site (provider location):  On-site  Mode of Communication:  [place]      Diagnoses:   Unspecified depressive disorder and unspecified anxiety    Goals of therapy: Elevate mood and show evidence of usual energy levels, and activities.      Individuals Present:   Psychotherapy services during this visit included myself and Dariusz Leyva.    Narrative:    Yadira reported she was  okay.  She endorsed moderate fatigue. She indicated that she has been trying to enjoy things, but  life keeps happening.  She reported she has had thoughts of suicide. She indicated she is deterred from doing it by  the after  and her cats. Discussed the possibility of a higher level of care or possibly talking more than once a week, if things worsen. She indicated that she would be safe in between our appointments. She denied sleep disturbance and appetite disturbance.      She reported that she is overwhelmed and has been dealing with enough. She indicated she is not taking in enough financially to cover her bills. She is also struggling to complete her homework in her master s program, because she is trying to keep up with finances. Her old boss still owes her money. She continues to do interviews. She has a job, but it has been difficult to  "complete the onboarding. She had to cancel her cello lessons and gym indefinitely. Discussed her dating experiences.       Additional Information:  The patient did not report medication changes.     Mental Status:  Appearance:  Casually groomed   Behavior/relationship to examiner/demeanor:  cooperative  Motor activity/EPS:  Within normal limits  Speech rate:  Within normal limits  Speech volume:  Within normal limits  Speech articulation:  Within normal limits  Speech coherence: Within normal limits  Speech spontaneity: Within normal limits  Mood (subjective): \"okay\"   Affect (objective appearance): Subdued  Thought Process (Associations):  Logical and Linear   Thought process (Rate):  Within normal limits   Thought content: Within normal limits  Abnormal Perception:  None   Insight:  Good   Judgment:  Good     Plan: Continue with goals as outlined above    Andreea Vásquez Psy.D.,L.P      Answers for HPI/ROS submitted by the patient on 11/2/2022  If you checked off any problems, how difficult have these problems made it for you to do your work, take care of things at home, or get along with other people?: Very difficult  PHQ9 TOTAL SCORE: 12  CARLY 7 TOTAL SCORE: 7      "

## 2022-11-09 ENCOUNTER — VIRTUAL VISIT (OUTPATIENT)
Dept: PSYCHIATRY | Facility: CLINIC | Age: 32
End: 2022-11-09
Attending: PSYCHOLOGIST
Payer: COMMERCIAL

## 2022-11-09 DIAGNOSIS — F41.1 GAD (GENERALIZED ANXIETY DISORDER): ICD-10-CM

## 2022-11-09 DIAGNOSIS — F33.1 MODERATE EPISODE OF RECURRENT MAJOR DEPRESSIVE DISORDER (H): Primary | ICD-10-CM

## 2022-11-09 PROCEDURE — 90837 PSYTX W PT 60 MINUTES: CPT | Mod: GT | Performed by: PSYCHOLOGIST

## 2022-11-09 ASSESSMENT — PATIENT HEALTH QUESTIONNAIRE - PHQ9
SUM OF ALL RESPONSES TO PHQ QUESTIONS 1-9: 9
SUM OF ALL RESPONSES TO PHQ QUESTIONS 1-9: 9
10. IF YOU CHECKED OFF ANY PROBLEMS, HOW DIFFICULT HAVE THESE PROBLEMS MADE IT FOR YOU TO DO YOUR WORK, TAKE CARE OF THINGS AT HOME, OR GET ALONG WITH OTHER PEOPLE: VERY DIFFICULT

## 2022-11-09 NOTE — PROGRESS NOTES
"OUTPATIENT PSYCHOTHERAPY PROGRESS NOTE    Client Name: Dariusz Leyva   YOB: 1990  Time of Service: 60 minutes   Service Type(s): Individual psychotherapy    VIDEO VISIT  Dariusz Leyva is a 32 year old who is being evaluated via a billable video visit.      Telehealth Details  Type of service:  psychotherapy  Time of service:    Start Time:  8:02 am    End Time: 9:04 am    Reason for Telehealth Visit: Patient has requested telehealth visit  Originating Site (patient location):  [facility]   Location- Patient's home  Distant Site (provider location):  On-site  Mode of Communication:  [place]o      Diagnoses:   Unspecified depressive disorder and unspecified anxiety    Goals of therapy: Elevate mood and show evidence of usual energy levels, and activities.      Individuals Present:   Psychotherapy services during this visit included myself and Dariusz Leyva.    Narrative:  Dariusz reported her mood was  I don t know how to describe it; I have been down.  She has not gotten excited about anything that has worked out for her lately, because nothing has been ideal. She started a new job and the organization is disorganized. She will be starting another job on the 15th.      She had a discussion with one of her friends and her friend reported some growth. Shared about another friend who was supposed to go on a trip with her, but they had to cancel. The friend ended up blocking her. Discussed her options to deal with the situation.        Additional Information:  The patient did not report medication changes.     Mental Status:  Appearance:  Casually groomed   Behavior/relationship to examiner/demeanor:  cooperative  Motor activity/EPS:  Within normal limits  Speech rate:  Within normal limits  Speech volume:  Within normal limits  Speech articulation:  Within normal limits  Speech coherence: Within normal limits  Speech spontaneity: Within normal limits  Mood (subjective): \"I don t know how to " "describe it; I have been down\"   Affect (objective appearance): Euthymic  Thought Process (Associations):  Logical and Linear   Thought process (Rate):  Within normal limits   Thought content: Within normal limits  Abnormal Perception:  None   Insight:  Good   Judgment:  Good     Plan: Continue with goals as outlined above    Andreea Vásquez Psy.D.,L.P      Answers for HPI/ROS submitted by the patient on 11/9/2022  If you checked off any problems, how difficult have these problems made it for you to do your work, take care of things at home, or get along with other people?: Very difficult  PHQ9 TOTAL SCORE: 9  CARLY 7 TOTAL SCORE: 7        "

## 2022-11-16 ENCOUNTER — VIRTUAL VISIT (OUTPATIENT)
Dept: PSYCHIATRY | Facility: CLINIC | Age: 32
End: 2022-11-16
Attending: PSYCHOLOGIST
Payer: COMMERCIAL

## 2022-11-16 DIAGNOSIS — F41.1 GAD (GENERALIZED ANXIETY DISORDER): ICD-10-CM

## 2022-11-16 DIAGNOSIS — F33.1 MODERATE EPISODE OF RECURRENT MAJOR DEPRESSIVE DISORDER (H): Primary | ICD-10-CM

## 2022-11-16 PROCEDURE — 90834 PSYTX W PT 45 MINUTES: CPT | Mod: GT | Performed by: PSYCHOLOGIST

## 2022-11-16 NOTE — PROGRESS NOTES
OUTPATIENT PSYCHOTHERAPY PROGRESS NOTE    Client Name: Dariusz Leyva   YOB: 1990  Time of Service: 52 minutes   Service Type(s): Individual psychotherapy    VIDEO VISIT  Dariusz Leyva is a 32 year old who is being evaluated via a billable video visit.      Telehealth Details  Type of service:  psychotherapy  Time of service:    Start Time:  8:02 am    End Time: 8:11 am (call dropped)    Start Time:  8:13 am (reconnected)    End Time: 8:57 am    Reason for Telehealth Visit: Patient has requested telehealth visit  Originating Site (patient location):  [facility]   Location- Patient's home  Distant Site (provider location):  Offsite  Mode of Communication:  [employer]      Diagnoses:   Unspecified depressive disorder and unspecified anxiety    Goals of therapy: Elevate mood and show evidence of usual energy levels, and activities.      Individuals Present:   Psychotherapy services during this visit included myself and Dariusz Leyva.    Narrative:  The patient reached out to this provider on 11/15 via [employer] and asked if we could move her appointment to 8am. This provider had availability, so the appointment time was switched, however the time in the EMR was not changed because staff was not available in the evening to make the change.    Dariusz reported that she started her new job on Monday. She has two weeks of training and then in her schedule will change. She indicated it was  pretty decent  yesterday. So far, the experience has been positive. 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 The building has a gym available.      She discussed a recent experience in an open relationship. She explained that she recognized that the relationship is open, but she is reexperiencing feelings from past relationships. She is seeing a lot of red flags in another relationship she is in.      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 She also shared that she started talking less,  because she felt an unsaid message that someone was irritated. She prefers people express themselves in a straightforward and rational way. She shared the Yasmeen, her friend, can let Dariusz know when she disagrees with a rational tone. Dariusz really appreciates this long-standing relationship. She also shared that she has a friend named Cecilia that she enjoys but she is busy. She also someone named Tamy - but she does not tend to come through on commitments (for example she invited Dariusz to a concert that she then backed out of because she wanted to bring her sister).       Discussed her experiences with people with alcohol use disorders. She reported circumstances that are removing people with these concerns out of her life.      Discussed that she is trying to catch up with school work. Her depressive symptoms are impacting her motivation. She will still get things done, but it tends to be last minute and this experience is not positive.         Additional Information:  The patient did not report medication changes.     Mental Status:  Appearance:  Casually groomed   Behavior/relationship to examiner/demeanor:  cooperative  Motor activity/EPS:  Within normal limits  Speech rate:  Within normal limits  Speech volume:  Within normal limits  Speech articulation:  Within normal limits  Speech coherence: Within normal limits  Speech spontaneity: Within normal limits  Mood (subjective): Not assessed  Affect (objective appearance): Euthymic  Thought Process (Associations):  Logical and Linear   Thought process (Rate):  Within normal limits   Thought content: Within normal limits  Abnormal Perception:  None   Insight:  Good   Judgment:  Good     Plan: Continue with goals as outlined above    Andreea Vásquez Psy.D.,L.P

## 2022-11-25 ENCOUNTER — OFFICE VISIT (OUTPATIENT)
Dept: FAMILY MEDICINE | Facility: CLINIC | Age: 32
End: 2022-11-25
Payer: COMMERCIAL

## 2022-11-25 VITALS
HEIGHT: 67 IN | HEART RATE: 79 BPM | DIASTOLIC BLOOD PRESSURE: 77 MMHG | SYSTOLIC BLOOD PRESSURE: 123 MMHG | RESPIRATION RATE: 20 BRPM | BODY MASS INDEX: 45.99 KG/M2 | WEIGHT: 293 LBS | TEMPERATURE: 98 F | OXYGEN SATURATION: 98 %

## 2022-11-25 DIAGNOSIS — G89.29 CHRONIC KNEE PAIN, UNSPECIFIED LATERALITY: Primary | ICD-10-CM

## 2022-11-25 DIAGNOSIS — M25.569 CHRONIC KNEE PAIN, UNSPECIFIED LATERALITY: Primary | ICD-10-CM

## 2022-11-25 PROCEDURE — 99213 OFFICE O/P EST LOW 20 MIN: CPT | Mod: GC

## 2022-11-25 NOTE — PROGRESS NOTES
"  Assessment & Plan     Chronic knee pain, unspecified laterality  Patient presents for disability parking as she is very limited by walking 2/2 chronic R knee pain s/p R knee posterolateral corner reconstruction with allograft (9/2022) with more surgeries planned. She relies on a knee brace and sometimes crutches.   -Disability parking form completed    Betsy Hernandes MD  [facility] PÉREZ Arzola is a 32 year old presenting for the following health issues:  Forms (Disability parking form needs to fill out, decline flu and booster vaccine)  Right knee osteoarthritis.    right knee pain. She is status post-right knee posterolateral corner reconstruction with allograft performed on 7/29/2020. She still has significant pain and is limited with walking, she needs a R knee brace and occasionally crutches. Some instability and limited by pain. She would like a disability parking pass as discussed between her and her ortho surgeon. She reports there are likely multiple surgeries ahead.          Review of Systems   As above      Objective    /77   Pulse 79   Temp 98  F (36.7  C) (Oral)   Resp 20   Ht 1.702 m (5' 7\")   Wt 133.1 kg (293 lb 6.4 oz)   LMP 10/25/2022   SpO2 98%   BMI 45.95 kg/m    Body mass index is 45.95 kg/m .  Physical Exam  Constitutional:       General: Wearing a R knee brace. No acute distress.     Appearance: Normal appearance. Patient not ill-appearing or diaphoretic.   Eyes:      Extraocular Movements: Extraocular movements intact.      Conjunctiva/sclera: Conjunctivae normal.   Pulmonary:      Effort: Pulmonary effort is normal. No respiratory distress.   Neurological:      General: No focal deficit present.      Mental Status: Patient is alert and oriented to person, place, and time.      Comments: No gross deficits appreciated   Psychiatric:         Attention and Perception: Attention normal.         Mood and Affect: Mood normal.         Speech: Speech " normal.         Behavior: Behavior normal.

## 2022-11-25 NOTE — PROGRESS NOTES
Preceptor Attestation:    I discussed the patient with the resident and evaluated the patient in person. I have verified the content of the note, which accurately reflects my assessment of the patient and the plan of care.   Supervising Physician:  Roberto Ramirez MD.

## 2022-11-28 ENCOUNTER — MYC MEDICAL ADVICE (OUTPATIENT)
Dept: FAMILY MEDICINE | Facility: CLINIC | Age: 32
End: 2022-11-28

## 2022-11-30 ENCOUNTER — VIRTUAL VISIT (OUTPATIENT)
Dept: PSYCHIATRY | Facility: CLINIC | Age: 32
End: 2022-11-30
Attending: PSYCHOLOGIST
Payer: COMMERCIAL

## 2022-11-30 DIAGNOSIS — F33.1 MODERATE EPISODE OF RECURRENT MAJOR DEPRESSIVE DISORDER (H): Primary | ICD-10-CM

## 2022-11-30 DIAGNOSIS — F41.1 GAD (GENERALIZED ANXIETY DISORDER): ICD-10-CM

## 2022-11-30 PROCEDURE — 90837 PSYTX W PT 60 MINUTES: CPT | Mod: GT | Performed by: PSYCHOLOGIST

## 2022-11-30 NOTE — TELEPHONE ENCOUNTER
Routing to provider, pt calling about her mychart message. Pt is requestin ga response. She said you can also send her a mychart regarding the request for Kailee as well. See refill request message. Thanks.    Lissy Guzman RN on 11/30/2022 at 10:39 AM

## 2022-11-30 NOTE — PROGRESS NOTES
OUTPATIENT PSYCHOTHERAPY PROGRESS NOTE    Client Name: Dariusz Leyva   YOB: 1990  Time of Service: 61 minutes   Service Type(s): Individual psychotherapy    VIDEO VISIT  Dariusz Leyva is a 32 year old who is being evaluated via a billable video visit.      Telehealth Details  Type of service:  psychotherapy  Time of service:    Start Time:  4:00 pm    End Time: 5:01 pm    Reason for Telehealth Visit: Patient has requested telehealth visit  Originating Site (patient location):  [facility]   Location- Patient's home  Distant Site (provider location):  Offsite  Mode of Communication:  [employer]      Diagnoses:   Unspecified depressive disorder and unspecified anxiety    Goals of therapy: Elevate mood and show evidence of usual energy levels, and activities.      Individuals Present:   Psychotherapy services during this visit included myself and Dariusz Leyva.    Narrative:  Dariusz reported her mood is  a little bit of stress, a little bit of anxiety.  Her old boss is starting to pay her again and she has a new job. She reported she is a little less depressed with finances.      Her new job is going okay, but she is dealing with the distance/snow. She has been carpooling to help. She reported that the work culture is  great.  She said everyone is nice and they feed the group - and they get discounts for items. Some of the down sides is the pay is low and a long commute.      Discussed her relationship with  E  in [place]. She is focused on her relationship with  A . She spent Thanksgiving with him - she does not see him as a boyfriend. She does not want to rush it.      Additional Information:  The patient did not report medication changes.     Mental Status:  Appearance:  Casually groomed   Behavior/relationship to examiner/demeanor:  cooperative  Motor activity/EPS:  Within normal limits  Speech rate:  Within normal limits  Speech volume:  Within normal limits  Speech articulation:  Within  "normal limits  Speech coherence: Within normal limits  Speech spontaneity: Within normal limits  Mood (subjective): \"a little bit of stress, a little bit of anxiety\"  Affect (objective appearance): Euthymic  Thought Process (Associations):  Logical and Linear   Thought process (Rate):  Within normal limits   Thought content: Within normal limits  Abnormal Perception:  None   Insight:  Good   Judgment:  Good     Plan: Continue with goals as outlined above    Andreea Vásquez Psy.D.,L.P          "

## 2022-12-02 ENCOUNTER — VIRTUAL VISIT (OUTPATIENT)
Dept: SURGERY | Facility: CLINIC | Age: 32
End: 2022-12-02
Payer: COMMERCIAL

## 2022-12-02 DIAGNOSIS — E66.01 MORBID OBESITY WITH BMI OF 45.0-49.9, ADULT (H): Primary | ICD-10-CM

## 2022-12-02 PROCEDURE — 97803 MED NUTRITION INDIV SUBSEQ: CPT | Mod: TEL | Performed by: DIETITIAN, REGISTERED

## 2022-12-02 NOTE — LETTER
12/2/2022         RE: Dariusz Leyva  [address]  [place] [address]        Dear Colleague,    Thank you for referring your patient, Dariusz Leyva, to the [facility] [place]. Please see a copy of my visit note below.    Dariusz Leyva is a 32 year old who is being evaluated via a billable telephone visit.      What phone number would you like to be contacted at? [phone]  How would you like to obtain your AVS? [facility]      Medical  Weight Loss Follow-Up Diet Evaluation  Assessment:  Dariusz is presenting today for a follow up weight management nutrition consultation. Pt has had an initial appointment with Dr. Walker  Weight loss medication: saxenda- hadn't taken it for for a while and took the full 3mg on Wednesday- feeling really nauseous  Personal Goals: issue with stress eating, would like to get down to 220lb  +weight loss to have knee surgery   Pt's weight is unknown  Initial weight: 317lb  Weight change: down 30lb    BMI: There is no height or weight on file to calculate BMI.  Ideal body weight: 62.7 kg (138 lb 5.4 oz)  Adjusted ideal body weight: 91.4 kg (201 lb 6.5 oz)    Estimated RMR ([place]-St Jeor equation):   2064 kcals x 1.2 (sedentary) = 2477 kcals (for weight maintenance)  Recommended Protein Intake:  grams of protein/day  Patient Active Problem List:  Patient Active Problem List   Diagnosis     Lumbago     Nonallopathic lesion of lumbar region     Nonallopathic lesion of sacral region     Pain in thoracic spine     Nonallopathic lesion of thoracic region     Abnormal Pap smear of cervix     Large tonsils     Morbid obesity with BMI of 45.0-49.9, adult (H)     MDD (major depressive disorder)     CARLY (generalized anxiety disorder)     Depression     Lipid screening     Chronic pain of right knee     Anxiety state     Injury of ligament of right knee     MDD (major depressive disorder), recurrent episode, moderate (H)      "Painful orthopaedic hardware (H)     Tibial plateau fracture, right, closed, initial encounter     Dyslipidemia     Limited mobility     Migraine with aura and without status migrainosus, not intractable     DDD (degenerative disc disease), cervical     DDD (degenerative disc disease), lumbar     Increased PTH level     Vitamin D deficiency     Progress on goals from last visit: Wants to do a \"detox\", cutting out junk food, cutting out chips and fruit snacks.   -states she continues to struggle with depression and this can throw her off from her daily routine and good habits. She is still trying to figure out how manage these periods of depression.  -says that she has found it helpful to have a support system, thinks she would benefit from regular check ins  Goals:  1. Consistently go to the gym - not met  2. Plan ahead for meals when not feeling well - trying to work on    Diet Recall:  Usually 1-2 meals per day  Morning Snack: chips from vending machine, nuts or trail mix - trying to find healthier snacks  Lunch: rice, jerk chicken, plantains, veggies - usually a meal provided from work  Dinner: Will have the other half of her meals  Beverages: 120oz water  Nutrition Diagnosis:    Overweight/Obesity (NC 3.3) related to overeating and poor lifestyle habits as evidenced by patient report of inconsistent meals, grazing in the evening and BMI 44.29    Intervention:  3. Food and/or nutrient delivery: discussed quick meals to meal plan and prep  4. Nutrition counseling: goal setting    Monitoring/Evaluation:    Goals:  1. Meal plan lunch and dinner meals    Patient to follow up in 2 month(s) with RD    Telephone visit duration: 21 minutes    Originating Location (pt. Location): Home        Distant Location (provider location):  Off-site    Physician has received verbal consent for a Video Visit from the patient? Yes      Una Retana          Again, thank you for allowing me to participate in the care of your patient.  "       Sincerely,        Una Retana

## 2022-12-02 NOTE — PROGRESS NOTES
Dariusz Leyva is a 32 year old who is being evaluated via a billable telephone visit.      What phone number would you like to be contacted at? [phone]  How would you like to obtain your AVS? [facility]      Medical  Weight Loss Follow-Up Diet Evaluation  Assessment:  Dariusz is presenting today for a follow up weight management nutrition consultation. Pt has had an initial appointment with Dr. Walker  Weight loss medication: saxenda- hadn't taken it for for a while and took the full 3mg on Wednesday- feeling really nauseous  Personal Goals: issue with stress eating, would like to get down to 220lb  +weight loss to have knee surgery   Pt's weight is unknown  Initial weight: 317lb  Weight change: down 30lb    BMI: There is no height or weight on file to calculate BMI.  Ideal body weight: 62.7 kg (138 lb 5.4 oz)  Adjusted ideal body weight: 91.4 kg (201 lb 6.5 oz)    Estimated RMR ([place]-St Jeor equation):   2064 kcals x 1.2 (sedentary) = 2477 kcals (for weight maintenance)  Recommended Protein Intake:  grams of protein/day  Patient Active Problem List:  Patient Active Problem List   Diagnosis     Lumbago     Nonallopathic lesion of lumbar region     Nonallopathic lesion of sacral region     Pain in thoracic spine     Nonallopathic lesion of thoracic region     Abnormal Pap smear of cervix     Large tonsils     Morbid obesity with BMI of 45.0-49.9, adult (H)     MDD (major depressive disorder)     CARLY (generalized anxiety disorder)     Depression     Lipid screening     Chronic pain of right knee     Anxiety state     Injury of ligament of right knee     MDD (major depressive disorder), recurrent episode, moderate (H)     Painful orthopaedic hardware (H)     Tibial plateau fracture, right, closed, initial encounter     Dyslipidemia     Limited mobility     Migraine with aura and without status migrainosus, not intractable     DDD (degenerative disc disease), cervical     DDD (degenerative disc disease),  "lumbar     Increased PTH level     Vitamin D deficiency     Progress on goals from last visit: Wants to do a \"detox\", cutting out junk food, cutting out chips and fruit snacks.   -states she continues to struggle with depression and this can throw her off from her daily routine and good habits. She is still trying to figure out how manage these periods of depression.  -says that she has found it helpful to have a support system, thinks she would benefit from regular check ins  Goals:  1. Consistently go to the gym - not met  2. Plan ahead for meals when not feeling well - trying to work on    Diet Recall:  Usually 1-2 meals per day  Morning Snack: chips from vending machine, nuts or trail mix - trying to find healthier snacks  Lunch: rice, jerk chicken, plantains, veggies - usually a meal provided from work  Dinner: Will have the other half of her meals  Beverages: 120oz water  Nutrition Diagnosis:    Overweight/Obesity (NC 3.3) related to overeating and poor lifestyle habits as evidenced by patient report of inconsistent meals, grazing in the evening and BMI 44.29    Intervention:  3. Food and/or nutrient delivery: discussed quick meals to meal plan and prep  4. Nutrition counseling: goal setting    Monitoring/Evaluation:    Goals:  1. Meal plan lunch and dinner meals    Patient to follow up in 2 month(s) with RD    Telephone visit duration: 21 minutes    Originating Location (pt. Location): Home        Distant Location (provider location):  Off-site    Physician has received verbal consent for a Video Visit from the patient? Yes      Una Retana      "

## 2022-12-14 ENCOUNTER — VIRTUAL VISIT (OUTPATIENT)
Dept: PSYCHIATRY | Facility: CLINIC | Age: 32
End: 2022-12-14
Attending: PSYCHOLOGIST
Payer: COMMERCIAL

## 2022-12-14 DIAGNOSIS — F41.1 GAD (GENERALIZED ANXIETY DISORDER): ICD-10-CM

## 2022-12-14 DIAGNOSIS — F33.1 MODERATE EPISODE OF RECURRENT MAJOR DEPRESSIVE DISORDER (H): Primary | ICD-10-CM

## 2022-12-14 PROCEDURE — 90834 PSYTX W PT 45 MINUTES: CPT | Mod: GT | Performed by: PSYCHOLOGIST

## 2023-03-06 ENCOUNTER — VIRTUAL VISIT (OUTPATIENT)
Dept: SURGERY | Facility: CLINIC | Age: 33
End: 2023-03-06
Payer: COMMERCIAL

## 2023-03-06 DIAGNOSIS — E66.01 MORBID OBESITY WITH BMI OF 45.0-49.9, ADULT (H): Primary | ICD-10-CM

## 2023-03-06 PROCEDURE — 97803 MED NUTRITION INDIV SUBSEQ: CPT | Mod: VID | Performed by: DIETITIAN, REGISTERED

## 2023-03-06 NOTE — PATIENT INSTRUCTIONS
Eating Plan -  Here's another way to visual your meals on your plate. This kind of plate balance is heavy on protein and fiber which can help with fullness and satiety.     Breakfast: 1/2 plate protein (~20-30 g protein) + 1/2 plate carb     Lunch: 1/3 plate protein (25-35 g protein or 4-6 oz) + 1/3 - 1/2 plate vegetable + 1/4 plate carb + healthy fat     Dinner: 1/3 plate protein (25-35 g protein or 4-6 oz) + 1/3 - 1/2 plate vegetable + 1/4 plate carb + healthy fat        Pick a food for each category to make a meal.     Here are some meal and food ideas for you below. Balance them as best you can like the above plan. Use food labels (look at serving size to see how much they are talking about and the protein content of that serving) to get an idea of protein content. It's okay to have more than one serving!        Whole Grains (carbs)  100% whole wheat breads and crackers  Bran, Oatmeal  Quinoa, couscous  Brown rice     Starchy Vegetables (carbs)  Corn  Green peas  Potato, sweet potato  [place], butternut squash  Beans (black, garbanzo, kidney, lima, navy, gregory, white)  Lentils (all kinds)  Peas (split, black eyed)     Fruits (carbs)  Apples, unsweetened applesauce  Banana  Blueberries, blackberries  Cantaloupe, honeydew  Grapes  Oranges, pineapple  Raspberries, strawberries  Watermelon     Non-starchy Vegetables (vegetables)  Asparagus, zucchini  Green beans  Broccoli, cauliflower  Carrots, celery, cucumber  Onion  Salad greens (spinach, kale, lettuce, evi)  Bell pepper (all kinds)  Tomato     Milk/Yogurt (protein)  Light Yogurt  Light Greek Yogurt  Skim milk  Soy milk  Lactose-free skim milk     Proteins (protein - see lean protein list below for more info)  Cottage cheese  Eggs, egg whites  Beef (90/10 ground, sirloin, roast, tenderloin)  Fish (Cod, tilapia, salmon, tuna)  Pork ([place] chang, ham, pork loin chop, tenderloin)  Turkey or chicken (breast, thin sliced deli, lean ground)  Clams, crab, lobster,  shrimp  Light string cheese (50 wilbur)     Fats  Avocado  Peanut butter  Nuts (almonds, walnuts, sunflower seeds)  Olives  Salad dressing  Olive, canola, avocado, vegetable oil  Flax seed  Hemp Seed        Quick and Easy Meals for Rotational Menus    Salad kit with rotisserie chicken, eggs, lunch meat, beans or tuna    Lunch meat sandwich or wrap with veggies (sugar snap peas, bell pepper slices, carrot sticks, celery, sliced cucumber, cherry tomatoes, etc.)    Greek or light yogurt with a handful of nuts and fruit    Cottage cheese, cherry tomatoes and Triscuit crackers    Refried bean and cheese quesadilla on whole wheat tortilla    Can of Progresso Light or [place] Well Yes soup    [place] cakes pancake or waffles with peanut butter or berries    Baked sweet potato with black beans and salsa and a side salad    Adult lunchable: lunch meat, string cheese, crackers and veggies    Protein pasta salad: whole wheat or bean pasta with chicken sausage, broccoli and italian dressing    Egg sandwich on english muffin: egg, slice of cheese and piece of ham    Grilled cheese and turkey sandwich with a side salad or cup of soup    BBQ Flatbread: Flat Out high protein flatbread with rotisserie chicken, BBQ sauce and red onion, topped with mozzarella cheese and baked in the oven    [place] Chicken Wrap: Rotisserie chicken warmed up with Dariel's Hot Sauce in a medium size tortilla with light ranch dressing. Add mixed greens, red onion, diced tomato, 2% shredded cheddar cheese.           LEAN PROTEIN SOURCES     Protein Source   Portion   Calories   Grams of Protein                             Nonfat, plain Greek yogurt    (10 grams sugar or less) 3/4 cup (6 oz)  12-17   Light Yogurt (10 grams sugar or less) 3/4 cup (6 oz)  6-8   Protein Shake 1 shake 110-180 15-30   Skim/1% Milk or lactose-free milk 1 cup ( 8 oz)  8   Plain or light, flavored soymilk 1 cup  7-8   Plain or light, hemp milk 1 cup 110 6   Fat  Free or 1% Cottage Cheese 1/2 cup 90 15   Part skim ricotta cheese 1/2 cup 100 14   Part skim or reduced fat cheese slices 1/4cup, 3 dice 65-80 8                                 Mozzarella String Cheese 1 80 8   Canned tuna, chicken, crab or salmon  (canned in water)  1/2 cup 100 15-20   White fish (broiled, grilled, baked) 3 ounces 100 21   [place]/Tuna (broiled, grilled, baked) 3 ounces 150-180 21   Shrimp, Scallops, Lobster, Crab 3 ounces 100 21   Pork loin, Pork Tenderloin 3 ounces 150 21   Boneless, skinless chicken /turkey breast                          (broiled, grilled, baked) 3 ounces 120 21   [place], [place], [place], and Venison 3 ounces 120 21   Lean cuts of red meat and pork (sirloin,   round, tenderloin, flank, ground 93%-96%) 3 ounces 170 21   Lean or Extra Lean Ground Turkey 1/2 cup 150 20   90-95% Lean [place] Burger 1 carl 140-180 21   Low-fat casserole with lean meat 3/4 cup 200 17   Luncheon Meats                                                        (turkey, lean ham, roast beef, chicken) 3 ounces 100 21   Egg (boiled, poached, scrambled) 1 Egg 60 7   Egg Substitute 1/2 cup 70 10   Nuts (limit to 1 serving per day)  3 Tbsp. 150 7   Nut [employer] (peanut, almond)  Limit to 1 serving or less daily 1 Tbsp. 90 4   Soy Burger (varies) 1  10-15   Edamame  1/2 cup ~95 9   Garbanzo, Black, [place] Beans 1/2 cup 110 7   Refried Beans 1/2 cup 100 7   Kidney and Lima beans 1/2 cup 110 7   Tempeh 3 oz 175 18   Vegan crumbles 1/2 cup 100 14   Tofu 1/2 cup 110 14   Chili (beans and extra lean beef or turkey) 1 cup 200 23   Lentil Stew/Soup 1 cup 150 12   Black Bean Soup 1 cup 175 12      More Meal Ideas     Breakfast         Omelet made with   cup to   cup egg substitute or 2 eggs    cup chopped vegetables  1-2 tbsp. of light cheese     cup salsa  Medium banana     1 cup non-fat plain, Greek yogurt mixed with 1 cup berries and 1-2 Tbsp nuts or cereal   -3/4 cup skim or 1% cottage cheese    cup unsweetened  whole-grain cereal  1/2 cup of fresh strawberries  Whole-wheat English muffin or mini bagel, 1 scrambled egg and 1 slice Swiss cheese   Small orange    cup cottage cheese, low-fat    cup fresh fruit          Lunch/Dinner    2-3 slices roasted turkey breast  1 tbsp. of fat free mayonnaise  2 slices of  whole-wheat bread, Medium apple  10 baby carrots with 1 tbsp. of low-fat dip     cup water packed tuna or chicken  1 tablespoons of low-fat mayonnaise  1-2 tbsp. dill relish  1 serving of whole-grain crackers  1 cup of strawberries   6 inch turkey sub sandwich with light mayonnaise,   cup cottage cheese                                                                                                                                                      Black bean and low-fat cheese on a whole wheat tortilla with salsa and light sour cream  Grilled chicken sandwich  Tossed salad with light dressing    Baked potato with 3/4 cup of extra lean ground beef, light shredded cheese and salsa  Fresh fruit                                                 Chicken chunks with lettuce and vegetables stuffed in sravani  Steamed broccoli                                                 3 oz boneless/skinless chicken breast  1/2 cup brown rice with stir-fried vegetables    grapefruit  3 ounces of salmon, trout, or tuna  1 cup of steamed asparagus  1 small slice whole grain Italian bread  Broiled white or pink fish  3/4 cup whole wheat pasta with tomatoes  3/4 cup of roasted red peppers  3 oz. of extra lean (93/7) hamburger on a Arnold[employer] [place] Thins  Tossed salad with light dressing                         Black bean or Tuscan bean soup with grated mozzarella cheese    of a flour tortilla     3 ounces of grilled pork loin with 1 tbsp. of low-sugar barbeque sauce, 1 cup of green beans seasoned with pepper  Small dinner roll or   cup of grapefruit sections     1-2 cups of torn evi    cup of garbanzo beans or diced skinless chicken  breast  5-6 cherry tomatoes  1  tbsp. of crumbled feta cheese  1 tbsp. of roasted soy nuts  1 tsp. of olive oil and 2-3 Tbsp. of balsamic or red wine vinegar  Small whole-wheat dinner roll or   cup of cut up pineapple  .nonsu

## 2023-03-06 NOTE — PROGRESS NOTES
Dariusz Leyva is a 32 year old who is being evaluated via a billable telephone visit.      What phone number would you like to be contacted at? [phone]  How would you like to obtain your AVS? [facility]      Medical  Weight Loss Follow-Up Diet Evaluation  Assessment:  Dariusz is presenting today for a follow up weight management nutrition consultation. Pt has had an initial appointment with Dr. Walker  Weight loss medication: saxenda  Personal Goals: issue with stress eating, would like to get down to 220lb  +weight loss to have knee surgery   Pt's weight is 291  Initial weight: 317lb  Weight change: down 26 lb    BMI: There is no height or weight on file to calculate BMI.  Ideal body weight: 62.7 kg (138 lb 5.4 oz)  Adjusted ideal body weight: 91.4 kg (201 lb 6.5 oz)    Estimated RMR ([place]-St Jeor equation):   2064 kcals x 1.2 (sedentary) = 2477 kcals (for weight maintenance)  Recommended Protein Intake:  grams of protein/day  Patient Active Problem List:  Patient Active Problem List   Diagnosis     Lumbago     Nonallopathic lesion of lumbar region     Nonallopathic lesion of sacral region     Pain in thoracic spine     Nonallopathic lesion of thoracic region     Abnormal Pap smear of cervix     Large tonsils     Morbid obesity with BMI of 45.0-49.9, adult (H)     MDD (major depressive disorder)     CARLY (generalized anxiety disorder)     Depression     Lipid screening     Chronic pain of right knee     Anxiety state     Injury of ligament of right knee     MDD (major depressive disorder), recurrent episode, moderate (H)     Painful orthopaedic hardware (H)     Tibial plateau fracture, right, closed, initial encounter     Dyslipidemia     Limited mobility     Migraine with aura and without status migrainosus, not intractable     DDD (degenerative disc disease), cervical     DDD (degenerative disc disease), lumbar     Increased PTH level     Vitamin D deficiency     Progress on goals from last visit:  "Weight has increased per patient. 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 She mentions again today that she is interested in doing a \"detox\", cutting out junk food, cutting out chips and fruit snacks. She feels \"heavy\" and bloated and wants to do a \"detox\" where she eats veggies for a week. We discussed this today and how she can maintain a balanced intake to support her goals.     Goals:  1. Meal plan lunch and dinner meals - not met    Diet Recall:  She hasn't been meal planning. She did plan her meals a few times but hasn't been consistent.  Morning Snack: chips from vending machine, nuts or trail mix - trying to find healthier snacks  Beverages: 120oz water  Nutrition Diagnosis:    Overweight/Obesity (NC 3.3) related to overeating and poor lifestyle habits as evidenced by patient report of inconsistent meals, grazing in the evening and BMI 44.29    Intervention:  1. Food and/or nutrient delivery: discussed how to plan and balance lunch and dinner meals - aim for protein, vegetable, and carb at each meal  2. Nutrition counseling: goal setting    Monitoring/Evaluation:    Goals:  1. Meal plan lunch and dinner meals  2. Aim to have a protein, vegetable, and carbohydrate at lunch and dinner    Patient to follow up in 1 month(s) with RD    Telephone visit duration: 21 minutes    Originating Location (pt. Location): Home        Distant Location (provider location):  Off-site    Physician has received verbal consent for a Video Visit from the patient? Yes      Una Retana    "

## 2023-03-06 NOTE — LETTER
3/6/2023         RE: Dariusz Leyva  [address]  [place] [address]        Dear Colleague,    Thank you for referring your patient, Dariusz Leyva, to the [facility] [place]. Please see a copy of my visit note below.    Dariusz Leyva is a 32 year old who is being evaluated via a billable telephone visit.      What phone number would you like to be contacted at? [phone]  How would you like to obtain your AVS? [facility]      Medical  Weight Loss Follow-Up Diet Evaluation  Assessment:  Dariusz is presenting today for a follow up weight management nutrition consultation. Pt has had an initial appointment with Dr. Walker  Weight loss medication: saxenda  Personal Goals: issue with stress eating, would like to get down to 220lb  +weight loss to have knee surgery   Pt's weight is 291  Initial weight: 317lb  Weight change: down 26 lb    BMI: There is no height or weight on file to calculate BMI.  Ideal body weight: 62.7 kg (138 lb 5.4 oz)  Adjusted ideal body weight: 91.4 kg (201 lb 6.5 oz)    Estimated RMR ([place]-St Jeor equation):   2064 kcals x 1.2 (sedentary) = 2477 kcals (for weight maintenance)  Recommended Protein Intake:  grams of protein/day  Patient Active Problem List:  Patient Active Problem List   Diagnosis     Lumbago     Nonallopathic lesion of lumbar region     Nonallopathic lesion of sacral region     Pain in thoracic spine     Nonallopathic lesion of thoracic region     Abnormal Pap smear of cervix     Large tonsils     Morbid obesity with BMI of 45.0-49.9, adult (H)     MDD (major depressive disorder)     CARLY (generalized anxiety disorder)     Depression     Lipid screening     Chronic pain of right knee     Anxiety state     Injury of ligament of right knee     MDD (major depressive disorder), recurrent episode, moderate (H)     Painful orthopaedic hardware (H)     Tibial plateau fracture, right, closed, initial encounter      "Dyslipidemia     Limited mobility     Migraine with aura and without status migrainosus, not intractable     DDD (degenerative disc disease), cervical     DDD (degenerative disc disease), lumbar     Increased PTH level     Vitamin D deficiency     Progress on goals from last visit: Weight has increased per patient. She finds she gets stressed at work (works in a [employer] center) and she tends to snack on foods. She mentions again today that she is interested in doing a \"detox\", cutting out junk food, cutting out chips and fruit snacks. She feels \"heavy\" and bloated and wants to do a \"detox\" where she eats veggies for a week. We discussed this today and how she can maintain a balanced intake to support her goals.     Goals:  1. Meal plan lunch and dinner meals - not met    Diet Recall:  She hasn't been meal planning. She did plan her meals a few times but hasn't been consistent.  Morning Snack: chips from vending machine, nuts or trail mix - trying to find healthier snacks  Beverages: 120oz water  Nutrition Diagnosis:    Overweight/Obesity (NC 3.3) related to overeating and poor lifestyle habits as evidenced by patient report of inconsistent meals, grazing in the evening and BMI 44.29    Intervention:  1. Food and/or nutrient delivery: discussed how to plan and balance lunch and dinner meals - aim for protein, vegetable, and carb at each meal  2. Nutrition counseling: goal setting    Monitoring/Evaluation:    Goals:  1. Meal plan lunch and dinner meals  2. Aim to have a protein, vegetable, and carbohydrate at lunch and dinner    Patient to follow up in 1 month(s) with RD    Telephone visit duration: 21 minutes    Originating Location (pt. Location): Home        Distant Location (provider location):  Off-site    Physician has received verbal consent for a Video Visit from the patient? Yes      Una Retana        Again, thank you for allowing me to participate in the care of your patient.        Sincerely,        Una" Mazin

## 2023-04-12 ENCOUNTER — VIRTUAL VISIT (OUTPATIENT)
Dept: PSYCHIATRY | Facility: CLINIC | Age: 33
End: 2023-04-12
Attending: PSYCHOLOGIST
Payer: COMMERCIAL

## 2023-04-12 DIAGNOSIS — F33.1 MODERATE EPISODE OF RECURRENT MAJOR DEPRESSIVE DISORDER (H): Primary | ICD-10-CM

## 2023-04-12 DIAGNOSIS — F41.1 GAD (GENERALIZED ANXIETY DISORDER): ICD-10-CM

## 2023-04-12 PROCEDURE — 90837 PSYTX W PT 60 MINUTES: CPT | Mod: VID | Performed by: PSYCHOLOGIST

## 2023-04-12 NOTE — PROGRESS NOTES
OUTPATIENT PSYCHOTHERAPY PROGRESS NOTE    Client Name: Dariusz Leyva   YOB: 1990  Time of Service: 63 minutes   Service Type(s): Individual psychotherapy    VIDEO VISIT  Dariusz Leyva is a 32 year old who is being evaluated via a billable video visit.      Telehealth Details  Type of service:  psychotherapy  Time of service:    Start Time:  4:01 pm    End Time: 5:04 pm    Reason for Telehealth Visit: Patient has requested telehealth visit  Originating Site (patient location):  [facility]   Location- Patient's home  Distant Site (provider location):  Onsite  Mode of Communication:  Hernan      Diagnoses:   Unspecified depressive disorder and unspecified anxiety    Goals of therapy: Elevate mood and show evidence of usual energy levels, and activities.      Individuals Present:   Psychotherapy services during this visit included myself and Francescochani Sullivanverónica.    Narrative:  Dariusz reported her mood is  exhausted.  She says that 80% of the time, her mood has been low. Today she is okay mentally. She has been napping after work because she is tired, and then she gets up for a while and she goes back to sleep. She endorsed fatigue. She reported her appetite is  normal.  She indicated she ate more take out last week, and this was mood related.  I wanted to make myself feel better.  She denied anhedonia - she spent time with a friend and she has been trying to go to coworkers' performances. She has had suicidal ideation every day for the last month - she might see something and say to herself  I can do it there.  She has not put herself in the area to actually do something. She reported feeling safe right now. Her stress and anxiety had been  through the roof.  She works every day. She works full time and then works overtime to pay the bills. 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      Dariusz reported that when we last saw each other she had been dating via an online  "platform. She had a negative experience. She got her current job through Thad, a person she started dating online. In January - the office had a party they went to a work party - Dariusz took him home and he asked her to be his girlfriend. They were together until the beginning of March. He ended up breaking up with her. He brought up her leg as one of the reasons. She still has to see him at work.      She is hesitant to start dating again. She is upset because she keeps thinking about him and she does not want him on her mind.      She heard from  E  a few days ago. He had not been responding for several months. She reported she has  cut off  some of her family. She has also lost other relationships - either by her choice or the other persons choice. She has felt supported by her friends - Hailey and Candelario. She also has support from coworkers.      She feels safe in her work environment. She has volunteered at an [employer] - she wanted to tap into something that she is passionate about.       Additional Information:  The patient did not report medication changes.     Mental Status:  Appearance:  Neatly groomed   Behavior/relationship to examiner/demeanor:  cooperative  Motor activity/EPS:  Within normal limits  Speech rate:  Within normal limits  Speech volume:  Within normal limits  Speech articulation:  Within normal limits  Speech coherence: Within normal limits  Speech spontaneity: Within normal limits  Mood (subjective): \"exhausted\"  Affect (objective appearance): Periods of dysphoria - generally euthymic  Thought Process (Associations):  Logical and Linear   Thought process (Rate):  Within normal limits   Thought content: Within normal limits  Abnormal Perception:  None   Insight:  Good   Judgment:  Good     Plan: Continue with goals as outlined above    Andreea Vásquez Psy.D.,L.P      "

## 2023-04-17 ENCOUNTER — VIRTUAL VISIT (OUTPATIENT)
Dept: SURGERY | Facility: CLINIC | Age: 33
End: 2023-04-17
Payer: COMMERCIAL

## 2023-04-17 DIAGNOSIS — E66.01 MORBID OBESITY WITH BMI OF 45.0-49.9, ADULT (H): Primary | ICD-10-CM

## 2023-04-17 PROCEDURE — 97803 MED NUTRITION INDIV SUBSEQ: CPT | Mod: VID | Performed by: DIETITIAN, REGISTERED

## 2023-04-17 NOTE — LETTER
4/17/2023         RE: Dariusz Leyva  [address]  [place] [address]        Dear Colleague,    Thank you for referring your patient, Dariusz Leyva, to the [facility] [place]. Please see a copy of my visit note below.    Dariusz Leyva is a 32 year old who is being evaluated via a billable telephone visit.      What phone number would you like to be contacted at? [phone]  How would you like to obtain your AVS? [facility]      Medical  Weight Loss Follow-Up Diet Evaluation  Assessment:  Dariusz is presenting today for a follow up weight management nutrition consultation. Pt has had an initial appointment with Dr. Walker  Weight loss medication: saxenda  Personal Goals: issue with stress eating, would like to get down to 220lb  +weight loss to have knee surgery   Pt's weight is unknown  Initial weight: 317lb  Weight change: was down 26 lb at previous visits    BMI: There is no height or weight on file to calculate BMI.  Ideal body weight: 62.7 kg (138 lb 5.4 oz)  Adjusted ideal body weight: 91.4 kg (201 lb 6.5 oz)    Estimated RMR ([place]-St Jeor equation):   2064 kcals x 1.2 (sedentary) = 2477 kcals (for weight maintenance)  Recommended Protein Intake:  grams of protein/day  Patient Active Problem List:  Patient Active Problem List   Diagnosis     Lumbago     Nonallopathic lesion of lumbar region     Nonallopathic lesion of sacral region     Pain in thoracic spine     Nonallopathic lesion of thoracic region     Abnormal Pap smear of cervix     Large tonsils     Morbid obesity with BMI of 45.0-49.9, adult (H)     MDD (major depressive disorder)     CARLY (generalized anxiety disorder)     Depression     Lipid screening     Chronic pain of right knee     Anxiety state     Injury of ligament of right knee     MDD (major depressive disorder), recurrent episode, moderate (H)     Painful orthopaedic hardware (H)     Tibial plateau fracture, right,  closed, initial encounter     Dyslipidemia     Limited mobility     Migraine with aura and without status migrainosus, not intractable     DDD (degenerative disc disease), cervical     DDD (degenerative disc disease), lumbar     Increased PTH level     Vitamin D deficiency     Progress on goals from last visit: Worked on doing some meal prep this past couple months - bought barbacoa, rice, gregory beans to make for lunches. 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 Meal plan lunch and dinner meals - somewhat met  2. Aim to have a protein, vegetable, and carbohydrate at lunch and dinner    Diet Recall:  Planning to try a smoothie for breakfast with frozen fruit, protein powder, and liquid  She has made some progress with meal planning this past month by putting together some burrito bowls for her lunches this week  Beverages: 120oz water  Nutrition Diagnosis:    Morbid Obesity related to overeating and poor lifestyle habits as evidenced by patient report of inconsistent meals, grazing in the evening and BMI 44.29    Intervention:  3. Food and/or nutrient delivery: aim for protein, vegetable, and carb at each meal, continue working on meal planning  4. Nutrition education: discussed her interest in water fasting  5. Nutrition counseling: goal setting    Monitoring/Evaluation:    Goals:  1. Meal plan lunch and dinner meals  2. Aim to have a protein, vegetable, and carbohydrate at lunch and dinner  3. Drinking more water     Patient to follow up in 1.5 month(s) with RD    Virtual Visit Details    Type of service:  Video Visit     Video Start Time: 3:30 pm     Video End Time:3:49 pm    Originating Location (pt. Location): Home    Distant Location (provider location):  Off-site  Platform used for Video Visit: Hernan Retana      Again, thank you for allowing me  to participate in the care of your patient.        Sincerely,        Una Retana RD

## 2023-04-17 NOTE — PROGRESS NOTES
Dariusz Leyva is a 32 year old who is being evaluated via a billable telephone visit.      What phone number would you like to be contacted at? [phone]  How would you like to obtain your AVS? [facility]      Medical  Weight Loss Follow-Up Diet Evaluation  Assessment:  Dariusz is presenting today for a follow up weight management nutrition consultation. Pt has had an initial appointment with Dr. Walker  Weight loss medication: saxenda  Personal Goals: issue with stress eating, would like to get down to 220lb  +weight loss to have knee surgery   Pt's weight is unknown  Initial weight: 317lb  Weight change: was down 26 lb at previous visits    BMI: There is no height or weight on file to calculate BMI.  Ideal body weight: 62.7 kg (138 lb 5.4 oz)  Adjusted ideal body weight: 91.4 kg (201 lb 6.5 oz)    Estimated RMR ([place]-St Jeor equation):   2064 kcals x 1.2 (sedentary) = 2477 kcals (for weight maintenance)  Recommended Protein Intake:  grams of protein/day  Patient Active Problem List:  Patient Active Problem List   Diagnosis     Lumbago     Nonallopathic lesion of lumbar region     Nonallopathic lesion of sacral region     Pain in thoracic spine     Nonallopathic lesion of thoracic region     Abnormal Pap smear of cervix     Large tonsils     Morbid obesity with BMI of 45.0-49.9, adult (H)     MDD (major depressive disorder)     CARLY (generalized anxiety disorder)     Depression     Lipid screening     Chronic pain of right knee     Anxiety state     Injury of ligament of right knee     MDD (major depressive disorder), recurrent episode, moderate (H)     Painful orthopaedic hardware (H)     Tibial plateau fracture, right, closed, initial encounter     Dyslipidemia     Limited mobility     Migraine with aura and without status migrainosus, not intractable     DDD (degenerative disc disease), cervical     DDD (degenerative disc disease), lumbar     Increased PTH level     Vitamin D deficiency     Progress  on goals from last visit: Worked on doing some meal prep this past couple months - bought barbacoa, rice, gregory beans to make for lunches. 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 Meal plan lunch and dinner meals - somewhat met  2. Aim to have a protein, vegetable, and carbohydrate at lunch and dinner    Diet Recall:  Planning to try a smoothie for breakfast with frozen fruit, protein powder, and liquid  She has made some progress with meal planning this past month by putting together some burrito bowls for her lunches this week  Beverages: 120oz water  Nutrition Diagnosis:    Morbid Obesity related to overeating and poor lifestyle habits as evidenced by patient report of inconsistent meals, grazing in the evening and BMI 44.29    Intervention:  3. Food and/or nutrient delivery: aim for protein, vegetable, and carb at each meal, continue working on meal planning  4. Nutrition education: discussed her interest in water fasting  5. Nutrition counseling: goal setting    Monitoring/Evaluation:    Goals:  1. Meal plan lunch and dinner meals  2. Aim to have a protein, vegetable, and carbohydrate at lunch and dinner  3. Drinking more water     Patient to follow up in 1.5 month(s) with RD    Virtual Visit Details    Type of service:  Video Visit     Video Start Time: 3:30 pm     Video End Time:3:49 pm    Originating Location (pt. Location): Home    Distant Location (provider location):  Off-site  Platform used for Video Visit: Hernan Retana

## 2023-04-20 ENCOUNTER — TELEPHONE (OUTPATIENT)
Dept: FAMILY MEDICINE | Facility: CLINIC | Age: 33
End: 2023-04-20

## 2023-04-20 ENCOUNTER — OFFICE VISIT (OUTPATIENT)
Dept: FAMILY MEDICINE | Facility: CLINIC | Age: 33
End: 2023-04-20
Payer: COMMERCIAL

## 2023-04-20 VITALS
TEMPERATURE: 99 F | SYSTOLIC BLOOD PRESSURE: 118 MMHG | HEART RATE: 71 BPM | DIASTOLIC BLOOD PRESSURE: 77 MMHG | RESPIRATION RATE: 16 BRPM | WEIGHT: 293 LBS | OXYGEN SATURATION: 99 % | BODY MASS INDEX: 46.3 KG/M2

## 2023-04-20 DIAGNOSIS — E66.01 OBESITY, CLASS III, BMI 40-49.9 (MORBID OBESITY) (H): ICD-10-CM

## 2023-04-20 DIAGNOSIS — Z32.01 PREGNANCY TEST POSITIVE: ICD-10-CM

## 2023-04-20 DIAGNOSIS — R21 RASH: ICD-10-CM

## 2023-04-20 DIAGNOSIS — Z11.3 ROUTINE SCREENING FOR STI (SEXUALLY TRANSMITTED INFECTION): Primary | ICD-10-CM

## 2023-04-20 LAB — HCG UR QL: POSITIVE

## 2023-04-20 PROCEDURE — 87591 N.GONORRHOEAE DNA AMP PROB: CPT | Performed by: FAMILY MEDICINE

## 2023-04-20 PROCEDURE — 87491 CHLMYD TRACH DNA AMP PROBE: CPT | Performed by: FAMILY MEDICINE

## 2023-04-20 PROCEDURE — 81025 URINE PREGNANCY TEST: CPT | Performed by: FAMILY MEDICINE

## 2023-04-20 PROCEDURE — 99214 OFFICE O/P EST MOD 30 MIN: CPT | Performed by: FAMILY MEDICINE

## 2023-04-20 RX ORDER — CLOTRIMAZOLE 1 %
CREAM (GRAM) TOPICAL 2 TIMES DAILY PRN
Qty: 60 G | Refills: 1 | Status: SHIPPED | OUTPATIENT
Start: 2023-04-20

## 2023-04-20 NOTE — TELEPHONE ENCOUNTER
[facility] for pt to help her schedule OB Intake/ultrasound appt with Dr Nino on 5/4/23./Samina Alfaro RN BSN

## 2023-04-20 NOTE — PATIENT INSTRUCTIONS
"Presents to discuss positive pregnancy test and requests GC/Chl testing.  Was in relationship February into March; but now with new partner. Not interested in parenthood at this time, Uses condoms to prevent conception.  Had unusually light/short flow middle of March. Is noticing signs of pregnancy like nausea at times and breast tenderness.  Had terminated pregnancies 3 times in the past (medication), most recently in December.  Also when to a clinic in [place] to have \"blighted ovum\" removed mechanically; tearful when recounting this as it was traumatic.  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 Concerned that continued medical terminations could impact her ability to achieve a pregnancy once it is desired. Long term, did not tolerate OCP's (mood). Had copper IUD at one time, but had STI.    Resources:    Dr Nino Thursday early pregnancy dating clinic.  Check testing, results should be back later Monday  "

## 2023-04-20 NOTE — PROGRESS NOTES
"  Assessment & Plan     Pregnancy test positive  Presents to discuss positive pregnancy test and requests GC/Chl testing.  Was in relationship February into March; but now with new partner. Not interested in parenthood at this time, Uses condoms to prevent conception.  Had unusually light/short flow middle of March. Is noticing signs of pregnancy like nausea at times and breast tenderness.  Had  terminated pregnancies 3 times in the past (medication), most recently in December.  Also when to a clinic in [place] to have \"blighted ovum\" removed mechanically; tearful when recounting this as it was traumatic.  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 Concerned that continued medical terminations could impact her ability to achieve a pregnancy once it is desired.    Resources:    Dr Nino Thursday early pregnancy dating clinic.  Check testing, results should be back later Monday    Routine screening for STI (sexually transmitted infection)    - HCG qualitative urine  - Chlamydia trachomatis PCR - Clinic Collect  - Neisseria gonorrhoeae PCR - Clinic Collect    Rash    - clotrimazole (LOTRIMIN) 1 % external cream; Apply topically 2 times daily as needed    Obesity, Class III, BMI 40-49.9 (morbid obesity) (H)  Follows with Weight management clinic. Goal is to improve fitness level and get to weight where Ortho treatments possible      Prescription drug management  36 minutes spent by me on the date of the encounter doing chart review, history and exam, documentation and further activities per the note       BMI:   Estimated body mass index is 46.3 kg/m  as calculated from the following:    Height as of 11/25/22: 1.702 m (5' 7\").    Weight as of this encounter: 134.1 kg (295 lb 9.6 oz).   Weight management plan: Discussed healthy diet and exercise guidelines Follow at Bariatrics clinic    Roberto Ramirez MD  [facility]   Yadira is a 32 " "year old, presenting for the following health issues:  Pregnancy Test (LMP 3/18/23) and STD (Check)        11/25/2022     8:38 AM   Additional Questions   Roomed by noemy   Accompanied by self     HPI     Presents to discuss positive pregnancy test and requests GC/Chl testing.  Was in relationship February into March; but now with new partner. Not interested in parenthood at this time, Uses condoms to prevent conception.  Had unusually light/short flow middle of March. Is noticing signs of pregnancy like nausea at times and breast tenderness.  Had terminated pregnancies 3 times in the past (medication), most recently in December.  Also when to a clinic in [place] to have \"blighted ovum\" removed mechanically; tearful when recounting this as it was traumatic.  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 Concerned that continued medical terminations could impact her ability to achieve a pregnancy once it is desired. Long term, did not tolerate OCP's (mood). Had copper IUD at one time, but had STI.      Review of Systems   Constitutional, HEENT, cardiovascular, pulmonary, gi and gu systems are negative, except as otherwise noted.      Objective    /77   Pulse 71   Temp 99  F (37.2  C)   Resp 16   Wt 134.1 kg (295 lb 9.6 oz)   LMP 03/18/2023   SpO2 99%   BMI 46.30 kg/m    Body mass index is 46.3 kg/m .  Physical Exam   GENERAL: healthy, alert and no distress  NECK: no adenopathy, no asymmetry, masses, or scars and thyroid normal to palpation  RESP: lungs clear to auscultation - no rales, rhonchi or wheezes  CV: regular rate and rhythm, normal S1 S2, no S3 or S4, no murmur, click or rub, no peripheral edema and peripheral pulses strong  ABDOMEN: soft, nontender, no hepatosplenomegaly, no masses and bowel sounds normal  MS: no gross musculoskeletal defects noted, no edema    Results for orders placed or performed in visit on 04/20/23 (from the past 24 hour(s)) "   HCG qualitative urine   Result Value Ref Range    hCG Urine Qualitative Positive (A) Negative

## 2023-04-21 LAB
C TRACH DNA SPEC QL NAA+PROBE: NEGATIVE
N GONORRHOEA DNA SPEC QL NAA+PROBE: NEGATIVE

## 2023-04-26 ENCOUNTER — VIRTUAL VISIT (OUTPATIENT)
Dept: PSYCHIATRY | Facility: CLINIC | Age: 33
End: 2023-04-26
Attending: PSYCHOLOGIST
Payer: COMMERCIAL

## 2023-04-26 DIAGNOSIS — F41.1 GAD (GENERALIZED ANXIETY DISORDER): ICD-10-CM

## 2023-04-26 DIAGNOSIS — F33.1 MODERATE EPISODE OF RECURRENT MAJOR DEPRESSIVE DISORDER (H): Primary | ICD-10-CM

## 2023-04-26 PROCEDURE — 90837 PSYTX W PT 60 MINUTES: CPT | Mod: VID | Performed by: PSYCHOLOGIST

## 2023-04-26 NOTE — PROGRESS NOTES
OUTPATIENT PSYCHOTHERAPY PROGRESS NOTE    Client Name: Dariusz Leyva   YOB: 1990  Time of Service: 61 minutes   Service Type(s): Individual psychotherapy    VIDEO VISIT  Dariusz Leyva is a 32 year old who is being evaluated via a billable video visit.      Telehealth Details  Type of service:  psychotherapy  Time of service:    Start Time:  4:17 pm    End Time: 5:18 pm    Reason for Telehealth Visit: Patient has requested telehealth visit  Originating Site (patient location):  [facility]   Location- Patient's home  Distant Site (provider location):  Onsite  Mode of Communication:  Hernan      Diagnoses:   Unspecified depressive disorder and unspecified anxiety    Goals of therapy: Elevate mood and show evidence of usual energy levels, and activities.      Individuals Present:   Psychotherapy services during this visit included myself and Yadirafadumo Gordon.    Narrative:  Dariusz reported her last week has been hectic. She just found out that she is pregnant with twins. She is unsure what she is going to do. Discussed her recent relationship. Processed her thoughts about the pregnancy. She will start her volunteering at the [employer].       Additional Information:  The patient did not report medication changes.     Mental Status:  Appearance:  Neatly groomed   Behavior/relationship to examiner/demeanor:  cooperative  Motor activity/EPS:  Within normal limits  Speech rate:  Within normal limits  Speech volume:  Within normal limits  Speech articulation:  Within normal limits  Speech coherence: Within normal limits  Speech spontaneity: Within normal limits  Mood (subjective): NA  Affect (objective appearance): Euthymic  Thought Process (Associations):  Logical and Linear   Thought process (Rate):  Within normal limits   Thought content: Within normal limits  Abnormal Perception:  None   Insight:  Good   Judgment:  Good     Plan: Continue with goals as outlined above    Andreea Vásquez  Pushpa,ARIELLEP

## 2023-05-03 ENCOUNTER — VIRTUAL VISIT (OUTPATIENT)
Dept: PSYCHIATRY | Facility: CLINIC | Age: 33
End: 2023-05-03
Attending: PSYCHOLOGIST
Payer: COMMERCIAL

## 2023-05-03 DIAGNOSIS — F33.1 MODERATE EPISODE OF RECURRENT MAJOR DEPRESSIVE DISORDER (H): Primary | ICD-10-CM

## 2023-05-03 DIAGNOSIS — F41.1 GAD (GENERALIZED ANXIETY DISORDER): ICD-10-CM

## 2023-05-03 PROCEDURE — 90837 PSYTX W PT 60 MINUTES: CPT | Mod: VID | Performed by: PSYCHOLOGIST

## 2023-05-03 NOTE — PROGRESS NOTES
OUTPATIENT PSYCHOTHERAPY PROGRESS NOTE    Client Name: Dariusz Leyva   YOB: 1990  Time of Service: 65 minutes   Service Type(s): Individual psychotherapy    VIDEO VISIT  Draiusz Leyva is a 32 year old who is being evaluated via a billable video visit.      Telehealth Details  Type of service:  psychotherapy  Time of service:    Start Time:  4:01 pm    End Time: 5:06 pm    Reason for Telehealth Visit: Patient has requested telehealth visit  Originating Site (patient location):  [facility]- [place]   Location- Patient's parked automobile  Distant Site (provider location):  Onsite  [facility] of [facility]:  [facility]      Diagnoses:   Unspecified depressive disorder and unspecified anxiety    Goals of therapy: Elevate mood and show evidence of usual energy levels, and activities.      Individuals Present:   Psychotherapy services during this visit included myself and Yadirafadumo Gordon.    Narrative:  Dariusz reported her mood was  I don t know . She reported going to work and feeling drained. She has decided to keep her pregnancy. She is overwhelmed with what she would like to do.      She has shared with her father - he encouraged her to live closer to him. She knows he would help, but she is unsure if she wants his help under his terms. One of her friends has offered to live with her. She does not think this would be a good fit for her. She is deciding if she will move or stay in [place].     She is waking up to 10 times a night and she is nauseous in the morning.      She would like to walk every day, but it is hard because she is feeling  empty.       Would like resources for expectant moms.        Additional Information:  The patient did not report medication changes.     Mental Status:  Appearance:  Neatly groomed   Behavior/relationship to examiner/demeanor:  cooperative  Motor activity/EPS:  Within normal limits  Speech rate:  Within normal limits  Speech volume:  Within normal limits  Speech articulation:   "Within normal limits  Speech coherence: Within normal limits  Speech spontaneity: Within normal limits  Mood (subjective): \"I don't know\"  Affect (objective appearance): Euthymic  Thought Process (Associations):  Logical and Linear   Thought process (Rate):  Within normal limits   Thought content: Within normal limits  Abnormal Perception:  None   Insight:  Good   Judgment:  Good     Plan: Continue with goals as outlined above    Andreea Vásquez Psy.D.,L.P  Answers for HPI/ROS submitted by the patient on 5/3/2023  If you checked off any problems, how difficult have these problems made it for you to do your work, take care of things at home, or get along with other people?: Somewhat difficult  PHQ9 TOTAL SCORE: 9      "

## 2023-05-17 ENCOUNTER — VIRTUAL VISIT (OUTPATIENT)
Dept: PSYCHIATRY | Facility: CLINIC | Age: 33
End: 2023-05-17
Attending: PSYCHOLOGIST
Payer: COMMERCIAL

## 2023-05-17 DIAGNOSIS — F33.1 MODERATE EPISODE OF RECURRENT MAJOR DEPRESSIVE DISORDER (H): Primary | ICD-10-CM

## 2023-05-17 DIAGNOSIS — F41.1 GAD (GENERALIZED ANXIETY DISORDER): ICD-10-CM

## 2023-05-17 PROCEDURE — 90834 PSYTX W PT 45 MINUTES: CPT | Mod: VID | Performed by: PSYCHOLOGIST

## 2023-05-17 NOTE — PROGRESS NOTES
OUTPATIENT PSYCHOTHERAPY PROGRESS NOTE    Client Name: Dariusz Leyva   YOB: 1990  Time of Service: 41 minutes   Service Type(s): Individual psychotherapy    VIDEO VISIT  Dariusz Leyva is a 32 year old who is being evaluated via a billable video visit.      Telehealth Details  Type of service:  psychotherapy  Time of service:    Start Time:  4:02 pm    End Time: 4:43 pm    Reason for Telehealth Visit: Patient has requested telehealth visit  Originating Site (patient location):  [facility]- [place]   Location- Patient's parked automobile  Distant Site (provider location):  Onsite  Mode of Communication:  Hernan      Diagnoses:   Unspecified depressive disorder and unspecified anxiety    Goals of therapy: Elevate mood and show evidence of usual energy levels, and activities.      Individuals Present:   Psychotherapy services during this visit included myself and Dariusz Leyva.    Narrative:  Dariusz reported that her mood is  decent  for the most part. She reported that she is needing to get up to use the bathroom. She is fatigue from that and being pregnant. She reported she is not doing much because she is tired. She denied SI. She indicated that her anxiety is higher than usual, because she reported she has lot of changes to make.      She had her first ultrasound - has a due date. Her due date is December 22nd.  Both the twins are the same size and normal and healthy. She is going to go through [facility] for care and she will be going to an OBGYN because with twins she is automatically high risk. She will see her OBGYN, Dr. Parks, next month.      Discussed possible classes she could take.      She went through the WIC process yesterday. She might apply for food stamps later.      Processed her experiences/feelings about the dad. She is unsure she wants to share the news.     She is looking for a new PCA - she has someone who can do it for the next month.         Additional Information:  The patient  "did not report medication changes.     Mental Status:  Appearance:  Neatly groomed   Behavior/relationship to examiner/demeanor:  cooperative  Motor activity/EPS:  Within normal limits  Speech rate:  Within normal limits  Speech volume:  Within normal limits  Speech articulation:  Within normal limits  Speech coherence: Within normal limits  Speech spontaneity: Within normal limits  Mood (subjective): \"decent\"  Affect (objective appearance): Euthymic  Thought Process (Associations):  Logical and Linear   Thought process (Rate):  Within normal limits   Thought content: Within normal limits  Abnormal Perception:  None   Insight:  Good   Judgment:  Good     Plan: Continue with goals as outlined above    Andreea Vásquez Psy.D.,L.P      "

## 2023-06-03 ENCOUNTER — HEALTH MAINTENANCE LETTER (OUTPATIENT)
Age: 33
End: 2023-06-03

## 2023-06-05 ENCOUNTER — VIRTUAL VISIT (OUTPATIENT)
Dept: SURGERY | Facility: CLINIC | Age: 33
End: 2023-06-05
Payer: COMMERCIAL

## 2023-06-05 DIAGNOSIS — E66.01 MORBID OBESITY WITH BMI OF 40.0-44.9, ADULT (H): Primary | ICD-10-CM

## 2023-06-05 PROCEDURE — 97803 MED NUTRITION INDIV SUBSEQ: CPT | Mod: VID | Performed by: DIETITIAN, REGISTERED

## 2023-06-05 NOTE — LETTER
6/5/2023         RE: Dariusz Leyva  [address]  [place] [address]        Dear Colleague,    Thank you for referring your patient, Dariusz Leyva, to the [facility] [place]. Please see a copy of my visit note below.    Dariusz Leyva is a 32 year old who is being evaluated via a billable telephone visit.      What phone number would you like to be contacted at? [phone]  How would you like to obtain your AVS? [facility]      Medical  Weight Loss Follow-Up Diet Evaluation  Assessment:  Dariusz is presenting today for a follow up weight management nutrition consultation. Pt has had an initial appointment with Dr. Walker  Weight loss medication: saxenda  Personal Goals: issue with stress eating, would like to get down to 220lb  +weight loss to have knee surgery   Pt's weight is 283 lb  Initial weight: 317lb  Weight change: was down 26 lb at previous visits    BMI: There is no height or weight on file to calculate BMI.  Ideal body weight: 62.7 kg (138 lb 5.4 oz)  Adjusted ideal body weight: 91.4 kg (201 lb 6.5 oz)    Estimated RMR ([place]-St Jeor equation):   2064 kcals x 1.2 (sedentary) = 2477 kcals (for weight maintenance)  Recommended Protein Intake:  grams of protein/day  Patient Active Problem List:  Patient Active Problem List   Diagnosis     Lumbago     Nonallopathic lesion of lumbar region     Nonallopathic lesion of sacral region     Pain in thoracic spine     Nonallopathic lesion of thoracic region     Abnormal Pap smear of cervix     Large tonsils     Morbid obesity with BMI of 45.0-49.9, adult (H)     MDD (major depressive disorder)     CARLY (generalized anxiety disorder)     Depression     Lipid screening     Chronic pain of right knee     Anxiety state     Injury of ligament of right knee     MDD (major depressive disorder), recurrent episode, moderate (H)     Painful orthopaedic hardware (H)     Tibial plateau fracture, right,  closed, initial encounter     Dyslipidemia     Limited mobility     Migraine with aura and without status migrainosus, not intractable     DDD (degenerative disc disease), cervical     DDD (degenerative disc disease), lumbar     Increased PTH level     Vitamin D deficiency     Progress on goals from last visit: She recently found out she was 11 weeks pregnant with twins. We discussed general nutrition for pregnancy.   +aiming to drink 64 oz bottle of water each day     Goals:  1. Meal plan lunch and dinner meals - Somewhat met   2. Aim to have a protein, vegetable, and carbohydrate at lunch and dinner - somewhat met, trying to add in a vegetable at lunch and dinner, and trying  3. Drinking more water - she is trying to finish a full 64 oz water bottle, but finds this challenging    Diet Recall:  Breakfast: honey, oatmeal, fruit   Trying to incorporate more veggies like carrots and celery    Beverages:   Water  Hasn't been drinking soda  But has drinking more juice  Nutrition Diagnosis:    Morbid Obesity related to overeating and poor lifestyle habits as evidenced by patient report of inconsistent meals, grazing in the evening and BMI 44.29    Intervention:  4. Food and/or nutrient delivery: aim for whole food protein, vegetable, and carb at each meal, continue working on meal planning  5. Nutrition education: discussed nutrition for pregnancy  6. Nutrition counseling: goal setting    Monitoring/Evaluation:    Goals:  1. Limit/avoid processed foods, focus on whole foods  2. Drinking more water - 64 oz bottle daily    Patient to follow up in 2 month(s) with RD    Virtual Visit Details    Type of service:  Video Visit     Video Start Time: 3:42 pm     Video End Time: 4:04 pm    Originating Location (pt. Location): Home    Distant Location (provider location):  Off-site  Platform used for Video Visit: Hernan Retana      Again, thank you for allowing me to participate in the care of your patient.         Sincerely,        Una Retana RD

## 2023-06-05 NOTE — PROGRESS NOTES
Dariusz Leyva is a 32 year old who is being evaluated via a billable video visit.        Medical  Weight Loss Follow-Up Diet Evaluation  Assessment:  Dariusz is presenting today for a follow up weight management nutrition consultation. Pt has had an initial appointment with Dr. Walker  Weight loss medication: saxenda  Personal Goals: issue with stress eating, would like to get down to 220lb  +weight loss to have knee surgery   Pt's weight is 283 lb  Initial weight: 317lb  Weight change: was down 26 lb at previous visits    BMI: There is no height or weight on file to calculate BMI.  Ideal body weight: 62.7 kg (138 lb 5.4 oz)  Adjusted ideal body weight: 91.4 kg (201 lb 6.5 oz)    Estimated RMR ([place]-St Jeor equation):   2064 kcals x 1.2 (sedentary) = 2477 kcals (for weight maintenance)  Recommended Protein Intake:  grams of protein/day  Patient Active Problem List:  Patient Active Problem List   Diagnosis     Lumbago     Nonallopathic lesion of lumbar region     Nonallopathic lesion of sacral region     Pain in thoracic spine     Nonallopathic lesion of thoracic region     Abnormal Pap smear of cervix     Large tonsils     Morbid obesity with BMI of 45.0-49.9, adult (H)     MDD (major depressive disorder)     CARLY (generalized anxiety disorder)     Depression     Lipid screening     Chronic pain of right knee     Anxiety state     Injury of ligament of right knee     MDD (major depressive disorder), recurrent episode, moderate (H)     Painful orthopaedic hardware (H)     Tibial plateau fracture, right, closed, initial encounter     Dyslipidemia     Limited mobility     Migraine with aura and without status migrainosus, not intractable     DDD (degenerative disc disease), cervical     DDD (degenerative disc disease), lumbar     Increased PTH level     Vitamin D deficiency     Progress on goals from last visit: She recently found out she was 11 weeks pregnant with twins. We discussed general nutrition for  pregnancy.   +aiming to drink 64 oz bottle of water each day     Goals:  1. Meal plan lunch and dinner meals - Somewhat met   2. Aim to have a protein, vegetable, and carbohydrate at lunch and dinner - somewhat met, trying to add in a vegetable at lunch and dinner, and trying  3. Drinking more water - she is trying to finish a full 64 oz water bottle, but finds this challenging    Diet Recall:  Breakfast: honey, oatmeal, fruit   Trying to incorporate more veggies like carrots and celery    Beverages:   Water  Hasn't been drinking soda  But has drinking more juice  Nutrition Diagnosis:    Morbid Obesity related to overeating and poor lifestyle habits as evidenced by patient report of inconsistent meals, grazing in the evening and BMI 44.29    Intervention:  4. Food and/or nutrient delivery: aim for whole food protein, vegetable, and carb at each meal, continue working on meal planning  5. Nutrition education: discussed nutrition for pregnancy  6. Nutrition counseling: goal setting    Monitoring/Evaluation:    Goals:  1. Limit/avoid processed foods, focus on whole foods  2. Drinking more water - 64 oz bottle daily    Patient to follow up in 2 month(s) with RD    Virtual Visit Details    Type of service:  Video Visit     Video Start Time: 3:42 pm     Video End Time: 4:04 pm    Originating Location (pt. Location): Home    Distant Location (provider location):  Off-site  Platform used for Video Visit: Hernan Retana

## 2023-06-07 ENCOUNTER — VIRTUAL VISIT (OUTPATIENT)
Dept: PSYCHIATRY | Facility: CLINIC | Age: 33
End: 2023-06-07
Attending: PSYCHOLOGIST
Payer: COMMERCIAL

## 2023-06-07 DIAGNOSIS — F41.1 GAD (GENERALIZED ANXIETY DISORDER): ICD-10-CM

## 2023-06-07 DIAGNOSIS — F33.1 MODERATE EPISODE OF RECURRENT MAJOR DEPRESSIVE DISORDER (H): Primary | ICD-10-CM

## 2023-06-07 PROCEDURE — 90837 PSYTX W PT 60 MINUTES: CPT | Mod: VID | Performed by: PSYCHOLOGIST

## 2023-06-07 NOTE — PROGRESS NOTES
OUTPATIENT PSYCHOTHERAPY PROGRESS NOTE    Client Name: Dariusz Leyva   YOB: 1990  Time of Service: 55 minutes   Service Type(s): Individual psychotherapy    VIDEO VISIT  Dariusz Leyva is a 32 year old who is being evaluated via a billable video visit.      Telehealth Details  Type of service:  psychotherapy  Time of service:    Start Time:  4:11 pm    End Time: 5:06 pm    Reason for Telehealth Visit: Patient has requested telehealth visit  Originating Site (patient location):  [facility]   Location- Patient's parked automobile  Distant Site (provider location):  Offsite  Mode of Communication:  [employer] App      Diagnoses:   Unspecified depressive disorder and unspecified anxiety    Goals of therapy: Elevate mood and show evidence of usual energy levels, and activities.      Individuals Present:   Psychotherapy services during this visit included myself and Dariusz Leyva.    Narrative:  Dariusz reported she is feeling  not great.  She indicated that she is aware of heightened emotions - including anger and loneliness. She reported that she has had passive of thoughts of  something happening to her.  She denied wanting to hurt herself - but she has thoughts of not wanting to be here.      She reported she is always tired. Sometimes she gets a nap after work or she will fall asleep really early like 8pm. She has sleep disturbance due to needing to go to the bathroom. She usually can go back to sleep, but not always. She has not been eating as much - she has a hard time finding things she wants to eat. She has some enjoyment - she has been enjoying time with a new partner.      Her leg is getting worse which is making it harder on her.      Her neighbor started as her PCA.     Talked about what might feel her feel supported and she was unsure. But she named loneliness as her primary concern she would like to address.         Additional Information:  The patient did not report medication changes.  "    Mental Status:  Appearance:  Neatly groomed   Behavior/relationship to examiner/demeanor:  cooperative  Motor activity/EPS:  Within normal limits  Speech rate:  Within normal limits  Speech volume:  Within normal limits  Speech articulation:  Within normal limits  Speech coherence: Within normal limits  Speech spontaneity: Within normal limits  Mood (subjective): \"not great\"  Affect (objective appearance): subdued, with some periods of dysphoria  Thought Process (Associations):  Logical and Linear   Thought process (Rate):  Within normal limits   Thought content: Within normal limits  Abnormal Perception:  None   Insight:  Good   Judgment:  Good     Plan: Continue with goals as outlined above    Andreea Vásquez Psy.D.,L.P    "

## 2023-06-14 ENCOUNTER — VIRTUAL VISIT (OUTPATIENT)
Dept: PSYCHIATRY | Facility: CLINIC | Age: 33
End: 2023-06-14
Attending: PSYCHOLOGIST
Payer: COMMERCIAL

## 2023-06-14 DIAGNOSIS — F41.1 GAD (GENERALIZED ANXIETY DISORDER): ICD-10-CM

## 2023-06-14 DIAGNOSIS — F33.1 MODERATE EPISODE OF RECURRENT MAJOR DEPRESSIVE DISORDER (H): Primary | ICD-10-CM

## 2023-06-14 PROCEDURE — 90837 PSYTX W PT 60 MINUTES: CPT | Mod: VID | Performed by: PSYCHOLOGIST

## 2023-06-14 NOTE — PROGRESS NOTES
OUTPATIENT PSYCHOTHERAPY PROGRESS NOTE    Client Name: Dariusz Leyva   YOB: 1990  Time of Service: 68 minutes   Service Type(s): Individual psychotherapy    VIDEO VISIT  Dariusz Leyva is a 32 year old who is being evaluated via a billable video visit.      Telehealth Details  Type of service:  psychotherapy  Time of service:    Start Time:  4:02 pm    End Time: 5:10 pm    Reason for Telehealth Visit: Patient has requested telehealth visit  Originating Site (patient location):  [facility]- [place]   Location- Patient's parked automobile  Distant Site (provider location):  Offsite  Mode of Communication:  Hernan      Diagnoses:   Unspecified depressive disorder and unspecified anxiety    Goals of therapy: Elevate mood and show evidence of usual energy levels, and activities.      Individuals Present:   Psychotherapy services during this visit included myself and Yadirafadumo Gordon.    Narrative:  Dariusz reported that she has been  up and down . She explained that she has been thinking about things a lot and not thinking about things. She says has been stressed thinking about things that might not be achievable, like moving. She is not feeling confident about where she lives and has not wanted to bring in someone else into her apartment lately. In particular a person she is dating (M). She feels comfortable being around him and she is not getting ques that would indicate there are red flags.      She is questioning her decision to keep the twins. She is thinking about giving them up for adoption. She is weighing her options. She said,  I am not sure I have the capacity to be a mom.  She indicated,  All I know is how to survive.       She is unsure that she can trust people. People have suggested getting help from her family and she is unsure of getting that support. She talked about having  middle child syndrome  - wanting to be  seen.  She does not want to be like her mom. She saw her mother put her  "partner first and she also does not certain behaviors to be modeled around her children - so she does not think she can get help from her family.      Talked about her experiences with her OBGYN and some of the limits that she has with her pregnancy because she is pregnant with twins.         Additional Information:  The patient did not report medication changes.     Mental Status:  Appearance:  Neatly groomed   Behavior/relationship to examiner/demeanor:  cooperative  Motor activity/EPS:  Within normal limits  Speech rate:  Within normal limits  Speech volume:  Within normal limits  Speech articulation:  Within normal limits  Speech coherence: Within normal limits  Speech spontaneity: Within normal limits  Mood (subjective): \"up and down\"  Affect (objective appearance): generally euthymic, with some periods of dysphoria  Thought Process (Associations):  Logical and Linear   Thought process (Rate):  Within normal limits   Thought content: Within normal limits  Abnormal Perception:  None   Insight:  Good   Judgment:  Good     Plan: Continue with goals as outlined above    Andreea Vásquez Psy.D.,L.P  "

## 2023-06-21 ENCOUNTER — VIRTUAL VISIT (OUTPATIENT)
Dept: PSYCHIATRY | Facility: CLINIC | Age: 33
End: 2023-06-21
Attending: PSYCHOLOGIST
Payer: COMMERCIAL

## 2023-06-21 DIAGNOSIS — F33.1 MODERATE EPISODE OF RECURRENT MAJOR DEPRESSIVE DISORDER (H): ICD-10-CM

## 2023-06-21 DIAGNOSIS — F41.1 GAD (GENERALIZED ANXIETY DISORDER): Primary | ICD-10-CM

## 2023-06-21 PROCEDURE — 90837 PSYTX W PT 60 MINUTES: CPT | Mod: VID | Performed by: PSYCHOLOGIST

## 2023-06-21 NOTE — PROGRESS NOTES
"OUTPATIENT PSYCHOTHERAPY PROGRESS NOTE    Client Name: Dariusz Leyva   YOB: 1990  Time of Service: 63 minutes   Service Type(s): Individual psychotherapy    VIDEO VISIT  Dariusz Leyva is a 32 year old who is being evaluated via a billable video visit.      Telehealth Details  Type of service:  psychotherapy  Time of service:    Start Time:  4:02 pm    End Time: 5:05 pm    Reason for Telehealth Visit: Patient has requested telehealth visit  Originating Site (patient location):  [facility]- [place]   Location- Patient's parked automobile  Distant Site (provider location):  Offsite  [facility]      Diagnoses:   Unspecified depressive disorder and unspecified anxiety    Goals of therapy: Elevate mood and show evidence of usual energy levels, and activities.      Individuals Present:   Psychotherapy services during this visit included myself and Dariusz Leyva.    Narrative:  Dariusz reported her mood was  good, but I am kind of upset myself.  She has decided to tell the person she is seeing about the pregnancy because she views the relationship differently now, but she is scared about his reaction. She is processing a lot of things. Treatment focused on considering how to prepare for his response.      Additional Information:  The patient did not report medication changes.     Mental Status:  Appearance:  Neatly groomed   Behavior/relationship to examiner/demeanor:  cooperative  Motor activity/EPS:  Within normal limits  Speech rate:  Within normal limits  Speech volume:  Within normal limits  Speech articulation:  Within normal limits  Speech coherence: Within normal limits  Speech spontaneity: Within normal limits  Mood (subjective): \"good, but I am kind of upset myself\"  Affect (objective appearance): generally euthymic, with some periods of dysphoria  Thought Process (Associations):  Logical and Linear   Thought process (Rate):  Within normal limits   Thought content: Within normal " limits  Abnormal Perception:  None   Insight:  Good   Judgment:  Good     Plan: Continue with goals as outlined above    Andreea Vásquez Psy.D.,L.P

## 2023-06-28 ENCOUNTER — VIRTUAL VISIT (OUTPATIENT)
Dept: PSYCHIATRY | Facility: CLINIC | Age: 33
End: 2023-06-28
Attending: PSYCHOLOGIST
Payer: COMMERCIAL

## 2023-06-28 DIAGNOSIS — F41.1 GAD (GENERALIZED ANXIETY DISORDER): Primary | ICD-10-CM

## 2023-06-28 DIAGNOSIS — F33.1 MODERATE EPISODE OF RECURRENT MAJOR DEPRESSIVE DISORDER (H): ICD-10-CM

## 2023-06-28 PROCEDURE — 90837 PSYTX W PT 60 MINUTES: CPT | Mod: VID | Performed by: PSYCHOLOGIST

## 2023-06-28 NOTE — PROGRESS NOTES
OUTPATIENT PSYCHOTHERAPY PROGRESS NOTE    Client Name: Dariusz Leyva   YOB: 1990  Time of Service: 61 minutes   Service Type(s): Individual psychotherapy    VIDEO VISIT  Dariusz Leyva is a 32 year old who is being evaluated via a billable video visit.      Telehealth Details  Type of service:  psychotherapy  Time of service:    Start Time:  4:05 pm    End Time: 5:06 pm    Reason for Telehealth Visit: Patient has requested telehealth visit  Originating Site (patient location):  [facility]- [place]   Location- Patient's parked automobile  Distant Site (provider location):  Offsite  Mode of Communication:  [employer]      Diagnoses:   Unspecified depressive disorder and unspecified anxiety    Goals of therapy: Elevate mood and show evidence of usual energy levels, and activities.      Individuals Present:   Psychotherapy services during this visit included myself and Yadirafadumo Gordon.    Narrative:  Dariusz reported that she shared that she was pregnant with her the person she is dating and it went okay. She is now wondering if he likes her or if it is something casual for him.      She reported that since she was younger, she can fixate on things. It can be people. She wonders  I know I like this person, but I am not sure he feels the same.  This then can get something she focuses on. She reported doubts of  measuring up to other women.  She reported that sometimes she feels all she has is  the stories of bad things that have happened to her.  She indicated that she used to be proud of the things she accomplished and she does not feel like she has done much lately. Considered when this shift might have started. She recalled a history of getting injured and this seems to be interacted to herself doubts. People s ableist responses have also solidified her feelings.      She reported that she wants to stop feeling like she does not matter or that she will always be alone. Her father has offered her support with  the twins, but she does not feel comfortable with his family (step mother and step brother). She just announced her pregnancy to her extended family and only two people responded. She questions the support she will have when she has her kids.      Discussed experiences where she has put other first and has not had the chance to be put first. She feels lonely within this experience.      Additional Information:  The patient did not report medication changes.     Mental Status:  Appearance:  Neatly groomed   Behavior/relationship to examiner/demeanor:  cooperative  Motor activity/EPS:  Within normal limits  Speech rate:  Within normal limits  Speech volume:  Within normal limits  Speech articulation:  Within normal limits  Speech coherence: Within normal limits  Speech spontaneity: Within normal limits  Mood (subjective): NA  Affect (objective appearance): euthymic  Thought Process (Associations):  Logical and Linear   Thought process (Rate):  Within normal limits   Thought content: Within normal limits  Abnormal Perception:  None   Insight:  Good   Judgment:  Good     Plan: Continue with goals as outlined above    Andreea Vásquez Psy.D.,L.P

## 2023-07-05 ENCOUNTER — VIRTUAL VISIT (OUTPATIENT)
Dept: PSYCHIATRY | Facility: CLINIC | Age: 33
End: 2023-07-05
Attending: PSYCHOLOGIST
Payer: COMMERCIAL

## 2023-07-05 ENCOUNTER — BEH TREATMENT PLAN (OUTPATIENT)
Dept: PSYCHIATRY | Facility: CLINIC | Age: 33
End: 2023-07-05
Payer: COMMERCIAL

## 2023-07-05 DIAGNOSIS — F33.1 MODERATE EPISODE OF RECURRENT MAJOR DEPRESSIVE DISORDER (H): Primary | ICD-10-CM

## 2023-07-05 PROCEDURE — 90837 PSYTX W PT 60 MINUTES: CPT | Mod: VID | Performed by: PSYCHOLOGIST

## 2023-07-05 NOTE — PROGRESS NOTES
OUTPATIENT PSYCHOTHERAPY PROGRESS NOTE    Client Name: Dariusz Leyva   YOB: 1990  Time of Service: 64 minutes   Service Type(s): Individual psychotherapy    VIDEO VISIT  Dariusz Leyva is a 32 year old who is being evaluated via a billable video visit.      Telehealth Details  Type of service:  psychotherapy  Time of service:    Start Time:  4:02 pm    End Time: 5:06 pm    Reason for Telehealth Visit: Patient has requested telehealth visit  Originating Site (patient location):  [facility]   Location- Patient's parked automobile  Distant Site (provider location):  Offsite  Mode of Communication:  [place]ernan      Diagnoses:   Unspecified depressive disorder and unspecified anxiety    Goals of therapy: Elevate mood and show evidence of usual energy levels, and activities.      Individuals Present:   Psychotherapy services during this visit included myself and Dariusz Leyva.    Narrative:  Dariusz reported her mood is  not super great, but not bad either, so just neutral.  She is having a hard time going to sleep earlier. She is getting about 5-6 hours of sleep. She does feel rested until she gets to work - that  sucks  the energy out of her. She went shopping with her friend on Sunday - other than that she has not had much going on (hard to assess for anhedonia). She reported having thoughts of suicide last week - without intent to act, but she indicated that this week her  rational mind  is in control. She rated her stress at an 80 (1-100) - she is trying to move and will need help to do so. She would like to get away from the environment she is in, because it is not safe.      She has had some conversations with  M  since she told her about the baby. She is trying to remind herself that if it does not work out with him  it will not be the end of the world.  She is trying to cope with the possibility of being on her own. She said,  I am used to men not choosing me.  She is also considering thinking  about how to present herself to other - she reported that she tends to focus on bad things that happened in the past. Processed her thoughts about relationships.     Reviewed treatment plan. Patient verbally agreed to the plan.      Additional Information:  The patient did not report medication changes.     Mental Status:  Appearance:  Neatly groomed   Behavior/relationship to examiner/demeanor:  cooperative  Motor activity/EPS:  Within normal limits  Speech rate:  Within normal limits  Speech volume:  Within normal limits  Speech articulation:  Within normal limits  Speech coherence: Within normal limits  Speech spontaneity: Within normal limits  Mood (subjective):  not super great, but not bad either, so just neutral   Affect (objective appearance): euthymic  Thought Process (Associations):  Logical and Linear   Thought process (Rate):  Within normal limits   Thought content: Within normal limits  Abnormal Perception:  None   Insight:  Good   Judgment:  Good     Plan: Continue with goals as outlined above    Andreea Vásquez Psy.D.,L.P

## 2023-07-05 NOTE — PROGRESS NOTES
"OUTPATIENT TREATMENT PLAN SUMMARY    Date of Treatment Plan: 7/5/23  90-Day Review Date: 10/5/23  Date of Initial Service: 9/17/18       1. DSM-V Diagnosis (include numeric code)  Dysthymic Disorder (F34.1)    2. Current symptoms and circumstances that substantiate the diagnosis:  Patient reported fairly consistent low mood, some disrupted sleep, fatigue, periods of suicidal ideation, periods of anhedonia (not enjoying cello, painting, etc),     She endorsed symptoms of anxiety including, but does not meet criteria for a specific anxiety disorder.     3. How symptoms and/or behaviors are affecting level of function:   Isolation, struggle with self-care at times, avoiding some relationships and activities. She also did not pass two of her masters classes last semester.     4. Risk Assessment:  Suicide:  Assessed Level of Immediate Risk: Low  Ideation: not current, but recent  Plan:  No  Means: No  Intent: No    Homicide/Violence:  Assessed Level of Immediate Risk: None  Ideation: No  Plan: No  Means: No  Intent: No    If on a medication, please include name and dosage: No psychotropic medications        Symptom/Problem Measurable Goals Interventions Gains Made   1.negative thinking 1. In the therapeutic environment and during stressful situations, Dariusz will learn to identify her pessimistic thoughts and challenge them 1.CBT: Problem solving, skill building and psychoeducation 1. She reports insight into her negative thoughts, she sometimes is successful in addressing those thoughts unless there is a lot of stress   2.  Obsessive thinking - impacts mood 2. Per patient report, will have reduction in symptoms 2. CBT 2. NA   3. Unhealthy relationships (wants to let toxic people \"go\") 3. Per patient report 3. CBT  3. Still working on this, but her approach has been different       5. Frequency of Sessions: Weekly    6. Discharge and Aftercare Goals: discharge on completion of goals    7. Expected duration of " treatment:  To be reassessed in 90 days    8. Participants in therapy plan (family, friends, support network): client and therapist      See scanned document for Acknowledgement of Current Treatment Plan      Regulatory Guidelines for Updating Treatment Plan  [place] Medical Assistance: Reviewed & signed at least every 90days  Medicare:  Update per policy

## 2023-07-10 ENCOUNTER — OFFICE VISIT (OUTPATIENT)
Dept: FAMILY MEDICINE | Facility: CLINIC | Age: 33
End: 2023-07-10
Payer: COMMERCIAL

## 2023-07-10 VITALS
SYSTOLIC BLOOD PRESSURE: 131 MMHG | HEART RATE: 80 BPM | TEMPERATURE: 98.2 F | DIASTOLIC BLOOD PRESSURE: 86 MMHG | WEIGHT: 293 LBS | BODY MASS INDEX: 47.39 KG/M2 | RESPIRATION RATE: 16 BRPM | OXYGEN SATURATION: 99 %

## 2023-07-10 DIAGNOSIS — M25.471 RIGHT ANKLE SWELLING: ICD-10-CM

## 2023-07-10 DIAGNOSIS — G43.009 MIGRAINE WITHOUT AURA AND WITHOUT STATUS MIGRAINOSUS, NOT INTRACTABLE: Primary | ICD-10-CM

## 2023-07-10 PROCEDURE — 99214 OFFICE O/P EST MOD 30 MIN: CPT | Mod: GC

## 2023-07-10 RX ORDER — MAGNESIUM OXIDE 400 MG/1
400 TABLET ORAL DAILY
Qty: 30 TABLET | Refills: 0 | Status: SHIPPED | OUTPATIENT
Start: 2023-07-10 | End: 2024-06-04

## 2023-07-10 RX ORDER — METOCLOPRAMIDE 10 MG/1
10 TABLET ORAL 4 TIMES DAILY PRN
Qty: 90 TABLET | Refills: 0 | Status: SHIPPED | OUTPATIENT
Start: 2023-07-10 | End: 2024-06-04

## 2023-07-10 RX ORDER — RIBOFLAVIN (VITAMIN B2) 400 MG
400 TABLET ORAL EVERY MORNING
Qty: 30 TABLET | Refills: 0 | Status: SHIPPED | OUTPATIENT
Start: 2023-07-10 | End: 2024-06-04

## 2023-07-10 NOTE — PROGRESS NOTES
Assessment & Plan     Right ankle swelling  The patient has been experiencing right ankle pain and swelling for the past two weeks whenever she is on her feet for over 15 minutes. Most likely this is musculoskeletal in origin due to her surgical history and onset of pain with activity. We would like to rule out a DVT first d/t the higher risk during pregnancy. Least likely is a fracture as there is no focal point tenderness and she is able to bear weight on it.  - Ice ankle regularly to keep swelling down.  - Recommended wearing compression socks to help with blood flow and swelling.  - Discussed that tylenol is safe to use in pregnancy to help with pain.  - Recommended physical therapy to help with muscle tightness; however, patient is stressed about time off from work so may pursue this later  - Ordered LE ultrasound to rule out DVT   - US Lower Extremity Venous Duplex Right    Migraine without aura and without status migrainosus, not intractable  The patient has been having worsening migraines since becoming pregnant, occurring almost every other day. History also concerning for tension type though many symptoms more consistent with migraine. She is not currently taking medication for them as she is pregnant.  - Discussed that it is safe to take tylenol during pregnancy. Recommended to take tylenol with Reglan at onset of migraine.   - metoclopramide (REGLAN) 10 MG tablet  Dispense: 90 tablet; Refill: 0  - Start magnesium oxide and riboflavin daily for migraine prophylaxis.   - magnesium oxide (MAG-OX) 400 MG tablet  Dispense: 30 tablet; Refill: 0   - Riboflavin 400 MG TABS  Dispense: 30 tablet; Refill: 0    Pregnancy  The patient is experiencing some struggles with work being accommodating of her pain and pregnancy. She would like forms filled out for work restrictions. I think this is reasonable due to the symptoms she is experiencing with her pregnancy.  - Received forms and will fill them out and let  patient know when they are ready to be picked up.    Kalina Vizcaino, MS3    I was present with the medical student who participated in the service and in the documentation of this note. I have verified the history and personally performed the physical exam and medical decision making, and have verified the content of the note, which accurately reflects my assessment of the patient and the plan of care.     Betsy Hernandes MD PGY3  [facility]      Subjective   Dariusz is a 32 year old, presenting for the following health issues:  Other (Pregnancy issues she wants to talk to  And needs special accomodation. Cant walk more than 15 min with foot and ankle pain for the past 2 weeks  /Wants breast pump )        11/25/2022     8:38 AM   Additional Questions   Roomed by noemy   Accompanied by self     HPI     Dariusz is a 32 year old woman with a pertinent medical history of multiple right knee surgeries and who is currently 4 months pregnant with twins who present today for right foot and ankle pain and worsening migraines. She reports that for the past couple weeks she can only walk 15 minutes before she will experience pain in her right ankle. She describes it as a sharp pain in her medial ankle all the way to the sole of her foot. It is also tender to palpation. She says there is also swelling of her ankle all the way up to her knee after being on her feet over 15 minutes as well. She has not tried any pain medication at this time due to her pregnancy.    Regarding her migraines, Dariusz states that she has had migraines in the past but they have gotten more frequent since her pregnancy started, and they now occur about every other day. She has tried baby aspirin with no relief. The migraines are associated with light and sound sensitivity and pressure behind the eyes, as well as headache at the back of her head.    Dariusz is also wondering whether she can have some forms filled out for  getting work restrictions. She is mostly interested in leniency from her workplace related to her pain and her pregnancy, such as more breaks during the day to go to the bathroom and walk around. Of note, she experiences a lot of pressure in her lower abdomen when she is standing up, and also has some pain in her buttocks when she sits for a long time.      Review of Systems   Constitutional, HEENT, cardiovascular, pulmonary, gi and gu systems are negative, except as otherwise noted.      Objective    /86 (BP Location: Left arm, Patient Position: Sitting, Cuff Size: Adult Regular)   Pulse 80   Temp 98.2  F (36.8  C) (Oral)   Resp 16   Wt 137.3 kg (302 lb 9.6 oz)   SpO2 99%   BMI 47.39 kg/m    Body mass index is 47.39 kg/m .     Physical Exam   GENERAL: healthy, alert and no distress  MS: Mild swelling present on the right ankle and lower leg. Diffuse tenderness to palpation along the right ankle joint line. Tightness of the right calf with decreased range of motion of the right ankle.  PSYCH: mentation appears normal, affect normal

## 2023-07-10 NOTE — PATIENT INSTRUCTIONS
We will call when the form is complete! If you don't hear by the end of the week please message or call to set up     We started 2 daily medicines to prevent migraine. One can cause a bit of stomach upset and diarrhea, take with food. Another med to start is only when needed for migraine plan, take this with tylenol as needed.

## 2023-07-12 ENCOUNTER — TELEPHONE (OUTPATIENT)
Dept: FAMILY MEDICINE | Facility: CLINIC | Age: 33
End: 2023-07-12

## 2023-07-12 ENCOUNTER — HOSPITAL ENCOUNTER (OUTPATIENT)
Dept: ULTRASOUND IMAGING | Facility: HOSPITAL | Age: 33
Discharge: HOME | End: 2023-07-12
Attending: FAMILY MEDICINE | Admitting: FAMILY MEDICINE
Payer: COMMERCIAL

## 2023-07-12 ENCOUNTER — VIRTUAL VISIT (OUTPATIENT)
Dept: PSYCHIATRY | Facility: CLINIC | Age: 33
End: 2023-07-12
Attending: PSYCHOLOGIST
Payer: COMMERCIAL

## 2023-07-12 DIAGNOSIS — F33.1 MODERATE EPISODE OF RECURRENT MAJOR DEPRESSIVE DISORDER (H): Primary | ICD-10-CM

## 2023-07-12 DIAGNOSIS — M25.471 RIGHT ANKLE SWELLING: ICD-10-CM

## 2023-07-12 DIAGNOSIS — F41.1 GAD (GENERALIZED ANXIETY DISORDER): ICD-10-CM

## 2023-07-12 PROCEDURE — 93971 EXTREMITY STUDY: CPT | Mod: RT

## 2023-07-12 PROCEDURE — 90837 PSYTX W PT 60 MINUTES: CPT | Mod: VID | Performed by: PSYCHOLOGIST

## 2023-07-12 NOTE — TELEPHONE ENCOUNTER
Called patient.  Need patient to write down list of job description to attach to form in order to complete form.  Pt's will drop off list of job description today or tomorrow.    Denilson Guzman MA

## 2023-07-12 NOTE — PROGRESS NOTES
OUTPATIENT PSYCHOTHERAPY PROGRESS NOTE    Client Name: Dariusz Leyva   YOB: 1990  Time of Service: 60 minutes   Service Type(s): Individual psychotherapy    VIDEO VISIT  Dariusz Leyva is a 32 year old who is being evaluated via a billable video visit.      Telehealth Details  Type of service:  psychotherapy  Time of service:    Start Time:  4:10 pm    End Time: 5:10 pm    Reason for Telehealth Visit: Patient has requested telehealth visit  Originating Site (patient location):  [facility]- [place]   Location- Patient's parked automobile  Distant Site (provider location):  Offsite  Mode of Communication:  Hernan      Diagnoses:   Unspecified depressive disorder and unspecified anxiety    Goals of therapy: Elevate mood and show evidence of usual energy levels, and activities.      Individuals Present:   Psychotherapy services during this visit included myself and Yadirafadumo Gordon.    Narrative:  Dariusz reported her mood is  stressed  and her blood pressure has been high. She was informed today that her company does not have a maternity leave policy. She can apply for disability, which would give her either 6-8 weeks. She indicated that she will not get her full pay check. She is stressed about how she will be able to work and take care of two children on her own and she does not have support.      She reported that she was doing okay for about a week, but she noticed that not soon after she will get bad news. She reported on some of the concerns she has been dealing with at work. She is not sure what to do - there are reason she would be hesitant to look for a new job right now.      Discussed talking to a  about options for healthcare and possibly housing. Will send a referral.    Additional Information:  The patient did not report medication changes.     Mental Status:  Appearance:  Neatly groomed   Behavior/relationship to examiner/demeanor:  cooperative  Motor activity/EPS:  Within normal  limits  Speech rate:  Within normal limits  Speech volume:  Within normal limits  Speech articulation:  Within normal limits  Speech coherence: Within normal limits  Speech spontaneity: Within normal limits  Mood (subjective):  stressed   Affect (objective appearance): Mood congruent - periods of sadness/crying  Thought Process (Associations):  Logical and Linear   Thought process (Rate):  Within normal limits   Thought content: Within normal limits  Abnormal Perception:  None   Insight:  Good   Judgment:  Good     Plan: Continue with goals as outlined above    Andreea Vásquez Psy.D.,L.P  Answers for HPI/ROS submitted by the patient on 7/12/2023  If you checked off any problems, how difficult have these problems made it for you to do your work, take care of things at home, or get along with other people?: Somewhat difficult  PHQ9 TOTAL SCORE: 5

## 2023-07-14 ENCOUNTER — PATIENT OUTREACH (OUTPATIENT)
Dept: CARE COORDINATION | Facility: CLINIC | Age: 33
End: 2023-07-14
Payer: COMMERCIAL

## 2023-07-14 ASSESSMENT — ACTIVITIES OF DAILY LIVING (ADL): DEPENDENT_IADLS:: INDEPENDENT

## 2023-07-14 NOTE — PROGRESS NOTES
Clinic Care Coordination Contact    Clinic Care Coordination Contact  OUTREACH    Referral Information:  Referral Source: Care Team (Andreea Vásquez)    Primary Diagnosis: Psychosocial    Chief Complaint   Patient presents with     Clinic Care Coordination - Initial      Universal Utilization:  Clinic Utilization  Difficulty keeping appointments: No  Compliance Concerns: No  No-Show Concerns: No  No PCP office visit in Past Year: Yes  Utilization    Hospital Admissions  0             ED Visits  0             No Show Count (past year)  3                Current as of: 7/13/2023 11:51 AM            Clinical Concerns:  Current Medical Concerns: Did not discuss.    Current Behavioral Concerns:     [facility] contacted patient to follow up on referral sent by Andreea Vásqeuz. Patient states that she is due with twins in December. Her current apartment is in a dangerous area, and is infested with cockroaches, mites, and other rodents. Patient's most immediate concerns is to find a two-bedroom apartment in a safer area. Patient is open to move out of [place], but would like to stay in [facility]. Patient's budget is $1,200 max per month. Patient denies any further concerns at this time.    Education Provided to patient: Patient was provided with resources for affordable two-bedroom apartments in [facility].      Health Maintenance Reviewed: Due/Overdue     Medication Management:  Medication review status: Did not discuss.     Functional Status:  Dependent ADLs: Independent  Dependent IADLs: Independent  Mobility Status: Independent  Fallen 2 or more times in the past year?: No  Any fall with injury in the past year?: No    Living Situation:  Current living arrangement: I live alone  Type of residence: Apartment    Lifestyle & Psychosocial Needs:    Social Determinants of Health     Tobacco Use: Low Risk  (7/10/2023)    Patient History      Smoking Tobacco Use: Never      Smokeless Tobacco Use: Never      Passive Exposure: Not on  file   Alcohol Use: Not on file   Financial Resource Strain: Not on file   Food Insecurity: Not on file   Transportation Needs: Not on file   Physical Activity: Not on file   Stress: Not on file   Social Connections: Not on file   Intimate Partner Violence: Not on file   Depression: Not at risk (7/12/2023)    PHQ-2      PHQ-2 Score: 1   Housing Stability: Not on file     Diet: Regular  Tube Feeding: No  Transportation means: Regular car     Church or spiritual beliefs that impact treatment: No  Chemical Dependency Status: No Current Concerns  Informal Support system: Family, Friends      Resources and Interventions:  Current Resources:      Community Resources: County Programs, [facility]  Supplies Currently Used at Home: None  Equipment Currently Used at Home: none  Employment Status: employed full-time    Advance Care Plan/Directive  Advanced Care Plans/Directives on file: No  Advanced Care Plan/Directive Status: Not Applicable    Referrals Placed: [facility]    Care Plan:  Care Plan: Housing Instability     Problem: SDOH LACK OF STABLE HOUSING     Long-Range Goal: Establish Stable Housing     Start Date: 7/14/2023 Expected End Date: 11/1/2023    This Visit's Progress: 10%    Priority: High    Note:     Barriers: Complex mental health, lack of finances.  Strengths: Strong support system.  Patient expressed understanding of goal: Yes.  Action steps to achieve this goal:  1. I will explore housing options throughout [facility].  2. I will review the housing resources sent to me by [facility].  3. I will reach out to Angelica with any questions or concerns.                      Patient/Caregiver understanding: Patient verbalized understanding, engaged in AIDET communication during patient encounter.    Outreach Frequency: monthly  Future Appointments              In 5 days Andreea Vásquez, PhD [facility] Mental Health & Addiction [facility], [facility] CLIN    In 3 weeks Andreea Vásquez, PhD [facility]  [place] Mental [facility] & Addiction [facility], [facility] MSA CLIN    In 1 month Andreea Vásquez, PhD [facility] Mental Health & Addiction [facility], [facility] M[facility] CLIN    In 1 month Andreea Vásquez, PhD [facility] Mental [facility] & Addiction [facility], [facility] M[facility] CLIN    In 1 month Una Retana RD [facility], [facility] MPLW    In 1 month Andreea Vásquez, PhD [facility] Mental [facility] & Addiction [facility], [facility] CLIN        Plan: [facility] will send housing resources to patient via [facility]. [facility] will follow up with patient in one month.    GAGE Ng  Clinic Care Coordination  [facility]  [email or web address]  [phone]

## 2023-07-19 ENCOUNTER — VIRTUAL VISIT (OUTPATIENT)
Dept: PSYCHIATRY | Facility: CLINIC | Age: 33
End: 2023-07-19
Attending: PSYCHOLOGIST
Payer: COMMERCIAL

## 2023-07-19 DIAGNOSIS — F33.1 MODERATE EPISODE OF RECURRENT MAJOR DEPRESSIVE DISORDER (H): Primary | ICD-10-CM

## 2023-07-19 DIAGNOSIS — F41.1 GAD (GENERALIZED ANXIETY DISORDER): ICD-10-CM

## 2023-07-19 PROCEDURE — 90837 PSYTX W PT 60 MINUTES: CPT | Mod: VID | Performed by: PSYCHOLOGIST

## 2023-07-19 NOTE — PROGRESS NOTES
OUTPATIENT PSYCHOTHERAPY PROGRESS NOTE    Client Name: Dariusz Leyva   YOB: 1990  Time of Service: 64 minutes   Service Type(s): Individual psychotherapy    VIDEO VISIT  Dariusz Leyva is a 32 year old who is being evaluated via a billable video visit.      Telehealth Details  Type of service:  psychotherapy  Time of service:    Start Time:  4:01 pm    End Time: 5:05 pm    Reason for Telehealth Visit: Patient has requested telehealth visit  Originating Site (patient location):  [facility]- [place]   Location- Patient's parked automobile  Distant Site (provider location):  Offsite  Mode of Communication:  Hernan      Diagnoses:   Unspecified depressive disorder and unspecified anxiety    Goals of therapy: Elevate mood and show evidence of usual energy levels, and activities.      Individuals Present:   Psychotherapy services during this visit included myself and Yadirafadumo Gordon.    Narrative:  Dariusz reported she is a little sleepy today - she indicated that she is tired from work. She falls asleep when she gets home and then she stays up later. She reported her mood is  okay, but a little anxious.  She also indicated that she is feeling lonely. She is looking forward to going to a party with [employer] on Saturday.      Discussed her relationship with [employer] and what she might want to do.     She reported that she was doing okay for about a week, but she noticed that not soon after she will get bad news. She reported on some of the concerns she has been dealing with at work. She is not sure what to do - there are reason she would be hesitant to look for a new job right now.      Discussed talking to a  about options for healthcare and possibly housing. Will send a referral.    Additional Information:  The patient did not report medication changes.     Mental Status:  Appearance:  Neatly groomed   Behavior/relationship to examiner/demeanor:  cooperative  Motor activity/EPS:  Within normal  limits  Speech rate:  Within normal limits  Speech volume:  Within normal limits  Speech articulation:  Within normal limits  Speech coherence: Within normal limits  Speech spontaneity: Within normal limits  Mood (subjective):  okay but a little anxious   Affect (objective appearance): Mood congruent  Thought Process (Associations):  Logical and Linear   Thought process (Rate):  Within normal limits   Thought content: Within normal limits  Abnormal Perception:  None   Insight:  Good   Judgment:  Good     Plan: Continue with goals as outlined above    Andreea Vásquez Psy.D.,L.P

## 2023-08-01 NOTE — PHARMACY-ADMISSION MEDICATION HISTORY
Admission Medication History Completed by Pharmacy    See [facility] Admission Navigator for allergy information, preferred outpatient pharmacy, prior to admission medications and immunization status.     Medication History Sources:     Patient    Pharmacy fill history via [facility]-SU    Changes made to PTA medication list (reason):    Added: None    Deleted: Tylenol 500 mg tablets, cyclobenzaprine 10 mg tablets, Nuvaring, metronidazole gel, Bactrim (not taking per pt)    Changed: cyclobenzaprine changed to prn (per pt)    Additional Information:    Pt has prescription for ibuprofen 800 mg tablets but has not picked up yet    Prior to Admission medications    Medication Sig Last Dose Taking? Auth Provider   acetaminophen (TYLENOL) 325 MG tablet Take 1-2 tablets (325-650 mg) by mouth every 6 hours as needed for mild pain Past Week at Unknown time Yes Andrae Curtis MD   cetirizine (ZYRTEC) 10 MG tablet Take 1 tablet (10 mg) by mouth daily Past Week at Unknown time Yes Caridad Chawla MD   cyclobenzaprine (FLEXERIL) 5 MG tablet Take 1 tablet (5 mg) by mouth 3 times daily  Patient taking differently: Take 5 mg by mouth 3 times daily as needed for muscle spasms  Past Week at Unknown time Yes Andrae Curtis MD   ibuprofen (ADVIL/MOTRIN) 600 MG tablet Take 1 tablet (600 mg) by mouth every 8 hours as needed for moderate pain Past Week at Unknown time Yes Andrae Curtis MD       Date completed: 07/30/20    Medication history completed by: Delia Estevez, [facility]             (0) too sleepy or reluctant, no latch achieved

## 2023-08-11 ENCOUNTER — PATIENT OUTREACH (OUTPATIENT)
Dept: CARE COORDINATION | Facility: CLINIC | Age: 33
End: 2023-08-11
Payer: COMMERCIAL

## 2023-08-11 NOTE — PROGRESS NOTES
Clinic Care Coordination Contact  Follow Up Progress Note      Assessment: [facility] contacted patient to follow up. Patient states that she received the resources that I sent, but hasn't made any calls yet. Patient is concerned that she won't be able to get into any of the apartments due to her low credit. Patient states that she has also been looking for remote work. She states that her current income isn't feasible for her and her two children on the way. Patient is struggling with feelings related to her pregnancy. She recently found out that she is having a boy and girl. She is hoping that once she gets settled into a new apartment and job, that she will feel more excited.    Care Gaps:    Health Maintenance Due   Topic Date Due    YEARLY PREVENTIVE VISIT  Never done    ADVANCE CARE PLANNING  Never done    DEPRESSION ACTION PLAN  Never done    HEPATITIS B IMMUNIZATION (1 of 3 - 3-dose series) Never done    COVID-19 Vaccine (3 - Pfizer series) 03/16/2022     Care Plans  Care Plan: Housing Instability       Problem: SDOH LACK OF STABLE HOUSING       Long-Range Goal: Establish Stable Housing       Start Date: 7/14/2023 Expected End Date: 11/1/2023    This Visit's Progress: 10%    Priority: High    Note:     Barriers: Complex mental health, lack of finances.  Strengths: Strong support system.  Patient expressed understanding of goal: Yes.  Action steps to achieve this goal:  1. I will explore housing options throughout [facility].  2. I will review the housing resources sent to me by [facility].  3. I will reach out to Angelica with any questions or concerns.                            Intervention/Education provided during outreach: Patient verbalized understanding, engaged in AIDET communication during patient encounter.    Plan: Patient will reach out with any questions or concerns.    Care Coordinator will follow up in one month.    Angelica White \[facility] [place] [facility]\""  Clinic Care Coordination  [facility]  [email or web address]  [phone]

## 2023-08-16 ENCOUNTER — VIRTUAL VISIT (OUTPATIENT)
Dept: PSYCHIATRY | Facility: CLINIC | Age: 33
End: 2023-08-16
Attending: PSYCHOLOGIST
Payer: COMMERCIAL

## 2023-08-16 DIAGNOSIS — F41.1 GAD (GENERALIZED ANXIETY DISORDER): ICD-10-CM

## 2023-08-16 DIAGNOSIS — F33.1 MODERATE EPISODE OF RECURRENT MAJOR DEPRESSIVE DISORDER (H): Primary | ICD-10-CM

## 2023-08-16 PROCEDURE — 90837 PSYTX W PT 60 MINUTES: CPT | Mod: VID | Performed by: PSYCHOLOGIST

## 2023-08-16 NOTE — PROGRESS NOTES
OUTPATIENT PSYCHOTHERAPY PROGRESS NOTE    Client Name: Dariusz Leyva   YOB: 1990  Time of Service: 65 minutes   Service Type(s): Individual psychotherapy    VIDEO VISIT  Dariusz Leyva is a 32 year old who is being evaluated via a billable video visit.      Telehealth Details  Type of service:  psychotherapy  Time of service:  Start Time:  4:03 pm  End Time: 5:08 pm    Reason for Telehealth Visit: Patient has requested telehealth visit  Originating Site (patient location):  [facility]- [place]   Location- Patient's parked automobile  Distant Site (provider location):  Onsite  Mode of Communication:  Hernan      Diagnoses:   Unspecified depressive disorder and unspecified anxiety    Goals of therapy: Elevate mood and show evidence of usual energy levels, and activities.      Individuals Present:   Psychotherapy services during this visit included myself and Dariusz Leyva.    Narrative:  Dariusz reported her mood is currently  a little stressed.  She indicated that she has had some negative thoughts about her pregnancy and her situation with  M.  She is not feeling like she is prepared to be a mother right now. She reported that she has been talking about a baby shower with her family, there is stress around both the cost and the planning. She explained that this experience is making her question how supportive her family will be when she has her children.      She was invited by  M  to go to his family s cabin. They thought it was just going to be them - but a lot more people attended then she expected. It went fine. She is trying to determine if she wants something more with him. She enjoys her time with him, but she doesn t know what his intentions are.      Treatment: Treatment focused on addressing negative thoughts. Discussed how she might challenge negative thoughts. It has been hard because she is trying to find a higher paying job and find a new place and she is not finding forward momentum.  She has not been able to solve her own problems and she is struggling in school.      Patient reaction: Dariusz was receptive to support and interventions provided today    Patient Progress: Overall Dariusz was cooperative, attentive and engaged throughout the session. Progress toward goal completion seems adequate.     Additional Information:  The patient did not report medication changes.     Mental Status:  Appearance:  Neatly groomed   Behavior/relationship to examiner/demeanor:  cooperative  Motor activity/EPS:  Within normal limits  Speech rate:  Within normal limits  Speech volume:  Within normal limits  Speech articulation:  Within normal limits  Speech coherence: Within normal limits  Speech spontaneity: Within normal limits  Mood (subjective):  a little stressed   Affect (objective appearance): Subdued, periods of dysphoria  Thought Process (Associations):  Logical and Linear   Thought process (Rate):  Within normal limits   Thought content: Within normal limits  Abnormal Perception:  None   Insight:  Good   Judgment:  Good     Plan: Continue with goals as outlined above    Andreea Vásquez Psy.D.,L.P

## 2023-08-23 ENCOUNTER — VIRTUAL VISIT (OUTPATIENT)
Dept: PSYCHIATRY | Facility: CLINIC | Age: 33
End: 2023-08-23
Attending: PSYCHOLOGIST
Payer: COMMERCIAL

## 2023-08-23 DIAGNOSIS — F33.1 MODERATE EPISODE OF RECURRENT MAJOR DEPRESSIVE DISORDER (H): Primary | ICD-10-CM

## 2023-08-23 DIAGNOSIS — F41.1 GAD (GENERALIZED ANXIETY DISORDER): ICD-10-CM

## 2023-08-23 PROCEDURE — 90837 PSYTX W PT 60 MINUTES: CPT | Mod: VID | Performed by: PSYCHOLOGIST

## 2023-08-23 NOTE — PROGRESS NOTES
"OUTPATIENT PSYCHOTHERAPY PROGRESS NOTE    Client Name: Dariusz Leyva   YOB: 1990  Time of Service: 61 minutes   Service Type(s): Individual psychotherapy    VIDEO VISIT  Dariusz Leyva is a 32 year old who is being evaluated via a billable video visit.      Telehealth Details  Type of service:  psychotherapy  Time of service:  Start Time:  4:00 pm  End Time: 5:01 pm    Reason for Telehealth Visit: Patient has requested telehealth visit  Originating Site (patient location):  [facility] [place]   Location- Patient's parked automobile  Distant Site (provider location):  Offsite  [facility]      Diagnoses:   Unspecified depressive disorder and unspecified anxiety    Goals of therapy: Elevate mood and show evidence of usual energy levels, and activities.      Individuals Present:   Psychotherapy services during this visit included myself and Dariusz Leyva.    Narrative:  Dariusz reported her mood has been  up and down.  She reported that it has been a very stressful 24 hours. Due to family stressors - Dariusz cancelled her baby shower. She had tire damage today and had to get them repaired and missed part of work. It was an unexpected expense. She shared that she continues to be unsure of the status of her relationship with  [employer].  She reported she wants just one thing \"to go right.\"      Treatment: Clinician used reflective listening, validation, positive reinforcement in a supportive framework. Discussed ongoing stressors from family interactions and lack of support from family in the midst of facing difficulties.      Patient reaction: Dariusz was receptive to support and interventions provided today    Patient Progress: Overall Dariusz was cooperative, attentive and engaged throughout the session. Progress toward goal completion seems adequate.     Additional Information:  The patient did not report medication changes.     Mental Status:  Appearance:  Neatly groomed "   Behavior/relationship to examiner/demeanor:  cooperative  Motor activity/EPS:  Within normal limits  Speech rate:  Within normal limits  Speech volume:  Within normal limits  Speech articulation:  Within normal limits  Speech coherence: Within normal limits  Speech spontaneity: Within normal limits  Mood (subjective):  up and down   Affect (objective appearance): Periods of dysphoria  Thought Process (Associations):  Logical and Linear   Thought process (Rate):  Within normal limits   Thought content: Within normal limits  Abnormal Perception:  None   Insight:  Good   Judgment:  Good     Plan: Continue with goals as outlined above    Andreea Vásquez Psy.D.,L.P

## 2023-08-30 ENCOUNTER — VIRTUAL VISIT (OUTPATIENT)
Dept: PSYCHIATRY | Facility: CLINIC | Age: 33
End: 2023-08-30
Attending: PSYCHOLOGIST
Payer: COMMERCIAL

## 2023-08-30 DIAGNOSIS — F33.1 MODERATE EPISODE OF RECURRENT MAJOR DEPRESSIVE DISORDER (H): Primary | ICD-10-CM

## 2023-08-30 DIAGNOSIS — F41.1 GAD (GENERALIZED ANXIETY DISORDER): ICD-10-CM

## 2023-08-30 PROCEDURE — 90837 PSYTX W PT 60 MINUTES: CPT | Mod: VID | Performed by: PSYCHOLOGIST

## 2023-08-30 NOTE — PROGRESS NOTES
OUTPATIENT PSYCHOTHERAPY PROGRESS NOTE    Client Name: Dariusz Leyva   YOB: 1990  Time of Service: 65 minutes   Service Type(s): Individual psychotherapy    VIDEO VISIT  Dariusz Leyva is a 32 year old who is being evaluated via a billable video visit.      Telehealth Details  Type of service:  psychotherapy  Time of service:  Start Time:  4:05 pm  End Time: 5:10 pm    Reason for Telehealth Visit: Patient has requested telehealth visit  Originating Site (patient location):  [facility] [place]   Location- Patient's parked automobile  Distant Site (provider location):  Offsite  [facility]      Diagnoses:   Unspecified depressive disorder and unspecified anxiety    Goals of therapy: Elevate mood and show evidence of usual energy levels, and activities.      Individuals Present:   Psychotherapy services during this visit included myself and Yadirafadumo Gordon.    Narrative:  Dariusz reported she is feeling  exhausted.  She indicated that she is sleeping no matter what. She reported it is likely to be more related to her pregnancy - but also might relate to mood. She reported feeling discomfort due to her pregnancy.      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     Treatment: Clinician used reflective listening, validation, positive reinforcement in a supportive framework. Treatment focused on processing feelings related to her relationship with  M  and interactions with others.     Patient reaction: Dariusz was receptive to support and interventions provided today    Patient Progress: Overall Dariusz was cooperative, attentive and engaged throughout the session. Progress toward goal completion seems good.     Additional Information:  The patient did not report medication changes.     Mental Status:  Appearance:  Neatly groomed   Behavior/relationship to examiner/demeanor:  cooperative  Motor activity/EPS:  Within  normal limits  Speech rate:  Within normal limits  Speech volume:  Within normal limits  Speech articulation:  Within normal limits  Speech coherence: Within normal limits  Speech spontaneity: Within normal limits  Mood (subjective):  exhausted   Affect (objective appearance): Euthymic  Thought Process (Associations):  Logical and Linear   Thought process (Rate):  Within normal limits   Thought content: Within normal limits  Abnormal Perception:  None   Insight:  Good   Judgment:  Good     Plan: Continue with goals as outlined above    Andreea Vásquez Psy.D.,L.P

## 2023-09-06 ENCOUNTER — VIRTUAL VISIT (OUTPATIENT)
Dept: PSYCHIATRY | Facility: CLINIC | Age: 33
End: 2023-09-06
Attending: PSYCHOLOGIST
Payer: COMMERCIAL

## 2023-09-06 DIAGNOSIS — F41.1 GAD (GENERALIZED ANXIETY DISORDER): ICD-10-CM

## 2023-09-06 DIAGNOSIS — F33.1 MODERATE EPISODE OF RECURRENT MAJOR DEPRESSIVE DISORDER (H): Primary | ICD-10-CM

## 2023-09-06 PROCEDURE — 90837 PSYTX W PT 60 MINUTES: CPT | Mod: VID | Performed by: PSYCHOLOGIST

## 2023-09-06 NOTE — PROGRESS NOTES
"OUTPATIENT PSYCHOTHERAPY PROGRESS NOTE    Client Name: Dariusz Leyva   YOB: 1990  Time of Service: 63 minutes   Service Type(s): Individual psychotherapy    VIDEO VISIT  Dariusz Leyva is a 32 year old who is being evaluated via a billable video visit.      Telehealth Details  Type of service:  psychotherapy  Time of service:  Start Time:  4:00 pm  End Time: 5:03 pm    Reason for Telehealth Visit: Patient has requested telehealth visit  Originating Site (patient location):  [facility]- [place]   Location- Patient's parked automobile  Distant Site (provider location):  Onsite  Mode of Communication:  [facility]      Diagnoses:   Unspecified depressive disorder and unspecified anxiety    Goals of therapy: Elevate mood and show evidence of usual energy levels, and activities.      Individuals Present:   Psychotherapy services during this visit included myself and Yadirafadumo Gordon.    Narrative:  Dariusz reported she was feeling  not so great.  She reported that she felt  sad  today but she did not know why. She started out as feeling tired and not wanting to be at work to just feeling sad. She reported  okay  sleep, it is usually disturbed by bathroom breaks. She has been experiencing mixed appetite - she is unsure what is causing the shifts - but it might be mood related. She endorsed anhedonia - she described some experiences from the week. She reported that she [place] and had a lot of pain and physical limitations. Discussed recent interaction she had with \"S  about her pregnancy and the role she would like for him to have in her life. Her birthday is on Sunday and she currently is not playing on doing something big, but hang with some friends and  M  has not been clear if he is coming. She is not sure where the relationship stands or how to go forward. The lack of quality time is affecting her.      Treatment: Clinician used reflective listening, validation, positive reinforcement, and " psychoeducation within a framework of supportive therapy. Treatment focused on processing interpersonal relationships.    Patient reaction: Dariusz was receptive to support and interventions provided today    Patient Progress: Overall Dariusz was cooperative, attentive and engaged throughout the session. Progress toward goal completion seems good.     Additional Information:  The patient did not report medication changes.     Mental Status:  Appearance:  Neatly groomed   Behavior/relationship to examiner/demeanor:  cooperative  Motor activity/EPS:  Within normal limits  Speech rate:  Within normal limits  Speech volume:  Within normal limits  Speech articulation:  Within normal limits  Speech coherence: Within normal limits  Speech spontaneity: Within normal limits  Mood (subjective):  not so great   Affect (objective appearance): Subdued, dysphoria in relation to discussing some of her relationships  Thought Process (Associations):  Logical and Linear   Thought process (Rate):  Within normal limits   Thought content: Within normal limits  Abnormal Perception:  None   Insight:  Good   Judgment:  Good     Plan: Continue with goals as outlined above    Andreea Vásquez Psy.D.,L.P

## 2023-09-08 ENCOUNTER — PATIENT OUTREACH (OUTPATIENT)
Dept: CARE COORDINATION | Facility: CLINIC | Age: 33
End: 2023-09-08
Payer: COMMERCIAL

## 2023-09-08 NOTE — PROGRESS NOTES
Clinic Care Coordination Contact  Follow Up Progress Note      Assessment: [facility] contacted patient to follow up. Patient states that she hasn't had any luck with finding a new apartment or a new job. Patient states that her apartment has cockroaches, and that the washing machine doesn't work. Her landlord has been refusing to fix either of these things. Patient was informed of the [place] Tenant Advocacy Line. Patient is unsure if she wants to reach out to them at this time, but would like me to send her the number. Offered to help patient apply for Section 8, to which she also declined.    Care Gaps:    Health Maintenance Due   Topic Date Due    YEARLY PREVENTIVE VISIT  Never done    ADVANCE CARE PLANNING  Never done    DEPRESSION ACTION PLAN  Never done    HEPATITIS B IMMUNIZATION (1 of 3 - 3-dose series) Never done    COVID-19 Vaccine (3 - Pfizer series) 03/16/2022    INFLUENZA VACCINE (1) 09/01/2023     Care Plans  Care Plan: Housing Instability       Problem: SDOH LACK OF STABLE HOUSING       Long-Range Goal: Establish Stable Housing       Start Date: 7/14/2023 Expected End Date: 11/1/2023    This Visit's Progress: 10%    Priority: High    Note:     Barriers: Complex mental health, lack of finances.  Strengths: Strong support system.  Patient expressed understanding of goal: Yes.  Action steps to achieve this goal:  1. I will explore housing options throughout [facility].  2. I will review the housing resources sent to me by [facility].  3. I will reach out to Angelica with any questions or concerns.                            Intervention/Education provided during outreach: Patient verbalized understanding, engaged in [employer] communication during patient encounter.     Outreach Frequency: monthly    Plan: [facility] will send Tenant Advocacy Line to patient via [employer].    Care Coordinator will follow up in one month.    Angelica White, [facility]  Clinic Care Coordination  [facility]  [email or web address]  [phone]

## 2023-09-20 ENCOUNTER — VIRTUAL VISIT (OUTPATIENT)
Dept: PSYCHIATRY | Facility: CLINIC | Age: 33
End: 2023-09-20
Attending: PSYCHOLOGIST
Payer: COMMERCIAL

## 2023-09-20 DIAGNOSIS — F41.1 GAD (GENERALIZED ANXIETY DISORDER): ICD-10-CM

## 2023-09-20 DIAGNOSIS — F33.1 MODERATE EPISODE OF RECURRENT MAJOR DEPRESSIVE DISORDER (H): Primary | ICD-10-CM

## 2023-09-20 PROCEDURE — 90837 PSYTX W PT 60 MINUTES: CPT | Mod: VID | Performed by: PSYCHOLOGIST

## 2023-09-20 NOTE — PROGRESS NOTES
OUTPATIENT PSYCHOTHERAPY PROGRESS NOTE    Client Name: Dariusz Leyva   YOB: 1990  Time of Service: 62 minutes   Service Type(s): Individual psychotherapy    VIDEO VISIT  Dariusz Leyva is a 33 year old who is being evaluated via a billable video visit.      Telehealth Details  Type of service:  psychotherapy  Time of service:  Start Time:  4:02 pm  End Time: 5:04 pm    Reason for Telehealth Visit: Patient has requested telehealth visit  Originating Site (patient location):  [facility]- [place]   Location- Patient's parked automobile  Distant Site (provider location):  Onsite  Mode of Communication:  [facility]      Diagnoses:   Unspecified depressive disorder and unspecified anxiety    Goals of therapy: Elevate mood and show evidence of usual energy levels, and activities.      Individuals Present:   Psychotherapy services during this visit included myself and Dariusz Leyva.    Narrative:  Dariusz reported her mood has been  okay.  She has been feeling  bad for myself  lately. She reported that she wakes up in the middle of the night and can t go back to bed for 2-3 hours - so she is fatigued during day. She denied appetite disturbance - but has more hunger in the evening. She reported she enjoys sleeping during the day, but she indicated that she does not have a lot to look forward to (endorsed anhedonia). She has tried to connect with friends, but they do not consistently call back. She denied suicidal ideation.      Her PCA has started not coming consistently. She is trying to start someone new.      Dariusz shared about her birthday. Trent came to the party and a few other friends. One of her friends kept bringing up a personal stressor.      She reported that she is feeling less social support. She is not getting a lot time with friends and she is not by coworkers at work. She can t get a hold of her babies' father.  She shared that relationships do not feel secure. She is feeling alone in every  aspect in her life. She indicated that she is shopping and eating to fill the gap.      She has two baby showers coming up in Oct (one locally and one in [place]).      Treatment: Clinician used reflective listening, validation, and psychoeducation within a CBT psychotherapy frame. Provided psychoeducation in regards to mood and anhedonia and motivation.      Patient reaction: Dariusz was receptive to support and interventions provided today    Patient Progress: Overall Dariusz was cooperative, attentive and engaged throughout the session. Progress toward goal completion seems good.     Additional Information:  The patient did not report medication changes.     Mental Status:  Appearance:  Neatly groomed   Behavior/relationship to examiner/demeanor:  cooperative  Motor activity/EPS:  Within normal limits  Speech rate:  Within normal limits  Speech volume:  Within normal limits  Speech articulation:  Within normal limits  Speech coherence: Within normal limits  Speech spontaneity: Within normal limits  Mood (subjective):  okay   Affect (objective appearance): euthymia, some dysphoria when discussing painful aspects of relationships  Thought Process (Associations):  Logical and Linear   Thought process (Rate):  Within normal limits   Thought content: Within normal limits  Abnormal Perception:  None   Insight:  Good   Judgment:  Good     Plan: Continue with goals as outlined above    Andreea Vásquez Psy.D.,L.P

## 2023-09-27 ENCOUNTER — VIRTUAL VISIT (OUTPATIENT)
Dept: PSYCHIATRY | Facility: CLINIC | Age: 33
End: 2023-09-27
Attending: PSYCHOLOGIST
Payer: COMMERCIAL

## 2023-09-27 DIAGNOSIS — F41.1 GAD (GENERALIZED ANXIETY DISORDER): ICD-10-CM

## 2023-09-27 DIAGNOSIS — F33.1 MODERATE EPISODE OF RECURRENT MAJOR DEPRESSIVE DISORDER (H): Primary | ICD-10-CM

## 2023-09-27 PROCEDURE — 90832 PSYTX W PT 30 MINUTES: CPT | Mod: VID | Performed by: PSYCHOLOGIST

## 2023-09-27 NOTE — PROGRESS NOTES
OUTPATIENT PSYCHOTHERAPY PROGRESS NOTE    Client Name: Dariusz Leyva   YOB: 1990  Time of Service: 23 minutes   Service Type(s): Individual psychotherapy    VIDEO VISIT  Dariusz Leyva is a 33 year old who is being evaluated via a billable video visit.      Telehealth Details  Type of service:  psychotherapy  Time of service:  Start Time:  4:00 pm  End Time: 4:23 pm    Reason for Telehealth Visit: Patient has requested telehealth visit  Originating Site (patient location):  [facility]- [place]   Location- Patient's automobile  Distant Site (provider location):  Onsite  Mode of Communication:  [facility]      Diagnoses:   Unspecified depressive disorder and unspecified anxiety    Goals of therapy: Elevate mood and show evidence of usual energy levels, and activities.      Individuals Present:   Psychotherapy services during this visit included myself and Yadirafadumo Gordon.    Narrative:  Dariusz reflected on not feeling supported by people. She has notice that her mother is trying - her mother is coming for her baby shower and purchased some things on her baby registry. She is still leery of the situation. Her father tends to spread himself thin and he can t hold up his promises. She reported her mood is  eh, some days I am fine and other days I am sad, then others I am fine emotionally.       Informed about possible jury duty in the next few weeks. She needed to keep her appt short because she was meeting with someone.        Treatment: Clinician used reflective listening, validation, and psychoeducation.     Patient reaction: Dariusz was receptive to support and interventions provided today    Patient Progress: Dariusz was cooperative, attentive and engaged throughout the session. Progress toward goal completion seems good.     Additional Information:  The patient did not report medication changes.     Mental Status:  Appearance:  Neatly groomed   Behavior/relationship to examiner/demeanor:   cooperative  Motor activity/EPS:  Within normal limits  Speech rate:  Within normal limits  Speech volume:  Within normal limits  Speech articulation:  Within normal limits  Speech coherence: Within normal limits  Speech spontaneity: Within normal limits  Mood (subjective):  eh, some days I am fine and other days I am sad, then others I am fine emotionally   Affect (objective appearance): euthymia,   Thought Process (Associations):  Logical and Linear   Thought process (Rate):  Within normal limits   Thought content: Within normal limits  Abnormal Perception:  None   Insight:  Good   Judgment:  Good     Plan: Continue with goals as outlined above    Andreea Vásquez Psy.D.,L.P

## 2023-09-28 DIAGNOSIS — J30.89 SEASONAL ALLERGIC RHINITIS DUE TO OTHER ALLERGIC TRIGGER: ICD-10-CM

## 2023-09-28 RX ORDER — CETIRIZINE HYDROCHLORIDE 10 MG/1
10 TABLET ORAL DAILY
Qty: 90 TABLET | Refills: 3 | Status: SHIPPED | OUTPATIENT
Start: 2023-09-28

## 2023-10-04 ENCOUNTER — VIRTUAL VISIT (OUTPATIENT)
Dept: PSYCHIATRY | Facility: CLINIC | Age: 33
End: 2023-10-04
Attending: PSYCHOLOGIST
Payer: COMMERCIAL

## 2023-10-04 DIAGNOSIS — F33.1 MODERATE EPISODE OF RECURRENT MAJOR DEPRESSIVE DISORDER (H): Primary | ICD-10-CM

## 2023-10-04 DIAGNOSIS — F41.1 GAD (GENERALIZED ANXIETY DISORDER): ICD-10-CM

## 2023-10-04 PROCEDURE — 90837 PSYTX W PT 60 MINUTES: CPT | Mod: VID | Performed by: PSYCHOLOGIST

## 2023-10-04 NOTE — PROGRESS NOTES
OUTPATIENT PSYCHOTHERAPY PROGRESS NOTE    Client Name: Dariusz Leyva   YOB: 1990  Time of Service: 64 minutes   Service Type(s): Individual psychotherapy    VIDEO VISIT  Dariusz Leyva is a 33 year old who is being evaluated via a billable video visit.      Telehealth Details  Type of service:  psychotherapy  Time of service:  Start Time:  4:00 pm  End Time: 5:04 pm    Reason for Telehealth Visit: Patient has requested telehealth visit  Originating Site (patient location):  [facility]   Location- Private room in hospital  Distant Site (provider location):  Offsite  Mode of Communication:  [facility]      Diagnoses:   Unspecified depressive disorder and unspecified anxiety    Goals of therapy: Elevate mood and show evidence of usual energy levels, and activities.      Individuals Present:   Psychotherapy services during this visit included myself and Yadirafadumo Godron.    Narrative:  Dariusz reported her water broke Sunday and she did not go into labor. She is 28 weeks and a few days - hoping she can make it to 34 weeks. She will be in the hospital during that time. She started a depression medication on this past Friday - her OB started her on the medication. [facility] is at the [facility].      Her mood is  okay.  She has not been getting sleep because she is up to go to the bathroom. She does not need to be checked for vitals so she is not disrupted too much. The babies heart rates are monitored twice a day and she gets vitals every 4 hours. She has had some decrease in appetite - but she has been eating but not as much  junk.  Less stress eating. She has been  chilling  and getting some visitors. Her cousin has been caring for her animals, her mother will be here Saturday to help for a week. She denied thoughts of suicide.      She is working with a  to get her short-term disability and rent aid.       Treatment: Clinician used reflective listening, validation, and  psychoeducation. CBT self care techniques were discussed.    Patient reaction: Dariusz was receptive to support and interventions provided today    Patient Progress: Dariusz was cooperative, attentive and engaged throughout the session. Progress toward goal completion seems good.     Additional Information:  Dariusz reported she started a new medication for depression    Mental Status:  Appearance:  In hospital gown   Behavior/relationship to examiner/demeanor:  cooperative  Motor activity/EPS:  Within normal limits  Speech rate:  Within normal limits  Speech volume:  Within normal limits  Speech articulation:  Within normal limits  Speech coherence: Within normal limits  Speech spontaneity: Within normal limits  Mood (subjective):  okay   Affect (objective appearance): Mood congruent  Thought Process (Associations):  Logical and Linear   Thought process (Rate):  Within normal limits   Thought content: Within normal limits  Abnormal Perception:  None   Insight:  Good   Judgment:  Good     Plan: Continue with goals as outlined above    Andreea Vásquez Psy.D.,L.P

## 2023-10-05 ENCOUNTER — PATIENT OUTREACH (OUTPATIENT)
Dept: CARE COORDINATION | Facility: CLINIC | Age: 33
End: 2023-10-05
Payer: COMMERCIAL

## 2023-10-05 NOTE — PROGRESS NOTES
Clinic Care Coordination Contact  Care Team Conversations    Outreach postponed as patient is currently hospitalized.     Will follow up with patient in one month.    Angelica White [facility]  Clinic Care Coordination  [facility]  [email or web address]  [phone]

## 2023-10-11 ENCOUNTER — VIRTUAL VISIT (OUTPATIENT)
Dept: PSYCHIATRY | Facility: CLINIC | Age: 33
End: 2023-10-11
Attending: PSYCHOLOGIST
Payer: COMMERCIAL

## 2023-10-11 DIAGNOSIS — F41.1 GAD (GENERALIZED ANXIETY DISORDER): ICD-10-CM

## 2023-10-11 DIAGNOSIS — F33.1 MODERATE EPISODE OF RECURRENT MAJOR DEPRESSIVE DISORDER (H): Primary | ICD-10-CM

## 2023-10-11 PROCEDURE — 90837 PSYTX W PT 60 MINUTES: CPT | Mod: VID | Performed by: PSYCHOLOGIST

## 2023-10-11 NOTE — PROGRESS NOTES
OUTPATIENT PSYCHOTHERAPY PROGRESS NOTE    Client Name: Dariusz Leyva   YOB: 1990  Time of Service: 60 minutes   Service Type(s): Individual psychotherapy    VIDEO VISIT  Dariusz Leyva is a 33 year old who is being evaluated via a billable video visit.      Telehealth Details  Type of service:  psychotherapy  Time of service:  Start Time:  4:10 pm  End Time: 5:10 pm    Reason for Telehealth Visit: Patient has requested telehealth visit  Originating Site (patient location):  [facility]   Location- Private room in hospital  Distant Site (provider location):  Onsite  Mode of Communication:  [facility]      Diagnoses:   Unspecified depressive disorder and unspecified anxiety    Goals of therapy: Elevate mood and show evidence of usual energy levels, and activities.      Individuals Present:   Psychotherapy services during this visit included myself and Yadirafadumo Gordon.    Narrative:  Dariusz reported she is trying not to be anxious. She is trying to get assistance and paper work done for work. 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 Her loans are in forbearance for now.      She did have the baby shower on Sunday. She enjoyed it, but she was disappointed because a lot of people cancelled and some people said nothing. She was expecting 30 and only 8 came. She is also planning another baby shower long distance that she can t attend. The next one will be Oct 21st - she will join over zoom. She is stressed because she does not have confidence in depending on anyone right now.      She is not showing signs of labor - she reported she is stable. She is frustrated with not being in control. She wants to work and pay her bills and she can t do that from the hospital.      Her mom will be there for a few more days.     She reported her mood is  okay.  She is sleeping a little better. She is having some appetite disturbance, but she still eats. She  has been watching videos on her phone. She reported severe depressive thoughts and she asked to increase her antidepressant. This occurred when she faced a financial stressor. She denied thoughts of suicide. She rated her stress level at a 60 out of 100 (100 being highest). In the past week it has been higher - 80 - depending on situational stressor variance.      Treatment: Clinician used reflective listening, validation, and psychoeducation. CBT self care techniques were discussed.    Patient reaction: Dariusz was receptive to support and interventions provided today    Patient Progress: Dariusz was cooperative, attentive and engaged throughout the session. Progress toward goal completion seems good.     Additional Information:  Dariusz reported she requested an increase in her antidepressant medication.    Mental Status:  Appearance:  In hospital gown   Behavior/relationship to examiner/demeanor:  cooperative, appeared groggy  Motor activity/EPS:  Within normal limits  Speech rate:  Within normal limits  Speech volume:  Within normal limits  Speech articulation:  Within normal limits  Speech coherence: Within normal limits  Speech spontaneity: Within normal limits  Mood (subjective):  okay   Affect (objective appearance): Mood congruent  Thought Process (Associations):  Logical and Linear   Thought process (Rate):  Within normal limits   Thought content: Within normal limits  Abnormal Perception:  None   Insight:  Good   Judgment:  Good     Plan: Continue with goals as outlined above    Andreea Vásquez Psy.D.,L.P

## 2023-10-18 ENCOUNTER — VIRTUAL VISIT (OUTPATIENT)
Dept: PSYCHIATRY | Facility: CLINIC | Age: 33
End: 2023-10-18
Attending: PSYCHOLOGIST
Payer: COMMERCIAL

## 2023-10-18 DIAGNOSIS — F33.1 MODERATE EPISODE OF RECURRENT MAJOR DEPRESSIVE DISORDER (H): Primary | ICD-10-CM

## 2023-10-18 DIAGNOSIS — F41.1 GAD (GENERALIZED ANXIETY DISORDER): ICD-10-CM

## 2023-10-18 PROCEDURE — 99207 PR NO BILLABLE SERVICE THIS VISIT: CPT | Mod: VID | Performed by: PSYCHOLOGIST

## 2023-10-18 NOTE — PROGRESS NOTES
Dariusz came to our visit briefly to inform this provider that she had her babies. She plans to meet with this provider next week, but she is focus on spending time with her newborns this week.

## 2023-10-25 ENCOUNTER — VIRTUAL VISIT (OUTPATIENT)
Dept: PSYCHIATRY | Facility: CLINIC | Age: 33
End: 2023-10-25
Attending: PSYCHOLOGIST
Payer: COMMERCIAL

## 2023-10-25 DIAGNOSIS — F33.1 MODERATE EPISODE OF RECURRENT MAJOR DEPRESSIVE DISORDER (H): Primary | ICD-10-CM

## 2023-10-25 DIAGNOSIS — F41.1 GAD (GENERALIZED ANXIETY DISORDER): ICD-10-CM

## 2023-10-25 PROCEDURE — 90832 PSYTX W PT 30 MINUTES: CPT | Mod: VID | Performed by: PSYCHOLOGIST

## 2023-10-25 NOTE — PROGRESS NOTES
OUTPATIENT PSYCHOTHERAPY PROGRESS NOTE    Client Name: Dariusz Leyva   YOB: 1990  Time of Service: 23 minutes   Service Type(s): Individual psychotherapy    VIDEO VISIT  Dariusz Leyva is a 33 year old who is being evaluated via a billable video visit.      Telehealth Details  Type of service:  psychotherapy  Time of service:  Start Time:  4:15 pm  End Time: 4:38 pm    Reason for Telehealth Visit: Patient has requested telehealth visit  Originating Site (patient location):  [facility]   Location- Private room in hospital room  Distant Site (provider location):  Onsite  Mode of Communication:  [facility]      Diagnoses:   Unspecified depressive disorder and unspecified anxiety    Goals of therapy: Elevate mood and show evidence of usual energy levels, and activities.      Individuals Present:   Psychotherapy services during this visit included myself and Dariusz Leyva.    Narrative:  Dariusz reported she had the twins on the . She was holding her daughter during the appointment, her son was at his feeding while we were talking. She reported the delivery did not go as she had wanted. She labored about 8.5 hours and pain medications were not affective. She gave birth to her son via vaginal birth, but her daughter was breech. She thought that she may be able to wait to have her, but they rushed to a ,  She had to be sedated because the pain medications were not working. She did not see her twins until about 6 hours after they were born. She is currently at the NICU and her twins will be there until the end of December. She sleeps at home and is there during the day. She reported feeling  exhausted.  Reported her antidepressant medications were increased. She indicated that she is not able to process information the way she would like.      She reported she is not feeling  depressed, but disappointed  in the way things are. She heard from  GE  and he would like to have a conversation about  their relationship via text. She reported her mind went to the worst-case scenario. Her cousin has helped by getting her some food and she went to her second baby shower over the weekend, but she has not been reaching out much because she sees that others have their own things  going on.      She is focused on getting things lined up for life with her twins. She is waiting to hear back about benefits.      Treatment: Clinician used reflective listening, validation, and psychoeducation.     Patient reaction: Dariusz was receptive to support and interventions provided today    Patient Progress: Dariusz was cooperative, attentive and engaged throughout the session. Progress toward goal completion seems good.     Additional Information:  Dariusz reported she had a change in her antidepressant medication.    Mental Status:  Appearance:  Casually dressed - blanket draped over her and baby   Behavior/relationship to examiner/demeanor:  cooperative  Motor activity/EPS:  Within normal limits  Speech rate:  Within normal limits  Speech volume:  Within normal limits  Speech articulation:  Within normal limits  Speech coherence: Within normal limits  Speech spontaneity: Within normal limits  Mood (subjective):  exhausted   Affect (objective appearance): subdued  Thought Process (Associations):  Logical and Linear   Thought process (Rate):  Within normal limits   Thought content: Within normal limits  Abnormal Perception:  None   Insight:  Good   Judgment:  Good     Plan: Continue with goals as outlined above    Andreea Vásquez Psy.D.,L.P

## 2023-10-31 ENCOUNTER — PATIENT OUTREACH (OUTPATIENT)
Dept: CARE COORDINATION | Facility: CLINIC | Age: 33
End: 2023-10-31
Payer: COMMERCIAL

## 2023-10-31 NOTE — PROGRESS NOTES
Clinic Care Coordination Contact  Follow Up Progress Note      Assessment: [facility] contacted patient to follow up. Patient states that she is doing well. She had her twins on 10/12. They were born at 2 and 3 pounds. It is anticipated that they will remain in the NICU until the end of December. Patient recently applied for public housing. She is currently seeking a safer place to stay while waiting to be chosen for public housing. Will explore transitional housing options and send this information to patient via email, per her request. Patient's email is [email or web address].    Care Gaps:    Health Maintenance Due   Topic Date Due    YEARLY PREVENTIVE VISIT  Never done    ADVANCE CARE PLANNING  Never done    DEPRESSION ACTION PLAN  Never done    HEPATITIS B IMMUNIZATION (1 of 3 - 3-dose series) Never done    COVID-19 Vaccine (3 - 2023-24 season) 09/01/2023     Care Plans  Care Plan: Housing Instability       Problem: SDOH LACK OF STABLE HOUSING       Long-Range Goal: Establish Stable Housing       Start Date: 7/14/2023 Expected End Date: 11/1/2023    This Visit's Progress: 30% Recent Progress: 10%    Priority: High    Note:     Barriers: Complex mental health, lack of finances.  Strengths: Strong support system.  Patient expressed understanding of goal: Yes.  Action steps to achieve this goal:  1. I will explore housing options throughout [facility].  2. I will review the housing resources sent to me by [facility].  3. I will reach out to Angelica with any questions or concerns.                            Intervention/Education provided during outreach: Patient verbalized understanding, engaged in AIDET communication during patient encounter.     Outreach Frequency: monthly, more frequently as needed    The patient consented via Verbal consent to have contact information and resources sent via email in an unencrypted manner.    Plan: Patient will reach out with any questions or concerns.    Care Coordinator will  follow up in one month.    Angelica White [facility]  Clinic Care Coordination  [facility]  [email or web address]  [phone]

## 2023-11-01 ENCOUNTER — VIRTUAL VISIT (OUTPATIENT)
Dept: PSYCHIATRY | Facility: CLINIC | Age: 33
End: 2023-11-01
Attending: PSYCHOLOGIST
Payer: COMMERCIAL

## 2023-11-01 DIAGNOSIS — F33.1 MODERATE EPISODE OF RECURRENT MAJOR DEPRESSIVE DISORDER (H): Primary | ICD-10-CM

## 2023-11-01 DIAGNOSIS — F41.1 GAD (GENERALIZED ANXIETY DISORDER): ICD-10-CM

## 2023-11-01 PROCEDURE — 90837 PSYTX W PT 60 MINUTES: CPT | Mod: VID | Performed by: PSYCHOLOGIST

## 2023-11-01 NOTE — PROGRESS NOTES
OUTPATIENT PSYCHOTHERAPY PROGRESS NOTE    Client Name: Dariusz Leyva   YOB: 1990  Time of Service: 58 minutes   Service Type(s): Individual psychotherapy    VIDEO VISIT  Dariusz Leyva is a 33 year old who is being evaluated via a billable video visit.      Telehealth Details  Type of service:  psychotherapy  Time of service:  Start Time:  4:05 pm  End Time: 5:03 pm    Reason for Telehealth Visit: Patient has requested telehealth visit  Originating Site (patient location):  [facility]   Location- Patient's home  Distant Site (provider location):  Onsite  Mode of Communication:  Hernan      Diagnoses:   Unspecified depressive disorder and unspecified anxiety    Goals of therapy: Elevate mood and show evidence of usual energy levels, and activities.      Individuals Present:   Psychotherapy services during this visit included myself and Dariusz Leyva.    Narrative:  Dariusz reported that her sleep has been broken up. She is slowly getting ready for the babies to come home. She reported her mood has been  up and down, mostly in the middle.  She endorsed fatigue. She denied appetite disturbance, anhedonia (she said she does not do much, but she ate with her friends recently), and suicidal ideation. She rated her stress as an 80 (scale 1-100), this is related to financial stress, getting ready for the twins come home, school, paternity, and some work pressure. Discussed process of determining paternity. Also discussed her relationship with [employer].     Treatment: Clinician used reflective listening, validation, positive reinforcement, and psychoeducation.     Patient reaction: Dariusz was receptive to support and interventions provided today    Patient Progress: Dariusz was cooperative, attentive and engaged throughout the session. Progress toward goal completion seems good.     Additional Information:   The patient did not report medication changes.    Mental Status:  Appearance:  Casually dressed    Behavior/relationship to examiner/demeanor:  cooperative  Motor activity/EPS:  Within normal limits  Speech rate:  Within normal limits  Speech volume:  Within normal limits  Speech articulation:  Within normal limits  Speech coherence: Within normal limits  Speech spontaneity: Within normal limits  Mood (subjective):  up and down   Affect (objective appearance): euthymic  Thought Process (Associations):  Logical and Linear   Thought process (Rate):  Within normal limits   Thought content: Within normal limits  Abnormal Perception:  None   Insight:  Good   Judgment:  Good     Plan: Continue with goals as outlined above    Andreea Vásquez Psy.D.,L.P

## 2023-11-08 ENCOUNTER — VIRTUAL VISIT (OUTPATIENT)
Dept: PSYCHIATRY | Facility: CLINIC | Age: 33
End: 2023-11-08
Attending: PSYCHOLOGIST
Payer: COMMERCIAL

## 2023-11-08 DIAGNOSIS — F41.1 GAD (GENERALIZED ANXIETY DISORDER): ICD-10-CM

## 2023-11-08 DIAGNOSIS — F33.1 MODERATE EPISODE OF RECURRENT MAJOR DEPRESSIVE DISORDER (H): Primary | ICD-10-CM

## 2023-11-08 PROCEDURE — 90837 PSYTX W PT 60 MINUTES: CPT | Mod: VID | Performed by: PSYCHOLOGIST

## 2023-11-08 NOTE — PROGRESS NOTES
OUTPATIENT PSYCHOTHERAPY PROGRESS NOTE    Client Name: Dariusz Leyva   YOB: 1990  Time of Service: 60 minutes   Service Type(s): Individual psychotherapy    VIDEO VISIT  Dariusz Leyva is a 33 year old who is being evaluated via a billable video visit.      Telehealth Details  Type of service:  psychotherapy  Time of service:  Start Time:  4:14 pm  End Time: 5:14 pm    Reason for Telehealth Visit: Patient has requested telehealth visit  Originating Site (patient location):  [facility]   Location- Patient's home  Distant Site (provider location):  Onsite  Mode of Communication:  Hernan      Diagnoses:   Unspecified depressive disorder and unspecified anxiety    Goals of therapy: Elevate mood and show evidence of usual energy levels, and activities.      Individuals Present:   Psychotherapy services during this visit included myself and Yadirafadumo Sullivanverónica.    Narrative:  Yadira reported that her son transitioned to a bassinet today out of the incubator. Her daughter is not ready yet. Dariusz reported that her mood was  okay.  She indicated she passed a class and was not sure if she was going to. She is still in a marketing class to finish. She has three more classes to finish before she is done. She is sleeping about 4 hours at a time. She indicated she feel  airheaded  sometimes, not so much fatigued. She reported she is going to the mall tomorrow - third time she has been out anywhere since the babies were born.      She is having a meeting tomorrow about changing her job. She might be getting approved for a one-bedroom apartment.      She shared that when she is home alone, she thinks about being alone more than when she is when she is at the hospital. She shared that she realized that she has low self-esteem and there are things she wants to change about herself. She reflected on her current views on relationships right now.      Treatment: Clinician used reflective listening, validation,  positive reinforcement, and psychoeducation.     Patient reaction: Dariusz was receptive to support and interventions provided today    Patient Progress: Dariusz was cooperative, attentive and engaged throughout the session. Progress toward goal completion seems good.     Additional Information:   The patient did not report medication changes.    Mental Status:  Appearance:  Casually dressed   Behavior/relationship to examiner/demeanor:  cooperative  Motor activity/EPS:  Within normal limits  Speech rate:  Within normal limits  Speech volume:  Within normal limits  Speech articulation:  Within normal limits  Speech coherence: Within normal limits  Speech spontaneity: Within normal limits  Mood (subjective):  okay   Affect (objective appearance): mood congruent  Thought Process (Associations):  Logical and Linear   Thought process (Rate):  Within normal limits   Thought content: Within normal limits  Abnormal Perception:  None   Insight:  Good   Judgment:  Good     Plan: Continue with goals as outlined above    Andreea Vásquez Psy.D.,L.P

## 2023-11-15 ENCOUNTER — VIRTUAL VISIT (OUTPATIENT)
Dept: PSYCHIATRY | Facility: CLINIC | Age: 33
End: 2023-11-15
Attending: PSYCHOLOGIST
Payer: COMMERCIAL

## 2023-11-15 DIAGNOSIS — F41.1 GAD (GENERALIZED ANXIETY DISORDER): ICD-10-CM

## 2023-11-15 DIAGNOSIS — F33.1 MODERATE EPISODE OF RECURRENT MAJOR DEPRESSIVE DISORDER (H): Primary | ICD-10-CM

## 2023-11-15 PROCEDURE — 90834 PSYTX W PT 45 MINUTES: CPT | Mod: VID | Performed by: PSYCHOLOGIST

## 2023-11-15 NOTE — PROGRESS NOTES
OUTPATIENT PSYCHOTHERAPY PROGRESS NOTE    Client Name: Dariusz Leyva   YOB: 1990  Time of Service: 39 minutes   Service Type(s): Individual psychotherapy    VIDEO VISIT  Dariusz Leyva is a 33 year old who is being evaluated via a billable video visit.      Telehealth Details  Type of service:  psychotherapy  Time of service:  Start Time:  4:23 pm  End Time: 5:02 pm    Reason for Telehealth Visit: Patient has requested telehealth visit  Originating Site (patient location):  [facility]   Location- In ICU with baby  Distant Site (provider location):  Onsite  Mode of Communication:  Hernan      Diagnoses:   Unspecified depressive disorder and unspecified anxiety    Goals of therapy: Elevate mood and show evidence of usual energy levels, and activities.      Individuals Present:   Psychotherapy services during this visit included myself and Francescochani Gordon.    Narrative:  Dariusz reported she was feeling tired. Her mood is  okay.  Yesterday was kind of a  down day.  She indicated that she has not been having great dreams and it has been getting her in negative moods. Also, a thought of an ex - came up yesterday. She also reflected on being alone and how that impacted her. She reported she is catching herself in old patterns and she does not want that for herself. She is still waiting to hear about the apartment. She has two more classes to take and then she will be done with school.     Treatment: Treatment focused on the recognition of interpersonal patterns and the next steps.      Patient reaction: Dariusz was receptive to support and interventions provided today    Patient Progress: Dariusz was cooperative, attentive and engaged throughout the session. Progress toward goal completion seems good.     Additional Information:   The patient did not report medication changes.    Mental Status:  Appearance:  Casually dressed   Behavior/relationship to examiner/demeanor:  cooperative  Motor  activity/EPS:  Within normal limits  Speech rate:  Within normal limits  Speech volume:  Within normal limits  Speech articulation:  Within normal limits  Speech coherence: Within normal limits  Speech spontaneity: Within normal limits  Mood (subjective):  okay   Affect (objective appearance): mood congruent  Thought Process (Associations):  Logical and Linear   Thought process (Rate):  Within normal limits   Thought content: Within normal limits  Abnormal Perception:  None   Insight:  Good   Judgment:  Good     Plan: Continue with goals as outlined above    Andreea Vásquez Psy.D.,L.P

## 2023-11-29 ENCOUNTER — VIRTUAL VISIT (OUTPATIENT)
Dept: PSYCHIATRY | Facility: CLINIC | Age: 33
End: 2023-11-29
Attending: PSYCHOLOGIST
Payer: COMMERCIAL

## 2023-11-29 DIAGNOSIS — F41.1 GAD (GENERALIZED ANXIETY DISORDER): ICD-10-CM

## 2023-11-29 DIAGNOSIS — F33.1 MODERATE EPISODE OF RECURRENT MAJOR DEPRESSIVE DISORDER (H): Primary | ICD-10-CM

## 2023-11-29 PROCEDURE — 90837 PSYTX W PT 60 MINUTES: CPT | Mod: VID | Performed by: PSYCHOLOGIST

## 2023-11-29 NOTE — PROGRESS NOTES
OUTPATIENT PSYCHOTHERAPY PROGRESS NOTE    Client Name: Dariusz Leyva   YOB: 1990  Time of Service: 62 minutes   Service Type(s): Individual psychotherapy    VIDEO VISIT  Dariusz eLyva is a 33 year old who is being evaluated via a billable video visit.      Telehealth Details  Type of service:  psychotherapy  Time of service:  Start Time:  4:14 pm  End Time: 5:16 pm    Reason for Telehealth Visit: Patient has requested telehealth visit  Originating Site (patient location):  [facility]   Location- Patient's home  Distant Site (provider location):  Onsite  Mode of Communication:  Hernan      Diagnoses:   Unspecified depressive disorder and unspecified anxiety    Goals of therapy: Elevate mood and show evidence of usual energy levels, and activities.      Individuals Present:   Psychotherapy services during this visit included myself and Dariusz Leyva.    Narrative:  Dariusz reported that they released both of her babies and this was earlier than expected. She indicated this is  a lot.  They will need to go in for several appointments over the next few months. She got her new apartment, but she will need to wait to sign the lease.      Discussed her relationship with  GE.  He is nice to her and they have chemistry. She is considering what the relationship means to her.      Treatment: Clinician used reflective listening, validation, positive reinforcement, and psychoeducation.     Patient reaction: Dariusz was receptive to support and interventions provided today    Patient Progress: Dariusz was cooperative, attentive and engaged throughout the session. Progress toward goal completion seems good.     Additional Information:   The patient did not report medication changes.    Mental Status:  Appearance:  Casually dressed   Behavior/relationship to examiner/demeanor:  cooperative  Motor activity/EPS:  Within normal limits  Speech rate:  Within normal limits  Speech volume:  Within normal  limits  Speech articulation:  Within normal limits  Speech coherence: Within normal limits  Speech spontaneity: Within normal limits  Mood (subjective):  alright   Affect (objective appearance): mood congruent  Thought Process (Associations):  Logical and Linear   Thought process (Rate):  Within normal limits   Thought content: Within normal limits  Abnormal Perception:  None   Insight:  Good   Judgment:  Good     Plan: Continue with goals as outlined above    Andreea Vásquez Psy.D.,L.P

## 2023-11-30 ENCOUNTER — PATIENT OUTREACH (OUTPATIENT)
Dept: CARE COORDINATION | Facility: CLINIC | Age: 33
End: 2023-11-30
Payer: COMMERCIAL

## 2023-11-30 NOTE — PROGRESS NOTES
Clinic Care Coordination Contact  Follow Up Progress Note      Assessment: [facility] contacted patient to follow up. Patient states that she is doing ok. Patient's twins were discharged from the NICU much earlier than expected. They are doing well at home, but patient is set to return to work within the next couple weeks. Patient doesn't feel ready to go back to work, and has asked her boss if she would be able to work remotely. She is waiting to hear back. Patient states that she found an apartment that she likes, and that she has been approved. She is waiting to hear about a move-in date. Patient states that while her twins were in the NICU, the [facility] informed her of the [employer] program. They had planned to apply together, but nobody ever followed up on this with patient. Patient requested that I look into this.    Called [facility]. Spoke with , Claudia. Claudia's number is [phone]. Claudia states that patient was denied financial assistance as she already received it back in October. Claudia plans to reach out to patient today to go over additional financial assistance options.    Care Gaps:    Health Maintenance Due   Topic Date Due    YEARLY PREVENTIVE VISIT  Never done    ADVANCE CARE PLANNING  Never done    DEPRESSION ACTION PLAN  Never done    HEPATITIS B IMMUNIZATION (1 of 3 - 3-dose series) Never done    COVID-19 Vaccine (3 - 2023-24 season) 09/01/2023     Care Plans  Care Plan: Housing Instability       Problem: SDOH LACK OF STABLE HOUSING       Long-Range Goal: Establish Stable Housing       Start Date: 7/14/2023 Expected End Date: 11/1/2023    This Visit's Progress: 50% Recent Progress: 30%    Priority: High    Note:     Barriers: Complex mental health, lack of finances.  Strengths: Strong support system.  Patient expressed understanding of goal: Yes.  Action steps to achieve this goal:  1. I will explore housing options throughout [facility].  2. I will review the housing  resources sent to me by [facility].  3. I will reach out to Angelica with any questions or concerns.                            Intervention/Education provided during outreach: Patient verbalized understanding, engaged in AIDET communication during patient encounter.     Outreach Frequency: monthly, more frequently as needed    Plan: Patient was encouraged to reach out with any questions or concerns.    Care Coordinator will follow up in one month.    Angelica White, [facility]  Clinic Care Coordination  [facility]  [email or web address]  [phone]

## 2023-12-06 ENCOUNTER — PATIENT OUTREACH (OUTPATIENT)
Dept: CARE COORDINATION | Facility: CLINIC | Age: 33
End: 2023-12-06
Payer: COMMERCIAL

## 2023-12-06 NOTE — PROGRESS NOTES
Clinic Care Coordination Contact  Follow Up Progress Note      Assessment: [facility] received incoming call from patient. Patient states that she never heard back from the NICU . She is requesting her direct number. This was provided to patient.    Care Gaps:    Health Maintenance Due   Topic Date Due    YEARLY PREVENTIVE VISIT  Never done    ADVANCE CARE PLANNING  Never done    DEPRESSION ACTION PLAN  Never done    HEPATITIS B IMMUNIZATION (1 of 3 - 3-dose series) Never done    COVID-19 Vaccine (3 - 2023-24 season) 09/01/2023     Care Plans  Care Plan: Housing Instability       Problem: SDOH LACK OF STABLE HOUSING       Long-Range Goal: Establish Stable Housing       Start Date: 7/14/2023 Expected End Date: 11/1/2023    This Visit's Progress: 50% Recent Progress: 30%    Priority: High    Note:     Barriers: Complex mental health, lack of finances.  Strengths: Strong support system.  Patient expressed understanding of goal: Yes.  Action steps to achieve this goal:  1. I will explore housing options throughout [facility].  2. I will review the housing resources sent to me by [facility].  3. I will reach out to Angelica with any questions or concerns.                            Intervention/Education provided during outreach: Patient verbalized understanding, engaged in AIDET communication during patient encounter.     Outreach Frequency: monthly, more frequently as needed    Plan: Patient was encouraged to reach out with any questions or concerns.    Care Coordinator will follow up in one month.    Angelica White, \[facility]\""  Clinic Care Coordination  [facility]  [email or web address]  [phone]

## 2023-12-13 ENCOUNTER — TELEPHONE (OUTPATIENT)
Dept: PSYCHIATRY | Facility: CLINIC | Age: 33
End: 2023-12-13
Payer: COMMERCIAL

## 2023-12-13 ENCOUNTER — PATIENT OUTREACH (OUTPATIENT)
Dept: CARE COORDINATION | Facility: CLINIC | Age: 33
End: 2023-12-13
Payer: COMMERCIAL

## 2023-12-13 NOTE — PROGRESS NOTES
Clinic Care Coordination Contact  Follow Up Progress Note      Assessment: [facility] contacted patient to check in. Patient states that she is doing ok. She went back to work this week, but is working reduced hours. Patient submitted applications for childcare assistance for the babies, and is waiting to hear back. Patient was approved for her new apartment, and signs the lease this Friday. Patient denies needing any immediate assistance from me.    Care Gaps:    Health Maintenance Due   Topic Date Due    YEARLY PREVENTIVE VISIT  Never done    ADVANCE CARE PLANNING  Never done    DEPRESSION ACTION PLAN  Never done    HEPATITIS B IMMUNIZATION (1 of 3 - 3-dose series) Never done    COVID-19 Vaccine (3 - 2023-24 season) 09/01/2023    PHQ-9  01/12/2024     Care Plans  Care Plan: Housing Instability       Problem: SDOH LACK OF STABLE HOUSING       Long-Range Goal: Establish Stable Housing       Start Date: 7/14/2023 Expected End Date: 11/1/2023    This Visit's Progress: 100% Recent Progress: 50%    Priority: High    Note:     Barriers: Complex mental health, lack of finances.  Strengths: Strong support system.  Patient expressed understanding of goal: Yes.  Action steps to achieve this goal:  1. I will explore housing options throughout [facility].  2. I will review the housing resources sent to me by [facility].  3. I will reach out to Angelica with any questions or concerns.                            Intervention/Education provided during outreach: Patient verbalized understanding, engaged in AIDET communication during patient encounter.     Outreach Frequency: monthly, more frequently as needed    Plan: Patient was encouraged to reach out with any questions or concerns.    Care Coordinator will follow up in one month.    Angelica White [facility]  Clinic Care Coordination  [facility]  [email or web address]  [phone]

## 2023-12-13 NOTE — TELEPHONE ENCOUNTER
Patient did not arrive to her telehealth appt at 4pm today, provided attempted sending links two times and then made a phone call to try to reach patient. Provider left message and let the patient know that we would not have another session until January and let the patient know to contact the clinic if the time was no longer working for her to either cancel future appts or request a different time. Provider ended the connection to the session at 4:24pm.

## 2024-01-02 ENCOUNTER — OFFICE VISIT (OUTPATIENT)
Dept: FAMILY MEDICINE | Facility: CLINIC | Age: 34
End: 2024-01-02
Payer: COMMERCIAL

## 2024-01-02 VITALS
WEIGHT: 293 LBS | HEIGHT: 69 IN | RESPIRATION RATE: 20 BRPM | OXYGEN SATURATION: 99 % | SYSTOLIC BLOOD PRESSURE: 152 MMHG | DIASTOLIC BLOOD PRESSURE: 94 MMHG | BODY MASS INDEX: 43.4 KG/M2 | HEART RATE: 71 BPM

## 2024-01-02 DIAGNOSIS — E66.01 MORBID OBESITY WITH BMI OF 45.0-49.9, ADULT (H): ICD-10-CM

## 2024-01-02 DIAGNOSIS — G89.29 CHRONIC PAIN OF RIGHT KNEE: ICD-10-CM

## 2024-01-02 DIAGNOSIS — M25.561 CHRONIC PAIN OF RIGHT KNEE: ICD-10-CM

## 2024-01-02 DIAGNOSIS — M25.569 CHRONIC KNEE PAIN, UNSPECIFIED LATERALITY: ICD-10-CM

## 2024-01-02 DIAGNOSIS — Z71.1 CONCERN ABOUT STD IN FEMALE WITHOUT DIAGNOSIS: Primary | ICD-10-CM

## 2024-01-02 DIAGNOSIS — F33.1 MODERATE EPISODE OF RECURRENT MAJOR DEPRESSIVE DISORDER (H): ICD-10-CM

## 2024-01-02 DIAGNOSIS — G43.109 MIGRAINE WITH AURA AND WITHOUT STATUS MIGRAINOSUS, NOT INTRACTABLE: ICD-10-CM

## 2024-01-02 DIAGNOSIS — M62.838 SPASM OF MUSCLE: ICD-10-CM

## 2024-01-02 DIAGNOSIS — G89.29 CHRONIC KNEE PAIN, UNSPECIFIED LATERALITY: ICD-10-CM

## 2024-01-02 DIAGNOSIS — M25.572 PAIN IN JOINT, ANKLE AND FOOT, LEFT: ICD-10-CM

## 2024-01-02 LAB
CLUE CELLS: ABNORMAL
TRICHOMONAS, WET PREP: ABNORMAL
WBC'S/HIGH POWER FIELD, WET PREP: ABNORMAL
YEAST, WET PREP: ABNORMAL

## 2024-01-02 PROCEDURE — 87210 SMEAR WET MOUNT SALINE/INK: CPT

## 2024-01-02 PROCEDURE — 87591 N.GONORRHOEAE DNA AMP PROB: CPT

## 2024-01-02 PROCEDURE — 99214 OFFICE O/P EST MOD 30 MIN: CPT | Mod: GC

## 2024-01-02 PROCEDURE — 87491 CHLMYD TRACH DNA AMP PROBE: CPT

## 2024-01-02 RX ORDER — ACETAMINOPHEN 325 MG/1
TABLET ORAL
Qty: 100 TABLET | Refills: 3 | Status: SHIPPED | OUTPATIENT
Start: 2024-01-02

## 2024-01-02 RX ORDER — CYCLOBENZAPRINE HCL 10 MG
10 TABLET ORAL 3 TIMES DAILY PRN
Qty: 90 TABLET | Refills: 0 | Status: SHIPPED | OUTPATIENT
Start: 2024-01-02 | End: 2024-01-29

## 2024-01-02 RX ORDER — IBUPROFEN 600 MG/1
TABLET, FILM COATED ORAL
Qty: 30 TABLET | Refills: 1 | Status: SHIPPED | OUTPATIENT
Start: 2024-01-02

## 2024-01-02 RX ORDER — IBUPROFEN 600 MG/1
TABLET, FILM COATED ORAL
Qty: 30 TABLET | Refills: 1 | Status: CANCELLED | OUTPATIENT
Start: 2024-01-02

## 2024-01-02 ASSESSMENT — PATIENT HEALTH QUESTIONNAIRE - PHQ9: SUM OF ALL RESPONSES TO PHQ QUESTIONS 1-9: 17

## 2024-01-02 NOTE — PATIENT INSTRUCTIONS
Take muscle relaxant as needed. There is a small theoretical chance it can pass in your breastmilk, but no data has shown that happening. To be safe, watch to see if your babies get too sleepy when you are taking it.

## 2024-01-02 NOTE — PROGRESS NOTES
Assessment & Plan   Pain in joint, ankle and foot, left  Spasm of muscle  Patient began experiencing having new pain on top of her chronic pain in her left ankle, leg and lower back since delivery of her babies.  Was on stirups for prolonged time, does not recall any acute trauma to the ankle.  Tender to palpation at medial malleolus of bone with tightness noted in bilateral calves, no enlargement on 1 side versus the other, no palpable clot, but limited by body habitus.  Most likely seems to be MSK given patient's extensive hardware history and the point tenderness of the worsening of pain.  Cannot completely rule out clot concern, if pain not improved within the next few days, consider bilateral ultrasound for clot rule out.  - cyclobenzaprine (FLEXERIL) 10 MG tablet; Take 1 tablet (10 mg) by mouth 3 times daily as needed for muscle spasms    Chronic knee pain, unspecified laterality  Morbid obesity with BMI of 45.0-49.9, adult (H)  Chronic pain of right knee  Fill of medications to maintain baseline pain control.  Patient is also been using TENS unit, massage gun, and hot and cold packs to help manage.  Attending PT as regularly as she is able to with the 2 twins.  - acetaminophen (TYLENOL) 325 MG tablet; TAKE 1-2 TABLETS(325-650MG) BY MOUTH EVERY 6 HOURS AS NEEDED FOR MILD PAIN Strength: 325 mg  - ibuprofen (ADVIL/MOTRIN) 600 MG tablet; TAKE 1 TABLET BY MOUTH EVERY 6 HOURS AS NEEDED FOR MILD PAIN OR FEVER    Concern about STD in female without diagnosis  Patient noticed vaginal odor recently.  Reports that she tends to get BV with sexual intercourse, started recently after birth of babies.  Also had a NuvaRing that she kept in for an extra week.  Wet prep was negative for clue cells, but did show some bacteria and increased white blood cells, running a GC to rule out.  - Wet preparation  - Chlamydia trachomatis/Neisseria gonorrhoeae by PCR - Clinic Collect    Moderate episode of recurrent major depressive  "disorder (H)  PHQ-9 17 today.  Patient stressed with the increased pain and baby is requiring lots of attention.  Has therapy appointments in place, no acute concerns at this time.    Migraine with aura and without status migrainosus, not intractable  Patient is a history of migraine with aura.  Episode of the current migraine currently.  Poor sleep, poor ability to manage self, and new pain likely causing exacerbation.  Do not recommend sumatriptan at this time as patient already has elevated blood pressure.  Of note, patient is on NuvaRing which combined with her migraine with aura puts her at an elevated clot risk.  Unsure who began this, but cautioned against continuing this method of birth control.  Patient may do better with Depo-Provera if daily pills continues to be an issue as patient mentions.    Diagnosis or treatment significantly limited by social determinants of health - twins at home, new mother  Ordering of each unique test  Prescription drug management  30 minutes spent by me on the date of the encounter doing chart review, history and exam, documentation and further activities per the note       BMI:   Estimated body mass index is 50.92 kg/m  as calculated from the following:    Height as of this encounter: 1.748 m (5' 8.8\").    Weight as of this encounter: 155.5 kg (342 lb 12.8 oz).   Weight management plan: Patient was referred to their PCP to discuss a diet and exercise plan.    MEDICATIONS:   Orders Placed This Encounter   Medications    acetaminophen (TYLENOL) 325 MG tablet     Sig: TAKE 1-2 TABLETS(325-650MG) BY MOUTH EVERY 6 HOURS AS NEEDED FOR MILD PAIN Strength: 325 mg     Dispense:  100 tablet     Refill:  3    ibuprofen (ADVIL/MOTRIN) 600 MG tablet     Sig: TAKE 1 TABLET BY MOUTH EVERY 6 HOURS AS NEEDED FOR MILD PAIN OR FEVER     Dispense:  30 tablet     Refill:  1    cyclobenzaprine (FLEXERIL) 10 MG tablet     Sig: Take 1 tablet (10 mg) by mouth 3 times daily as needed for muscle spasms " "    Dispense:  90 tablet     Refill:  0          - Continue other medications without change  Work on weight loss  Regular exercise    Return in about 2 weeks (around 1/16/2024).    Margy Gregorio MD  [facility] PÉREZ Arzola is a 33 year old, presenting for the following health issues:  STD (Std ck - poss bv - Ph is off because there is a vaginal order - no burning or itching ), Musculoskeletal Problem (Muscle pain after giving birth to twins increased in the leg in the back ), and Headache (Migraine increase to pain )        1/2/2024     4:08 PM   Additional Questions   Roomed by MAGDI Her MA   Accompanied by Self       HPI   Wet prep  Muscle cramping since delivery    Leg and back pain since delivery (R leg, some in L)   -ankle is new, has had in the R leg before after surgery in 2020    -sharp, worse with standing and moving, improves with TENS unit and resting, Tylenol, ibuprofen, helps a little but not completely   -delivered twin 1 stirrups   -limping to avoid pain   -flares up other chronic pain issues (meniscus, previous hardware)   -started PT, not able to go consistenly with babies    Some numbness in right finger tips in first 4 finger tips    Migraine (hx w/aura)   -    Vaginal odor   -started after resuming intercourse   -prone to BV with intercourse    NuvaRing accidentally in for week 4    -switching to new medicine    Review of Systems   Constitutional, HEENT, cardiovascular, pulmonary, gi and gu systems are negative, except as otherwise noted.      Objective    BP (!) 152/94   Pulse 71   Resp 20   Ht 1.748 m (5' 8.8\")   Wt (!) 155.5 kg (342 lb 12.8 oz)   SpO2 99%   Breastfeeding Yes   BMI 50.92 kg/m    Body mass index is 50.92 kg/m .  Physical Exam   GENERAL: healthy, alert and no distress  RESP: lungs clear to auscultation - no rales, rhonchi or wheezes  CV: regular rate and rhythm, normal S1 S2, no S3 or S4, no murmur, click or rub, no peripheral edema and " "peripheral pulses strong  ABDOMEN: soft, nontender, no hepatosplenomegaly, no masses and bowel sounds normal  MS: point tenderness to palpation of R medial malleolus, bilateral tightness in calves, no difference in size, point tenderness to palpation, or palpable clots (limited by habitus)  SKIN: no suspicious lesions or rashes  PSYCH: mentation appears normal, affect stressed, mood \"holding on\"  PATIENT HEALTH QUESTIONNAIRE-9 (PHQ - 9)    Over the last 2 weeks, how often have you been bothered by any of the following problems?    1. Little interest or pleasure in doing things -  Nearly every day   2. Feeling down, depressed, or hopeless -  Several days   3. Trouble falling or staying asleep, or sleeping too much - Nearly every day   4. Feeling tired or having little energy -  Nearly every day   5. Poor appetite or overeating -  Nearly every day   6. Feeling bad about yourself - or that you are a failure or have let yourself or your family down -  Several days   7. Trouble concentrating on things, such as reading the newspaper or watching television - Nearly every day   8. Moving or speaking so slowly that other people could have noticed? Or the opposite - being so fidgety or restless that you have been moving around a lot more than usual Not at all   9. Thoughts that you would be better off dead or of hurting  yourself in some way Not at all   Total Score: 17     If you checked off any problems, how difficult have these problems made it for you to do your work, take care of things at home, or get along with other people? Very difficult    Developed by Olamide Kirby, Shayna Veronica, Jim Ramires and colleagues, with an educational nickie from [employer] No permission required to reproduce, translate, display or distribute. permission required to reproduce, translate, display or distribute.    Results for orders placed or performed in visit on 01/02/24 (from the past 24 hour(s))   Wet preparation    " Specimen: Vagina; Swab   Result Value Ref Range    Trichomonas Absent Absent    Yeast Absent Absent    Clue Cells Absent Absent    WBCs/high power field 2+ (A) None    Narrative    Few bacteria; negative odor        ----- Service Performed and Documented by Resident or Fellow ------

## 2024-01-03 ENCOUNTER — VIRTUAL VISIT (OUTPATIENT)
Dept: PSYCHIATRY | Facility: CLINIC | Age: 34
End: 2024-01-03
Attending: PSYCHOLOGIST
Payer: COMMERCIAL

## 2024-01-03 DIAGNOSIS — F33.1 MODERATE EPISODE OF RECURRENT MAJOR DEPRESSIVE DISORDER (H): Primary | ICD-10-CM

## 2024-01-03 DIAGNOSIS — F41.1 GAD (GENERALIZED ANXIETY DISORDER): ICD-10-CM

## 2024-01-03 LAB
C TRACH DNA SPEC QL PROBE+SIG AMP: NEGATIVE
N GONORRHOEA DNA SPEC QL NAA+PROBE: NEGATIVE

## 2024-01-03 PROCEDURE — 90832 PSYTX W PT 30 MINUTES: CPT | Mod: 95 | Performed by: PSYCHOLOGIST

## 2024-01-03 ASSESSMENT — PATIENT HEALTH QUESTIONNAIRE - PHQ9
SUM OF ALL RESPONSES TO PHQ QUESTIONS 1-9: 16
SUM OF ALL RESPONSES TO PHQ QUESTIONS 1-9: 16
10. IF YOU CHECKED OFF ANY PROBLEMS, HOW DIFFICULT HAVE THESE PROBLEMS MADE IT FOR YOU TO DO YOUR WORK, TAKE CARE OF THINGS AT HOME, OR GET ALONG WITH OTHER PEOPLE: EXTREMELY DIFFICULT

## 2024-01-03 NOTE — PROGRESS NOTES
OUTPATIENT PSYCHOTHERAPY PROGRESS NOTE    Client Name: Dariusz Leyva   YOB: 1990  Time of Service: 33 minutes   Service Type(s): Individual psychotherapy    VIDEO VISIT  Dariusz Leyva is a 33 year old who is being evaluated via a billable video visit.      Telehealth Details  Type of service:  psychotherapy  Time of service:  Start Time:  4:04 pm  End Time: 4:37 pm    Reason for Telehealth Visit: Patient has requested telehealth visit  Originating Site (patient location):  [facility]   Location- Patient's home  Distant Site (provider location):  Onsite  Mode of Communication:  Hernan      Diagnoses:   Unspecified depressive disorder and unspecified anxiety    Goals of therapy: Elevate mood and show evidence of usual energy levels, and activities.      Individuals Present:   Psychotherapy services during this visit included myself and Yadirafadumo Gordon.    Narrative:  Dariusz reported that she has been working from home for 30 hours a week. She is feeling  exhausted.  Her mother is coming back tomorrow. She does not have permanent ; she has some people covering shortly. She is waiting to hear back on a scholarship and that is likely 4-6 weeks. She is trying to get everything moved still to her new apartment.  She reported that she feels  hopelessness  a lot - but she is so tired it is hard to feel it. She explained that she would typically be crying right now, but she is depleted. Patient request letter for emotional support animal.    Treatment: Clinician used reflective listening, validation, positive reinforcement, and psychoeducation.     Patient reaction: Dariusz was receptive to support and interventions provided today    Patient Progress: Dariusz was cooperative, attentive and engaged throughout the session. Progress toward goal completion seems good.     Additional Information:   The patient did not report medication changes.    Mental Status:  Appearance:  Casually dressed    Behavior/relationship to examiner/demeanor:  cooperative - very drowsy  Motor activity/EPS:  Within normal limits  Speech rate:  Within normal limits  Speech volume:  Within normal limits  Speech articulation:  Within normal limits  Speech coherence: Within normal limits  Speech spontaneity: Within normal limits  Mood (subjective):  hopelessness   Affect (objective appearance): Subdued  Thought Process (Associations):  Logical and Linear   Thought process (Rate):  Within normal limits   Thought content: Within normal limits  Abnormal Perception:  None   Insight:  Good   Judgment:  Good     Plan: Continue with goals as outlined above    Andreea Vásquez Psy.D.,L.P  Answers submitted by the patient for this visit:  Patient Health Questionnaire (Submitted on 1/3/2024)  If you checked off any problems, how difficult have these problems made it for you to do your work, take care of things at home, or get along with other people?: Extremely difficult  PHQ9 TOTAL SCORE: 16

## 2024-01-12 ENCOUNTER — PATIENT OUTREACH (OUTPATIENT)
Dept: CARE COORDINATION | Facility: CLINIC | Age: 34
End: 2024-01-12
Payer: COMMERCIAL

## 2024-01-12 NOTE — PROGRESS NOTES
Clinic Care Coordination Contact  Follow Up Progress Note      Assessment: [facility] contacted patient to check in. Patient states that she is doing ok. Her mom is in town and she has friends that are able to care for her twins while she is working. She was approved to work remotely, and has been working 30 hours/week. She is waiting to hear back about  assistance for the twins. Patient denies having any immediate social service needs.    Care Gaps:    Health Maintenance Due   Topic Date Due    YEARLY PREVENTIVE VISIT  Never done    ADVANCE CARE PLANNING  Never done    DEPRESSION ACTION PLAN  Never done    HEPATITIS B IMMUNIZATION (1 of 3 - 3-dose series) Never done    COVID-19 Vaccine (3 - 2023-24 season) 09/01/2023     Care Plans  Care Plan: Housing Instability       Problem: SDOH LACK OF STABLE HOUSING                   Care Plan: Mental Health       Problem: Mental Health Symptoms Need Improvement       Goal: Improve management of mental health symptoms and establish with mental health/psychosocial supports       Start Date: 1/12/2024 Expected End Date: 6/1/2024    This Visit's Progress: 10%    Priority: High    Note:     Barriers: Complex mental health.  Strengths: Strong support system.  Patient expressed understanding of goal: Yes.  Action steps to achieve this goal:  1. I will continue my appointments with Megan Vásquez.  2. I will continue to accept help from my mom as long as she is in town.  3. I will reach out to [facility] with any questions or concerns.                              Intervention/Education provided during outreach: Patient verbalized understanding, engaged in AIDET communication during patient encounter.     Outreach Frequency: monthly, more frequently as needed    Plan: Patient was encouraged to reach out with any questions or concerns.    Care Coordinator will follow up in one month.    GAGE Ng  Clinic Care Coordination  [facility]  [email or web address]  [phone]

## 2024-01-17 ENCOUNTER — VIRTUAL VISIT (OUTPATIENT)
Dept: PSYCHIATRY | Facility: CLINIC | Age: 34
End: 2024-01-17
Attending: PSYCHOLOGIST
Payer: COMMERCIAL

## 2024-01-17 DIAGNOSIS — F41.1 GAD (GENERALIZED ANXIETY DISORDER): Primary | ICD-10-CM

## 2024-01-17 DIAGNOSIS — F33.1 MODERATE EPISODE OF RECURRENT MAJOR DEPRESSIVE DISORDER (H): ICD-10-CM

## 2024-01-17 PROCEDURE — 90837 PSYTX W PT 60 MINUTES: CPT | Mod: 95 | Performed by: PSYCHOLOGIST

## 2024-01-17 ASSESSMENT — PATIENT HEALTH QUESTIONNAIRE - PHQ9
SUM OF ALL RESPONSES TO PHQ QUESTIONS 1-9: 13
10. IF YOU CHECKED OFF ANY PROBLEMS, HOW DIFFICULT HAVE THESE PROBLEMS MADE IT FOR YOU TO DO YOUR WORK, TAKE CARE OF THINGS AT HOME, OR GET ALONG WITH OTHER PEOPLE: VERY DIFFICULT
SUM OF ALL RESPONSES TO PHQ QUESTIONS 1-9: 13

## 2024-01-17 NOTE — PROGRESS NOTES
OUTPATIENT PSYCHOTHERAPY PROGRESS NOTE    Client Name: Dariusz Leyva   YOB: 1990  Time of Service: 61 minutes   Service Type(s): Individual psychotherapy    VIDEO VISIT  Dariusz Leyva is a 33 year old who is being evaluated via a billable video visit.      Telehealth Details  Type of service:  psychotherapy  Time of service:  Start Time:  4:02 pm  End Time: 5:03 pm    Reason for Telehealth Visit: Patient has requested telehealth visit  Originating Site (patient location):  [facility]   Location- Patient's home  Distant Site (provider location):  Onsite  Mode of Communication:  [place]ernan      Diagnoses:   Unspecified depressive disorder and unspecified anxiety    Goals of therapy: Elevate mood and show evidence of usual energy levels, and activities.      Individuals Present:   Psychotherapy services during this visit included myself and Dariusz Sullivanverónica.    Narrative:  Dariusz reported her mood is  not that great.  She has sleep disturbance. She is experiencing significant anhedonia. She denied suicidal ideation. She said,  that the overwhelmingness of being a parent has gotten to me.  She indicated she feels she is in a regretful stage. She feels like she needs a break. Her mother will be leaving on the 21st. She has been able to continue to work from home. She has had inconsistent . Her benefits got stopped because her  did not complete something - she is still waiting to hear about childcare. The twins have an inconsistent schedule. She had not been able to move the rest of her things out of her old apartment. She has to retake a class. Shared about crisis nurseries. Her father is trying to make a trip in mid-February.      Treatment: Clinician used reflective listening, validation, positive reinforcement, and psychoeducation.     Patient reaction: Dariusz was receptive to support and interventions provided today    Patient Progress: Dariusz was cooperative, attentive and  engaged throughout the session. Progress toward goal completion seems good.     Additional Information:   The patient did not report medication changes.    Mental Status:  Appearance:  Casually dressed   Behavior/relationship to examiner/demeanor:  cooperative - very drowsy  Motor activity/EPS:  Within normal limits  Speech rate:  Within normal limits  Speech volume:  Within normal limits  Speech articulation:  Within normal limits  Speech coherence: Within normal limits  Speech spontaneity: Within normal limits  Mood (subjective):  not that great   Affect (objective appearance): Subdued  Thought Process (Associations):  Logical and Linear   Thought process (Rate):  Within normal limits   Thought content: Within normal limits  Abnormal Perception:  None   Insight:  Good   Judgment:  Good     Plan: Continue with goals as outlined above    Andreea Vásquez Psy.D.,L.P    Answers submitted by the patient for this visit:  Patient Health Questionnaire (Submitted on 1/17/2024)  If you checked off any problems, how difficult have these problems made it for you to do your work, take care of things at home, or get along with other people?: Very difficult  PHQ9 TOTAL SCORE: 13

## 2024-01-24 ENCOUNTER — VIRTUAL VISIT (OUTPATIENT)
Dept: PSYCHIATRY | Facility: CLINIC | Age: 34
End: 2024-01-24
Attending: PSYCHOLOGIST
Payer: COMMERCIAL

## 2024-01-24 DIAGNOSIS — F33.1 MODERATE EPISODE OF RECURRENT MAJOR DEPRESSIVE DISORDER (H): ICD-10-CM

## 2024-01-24 DIAGNOSIS — F41.1 GAD (GENERALIZED ANXIETY DISORDER): Primary | ICD-10-CM

## 2024-01-24 PROCEDURE — 90834 PSYTX W PT 45 MINUTES: CPT | Mod: 95 | Performed by: PSYCHOLOGIST

## 2024-01-24 NOTE — PROGRESS NOTES
OUTPATIENT PSYCHOTHERAPY PROGRESS NOTE    Client Name: Dariusz Leyva   YOB: 1990  Time of Service: 52 minutes   Service Type(s): Individual psychotherapy    VIDEO VISIT  Dariusz Leyva is a 33 year old who is being evaluated via a billable video visit.      Telehealth Details  Type of service:  psychotherapy  Time of service:  Start Time:  4:09 pm  End Time: 5:01 pm    Reason for Telehealth Visit: Patient has requested telehealth visit  Originating Site (patient location):  [facility]   Location- Patient's home  Distant Site (provider location):  Onsite  Mode of Communication:  [place]ernan      Diagnoses:   Unspecified depressive disorder and unspecified anxiety    Goals of therapy: Elevate mood and show evidence of usual energy levels, and activities.      Individuals Present:   Psychotherapy services during this visit included myself and Dariusz Sullivanverónica.    Narrative:  Dariusz reported she is  tired.  She reported she did not go to work today because her  did not come. She is totally on her own now for  - her mother has left. She reported her mood is  grumpy  from lack of sleep. Her cousin will be coming at 4:30 to help her finish moving her stuff from her last apartment. She is trying to figure out her  application - she is concerned she may lose her job.     Treatment: Clinician used reflective listening, validation, positive reinforcement, and psychoeducation.     Patient reaction: Dariusz was receptive to support and interventions provided today    Patient Progress: Dariusz was cooperative, attentive and engaged throughout the session. Progress toward goal completion seems good.     Additional Information:   The patient did not report medication changes.    Mental Status:  Appearance:  Casually dressed   Behavior/relationship to examiner/demeanor:  cooperative   Motor activity/EPS:  Within normal limits  Speech rate:  Within normal limits  Speech volume:  Within  normal limits  Speech articulation:  Within normal limits  Speech coherence: Within normal limits  Speech spontaneity: Within normal limits  Mood (subjective):  grumpy   Affect (objective appearance): Subdued  Thought Process (Associations):  Logical and Linear   Thought process (Rate):  Within normal limits   Thought content: Within normal limits  Abnormal Perception:  None   Insight:  Good   Judgment:  Good     Plan: Continue with goals as outlined above    Andreea Vásquez Psy.D.,L.P

## 2024-01-29 DIAGNOSIS — M62.838 SPASM OF MUSCLE: ICD-10-CM

## 2024-01-29 RX ORDER — CYCLOBENZAPRINE HCL 10 MG
10 TABLET ORAL 3 TIMES DAILY PRN
Qty: 90 TABLET | Refills: 0 | Status: SHIPPED | OUTPATIENT
Start: 2024-01-29

## 2024-01-31 ENCOUNTER — VIRTUAL VISIT (OUTPATIENT)
Dept: PSYCHIATRY | Facility: CLINIC | Age: 34
End: 2024-01-31
Attending: PSYCHOLOGIST
Payer: COMMERCIAL

## 2024-01-31 ENCOUNTER — TELEPHONE (OUTPATIENT)
Dept: PSYCHIATRY | Facility: CLINIC | Age: 34
End: 2024-01-31
Payer: COMMERCIAL

## 2024-01-31 DIAGNOSIS — F33.1 MODERATE EPISODE OF RECURRENT MAJOR DEPRESSIVE DISORDER (H): ICD-10-CM

## 2024-01-31 DIAGNOSIS — F41.1 GAD (GENERALIZED ANXIETY DISORDER): Primary | ICD-10-CM

## 2024-01-31 PROCEDURE — 90834 PSYTX W PT 45 MINUTES: CPT | Mod: 95 | Performed by: PSYCHOLOGIST

## 2024-01-31 NOTE — PROGRESS NOTES
OUTPATIENT PSYCHOTHERAPY PROGRESS NOTE    Client Name: Dariusz Leyva   YOB: 1990  Time of Service: 50 minutes   Service Type(s): Individual psychotherapy    VIDEO VISIT  Dariusz Leyva is a 33 year old who is being evaluated via a billable video visit.      Telehealth Details  Type of service:  psychotherapy  Time of service:  Start Time:  4:16 pm  End Time: 5:06 pm    Reason for Telehealth Visit: Patient has requested telehealth visit  Originating Site (patient location):  [facility]   Location- Patient's home  Distant Site (provider location):  Onsite  Mode of Communication:  [place]ernan      Diagnoses:   Unspecified depressive disorder and unspecified anxiety    Goals of therapy: Elevate mood and show evidence of usual energy levels, and activities.      Individuals Present:   Psychotherapy services during this visit included myself and Francescochani Gordon.    Narrative:  Dariusz reported she was feeling  tired.  She is still caring for her twins alone at night and they are not on the same sleep schedule. She lost a  - this person was doing it without pay, so she will need to expend more financial resources. She has not heard from the [facility] about supports. She has been able to work. She has very low energy level. She denied anhedonia (she is getting out to shop a bit). Discussed relationships - and how things have changed over time.       Treatment: Clinician used reflective listening, validation, positive reinforcement, and psychoeducation.     Patient reaction: Dariusz was receptive to support and interventions provided today    Patient Progress: Dariusz was cooperative, attentive and engaged throughout the session. Progress toward goal completion seems good.     Additional Information:   The patient did not report medication changes.    Mental Status:  Appearance:  Casually dressed   Behavior/relationship to examiner/demeanor:  cooperative   Motor activity/EPS:  Within normal  limits  Speech rate:  Within normal limits  Speech volume:  Within normal limits  Speech articulation:  Within normal limits  Speech coherence: Within normal limits  Speech spontaneity: Within normal limits  Mood (subjective):  tired   Affect (objective appearance): Subdued  Thought Process (Associations):  Logical and Linear   Thought process (Rate):  Within normal limits   Thought content: Within normal limits  Abnormal Perception:  None   Insight:  Good   Judgment:  Good     Plan: Continue with goals as outlined above    Andreea Vásquez Psy.D.,L.P

## 2024-01-31 NOTE — TELEPHONE ENCOUNTER
Provider called patient to check in to see if she was able to come to telehealth appt. Left message for patient indicting provider would wait for 5 minutes. Dariusz arrived at appt

## 2024-02-07 ENCOUNTER — VIRTUAL VISIT (OUTPATIENT)
Dept: PSYCHIATRY | Facility: CLINIC | Age: 34
End: 2024-02-07
Attending: PSYCHOLOGIST
Payer: COMMERCIAL

## 2024-02-07 ENCOUNTER — PATIENT OUTREACH (OUTPATIENT)
Dept: CARE COORDINATION | Facility: CLINIC | Age: 34
End: 2024-02-07
Payer: COMMERCIAL

## 2024-02-07 DIAGNOSIS — F41.1 GAD (GENERALIZED ANXIETY DISORDER): Primary | ICD-10-CM

## 2024-02-07 DIAGNOSIS — F33.1 MODERATE EPISODE OF RECURRENT MAJOR DEPRESSIVE DISORDER (H): ICD-10-CM

## 2024-02-07 PROCEDURE — 90837 PSYTX W PT 60 MINUTES: CPT | Mod: 95 | Performed by: PSYCHOLOGIST

## 2024-02-07 NOTE — PROGRESS NOTES
Clinic Care Coordination Contact  Follow Up Progress Note      Assessment: [facility] contacted patient to follow up. Patient asked to call me back in a few minutes. Will await return call.    Care Gaps:    Health Maintenance Due   Topic Date Due    YEARLY PREVENTIVE VISIT  Never done    ADVANCE CARE PLANNING  Never done    DEPRESSION ACTION PLAN  Never done    HEPATITIS B IMMUNIZATION (1 of 3 - 3-dose series) Never done    LIPID  07/20/2021    COVID-19 Vaccine (3 - 2023-24 season) 09/01/2023     Care Plans  Care Plan: Housing Instability       Problem: SDOH LACK OF STABLE HOUSING                   Care Plan: Mental Health       Problem: Mental Health Symptoms Need Improvement       Goal: Improve management of mental health symptoms and establish with mental health/psychosocial supports       Start Date: 1/12/2024 Expected End Date: 6/1/2024    This Visit's Progress: 10%    Priority: High    Note:     Barriers: Complex mental health.  Strengths: Strong support system.  Patient expressed understanding of goal: Yes.  Action steps to achieve this goal:  1. I will continue my appointments with Megan Vásquez.  2. I will continue to accept help from my mom as long as she is in town.  3. I will reach out to [facility] with any questions or concerns.                              Intervention/Education provided during outreach: Patient verbalized understanding, engaged in AIDET communication during patient encounter.     Plan: Will await return call from patient.    Care Coordinator will follow up in one month.    GAGE Ng  Clinic Care Coordination  [email or web address]  [phone]

## 2024-02-07 NOTE — PROGRESS NOTES
OUTPATIENT PSYCHOTHERAPY PROGRESS NOTE    Client Name: Dariusz Leyva   YOB: 1990  Time of Service: 55 minutes   Service Type(s): Individual psychotherapy    VIDEO VISIT  Dariusz Leyva is a 33 year old who is being evaluated via a billable video visit.      Telehealth Details  Type of service:  psychotherapy  Time of service:  Start Time:  4:08 pm  End Time: 5:03 pm    Reason for Telehealth Visit: Patient has requested telehealth visit  Originating Site (patient location):  [facility]   Location- Patient's home  Distant Site (provider location):  Onsite  Mode of Communication:  Hernan      Diagnoses:   Unspecified depressive disorder and unspecified anxiety    Goals of therapy: Elevate mood and show evidence of usual energy levels, and activities.      Individuals Present:   Psychotherapy services during this visit included myself and Yadirafadumo Gordon.    Narrative:  Dariusz reported her mood is  aggravated.  She indicated that this is due to sleep deprivation. She is still waiting for the [facility] to get back to her about . She indicated that she heard back about her twins  development and they are on track for development. She is trying to refocus her mental energy, but sometimes she thinks about romantic relationships and it lowers her mood. She reported her father will be coming to town on Feb 17th.      Treatment: Clinician used reflective listening, validation, positive reinforcement, and psychoeducation.     Patient reaction: Dariusz was receptive to support and interventions provided today    Patient Progress: Dariusz was cooperative, attentive and engaged throughout the session. Progress toward goal completion seems good.     Additional Information:   The patient did not report medication changes.    Mental Status:  Appearance:  Casually dressed   Behavior/relationship to examiner/demeanor:  cooperative   Motor activity/EPS:  Within normal limits  Speech rate:  Within normal  limits  Speech volume:  Within normal limits  Speech articulation:  Within normal limits  Speech coherence: Within normal limits  Speech spontaneity: Within normal limits  Mood (subjective):  aggravated   Affect (objective appearance): Generally euthymic  Thought Process (Associations):  Logical and Linear   Thought process (Rate):  Within normal limits   Thought content: Within normal limits  Abnormal Perception:  None   Insight:  Good   Judgment:  Good     Plan: Continue with goals as outlined above    Andreea Vásquez Psy.D.,L.P

## 2024-02-14 ENCOUNTER — VIRTUAL VISIT (OUTPATIENT)
Dept: PSYCHIATRY | Facility: CLINIC | Age: 34
End: 2024-02-14
Attending: PSYCHOLOGIST
Payer: COMMERCIAL

## 2024-02-14 DIAGNOSIS — F41.1 GAD (GENERALIZED ANXIETY DISORDER): Primary | ICD-10-CM

## 2024-02-14 DIAGNOSIS — F33.1 MODERATE EPISODE OF RECURRENT MAJOR DEPRESSIVE DISORDER (H): ICD-10-CM

## 2024-02-14 PROCEDURE — 90837 PSYTX W PT 60 MINUTES: CPT | Mod: 95 | Performed by: PSYCHOLOGIST

## 2024-02-14 NOTE — PROGRESS NOTES
OUTPATIENT PSYCHOTHERAPY PROGRESS NOTE    Client Name: Dariusz Leyva   YOB: 1990  Time of Service: 61 minutes   Service Type(s): Individual psychotherapy    VIDEO VISIT  Dariusz Leyva is a 33 year old who is being evaluated via a billable video visit.      Telehealth Details  Type of service:  psychotherapy  Time of service:  Start Time:  4:15 pm  End Time: 5:15 pm    Reason for Telehealth Visit: Patient has requested telehealth visit  Originating Site (patient location):  [facility]   Location- Patient's home  Distant Site (provider location):  Onsite  Mode of Communication:  Hernan      Diagnoses:   Unspecified depressive disorder and unspecified anxiety    Goals of therapy: Elevate mood and show evidence of usual energy levels, and activities.      Individuals Present:   Psychotherapy services during this visit included myself and Dariusz Leyva.    Narrative:  Dariusz reported she just scratched the side of her car - there was a trash can close to a turn she made.  The twins started day care on Monday. Dariusz has had a little time to do some errands. She still plans to have help come after work. Her father should arrive on Friday. She has a movie night with Trent coming up.      Treatment: Clinician used reflective listening, validation, positive reinforcement, and psychoeducation.     Patient reaction: Dariusz was receptive to support and interventions provided today    Patient Progress: Dariusz was cooperative, attentive and engaged throughout the session. Progress toward goal completion seems good.     Additional Information:   The patient did not report medication changes.    Mental Status:  Appearance:  Casually dressed   Behavior/relationship to examiner/demeanor:  cooperative, more energy  Motor activity/EPS:  Within normal limits  Speech rate:  Within normal limits  Speech volume:  Within normal limits  Speech articulation:  Within normal limits  Speech coherence: Within normal  limits  Speech spontaneity: Within normal limits  Mood (subjective):  okay   Affect (objective appearance): Euthymic  Thought Process (Associations):  Logical and Linear   Thought process (Rate):  Within normal limits   Thought content: Within normal limits  Abnormal Perception:  None   Insight:  Good   Judgment:  Good     Plan: Continue with goals as outlined above    Andreea Vásquez Psy.D.,L.P

## 2024-02-21 ENCOUNTER — VIRTUAL VISIT (OUTPATIENT)
Dept: PSYCHIATRY | Facility: CLINIC | Age: 34
End: 2024-02-21
Attending: PSYCHOLOGIST
Payer: COMMERCIAL

## 2024-02-21 DIAGNOSIS — F33.1 MODERATE EPISODE OF RECURRENT MAJOR DEPRESSIVE DISORDER (H): ICD-10-CM

## 2024-02-21 DIAGNOSIS — F41.1 GAD (GENERALIZED ANXIETY DISORDER): Primary | ICD-10-CM

## 2024-02-21 PROCEDURE — 90837 PSYTX W PT 60 MINUTES: CPT | Mod: 95 | Performed by: PSYCHOLOGIST

## 2024-02-21 NOTE — PROGRESS NOTES
OUTPATIENT PSYCHOTHERAPY PROGRESS NOTE    Client Name: Dariusz Leyva   YOB: 1990  Time of Service: 66 minutes   Service Type(s): Individual psychotherapy    VIDEO VISIT  Dariusz Leyva is a 33 year old who is being evaluated via a billable video visit.      Telehealth Details  Type of service:  psychotherapy  Time of service:  Start Time:  4:01 pm  End Time: 5:07 pm    Reason for Telehealth Visit: Patient has requested telehealth visit  Originating Site (patient location):  [facility]   Location- Patient's home  Distant Site (provider location):  Onsite  Mode of Communication:  Hernan      Diagnoses:   Unspecified depressive disorder and unspecified anxiety    Goals of therapy: Elevate mood and show evidence of usual energy levels, and activities.      Individuals Present:   Psychotherapy services during this visit included myself and Dariusz Leyva.    Narrative:  Dariusz reported that she is feeling  sick.  She reported her twins got the flu from being in the day care. She has been stuck in the house. She is still trying to determine her work schedule. Her father visited from Friday to Sunday. She shared her experience of him visiting. She shared her previous boyfriend recently posted a picture of his new girlfriend. She shared about her experiences in early relationships. She is trying to determine what being a mom means to her. Also discussed what it might mean to stop dating and be alone.       Treatment: Clinician used reflective listening, validation, positive reinforcement, and psychoeducation.     Patient reaction: Dariusz was receptive to support and interventions provided today    Patient Progress: Dariusz was cooperative, attentive and engaged throughout the session. Progress toward goal completion seems good.     Additional Information:   The patient did not report medication changes.    Mental Status:  Appearance:  Casually dressed   Behavior/relationship to examiner/demeanor:   cooperative, more energy  Motor activity/EPS:  Within normal limits  Speech rate:  Within normal limits  Speech volume:  Within normal limits  Speech articulation:  Within normal limits  Speech coherence: Within normal limits  Speech spontaneity: Within normal limits  Mood (subjective):  sick   Affect (objective appearance): Subdued  Thought Process (Associations):  Logical and Linear   Thought process (Rate):  Within normal limits   Thought content: Within normal limits  Abnormal Perception:  None   Insight:  Good   Judgment:  Good     Plan: Continue with goals as outlined above    Andreea Vásquez Psy.D.,L.P

## 2024-03-08 ENCOUNTER — PATIENT OUTREACH (OUTPATIENT)
Dept: CARE COORDINATION | Facility: CLINIC | Age: 34
End: 2024-03-08
Payer: COMMERCIAL

## 2024-03-13 ENCOUNTER — VIRTUAL VISIT (OUTPATIENT)
Dept: PSYCHIATRY | Facility: CLINIC | Age: 34
End: 2024-03-13
Attending: PSYCHOLOGIST
Payer: COMMERCIAL

## 2024-03-13 DIAGNOSIS — F33.1 MODERATE EPISODE OF RECURRENT MAJOR DEPRESSIVE DISORDER (H): ICD-10-CM

## 2024-03-13 DIAGNOSIS — F41.1 GAD (GENERALIZED ANXIETY DISORDER): Primary | ICD-10-CM

## 2024-03-13 PROCEDURE — 90837 PSYTX W PT 60 MINUTES: CPT | Mod: 95 | Performed by: PSYCHOLOGIST

## 2024-03-13 NOTE — PROGRESS NOTES
"OUTPATIENT PSYCHOTHERAPY PROGRESS NOTE    Client Name: Dariusz Leyva   YOB: 1990  Time of Service: 60 minutes   Service Type(s): Individual psychotherapy    VIDEO VISIT  Dariusz Leyva is a 33 year old who is being evaluated via a billable video visit.      Telehealth Details  Type of service:  psychotherapy  Time of service:  Start Time:  4:05 pm  End Time: 5:05 pm    Reason for Telehealth Visit: Patient has requested telehealth visit  Originating Site (patient location):  [facility]   Location- Patient's home  Distant Site (provider location):  Onsite  Mode of Communication:  Hernan      Diagnoses:   Unspecified depressive disorder and unspecified anxiety    Goals of therapy: Elevate mood and show evidence of usual energy levels, and activities.      Individuals Present:   Psychotherapy services during this visit included myself and Dariusz Gordon.    Narrative:  Dariusz reported her mood has been \"blah.\" She is not getting much sleep and her son is not feeling well. She needed to stay home today to take care of him. She is struggling with school because she is overwhelmed with . She started back at work in-person this week.    Treatment: Clinician used reflective listening, validation, positive reinforcement, and psychoeducation.     Patient reaction: Dariusz was receptive to support and interventions provided today    Patient Progress: Dariusz was cooperative, attentive and engaged throughout the session. Progress toward goal completion seems good.     Additional Information:   The patient did not report medication changes.    Mental Status:  Appearance:  Casually dressed   Behavior/relationship to examiner/demeanor:  cooperative  Motor activity/EPS:  Within normal limits  Speech rate:  Within normal limits  Speech volume:  Within normal limits  Speech articulation:  Within normal limits  Speech coherence: Within normal limits  Speech spontaneity: Within normal limits  Mood " (subjective):  blah   Affect (objective appearance): Subdued  Thought Process (Associations):  Logical and Linear   Thought process (Rate):  Within normal limits   Thought content: Within normal limits  Abnormal Perception:  None   Insight:  Good   Judgment:  Good     Plan: Continue with goals as outlined above    Andreea Vásquez Psy.D.,L.P

## 2024-04-03 ENCOUNTER — VIRTUAL VISIT (OUTPATIENT)
Dept: PSYCHIATRY | Facility: CLINIC | Age: 34
End: 2024-04-03
Attending: PSYCHOLOGIST
Payer: COMMERCIAL

## 2024-04-03 ENCOUNTER — BEH TREATMENT PLAN (OUTPATIENT)
Dept: PSYCHIATRY | Facility: CLINIC | Age: 34
End: 2024-04-03
Payer: COMMERCIAL

## 2024-04-03 DIAGNOSIS — F41.1 GAD (GENERALIZED ANXIETY DISORDER): Primary | ICD-10-CM

## 2024-04-03 DIAGNOSIS — F33.1 MODERATE EPISODE OF RECURRENT MAJOR DEPRESSIVE DISORDER (H): ICD-10-CM

## 2024-04-03 PROCEDURE — 90837 PSYTX W PT 60 MINUTES: CPT | Mod: 95 | Performed by: PSYCHOLOGIST

## 2024-04-03 NOTE — PROGRESS NOTES
"OUTPATIENT TREATMENT PLAN SUMMARY    Date of Treatment Plan: 4/3/24  90-Day Review Date: 7/3/24  Date of Initial Service: 9/17/18       DSM-V Diagnosis (include numeric code)  Dysthymic Disorder (F34.1)    Current symptoms and circumstances that substantiate the diagnosis:  Patient reported fairly consistent low mood, disrupted sleep, fatigue, periods of anhedonia     She endorsed symptoms of anxiety including, she reported a looming \"doom\" coming, worries about benefits for the kids, and losing herself and being a single mom     How symptoms and/or behaviors are affecting level of function:  Isolation, struggle with self-care at times, she continues to struggle with school work (\"sometimes my brain just does not want to process\")    Risk Assessment:  Suicide:  Assessed Level of Immediate Risk: Low  Ideation: not current, is experiencing less often  Plan:  No  Means: No  Intent: No    Homicide/Violence:  Assessed Level of Immediate Risk: None  Ideation: No  Plan: No  Means: No  Intent: No    If on a medication, please include name and dosage: Citalopram 30mg once a day      Symptom/Problem Measurable Goals Interventions Gains Made   1.Negative thinking - cynacism 1. In the therapeutic environment and during stressful situations, Dariusz will learn to identify her pessimistic thoughts and challenge them    Would like to have awareness of how this might come out in her environment around her children 1.CBT: Problem solving, skill building and psychoeducation 1. She reports insight into her negative thoughts, she sometimes is successful in addressing those thoughts unless there is a lot of stress   2.  Obsessive thinking - impacts mood 2. Per patient report, will have reduction in symptoms 2. CBT 2. 4/3/24 Has been able to create a mindset to move her aware from obsessing about relationships. \"Let it happen.\"    3. Self Care 3. Will make incremental changes toward self defined goals 3. CBT: Problem solving, skill " building and psychoeducation 3. NA - new   4. Anxiety 4. Reduction in smoking. Currently smoking once a day. Would like to no more than once or twice a week. And move towards only smoking in social situations 4. CBT strategies - replacement behavior when anxious 4. NA - new       Frequency of Sessions: Weekly    Discharge and Aftercare Goals: discharge on completion of goals    Expected duration of treatment:  To be reassessed in 90 days    Participants in therapy plan (family, friends, support network): client and therapist      Telehealth appointment - patient gave verbal acknowledge of plan as written      Regulatory Guidelines for Updating Treatment Plan  [place] Medical Assistance: Reviewed & signed at least every 90days  Medicare:  Update per policy

## 2024-04-03 NOTE — PROGRESS NOTES
OUTPATIENT PSYCHOTHERAPY PROGRESS NOTE    Client Name: Dariusz Leyva   YOB: 1990  Time of Service: 55 minutes   Service Type(s): Individual psychotherapy    VIDEO VISIT  Dariusz Leyva is a 33 year old who is being evaluated via a billable video visit.      Telehealth Details  Type of service:  psychotherapy  Time of service:  Start Time:  4:05 pm  End Time: 5:00 pm    Reason for Telehealth Visit: Patient has requested telehealth visit  Originating Site (patient location):  [facility]   Location- Patient's home  Distant Site (provider location):  Offsite  Mode of Communication:  Hernan      Diagnoses:   Unspecified depressive disorder and unspecified anxiety    Goals of therapy: Decrease negative and obsessional thought processes, increase self care, and identify replacement strategies for dealing with anxiety    Individuals Present:   Psychotherapy services during this visit included myself and Dariusz Leyva.    Narrative:  Dariusz reported her mood is  up and down.  She is not consistently getting sleep.      Her great aunt just  and she will be traveling to the  on Friday. She went to the [employer] with  [employer].  She enjoyed her time, but  M  was tired and not as engaged. She has come to the understanding to let relationships  happen.  She doesn t want to keep victimizing herself because someone is  not in to me.      She shared that she plans to make incremental changes in her diet. And she is trying to make plans to workout at work.     Treatment: Clinician used reflective listening, validation, positive reinforcement, and psychoeducation. Discussed how reframing is useful in consideration of relationships. Updated treatment plan.    Patient reaction: Dariusz was receptive to support and interventions provided today    Patient Progress: Dariusz was cooperative, attentive and engaged throughout the session. Progress toward goal completion seems good.     Additional Information:    The patient did not report medication changes.    Mental Status:  Appearance:  Casually dressed   Behavior/relationship to examiner/demeanor:  cooperative  Motor activity/EPS:  Within normal limits  Speech rate:  Within normal limits  Speech volume:  Within normal limits  Speech articulation:  Within normal limits  Speech coherence: Within normal limits  Speech spontaneity: Within normal limits  Mood (subjective):  up and down   Affect (objective appearance): Euthymic  Thought Process (Associations):  Logical and Linear   Thought process (Rate):  Within normal limits   Thought content: Within normal limits  Abnormal Perception:  None   Insight:  Good   Judgment:  Good     Plan: Continue with goals as outlined above    Andreea Vásquez Psy.D.,L.P

## 2024-04-08 ENCOUNTER — PATIENT OUTREACH (OUTPATIENT)
Dept: CARE COORDINATION | Facility: CLINIC | Age: 34
End: 2024-04-08
Payer: COMMERCIAL

## 2024-04-08 NOTE — PROGRESS NOTES
Clinic Care Coordination Contact  Follow Up Progress Note      Assessment: [facility] contacted patient to check in. Patient requested to call me back shortly. Will await return call.    Care Gaps:    Health Maintenance Due   Topic Date Due    YEARLY PREVENTIVE VISIT  Never done    ADVANCE CARE PLANNING  Never done    DEPRESSION ACTION PLAN  Never done    HEPATITIS B IMMUNIZATION (1 of 3 - 19+ 3-dose series) Never done    LIPID  07/20/2021    COVID-19 Vaccine (3 - 2023-24 season) 09/01/2023     Care Plans  Care Plan: Housing Instability       Problem: SDOH LACK OF STABLE HOUSING                   Care Plan: Mental Health       Problem: Mental Health Symptoms Need Improvement       Goal: Improve management of mental health symptoms and establish with mental health/psychosocial supports       Start Date: 1/12/2024 Expected End Date: 6/1/2024    This Visit's Progress: 10%    Priority: High    Note:     Barriers: Complex mental health.  Strengths: Strong support system.  Patient expressed understanding of goal: Yes.  Action steps to achieve this goal:  1. I will continue my appointments with Megan Vásquez.  2. I will continue to accept help from my mom as long as she is in town.  3. I will reach out to [facility] with any questions or concerns.                              Intervention/Education provided during outreach: Patient verbalized understanding, engaged in AIDET communication during patient encounter.    Plan: Patient was encouraged to reach out with any questions or concerns.     Care Coordinator will follow up in one month.    Angelica White [facility]  Clinic Care Coordination  [facility] [email or web address] [email or web address]  [phone]

## 2024-04-12 ENCOUNTER — VIRTUAL VISIT (OUTPATIENT)
Dept: FAMILY MEDICINE | Facility: CLINIC | Age: 34
End: 2024-04-12
Payer: COMMERCIAL

## 2024-04-12 DIAGNOSIS — M25.561 CHRONIC PAIN OF RIGHT KNEE: Primary | ICD-10-CM

## 2024-04-12 DIAGNOSIS — M79.672 BILATERAL FOOT PAIN: ICD-10-CM

## 2024-04-12 DIAGNOSIS — G89.29 CHRONIC PAIN OF RIGHT KNEE: Primary | ICD-10-CM

## 2024-04-12 DIAGNOSIS — M79.671 BILATERAL FOOT PAIN: ICD-10-CM

## 2024-04-12 PROCEDURE — 99213 OFFICE O/P EST LOW 20 MIN: CPT | Mod: 95

## 2024-04-12 ASSESSMENT — PATIENT HEALTH QUESTIONNAIRE - PHQ9: SUM OF ALL RESPONSES TO PHQ QUESTIONS 1-9: 13

## 2024-04-12 NOTE — PROGRESS NOTES
Preceptor Attestation:   I discussed the patient with the resident. Patient seen and evaluated via video visit. I have verified the content of the note, which accurately reflects my assessment of the patient and the plan of care.   Supervising Physician:  Rico Mora MD.

## 2024-04-12 NOTE — PROGRESS NOTES
Dariusz is a 33 year old who is being evaluated via a billable video visit.    How would you like to obtain your AVS? MyChart  If the video visit is dropped, the invitation should be resent by: Text to cell phone: [phone]  Will anyone else be joining your video visit? No      Assessment & Plan     #Chronic pain of right knee  Follow-up for ongoing knee pain.  History: Has had knee pain with laxity and multiple injuries, multiple surgeries.  Ongoing instability with previous falls and shower.  Today: Patient reports that she has had a transfer bench previously this was damaged in the move and needs a new order for a transfer bench for her shower.  She is worried about falls and she has had this in the future and she lives at home with 2 young children, otherwise alone.  Has seen orthopedic surgeon though due to weight treatment is limited at this time.  - Tub Transfer Bench Order  -Continue to see orthopedic surgeon as desired  -Encouraged to continue with weight management clinic  -The patient was assessed and it was determined the patient is in need of the following listed DME Supplies/Equipment. Please complete supporting documentation below to demonstrate medical necessity.    -Patient has had many years of chronic knee pain, instability, history of falls particularly in shower and would be at risk of future falls in the shower.  It is my medical opinion she would benefit from a transfer bench as she continues to work on her knee instability and pain.    #Bilateral foot pain  Patient reporting 1 month of plantar aspect of bilateral feet, worse in the morning or after sitting.  More consistent with plantar fasciitis based off history though cannot assess fully from video visit.  Discussed conservative management of plantar fasciitis.  Patient would also like podiatry referral, she has seen them a long time ago and has had multiple issues with ankle and foot pain.  - Orthopedic  Referral;  "Future  -Conservative management of plantar fasciitis including stretching, ice such as with frozen water bottle        Subjective   Dariusz is a 33 year old, presenting for the following health issues:  Medication Request (New transfer bed for the showers - pt is requesting for an rx for for [employer] [facility] ) and Referral (Poss referral for pediatrist - difficulty standing cos feet hurt badly. )      4/12/2024     3:12 PM   Additional Questions   Roomed by E. her MA   Accompanied by Self         4/12/2024    Information    services provided? No     Video Start Time: 3:25pm    HPI     Knee instability, has had previous fall in the shower, right about this as patient lives alone with 2 young children.  Needs transfer bench.  Had this previously from [facility] though this was destroyed and moved and would need a new one.    FRANCESCO knee has had 4 surgeries, was seeing orthopedic surgeon but at that time was told that she had to lose weight before any further procedure.  She was managing her weight with her weight management clinic, was on a medication however stopped it during breast-feeding.          Objective    Vitals - Patient Reported  Height (Patient Reported): 171.5 cm (5' 7.5\")        Physical Exam   GENERAL: alert and no distress  EYES: Eyes grossly normal to inspection.  No discharge or erythema, or obvious scleral/conjunctival abnormalities.  RESP: No audible wheeze, cough, or visible cyanosis.    SKIN: Visible skin clear. No significant rash, abnormal pigmentation or lesions.  NEURO: Cranial nerves grossly intact.  Mentation and speech appropriate for age.  PSYCH: Appropriate affect, tone, and pace of words        Video-Visit Details    Type of service:  Video Visit   Video End Time:    3:40  Originating Location (pt. Location): Home    Distant Location (provider location):  On-site  Platform used for Video Visit: Connect over epic  Signed Electronically by: Betsy Hernandes MD  DME " (Durable Medical Equipment) Orders and Documentation  No orders of the defined types were placed in this encounter.

## 2024-04-12 NOTE — Clinical Note
Hello! I did a DME order, are you able to get that figured out for her? Let me know if you need anything else from me!

## 2024-04-15 ENCOUNTER — PATIENT OUTREACH (OUTPATIENT)
Dept: CARE COORDINATION | Facility: CLINIC | Age: 34
End: 2024-04-15
Payer: COMMERCIAL

## 2024-04-15 NOTE — PROGRESS NOTES
Care Coordination:     DME Order Number: [id number]  Device: Tub Transfer Bench   Vendor: [employer]  Fax: [phone]      Billy Lemon Sr.   Care Coordination  [address] [address]  [email or web address]   Office: [phone] Direct: [phone]  [facility]

## 2024-04-17 ENCOUNTER — VIRTUAL VISIT (OUTPATIENT)
Dept: PSYCHIATRY | Facility: CLINIC | Age: 34
End: 2024-04-17
Attending: PSYCHOLOGIST
Payer: COMMERCIAL

## 2024-04-17 DIAGNOSIS — F33.1 MODERATE EPISODE OF RECURRENT MAJOR DEPRESSIVE DISORDER (H): ICD-10-CM

## 2024-04-17 DIAGNOSIS — F41.1 GAD (GENERALIZED ANXIETY DISORDER): Primary | ICD-10-CM

## 2024-04-17 PROCEDURE — 90837 PSYTX W PT 60 MINUTES: CPT | Mod: 95 | Performed by: PSYCHOLOGIST

## 2024-04-17 NOTE — PROGRESS NOTES
OUTPATIENT PSYCHOTHERAPY PROGRESS NOTE    Client Name: Dariusz Leyva   YOB: 1990  Time of Service: 54 minutes   Service Type(s): Individual psychotherapy    VIDEO VISIT  Dariusz Leyva is a 33 year old who is being evaluated via a billable video visit.      Telehealth Details  Type of service:  psychotherapy  Time of service:  Start Time:  4:07 pm  End Time: 5:01 pm    Reason for Telehealth Visit: Patient has requested telehealth visit  Originating Site (patient location):  [facility]   Location- Patient's home  Distant Site (provider location):  Offsite  Mode of Communication:  Hernan      Diagnoses:   Unspecified depressive disorder and unspecified anxiety    Goals of therapy: Decrease negative and obsessional thought processes, increase self care, and identify replacement strategies for dealing with anxiety    Individuals Present:   Psychotherapy services during this visit included myself and Dariusz Leyva.    Narrative:  Dariusz reported her mood is  middle ground or a little low depending on the day.  Is experiencing sleep disturbance related to being a mother of small children. She explained that sometimes she has no appetite and sometimes she has a big appetite - she is not sure if it is mood related. She endorsed fatigue - likely related to lack of sleep. She denied thoughts of suicide and anhedonia.  She rated her stress at an 80 (out of 100, with 100 being most stressed) This is her baseline lately. When she had the twins - she was closer to 100.  She reported that it is not at 100 because she has been able to pay her major bills.      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 She also is able to talk to people on her way to and from pumping. She indicated that her performance is down. For the first time that she had one call that was rated in the 80s and not in the 90s.      She indicated that she is not getting work done at school. She did  pass her last class. She is in her official last class right now. She is upset with herself. She is overwhelmed at work and at home.      Treatment: Clinician used reflective listening, validation, positive reinforcement, and psychoeducation. Discussed strengths within her skill set and how thoughts about her career in the future.    Patient reaction: Dariusz was receptive to support and interventions provided today    Patient Progress: Dariusz was cooperative, attentive and engaged throughout the session. Progress toward goal completion seems good.     Additional Information:   The patient did not report medication changes.    Mental Status:  Appearance:  Casually dressed   Behavior/relationship to examiner/demeanor:  cooperative  Motor activity/EPS:  Within normal limits  Speech rate:  Within normal limits  Speech volume:  Within normal limits  Speech articulation:  Within normal limits  Speech coherence: Within normal limits  Speech spontaneity: Within normal limits  Mood (subjective):  middle ground or a little low depending on the day   Affect (objective appearance): Euthymic  Thought Process (Associations):  Logical and Linear   Thought process (Rate):  Within normal limits   Thought content: Within normal limits  Abnormal Perception:  None   Insight:  Good   Judgment:  Good     Plan: Continue with goals as outlined above    Andreea Vásquez Psy.D.,L.P

## 2024-04-24 ENCOUNTER — VIRTUAL VISIT (OUTPATIENT)
Dept: PSYCHIATRY | Facility: CLINIC | Age: 34
End: 2024-04-24
Attending: PSYCHOLOGIST
Payer: COMMERCIAL

## 2024-04-24 DIAGNOSIS — F33.1 MODERATE EPISODE OF RECURRENT MAJOR DEPRESSIVE DISORDER (H): ICD-10-CM

## 2024-04-24 DIAGNOSIS — F41.1 GAD (GENERALIZED ANXIETY DISORDER): Primary | ICD-10-CM

## 2024-04-24 PROCEDURE — 90834 PSYTX W PT 45 MINUTES: CPT | Mod: 95 | Performed by: PSYCHOLOGIST

## 2024-04-24 NOTE — PROGRESS NOTES
OUTPATIENT PSYCHOTHERAPY PROGRESS NOTE    Client Name: Dariusz Leyva   YOB: 1990  Time of Service: 42 minutes   Service Type(s): Individual psychotherapy    VIDEO VISIT  Dariusz Leyva is a 33 year old who is being evaluated via a billable video visit.      Telehealth Details  Type of service:  psychotherapy  Time of service:  Start Time:  4:02 pm  End Time: 4:44 pm    Reason for Telehealth Visit: Patient has requested telehealth visit  Originating Site (patient location):  [facility]   Location- Patient's home  Distant Site (provider location):  Offsite  Mode of Communication:  Hernan      Diagnoses:   Unspecified depressive disorder and unspecified anxiety    Goals of therapy: Decrease negative and obsessional thought processes, increase self care, and identify replacement strategies for dealing with anxiety    Individuals Present:   Psychotherapy services during this visit included myself and Yadirafadumo Gordon.    Narrative:  Dariusz reported her mood was  overstimulated.  She reported that this is because she has not had a lot of mental or physical rest between home and work demands. She indicated that she had a  decent amount of sleep.  She endorsed fatigue.  She denied appetite disturbance, anhedonia, and suicidal ideation. She reported her stress level is at a 70 (1-100 scale, 100 being most stressed). She continues to experience pain and has low energy.      She will be going out of town tomorrow evening.      She has decided to officially  back away from  . She is feeling  sad.  She reported,  I am doing okay, it is kind of not about me anymore.     Treatment: Clinician used reflective listening, validation, positive reinforcement, and psychoeducation. Discussed strengths within her skill set and how thoughts about her career in the future.    Patient reaction: Dariusz was receptive to support and interventions provided today    Patient Progress: Dariusz was cooperative, attentive and  engaged throughout the session. Progress toward goal completion seems good.     Additional Information:   The patient did not report medication changes.    Mental Status:  Appearance:  Casually dressed   Behavior/relationship to examiner/demeanor:  cooperative  Motor activity/EPS:  Within normal limits  Speech rate:  Within normal limits  Speech volume:  Within normal limits  Speech articulation:  Within normal limits  Speech coherence: Within normal limits  Speech spontaneity: Within normal limits  Mood (subjective):  overstimulated   Affect (objective appearance): Subdued  Thought Process (Associations):  Logical and Linear   Thought process (Rate):  Within normal limits   Thought content: Within normal limits  Abnormal Perception:  None   Insight:  Good   Judgment:  Good     Plan: Continue with goals as outlined above    Andreea Vásquez Psy.D.,L.P

## 2024-05-07 ENCOUNTER — PATIENT OUTREACH (OUTPATIENT)
Dept: CARE COORDINATION | Facility: CLINIC | Age: 34
End: 2024-05-07
Payer: COMMERCIAL

## 2024-05-07 ENCOUNTER — HOSPITAL ENCOUNTER (EMERGENCY)
Facility: CLINIC | Age: 34
Discharge: HOME | End: 2024-05-07
Attending: EMERGENCY MEDICINE | Admitting: EMERGENCY MEDICINE
Payer: COMMERCIAL

## 2024-05-07 VITALS
SYSTOLIC BLOOD PRESSURE: 150 MMHG | HEIGHT: 67 IN | BODY MASS INDEX: 45.99 KG/M2 | HEART RATE: 81 BPM | RESPIRATION RATE: 18 BRPM | OXYGEN SATURATION: 97 % | WEIGHT: 293 LBS | TEMPERATURE: 97.1 F | DIASTOLIC BLOOD PRESSURE: 85 MMHG

## 2024-05-07 DIAGNOSIS — K08.89 TOOTH PAIN: ICD-10-CM

## 2024-05-07 PROCEDURE — 250N000013 HC RX MED GY IP 250 OP 250 PS 637: Performed by: EMERGENCY MEDICINE

## 2024-05-07 PROCEDURE — 99283 EMERGENCY DEPT VISIT LOW MDM: CPT

## 2024-05-07 PROCEDURE — 250N000011 HC RX IP 250 OP 636: Performed by: EMERGENCY MEDICINE

## 2024-05-07 RX ORDER — HYDROCODONE BITARTRATE AND ACETAMINOPHEN 5; 325 MG/1; MG/1
2 TABLET ORAL ONCE
Status: COMPLETED | OUTPATIENT
Start: 2024-05-07 | End: 2024-05-07

## 2024-05-07 RX ORDER — ONDANSETRON 4 MG/1
4 TABLET, ORALLY DISINTEGRATING ORAL ONCE
Status: COMPLETED | OUTPATIENT
Start: 2024-05-07 | End: 2024-05-07

## 2024-05-07 RX ORDER — AMOXICILLIN 500 MG/1
500 CAPSULE ORAL 3 TIMES DAILY
Qty: 7 CAPSULE | Refills: 0 | Status: SHIPPED | OUTPATIENT
Start: 2024-05-07 | End: 2024-05-10

## 2024-05-07 RX ADMIN — HYDROCODONE BITARTRATE AND ACETAMINOPHEN 2 TABLET: 5; 325 TABLET ORAL at 12:40

## 2024-05-07 RX ADMIN — ONDANSETRON 4 MG: 4 TABLET, ORALLY DISINTEGRATING ORAL at 12:41

## 2024-05-07 ASSESSMENT — ACTIVITIES OF DAILY LIVING (ADL): ADLS_ACUITY_SCORE: 38

## 2024-05-07 ASSESSMENT — COLUMBIA-SUICIDE SEVERITY RATING SCALE - C-SSRS
6. HAVE YOU EVER DONE ANYTHING, STARTED TO DO ANYTHING, OR PREPARED TO DO ANYTHING TO END YOUR LIFE?: NO
2. HAVE YOU ACTUALLY HAD ANY THOUGHTS OF KILLING YOURSELF IN THE PAST MONTH?: NO
1. IN THE PAST MONTH, HAVE YOU WISHED YOU WERE DEAD OR WISHED YOU COULD GO TO SLEEP AND NOT WAKE UP?: NO

## 2024-05-07 NOTE — PROGRESS NOTES
Clinic Care Coordination Contact  Follow Up Progress Note      Assessment: [facility] contacted patient to check in. Patient reports that she is doing well. She recently applied for a 2 bedroom 2 bath apartment, and is waiting to hear back. She denies needing any assistance from me at this time.    Care Gaps:    Health Maintenance Due   Topic Date Due    YEARLY PREVENTIVE VISIT  Never done    ADVANCE CARE PLANNING  Never done    DEPRESSION ACTION PLAN  Never done    HEPATITIS B IMMUNIZATION (1 of 3 - 19+ 3-dose series) Never done    LIPID  07/20/2021    COVID-19 Vaccine (3 - 2023-24 season) 09/01/2023    DEPRESSION 6 MO INDEX REPEAT PHQ-9  05/03/2024     Care Plans  Care Plan: Housing Instability       Problem: SDOH LACK OF STABLE HOUSING                   Care Plan: Mental Health       Problem: Mental Health Symptoms Need Improvement       Goal: Improve management of mental health symptoms and establish with mental health/psychosocial supports       Start Date: 1/12/2024 Expected End Date: 6/1/2024    This Visit's Progress: 40% Recent Progress: 10%    Priority: High    Note:     Barriers: Complex mental health.  Strengths: Strong support system.  Patient expressed understanding of goal: Yes.  Action steps to achieve this goal:  1. I will continue my appointments with Andreea Vásquez.  2. I will continue to accept help from my mom as long as she is in town.  3. I will reach out to [facility] with any questions or concerns.                              Intervention/Education provided during outreach: Patient verbalized understanding, engaged in AIDET communication during patient encounter.     Outreach Frequency: monthly, more frequently as needed    Plan: Patient was encouraged to reach out with any questions or concerns.    Care Coordinator will follow up in one month.    Angelica White ANDREW  Clinic Care Coordination  [facility]  [email or web address]  [phone]

## 2024-05-07 NOTE — ED TRIAGE NOTES
Pt has tooth pain and in unable to handle the pain   Pt has had this pain for 1 month   Pt has pain in back upper right side of her mouth   Pt has taken tylenol for pain

## 2024-05-07 NOTE — ED PROVIDER NOTES
History     Chief Complaint:  Dental Pain       HPI   Dariusz Leyva is a 33 year old female who presents with dental pain in her right maxillary premolar that been present for about a month.  She states that the pain comes and goes, and she been able to manage it mostly over the last month with Tylenol and Motrin.  However she states that she has pain that got so bad today she can wait till her dental appointment later on, and needed some pain meds for this.  She does again have a dental appointment later on today for either an extraction or root canal depending on how it looks.  Denies any fevers.      Independent Historian:   No    Review of External Notes: N/A      Allergies:  Cherry  Animal Dander  Nka [No Known Allergies]  Seasonal Allergies  Plum Pulp     Medications:    acetaminophen (TYLENOL) 325 MG tablet  cetirizine (ZYRTEC) 10 MG tablet  clotrimazole (LOTRIMIN) 1 % external cream  cyclobenzaprine (FLEXERIL) 10 MG tablet  cyclobenzaprine (FLEXERIL) 5 MG tablet  MONO 30 MG tablet  MONO 30 MG tablet  etonogestrel-ethinyl estradiol (NUVARING) 0.12-0.015 MG/24HR vaginal ring  fluticasone (FLONASE) 50 MCG/ACT nasal spray  Glucosamine-Chondroit-Vit C-Mn (GLUCOSAMINE 1500 COMPLEX PO)  ibuprofen (ADVIL/MOTRIN) 600 MG tablet  ketoconazole (NIZORAL) 2 % external shampoo  lactase (LACTAID) 3000 UNIT tablet  LACTOSE FAST ACTING RELIEF 9000 units TABS tablet  magnesium oxide (MAG-OX) 400 MG tablet  metoclopramide (REGLAN) 10 MG tablet  naltrexone (DEPADE/REVIA) 50 MG tablet  NEW MED  phentermine (ADIPEX-P) 37.5 MG tablet  phentermine (ADIPEX-P) 37.5 MG tablet  Prenatal Vit-Fe Fumarate-FA (PRENATAL VITAMIN) 27-0.8 MG TABS  Probiotic Product (SUPER PROBIOTIC) CAPS  Riboflavin 400 MG TABS        Past Medical History:    Past Medical History:   Diagnosis Date    DDD (degenerative disc disease), cervical 02/03/2022    Depressive disorder     Dyslipidemia     Increased PTH level     Vitamin D deficiency        Past  "Surgical History:    Past Surgical History:   Procedure Laterality Date    ARTHROSCOPIC  RECONSTRUCTION/REPAIR LATERAL LIGAMENT KNEE Right 7/29/2020    Procedure: Posterior Lateral Corner Reconstruction with Allograft;  Surgeon: Cyril Lebron MD;  Location: [facility] OR    ARTHROSCOPY KNEE Right 7/29/2020    Procedure: Right Knee Exam Under Anesthesia, Arthroscopy knee and Debridement, Peroneal Nerve Neurolysis;  Surgeon: Cyril Lebron MD;  Location: [facility] OR    CYST REMOVAL      back    OTHER SURGICAL HISTORY      tonselectomy        Family History:    family history includes Cancer in her maternal grandmother, mother, paternal grandfather, and paternal grandmother; Skin Cancer in her mother.    Social History:   reports that she has never smoked. She has never used smokeless tobacco. She reports current alcohol use. She reports current drug use. Drug: Marijuana.  PCP: Betsy Hernandes     Physical Exam   Patient Vitals for the past 24 hrs:   BP Temp Temp src Pulse Resp SpO2 Height Weight   05/07/24 1210 (!) 150/85 97.1  F (36.2  C) Temporal 81 18 97 % 1.702 m (5' 7\") (!) 157.9 kg (348 lb)        Physical Exam  Vitals: reviewed by me  General: Pt seen on [facility], pleasant, cooperative, and alert to conversation  Eyes: Tracking well, clear conjunctiva BL  ENT: MMM, midline trachea.  No facial swelling noted, airway is amply patent, does have some tenderness to the rear most right maxillary premolar.  Minimal surrounding swelling at the gumline, no purulence or abscess noted.  Lungs: No tachypnea, no accessory muscle use. No respiratory distress.   CV: Rate as above  MSK: no joint effusion.  No evidence of trauma  Skin: No rash  Neuro: Clear speech and no facial droop.  Psych: Not RIS, no e/o AH/          Emergency Department Course       Emergency Department Course & Assessments:      Interventions:  Medications   HYDROcodone-acetaminophen (NORCO) 5-325 MG per tablet 2 tablet (2 tablets Oral $Given " 5/7/24 1240)   ondansetron (ZOFRAN ODT) ODT tab 4 mg (4 mg Oral $Given 5/7/24 1241)             Social Determinants of Health affecting care:   Stress/Adjustment Disorders      Disposition:  The patient was discharged to home.     Impression & Plan    MIPS (If applicable):  N/A        Medical Decision Making:  This is a very pleasant 53-year-old female who presents the emergency room with what appears to be dental infection.  I do not think she has an abscess that can easily be drained here in the ER, and her airway is patent.  She has no oral lesions, but the tooth is very clearly fractured and I do think would likely continue to result in recurrent infections which she may have today given the swelling.  Therefore I am giving her antibiotics and pain medication here in the ER, but she does know that she needs to follow specifically with her dentist today to get definitive management for this.  Highly reliable appearing, red flags when to come back to the ER were discussed in detail, no abscess to drain and I do not think x-ray benefit from a CT scan.    Diagnosis:    ICD-10-CM    1. Tooth pain  K08.89            Discharge Medications:  New Prescriptions    AMOXICILLIN (AMOXIL) 500 MG CAPSULE    Take 1 capsule (500 mg) by mouth 3 times daily for 7 doses          5/7/2024   Orville Reynolds*        Orville Reynolds MD  05/07/24 1250

## 2024-05-08 ENCOUNTER — VIRTUAL VISIT (OUTPATIENT)
Dept: PSYCHIATRY | Facility: CLINIC | Age: 34
End: 2024-05-08
Attending: PSYCHOLOGIST
Payer: COMMERCIAL

## 2024-05-08 DIAGNOSIS — F33.1 MODERATE EPISODE OF RECURRENT MAJOR DEPRESSIVE DISORDER (H): ICD-10-CM

## 2024-05-08 DIAGNOSIS — F41.1 GAD (GENERALIZED ANXIETY DISORDER): Primary | ICD-10-CM

## 2024-05-08 PROCEDURE — 90832 PSYTX W PT 30 MINUTES: CPT | Mod: 95 | Performed by: PSYCHOLOGIST

## 2024-05-08 NOTE — PROGRESS NOTES
OUTPATIENT PSYCHOTHERAPY PROGRESS NOTE    Client Name: Dariusz Leyva   YOB: 1990  Time of Service: 31 minutes   Service Type(s): Individual psychotherapy    VIDEO VISIT  Dariusz Leyva is a 33 year old who is being evaluated via a billable video visit.      Telehealth Details  Type of service:  psychotherapy  Time of service:  Start Time:  4:01 pm  End Time: 4:32 pm    Reason for Telehealth Visit: Patient has requested telehealth visit  Originating Site (patient location):  [facility]   Location- Patient's home  Distant Site (provider location):  Onsite  Mode of Communication:  Hernan      Diagnoses:   Unspecified depressive disorder and unspecified anxiety    Goals of therapy: Decrease negative and obsessional thought processes, increase self care, and identify replacement strategies for dealing with anxiety    Individuals Present:   Psychotherapy services during this visit included myself and Yadirafadumo Sullivanverónica.    Narrative:  Dariusz reported that she started taking pain pills for her teeth and that has increased her experience of being tired. She had her dental appointment, but she has to wait until the end of the month to get her tooth fixed.      Dariusz reported that she has been  a little depressed.  She realized that she forgot to take her antidepressant medication for a week, but she started them again yesterday. Her sleep is  okay  - she has been trying to sleep when her twins sleep, but her tooth hurt so her sleep was not restful. Her sleep is also disrupted frequently by her twins. She is experiencing chronic fatigue. She has had less of an appetite the last few days (this is not related to her tooth pain). It could be mood related.      She reported she just finished a class and she only has one more class to finish her degree. She is hoping this help her feel less overwhelmed, but she is not feeling the  excitement  that other might feel.     Dariusz wants to find ways to focus on  self care. She has noticed that she has not had any time to focus on herself.     Treatment: Treatment focused on problem solving in regards to self care. Right now she is focused on her teeth and she has an appointment for her feet (she has a lot of pain in a bone in her foot). Trying to make incremental steps.    Patient reaction: Dariusz was receptive to support and interventions provided today    Patient Progress: Dariusz was cooperative, attentive and engaged throughout the session. Progress toward goal completion seems good.     Additional Information:   The patient reported that she started pain medications prescribed during an ED visit for her tooth pain.    Mental Status:  Appearance:  Casually dressed   Behavior/relationship to examiner/demeanor:  cooperative  Motor activity/EPS:  Within normal limits  Speech rate:  Within normal limits  Speech volume:  Within normal limits  Speech articulation:  Within normal limits  Speech coherence: Within normal limits  Speech spontaneity: Within normal limits  Mood (subjective):  a little depressed.   Affect (objective appearance): Subdued  Thought Process (Associations):  Logical and Linear   Thought process (Rate):  Within normal limits   Thought content: Within normal limits  Abnormal Perception:  None   Insight:  Good   Judgment:  Good     Plan: Continue with goals as outlined above    Andreea Vásquez Psy.D.,L.P

## 2024-05-15 ENCOUNTER — VIRTUAL VISIT (OUTPATIENT)
Dept: PSYCHIATRY | Facility: CLINIC | Age: 34
End: 2024-05-15
Attending: PSYCHOLOGIST
Payer: COMMERCIAL

## 2024-05-15 ENCOUNTER — OFFICE VISIT (OUTPATIENT)
Dept: PODIATRY | Facility: CLINIC | Age: 34
End: 2024-05-15
Attending: FAMILY MEDICINE
Payer: COMMERCIAL

## 2024-05-15 VITALS — WEIGHT: 293 LBS | SYSTOLIC BLOOD PRESSURE: 119 MMHG | DIASTOLIC BLOOD PRESSURE: 81 MMHG | BODY MASS INDEX: 54.5 KG/M2

## 2024-05-15 DIAGNOSIS — M76.821 POSTERIOR TIBIAL TENDON DYSFUNCTION, RIGHT: ICD-10-CM

## 2024-05-15 DIAGNOSIS — M21.42 BILATERAL PES PLANUS: ICD-10-CM

## 2024-05-15 DIAGNOSIS — M79.671 BILATERAL FOOT PAIN: Primary | ICD-10-CM

## 2024-05-15 DIAGNOSIS — M79.672 BILATERAL FOOT PAIN: Primary | ICD-10-CM

## 2024-05-15 DIAGNOSIS — E66.01 MORBID OBESITY WITH BMI OF 45.0-49.9, ADULT (H): ICD-10-CM

## 2024-05-15 DIAGNOSIS — F41.1 GAD (GENERALIZED ANXIETY DISORDER): Primary | ICD-10-CM

## 2024-05-15 DIAGNOSIS — M21.41 BILATERAL PES PLANUS: ICD-10-CM

## 2024-05-15 DIAGNOSIS — M72.2 PLANTAR FASCIITIS, BILATERAL: ICD-10-CM

## 2024-05-15 DIAGNOSIS — F33.1 MODERATE EPISODE OF RECURRENT MAJOR DEPRESSIVE DISORDER (H): ICD-10-CM

## 2024-05-15 PROCEDURE — 99203 OFFICE O/P NEW LOW 30 MIN: CPT | Performed by: PODIATRIST

## 2024-05-15 PROCEDURE — 90832 PSYTX W PT 30 MINUTES: CPT | Mod: 95 | Performed by: PSYCHOLOGIST

## 2024-05-15 NOTE — PROGRESS NOTES
ASSESSMENT:  Encounter Diagnoses   Name Primary?    Bilateral foot pain Yes    Posterior tibial tendon dysfunction, right     Bilateral pes planus     Plantar fasciitis, bilateral     Morbid obesity with BMI of 45.0-49.9, adult (H)      MEDICAL DECISION MAKING:  Her pain is multifactorial and likely related to recent pregnancy, body weight, and pes planus.  Pain is primarily thought to be secondary to plantar fascial stress and posterior tibial tendon on the right.    I explained that the standard of care is improving the support under her feet.  We discussed quality over-the-counter arch supports as well as quality athletic shoes.  She is referred for custom molded orthoses.    A Tri-Lock brace with foot strap medial was donned.  I told her she can use this for up to a month if it helps.    We discussed the causes and nature of arch and heel pain.  The anatomy and function of the plantar fascia was discussed.  The treatment plan discussed included calf and plantar fascial stretching, avoidance of barefoot walking, stiffer soled shoes, activity modification, over-the-counter arch supports versus custom orthoses, prn icing and NSAIDs.  A comprehensive After-Visit Summary was provided.    Follow-up in 6 weeks    Disclaimer: This note consists of symbols derived from keyboarding, dictation and/or voice recognition software. As a result, there may be errors in the script that have gone undetected. Please consider this when interpreting information found in this chart.    Orville Brantley DPM, FACFAS, MS    [place] [facility] of Podiatry/Foot & Ankle Surgery      ____________________________________________________________________    HPI:       I was asked by Dr. Rico Mora to evaluate Dariusz Leyva in consultation for bilateral foot pain.     Chief Complaint: foot pain, right is worse than left  She reports pain in the heel and arch area as well as shooting pain over the medial right ankle.  Onset of problem: 7  months  Pain/ discomfort is described as:  shooting  Pain Ratin/10   Frequency:  comes and goes    The pain is exacerbated by weight bearing activities.  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  Walking for exercise  Dariusz questions if her foot pain could be pregnancy related.   She had twins 7 months ago.  Significant decrease in medial longitudinal arch with weightbearing.  *  Past Medical History:   Diagnosis Date    DDD (degenerative disc disease), cervical 2022    Depressive disorder     Dyslipidemia     Increased PTH level     Vitamin D deficiency    *  *  Past Surgical History:   Procedure Laterality Date    ARTHROSCOPIC  RECONSTRUCTION/REPAIR LATERAL LIGAMENT KNEE Right 2020    Procedure: Posterior Lateral Corner Reconstruction with Allograft;  Surgeon: Cyril Lebron MD;  [facility]: [facility] OR    ARTHROSCOPY KNEE Right 2020    Procedure: Right Knee Exam Under Anesthesia, Arthroscopy knee and Debridement, Peroneal Nerve Neurolysis;  Surgeon: Cyril Lebron MD;  [facility]: [facility] OR    CYST REMOVAL      back    OTHER SURGICAL HISTORY      tonselectomy   *  *      EXAM:    Vitals: /81   Wt (!) 157.9 kg (348 lb)   LMP 2024 (Exact Date)   BMI 54.50 kg/m    BMI: Body mass index is 54.5 kg/m .    Constitutional:  Dariusz Leyva is in no apparent distress, appears well-nourished.  Cooperative with history and physical exam.    Vascular:  Pedal pulses are palpable for both the DP and PT arteries.  CFT < 3 sec.  No edema.      Neuro: Light touch sensation is intact to the L4, L5, S1 distributions  No evidence of weakness, spasticity, or contracture in the lower extremities.     Derm: Normal texture and turgor.  No erythema, ecchymosis, or cyanosis.  No open lesions.     Musculoskeletal:    Lower extremity muscle strength is normal.  Significant decrease in medial longitudinal arch with weightbearing.  Pain on  palpation to the bilateral plantar medial heel and into the arch.  No pain with squeezing the body of the calcaneus.  Mild pain with inversion of the right foot against resistance and with palpation over the distal aspect of the posterior tibial tendon.

## 2024-05-15 NOTE — PROGRESS NOTES
"OUTPATIENT PSYCHOTHERAPY PROGRESS NOTE    Client Name: Dariusz Leyva   YOB: 1990  Time of Service: 35  minutes   Service Type(s): Individual psychotherapy    VIDEO VISIT  Dariusz Leyva is a 33 year old who is being evaluated via a billable video visit.      Telehealth Details  Type of service:  psychotherapy  Time of service:  Start Time:  4:00 pm  End Time: 4:35 pm    Reason for Telehealth Visit: Patient has requested telehealth visit  Originating Site (patient location):  [facility]   Location- Patient's home  Distant Site (provider location):  Onsite  Mode of Communication:  Hernan      Diagnoses:   Unspecified depressive disorder and unspecified anxiety    Goals of therapy: Decrease negative and obsessional thought processes, increase self care, and identify replacement strategies for dealing with anxiety    Individuals Present:   Psychotherapy services during this visit included myself and Dariusz Leyav.    Narrative:  Dariusz reported her mood is \"okay\" but it has been \"up and down\" this week. She indicated her twins have been waking about every 1-2 hours, she wonders if they are having allergy issues. She is feeling less fatigued some days - but she still has really bad days. She indicated that she has less energy because of her pain, particularly in her feet. She reported she has not had as \"hardy\" of an appetite lately - she is unsure if it is mood related. She does not think she would enjoy things - even if she had time. She denied thoughts of suicide. She reported her stress level is a 75 out of 100 (with 100 being most stressed).     She reported she is \"stuck in a cycle of day to day.\" She is not able to break out of it time wise and also because of financial constraints. She reported that it gets her feeling overwhelmed at times - she is not always sure how to make decisions to be in a better financial.     She is on her own this week (ie does not have someone to help her). She " is trying to adapt to being alone caring for her twins.    Treatment: Clinician used reflective listening, validation, positive reinforcement, and psychoeducation within a framework of CBT. Discussed strategies of how she might get support this week.  Also discussed how she will manage being alone caring for the twins. She shared her experiences of the stigma of being a single mother.    Patient reaction: Dariusz was receptive to support and interventions provided today    Patient Progress: Dariusz was cooperative, attentive and engaged throughout the session. Progress toward goal completion seems good.     Additional Information:   Patient did not report medication changes.    Mental Status:  Appearance:  Casually dressed   Behavior/relationship to examiner/demeanor:  cooperative  Motor activity/EPS:  Within normal limits  Speech rate:  Within normal limits  Speech volume:  Within normal limits  Speech articulation:  Within normal limits  Speech coherence: Within normal limits  Speech spontaneity: Within normal limits  Mood (subjective):  okay   Affect (objective appearance): Mood congruent  Thought Process (Associations):  Logical and Linear   Thought process (Rate):  Within normal limits   Thought content: Within normal limits  Abnormal Perception:  None   Insight:  Good   Judgment:  Good     Plan: Continue with goals as outlined above    Andreea Vásquez Psy.D.,L.P

## 2024-05-15 NOTE — LETTER
5/15/2024         RE: Dariusz Leyva  [address]  [place] [address]        Dear Colleague,    Thank you for referring your patient, Dariusz Leyva, to the [facility]. Please see a copy of my visit note below.    ASSESSMENT:  Encounter Diagnoses   Name Primary?     Bilateral foot pain Yes     Posterior tibial tendon dysfunction, right      Bilateral pes planus      Plantar fasciitis, bilateral      Morbid obesity with BMI of 45.0-49.9, adult (H)      MEDICAL DECISION MAKING:  Her pain is multifactorial and likely related to recent pregnancy, body weight, and pes planus.  Pain is primarily thought to be secondary to plantar fascial stress and posterior tibial tendon on the right.    I explained that the standard of care is improving the support under her feet.  We discussed quality over-the-counter arch supports as well as quality athletic shoes.  She is referred for custom molded orthoses.    A Tri-Lock brace with foot strap medial was donned.  I told her she can use this for up to a month if it helps.    We discussed the causes and nature of arch and heel pain.  The anatomy and function of the plantar fascia was discussed.  The treatment plan discussed included calf and plantar fascial stretching, avoidance of barefoot walking, stiffer soled shoes, activity modification, over-the-counter arch supports versus custom orthoses, prn icing and NSAIDs.  A comprehensive After-Visit Summary was provided.    Follow-up in 6 weeks    Disclaimer: This note consists of symbols derived from keyboarding, dictation and/or voice recognition software. As a result, there may be errors in the script that have gone undetected. Please consider this when interpreting information found in this chart.    Orville Brantley, JOHNSON, FACFAS, [facility] Department of Podiatry/Foot & Ankle Surgery      ____________________________________________________________________    HPI:       I was asked  by Dr. Rico Mora to evaluate Dariusz Leyva in consultation for bilateral foot pain.     Chief Complaint: foot pain, right is worse than left  She reports pain in the heel and arch area as well as shooting pain over the medial right ankle.  Onset of problem: 7 months  Pain/ discomfort is described as:  shooting  Pain Ratin/10   Frequency:  comes and goes    The pain is exacerbated by weight bearing activities.  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  Walking for exercise  Dariusz questions if her foot pain could be pregnancy related.   She had twins 7 months ago.  Significant decrease in medial longitudinal arch with weightbearing.  *  Past Medical History:   Diagnosis Date     DDD (degenerative disc disease), cervical 2022     Depressive disorder      Dyslipidemia      Increased PTH level      Vitamin D deficiency    *  *  Past Surgical History:   Procedure Laterality Date     ARTHROSCOPIC  RECONSTRUCTION/REPAIR LATERAL LIGAMENT KNEE Right 2020    Procedure: Posterior Lateral Corner Reconstruction with Allograft;  Surgeon: Cyril Lebron MD;  Location: [facility] OR     ARTHROSCOPY KNEE Right 2020    Procedure: Right Knee Exam Under Anesthesia, Arthroscopy knee and Debridement, Peroneal Nerve Neurolysis;  Surgeon: Cyril Lebron MD;  Location: [facility] OR     CYST REMOVAL      back     OTHER SURGICAL HISTORY      tonselectomy   *  *      EXAM:    Vitals: /81   Wt (!) 157.9 kg (348 lb)   LMP 2024 (Exact Date)   BMI 54.50 kg/m    BMI: Body mass index is 54.5 kg/m .    Constitutional:  Dariusz Leyva is in no apparent distress, appears well-nourished.  Cooperative with history and physical exam.    Vascular:  Pedal pulses are palpable for both the DP and PT arteries.  CFT < 3 sec.  No edema.      Neuro: Light touch sensation is intact to the L4, L5, S1 distributions  No evidence of weakness, spasticity, or contracture  in the lower extremities.     Derm: Normal texture and turgor.  No erythema, ecchymosis, or cyanosis.  No open lesions.     Musculoskeletal:    Lower extremity muscle strength is normal.  Significant decrease in medial longitudinal arch with weightbearing.  Pain on palpation to the bilateral plantar medial heel and into the arch.  No pain with squeezing the body of the calcaneus.  Mild pain with inversion of the right foot against resistance and with palpation over the distal aspect of the posterior tibial tendon.        Again, thank you for allowing me to participate in the care of your patient.        Sincerely,        Orville Brantley DPM

## 2024-05-15 NOTE — PATIENT INSTRUCTIONS
Thank you for choosing [facility] Podiatry / Foot & Ankle Surgery!    DR. STYLES'S CLINIC LOCATIONS:     [facility] TRIAGE LINE: [phone]   [address] APPOINTMENTS: [phone]   [place] [address] RADIOLOGY: [phone]   (Every other Tues - Wed - Fri PM) SET UP SURGERY: [phone]    PHYSICAL THERAPY: [phone]   [place] SPECIALTY BILLING QUESTIONS: [phone] [address] FAX: [phone]   [place] [address]    (Thurs & Fri AM)       POSTERIOR TIBIAL TENDON DYSFUNCTION (PTTD)  The posterior tibial tendon serves as one of the major supporting structures of the foot, helping it to function while walking. Posterior tibial tendon dysfunction (PTTD) is a condition caused by changes in the tendon, impairing its ability to support the arch. This results in flattening of the foot.  PTTD is often called adult acquired flatfoot because it is the most common type of flatfoot developed during adulthood. Although this condition typically occurs in only one foot, some people may develop it in both feet. PTTD is usually progressive, which means it will keep getting worse, especially if it is not treated early.  CAUSES  Overuse of the posterior tibial tendon is often the cause of PTTD. In fact, the symptoms usually occur after activities that involve the tendon, such as running, walking, hiking or climbing stairs.  SYMPTOMS   The symptoms of PTTD may include pain, swelling, a flattening of the arch and an inward rolling of the ankle. As the condition progresses, the symptoms will change.  For example, when PTTD initially develops, there is pain on the inside of the foot and ankle (along the course of the tendon). In addition, the area may be red, warm and swollen.  Later, as the arch begins to flatten, there may still be pain on the inside of the foot and ankle. But at this point, the foot and toes begin to turn outward and the ankle rolls inward.  As PTTD becomes more  advanced, the arch flattens even more and the pain often shifts to the outside of the foot, below the ankle. The tendon has deteriorated considerably, and arthritis often develops in the foot. In more severe cases, arthritis may also develop in the ankle.  TREATMENT  Because of the progressive nature of PTTD, early treatment is advised. If treated early enough, your symptoms may resolve without the need for surgery, and progression of your condition can be arrested.  In contrast, untreated PTTD could leave you with an extremely flat foot, painful arthritis in the foot and ankle and increasing limitations on walking, running or other activities.  In many cases of PTTD, treatment can begin with nonsurgical approaches that may include:  Orthotic devices or bracing - To give your arch the support it needs, your foot and ankle surgeon may provide you with an ankle brace or a custom orthotic device that fits into the shoe.   Immobilization - Sometimes a short-leg cast or boot is worn to immobilize the foot and allow the tendon to heal, or you may need to completely avoid all weightbearing for a while.   Physical therapy - Ultrasound therapy and exercises may help rehabilitate the tendon and muscle following immobilization.   Medications - Nonsteroidal anti-inflammatory drugs (NSAIDs), such as ibuprofen, help reduce the pain and inflammation.   Shoe modifications - Your foot and ankle surgeon may advise changes to your shoes and may provide special inserts designed to improve arch support.  SURGERY  In cases of PTTD that have progressed substantially or have failed to improve with nonsurgical treatment, surgery may be required. For some advanced cases, surgery may be the only option. Your foot and ankle surgeon will determine the best approach for you.     Dr. Brantley's Heel and Arch Pain Recommendations:     1)  A rigid-soled shoe will take stress off of the plantar fascia. An athletic type shoe with a more rigid sole is  probably best.    2)  Some good over the counter inserts/ arch supports might help:  Spenco, Superfeet, Birkenstock, others.  If you buy some, consider gradually getting used to them. Increase time on them over the course of a week.    3)  Custom orthoses (prescription arch supports) are known to help treat and prevent plantar fasciitis.    4)  Avoid barefoot walking, even at home.  It is a good idea to wear supportive shoes as much as possible.    5)  Stretch your calf musculature and plantar fascia frequently.  It is a good idea to do this before getting out of bed, if pain is worse in the mornings.    6)  Ice and anti-inflammatories can help if pain is related to inflammation - more likely to help when the problem first starts.  If the pain has existed for over a month, anti-inflammatory measures might be less helpful.  Sometimes he can be therapeutic.    If your pain persists, please return to clinic.  Future options include bracing, physical therapy, immobilization/walking boot, surgery or other procedures.    Plantar fasciitis is very common and given time, will usually resolve. Obviously it can take a long time, as we walk on the part that his hurting.        PLANTAR FASCIITIS  Plantar fasciitis is often referred to as heel spurs or heel pain. Plantar fasciitis is a very common problem that affects people of all foot shapes, age, weight and activity level. Pain may be in the arch or on the weight-bearing surface of the heel. The pain may come on without injury or identifiable cause. Pain is generally present when first getting out of bed in the morning or up from a seated break.     CAUSES  The plantar fascia is a dense fibrous band of tissue that stretches across the bottom surface of the foot. The fascia helps support the foot muscles and arch. Plantar fasciitis is thought to be caused by mechanical strain or overload. Frequent walking without shoes or wearing unsupportive shoes is thought to cause  structural overload and ultimately inflammation of the plantar fascia. Some people have heel spurs that can be seen on x-ray. The heel spur is actually a minor component of plantar fascitis and is largely ignored.       SELF TREATMENT   The easiest solution is to stop walking around your home without shoes. Plantar fasciitis is largely a shoe problem. Shoes are either not being worn often enough or your current shoes are inadequate for your weight, foot structure or activity level. The majority of shoes on the market today are not sufficient to resist development of plantar fasciitis or to promote healing. Assume that your current shoes are inadequate and will need to be replaced. Even high quality shoes wear out with 6 months to one year of frequent use. Weight loss is another option. Losing ten pounds in the next two months may be enough to resolve the problem. Ice applied to the area of pain two to three times per day for ten minutes each session can be very helpful. Warm foot soaks in epsom salts can also relieve pain. This should continue until the problem resolves. Achilles tendon stretching is essential. Stretch multiple times daily to promote healing and to prevent recurrence in the future. Over all stretching of the body is helpful as well such as the calves, thighs and lower back. Normally when one area of the body is tight, other areas are too. Gentle Yoga can be good for this.     Over the counter topical anti inflammatories can be helpful such as biofreeze, bengay, salon pas, ect...  Oral ibuprofen or aleve is recommended as well to try to calm down inflammation.     Night splints can be helpful to gradually stretch the foot at night as a lot of pain is when you get up in the morning. Taking a towel or thera band and stretching the foot back multiple times before you get ou of bed can be beneficial as well.     MEDICAL TREATMENT  Medical treatments often include custom arch supports, cortisone  injections, physical therapy, splints to be worn in bed, prescription medications and surgery. The home treatments listed above will be necessary regardless of these advanced medical treatments. Surgery is rarely needed but is very helpful in selected cases.     PROGNOSIS  Plantar fasciitis can last from one day to a lifetime. Some people get intermittent fascitis that is very short-lived. Others suffer daily for years. Excessive body weight, frequent bare foot walking, long hours on the feet, inadequate shoes, predisposing foot structures and excessive activity such as running are all potential issues that lead to chronic and/or recurring plantar fascitis. Having plantar fasciitis means that you are forever prone to this problem and will require modification of some of the above factors. Most people seek treatment within one to four months. Healing usually requires a similar one to four month time frame. Healing time is relative to the amount of effort spent treating the problem.   Plantar fasciitis is highly recurrent. Risk factors often continue, including return to bare foot walking, inadequate shoes, excessive body weight, excessive activities, etc. Your life style and foot structure may predispose you to recurrent plantar fasciitis. A daily prevention regimen can be very helpful. Ongoing use of shoe inserts, careful attention to appropriate shoes, daily Achilles stretching, etc. may prevent recurrence. Prompt attention at the earliest warning signs of heel pain can resolve the problem in as short as a few days.     EXERCISES  Stair Exercise: Step on the stairs with the ball of your foot and hold your position for at least 15 seconds, then slowly step down with the heels of your foot. You can do this daily and as often as you want.   Picking the Towel: Sit comfortably and then pick the towel up with your toes. You can use any object other than a towel as long as the material can be soft and you can pick it up  with your toes.  Rolling the Bottle: Use a small ball or frozen water bottle and then roll it around with your foot.   Flex the Toes: Sit comfortably and then flex your toes by pointing it towards the floor or towards your body. This will relax and flex your foot and exercise your plantar fascia, the calf, and the Achilles tendon. The inability of the foot to stretch often causes the bunching up of the plantar fascia area leading to the pain.  Calf/Achilles Stretching Examples:  Do the stretching gently. Do not bounce or stretch to the point of pain. Hold each stretch for 30 seconds. Stretch 10 times per set, three sets per day. Morning, afternoon and evening. If your heel pain is very severe in the morning, consider doing the first set of stretches before you get out of bed.                 OVER THE COUNTER INSERT RECOMMENDATIONS  SuperFeet   Sofsole Fit Spenco   Power Step   Walk-Fit Arch Cradles     Most of these can be found at your local [employer], sporting [employer] stores, or online.  **A good high quality over the counter insert should cost around $40-$50      [employer] [address]  [phone]   [address]  [phone] [place]  [address] [place] [address]  [phone]   [address]  [phone]   [place]  [address] [facility]  [phone] [place] [address]  [phone]   [place]  [address]  [phone]   [place]  [address] [place] [address]  [phone] [place]  [address]  [phone]     [facility] LOCATIONS  [facility]  [address]  [place] [address]  Phone: [phone]  Fax: [phone] [facility]  [address]  [place] [address]  Phone: [phone]   Fax: [phone]   [facility]  [address]  [place] [address]  Phone: [phone]  Fax:  [phone] [facility]  [address]  [place] [address]  Phone: [phone]   Fax: [phone]   [facility]   [address]  [place] [address]  Phone: [phone]  Fax: [phone] * Please call any location listed to make an appointment for a casting/fitting. Your referral was sent to their central office and they will all have the order on file.

## 2024-05-19 NOTE — PROGRESS NOTES
"OUTPATIENT PSYCHOTHERAPY PROGRESS NOTE    Client Name: Dariusz Leyva   YOB: 1990  Time of Service: 35  minutes   Service Type(s): Individual psychotherapy    VIDEO VISIT  Dariusz Leyva is a 33 year old who is being evaluated via a billable video visit.      Telehealth Details  Type of service:  psychotherapy  Time of service:  Start Time:  4:05 pm  End Time: 4:40 pm    Reason for Telehealth Visit: Patient has requested telehealth visit  Originating Site (patient location):  [facility]   Location- Patient's home  Distant Site (provider location):  Onsite  Mode of Communication:  Hernan      Diagnoses:   Unspecified depressive disorder and unspecified anxiety    Goals of therapy: Decrease negative and obsessional thought processes, increase self care, and identify replacement strategies for dealing with anxiety    Individuals Present:   Psychotherapy services during this visit included myself and Dariusz Leyva.    Narrative:  Dariusz reported her mood is \"okay today, but it has been up and down.\" She reported she gets as much sleep as she can -sleep disruption due to parenting. She reporting her appetite has been diminishing - the last week or so. She indicated she is not as hungry as she usually is. The last time she ate today was 9am. She is feeling fatigue and she has lower energy. She is unsure if she would experience enjoyment if she had time. She denied thoughts of suicide. She reported her stress is at an 85 out of 100 (100 being most stressed).     She reported her tooth is \"not as bad.\" She is finished with her classes for her degree, but she has a few assignments she has to finish before her degree is conferred. She reported she is not feeling many positive feelings towards graduating - \"she explained graduating is a sore spot.\" She shared her negative experiences she has had in past graduations. She does not have any pictures from any of her graduations.     She reported seeing a " podiatrist. She indicated that she is grateful to start making progress incrementally toward self care.     She reported she was feeling like she does not have enough energy to seek the things she wants to do.     Treatment: Treatment focused on supportive psychotherapy and recognizing interpersonal patterns that might have reinforced how she sees herself right now and how she might reframe. Continued to encourage incremental self care.    Patient reaction: Dariusz was receptive to support and interventions provided today    Patient Progress: Dariusz was cooperative, attentive and engaged throughout the session. Progress toward goal completion seems good.     Additional Information:   Patient did not report medication changes.    Mental Status:  Appearance:  Casually dressed   Behavior/relationship to examiner/demeanor:  cooperative  Motor activity/EPS:  Within normal limits  Speech rate:  Within normal limits  Speech volume:  Within normal limits  Speech articulation:  Within normal limits  Speech coherence: Within normal limits  Speech spontaneity: Within normal limits  Mood (subjective):  okay today, but it has been up and down   Affect (objective appearance): Euthymic  Thought Process (Associations):  Logical and Linear   Thought process (Rate):  Within normal limits   Thought content: Within normal limits  Abnormal Perception:  None   Insight:  Good   Judgment:  Good     Plan: Continue with goals as outlined above    Andreea Vásquez Psy.D.,L.P

## 2024-05-22 ENCOUNTER — VIRTUAL VISIT (OUTPATIENT)
Dept: PSYCHIATRY | Facility: CLINIC | Age: 34
End: 2024-05-22
Attending: PSYCHOLOGIST
Payer: COMMERCIAL

## 2024-05-22 DIAGNOSIS — F33.1 MODERATE EPISODE OF RECURRENT MAJOR DEPRESSIVE DISORDER (H): ICD-10-CM

## 2024-05-22 DIAGNOSIS — F41.1 GAD (GENERALIZED ANXIETY DISORDER): Primary | ICD-10-CM

## 2024-05-22 PROCEDURE — 90832 PSYTX W PT 30 MINUTES: CPT | Mod: 95 | Performed by: PSYCHOLOGIST

## 2024-05-29 ENCOUNTER — VIRTUAL VISIT (OUTPATIENT)
Dept: PSYCHIATRY | Facility: CLINIC | Age: 34
End: 2024-05-29
Attending: PSYCHOLOGIST
Payer: COMMERCIAL

## 2024-05-29 DIAGNOSIS — F33.1 MODERATE EPISODE OF RECURRENT MAJOR DEPRESSIVE DISORDER (H): ICD-10-CM

## 2024-05-29 DIAGNOSIS — F41.1 GAD (GENERALIZED ANXIETY DISORDER): Primary | ICD-10-CM

## 2024-05-29 PROCEDURE — 90834 PSYTX W PT 45 MINUTES: CPT | Mod: 95 | Performed by: PSYCHOLOGIST

## 2024-05-29 NOTE — PROGRESS NOTES
OUTPATIENT PSYCHOTHERAPY PROGRESS NOTE    Client Name: Dariusz Leyva   YOB: 1990  Time of Service: 45 minutes   Service Type(s): Individual psychotherapy    VIDEO VISIT  Dariusz Leyva is a 33 year old who is being evaluated via a billable video visit.      Telehealth Details  Type of service:  psychotherapy  Time of service:  Start Time:  4:00 pm  End Time: 4:45 pm    Reason for Telehealth Visit: Patient has requested telehealth visit  Originating Site (patient location):  [facility]   Location- Patient's home  Distant Site (provider location):  Onsite  Mode of Communication:  Hernan      Diagnoses:   Unspecified depressive disorder and unspecified anxiety    Goals of therapy: Decrease negative and obsessional thought processes, increase self care, and identify replacement strategies for dealing with anxiety    Individuals Present:   Psychotherapy services during this visit included myself and Dariusz Leyva.    Narrative:  Dariusz reported that her mood is  okay , but she has low energy. She reported her sleep is  not great, not terrible.  This is mostly related to her twins waking her up. She also had some insomnia. She is experiencing mental and physical fatigue. She endorsed anhedonia. She denied appetite disturbance, and suicidal ideation. She reported her stress is at a 75 out of 100 (100 being most stressed). She reported that lately she has had  bouts of deep sadness  - these are lasting about 30 minutes. There might be different things that she hears or sees that triggers her sadness. She reported one of the themes is  things not working like they should.  For example, her being a single mom and not having support. She talked about things not being easy and  it is lonely.       She reported she is having a lot of bone pain and she has scheduled an appt with her ortho doctor. She is trying to get her antidepressant filled because her OB that prescribed them have left the system.  Suggested that she reach out to nursing staff.      Treatment: Clinician used reflective listening, validation, positive reinforcement, and psychoeducation within a CBT framework. Processed her feelings about children not knowing their father.      Patient reaction: Dariusz was receptive to support and interventions provided today    Patient Progress: Dariusz was cooperative, attentive and engaged throughout the session. Progress toward goal completion seems good.     Additional Information:   Patient did not report medication changes.    Mental Status:  Appearance:  Casually dressed   Behavior/relationship to examiner/demeanor:  cooperative  Motor activity/EPS:  Within normal limits  Speech rate:  Within normal limits  Speech volume:  Within normal limits  Speech articulation:  Within normal limits  Speech coherence: Within normal limits  Speech spontaneity: Within normal limits  Mood (subjective):  okay   Affect (objective appearance): Euthymic  Thought Process (Associations):  Logical and Linear   Thought process (Rate):  Within normal limits   Thought content: Within normal limits  Abnormal Perception:  None   Insight:  Good   Judgment:  Good     Plan: Continue with goals as outlined above    Andreea Vásquez Psy.D.,L.P

## 2024-06-04 ENCOUNTER — VIRTUAL VISIT (OUTPATIENT)
Dept: FAMILY MEDICINE | Facility: CLINIC | Age: 34
End: 2024-06-04
Payer: COMMERCIAL

## 2024-06-04 DIAGNOSIS — J30.2 SEASONAL ALLERGIES: ICD-10-CM

## 2024-06-04 DIAGNOSIS — F33.1 MDD (MAJOR DEPRESSIVE DISORDER), RECURRENT EPISODE, MODERATE (H): Primary | ICD-10-CM

## 2024-06-04 DIAGNOSIS — R79.89 INCREASED PTH LEVEL: ICD-10-CM

## 2024-06-04 DIAGNOSIS — E78.5 HYPERLIPIDEMIA WITH TARGET LDL LESS THAN 130: ICD-10-CM

## 2024-06-04 DIAGNOSIS — F41.1 GAD (GENERALIZED ANXIETY DISORDER): ICD-10-CM

## 2024-06-04 DIAGNOSIS — I10 HYPERTENSION GOAL BP (BLOOD PRESSURE) < 140/90: ICD-10-CM

## 2024-06-04 DIAGNOSIS — E55.9 VITAMIN D DEFICIENCY: ICD-10-CM

## 2024-06-04 PROBLEM — O30.049 DICHORIONIC DIAMNIOTIC TWIN PREGNANCY: Status: RESOLVED | Noted: 2023-06-09 | Resolved: 2024-06-04

## 2024-06-04 PROBLEM — O30.049 DICHORIONIC DIAMNIOTIC TWIN PREGNANCY: Status: ACTIVE | Noted: 2023-06-09

## 2024-06-04 PROCEDURE — 99213 OFFICE O/P EST LOW 20 MIN: CPT | Mod: 95 | Performed by: FAMILY MEDICINE

## 2024-06-04 RX ORDER — CITALOPRAM HYDROBROMIDE 20 MG/1
30 TABLET ORAL DAILY
Qty: 135 TABLET | Refills: 4 | Status: SHIPPED | OUTPATIENT
Start: 2024-06-04

## 2024-06-04 RX ORDER — ACETAMINOPHEN AND CODEINE PHOSPHATE 300; 30 MG/1; MG/1
1 TABLET ORAL EVERY 4 HOURS PRN
COMMUNITY
Start: 2024-05-07 | End: 2024-06-04

## 2024-06-04 RX ORDER — FLUTICASONE PROPIONATE 50 MCG
2 SPRAY, SUSPENSION (ML) NASAL DAILY
Qty: 16 G | Refills: 4 | Status: SHIPPED | OUTPATIENT
Start: 2024-06-04

## 2024-06-04 ASSESSMENT — ANXIETY QUESTIONNAIRES
8. IF YOU CHECKED OFF ANY PROBLEMS, HOW DIFFICULT HAVE THESE MADE IT FOR YOU TO DO YOUR WORK, TAKE CARE OF THINGS AT HOME, OR GET ALONG WITH OTHER PEOPLE?: SOMEWHAT DIFFICULT
4. TROUBLE RELAXING: SEVERAL DAYS
6. BECOMING EASILY ANNOYED OR IRRITABLE: MORE THAN HALF THE DAYS
7. FEELING AFRAID AS IF SOMETHING AWFUL MIGHT HAPPEN: NOT AT ALL
3. WORRYING TOO MUCH ABOUT DIFFERENT THINGS: NOT AT ALL
GAD7 TOTAL SCORE: 3
IF YOU CHECKED OFF ANY PROBLEMS ON THIS QUESTIONNAIRE, HOW DIFFICULT HAVE THESE PROBLEMS MADE IT FOR YOU TO DO YOUR WORK, TAKE CARE OF THINGS AT HOME, OR GET ALONG WITH OTHER PEOPLE: SOMEWHAT DIFFICULT
7. FEELING AFRAID AS IF SOMETHING AWFUL MIGHT HAPPEN: NOT AT ALL
1. FEELING NERVOUS, ANXIOUS, OR ON EDGE: NOT AT ALL
5. BEING SO RESTLESS THAT IT IS HARD TO SIT STILL: NOT AT ALL
2. NOT BEING ABLE TO STOP OR CONTROL WORRYING: NOT AT ALL

## 2024-06-04 ASSESSMENT — PATIENT HEALTH QUESTIONNAIRE - PHQ9
SUM OF ALL RESPONSES TO PHQ QUESTIONS 1-9: 10
10. IF YOU CHECKED OFF ANY PROBLEMS, HOW DIFFICULT HAVE THESE PROBLEMS MADE IT FOR YOU TO DO YOUR WORK, TAKE CARE OF THINGS AT HOME, OR GET ALONG WITH OTHER PEOPLE: NOT DIFFICULT AT ALL
SUM OF ALL RESPONSES TO PHQ QUESTIONS 1-9: 10
10. IF YOU CHECKED OFF ANY PROBLEMS, HOW DIFFICULT HAVE THESE PROBLEMS MADE IT FOR YOU TO DO YOUR WORK, TAKE CARE OF THINGS AT HOME, OR GET ALONG WITH OTHER PEOPLE: NOT DIFFICULT AT ALL

## 2024-06-04 NOTE — PROGRESS NOTES
____________________________    Virtual Visit - Video Encounter  [facility]  Family Medicine  Date of Service: 6/4/2024    Subjective:  Chief Complaint   Patient presents with    Recheck Medication     Like to start  citalopram       Depression + Anxiety  First diagnosed 8 years ago  Suicidal thoughts - in the past, none now  No bipolar depression.  Mom of multiples - twins are 7 months, single Mom. 5 months adjusted.  Work: call center.   Doesn't drink a lot of caffeine. One cup of Kinyarwanda vanilla coffee.  Has PCA who helps with cleaning and cooking - they took hours away. Less help now.   [place].    Started medicine when pregnant, ran out.  The doctor she was working with left.  No refills on medications.         10/5/2022     8:02 AM 11/2/2022     7:59 AM 6/4/2024    12:51 PM   CARLY-7 SCORE   Total Score 9 (mild anxiety) 7 (mild anxiety) 3 (minimal anxiety)   Total Score 9    9 7    7 3         4/12/2024     3:16 PM 6/4/2024    12:50 PM 6/4/2024    12:55 PM   PHQ   PHQ-9 Total Score 13 10    10    Q9: Thoughts of better off dead/self-harm past 2 weeks Not at all Not at all Not at all          Objective:     GENERAL: alert and no distress  EYES: Eyes grossly normal to inspection.  No discharge or erythema, or obvious scleral/conjunctival abnormalities.  RESP: No audible wheeze, cough, or visible cyanosis.    SKIN: Visible skin clear. No significant rash, abnormal pigmentation or lesions.  NEURO: Cranial nerves grossly intact.  Mentation and speech appropriate for age.  PSYCH: Appropriate affect, tone, and pace of words   No visits with results within 1 Week(s) from this visit.   Latest known visit with results is:   Office Visit on 01/02/2024   Component Date Value Ref Range Status    Trichomonas 01/02/2024 Absent  Absent Final    Yeast 01/02/2024 Absent  Absent Final    Clue Cells 01/02/2024 Absent  Absent Final    WBCs/high power field 01/02/2024 2+ (A)  None Final     Chlamydia Trachomatis 01/02/2024 Negative  Negative Final    Negative for C. trachomatis rRNA by transcription mediated amplification.   A negative result by transcription mediated amplification does not preclude the presence of infection because results are dependent on proper and adequate collection, absence of inhibitors and sufficient rRNA to be detected.    Neisseria gonorrhoeae 01/02/2024 Negative  Negative Final    Negative for N. gonorrhoeae rRNA by transcription mediated amplification. A negative result by transcription mediated amplification does not preclude the presence of C. trachomatis infection because results are dependent on proper and adequate collection, absence of inhibitors and sufficient rRNA to be detected.     No results found.   Assessment & Plan:  Depression and Anxiety. In remission. Refilled citalopram 30 mg daily. Recommend scheduling a physical with PCP.  Caregiver stress. Mom of multiples (7m twins), single parent. Discussed strategies to get help.   Labs due. Can schedule a fasting lab visit at her convenience.       Order Summary                                                      Dariusz was seen today for recheck medication.    Diagnoses and all orders for this visit:    MDD (major depressive disorder), recurrent episode, moderate (H)  -     citalopram (CELEXA) 20 MG tablet; Take 1.5 tablets (30 mg) by mouth daily    Hypertension goal BP (blood pressure) < 140/90  -     Comprehensive metabolic panel (BMP + Alb, Alk Phos, ALT, AST, Total. Bili, TP); Future  -     Magnesium; Future    Increased PTH level  -     Phosphorus; Future  -     Parathyroid Hormone Intact; Future  -     Vitamin D deficiency screening; Future  -     Ionized Calcium; Future    Vitamin D deficiency    Hyperlipidemia with target LDL less than 130  -     Lipid panel reflex to direct LDL Fasting; Future    BMI 50.0-59.9, adult (H)  -     Hemoglobin A1c; Future    Seasonal allergies  -     fluticasone (FLONASE) 50  MCG/ACT nasal spray; Spray 2 sprays into both nostrils daily    CARLY (generalized anxiety disorder)  -     citalopram (CELEXA) 20 MG tablet; Take 1.5 tablets (30 mg) by mouth daily    Other orders  -     REVIEW OF HEALTH MAINTENANCE PROTOCOL ORDERS  -     HEPATITIS B, ADULT 20+ (ENGERIX-B/RECOMBIVAX HB); Future  -     PRIMARY CARE FOLLOW-UP SCHEDULING; Future  -     PRIMARY CARE FOLLOW-UP SCHEDULING; Future        Future Appointments   Date Time Provider Department Center   6/5/2024  4:00 PM Andreea Vásquez,  [facility]   6/12/2024  4:00 PM Andreea Vásquez PhD [facility]   6/26/2024  4:00 PM Andreea Vásquez PhD [facility]   7/10/2024  4:00 PM Andreea Vásquez PhD [facility]   7/17/2024  4:00 PM Andreea Vásquez PhD [facility]   7/24/2024  4:00 PM Andreea Vásquez PhD [facility]   7/31/2024  4:00 PM Andreea Vásquez PhD [facility]       Completed by: Danielle Barros M.D., [facility]. 6/4/2024 1:05 PM.  This transcription uses voice recognition software, which may contain typographical errors.  ____________________________    Start visit: 1:05 PM. End visit: 1:22 PM   Physician location: [facility], on site.  Patient location: Home  Platform: [employer]    Answers submitted by the patient for this visit:  Patient Health Questionnaire (Submitted on 6/4/2024)  If you checked off any problems, how difficult have these problems made it for you to do your work, take care of things at home, or get along with other people?: Not difficult at all  PHQ9 TOTAL SCORE: 10  CARLY-7 (Submitted on 6/4/2024)  CARLY 7 TOTAL SCORE: 3  General Questionnaire (Submitted on 6/4/2024)  Chief Complaint: Chronic problems general questions HPI Form  What is the reason for your visit today? : meds  How many servings of fruits and vegetables do you eat daily?: 2-3  On average, how many sweetened beverages do you drink each day (Examples:  soda, juice, sweet tea, etc.  Do NOT count diet or artificially sweetened beverages)?: 2  How many minutes a day do you exercise enough to make your heart beat faster?: 30 to 60  How many days a week do you exercise enough to make your heart beat faster?: 7  How many days per week do you miss taking your medication?: 7  What makes it hard for you to take your medication every day?: other

## 2024-06-04 NOTE — PROGRESS NOTES
Dariusz is a 33 year old who is being evaluated via a billable video visit.    How would you like to obtain your AVS? MyChart  If the video visit is dropped, the invitation should be resent by: Text to cell phone: [phone]  Will anyone else be joining your video visit? No  {If patient encounters technical issues they should call [phone] :423961}    {PROVIDER CHARTING PREFERENCE:781422}    Subjective   Dariusz is a 33 year old, presenting for the following health issues:  Recheck Medication      6/4/2024    12:51 PM   Additional Questions   Roomed by [employer]     Video Start Time: {video visit start/end time for provider to select:604491}    HPI     {SUPERLIST (Optional):497100}  {additonal problems for provider to add (Optional):108094}    {ROS Picklists (Optional):570900}      Objective           Vitals:  No vitals were obtained today due to virtual visit.    Physical Exam   {video visit exam brief selected:786463}    {Diagnostic Test Results (Optional):346973}      Video-Visit Details    Type of service:  Video Visit   Video End Time:{video visit start/end time for provider to select:073486}  Originating Location (pt. Location): work  {PROVIDER LOCATION On-site should be selected for visits conducted from your clinic location or adjoining [facility] hospital, academic office, or other nearby [facility] building. Off-site should be selected for all other provider locations, including home:[address]}  Distant Location (provider location):  On-site  Platform used for Video Visit: Zoom (Telehealth)  Signed Electronically by: Danielle Barros MD  {Email feedback regarding this note to [email or web address]   :562931}

## 2024-06-05 ENCOUNTER — VIRTUAL VISIT (OUTPATIENT)
Dept: PSYCHIATRY | Facility: CLINIC | Age: 34
End: 2024-06-05
Attending: PSYCHOLOGIST
Payer: COMMERCIAL

## 2024-06-05 ENCOUNTER — PATIENT OUTREACH (OUTPATIENT)
Dept: CARE COORDINATION | Facility: CLINIC | Age: 34
End: 2024-06-05
Payer: COMMERCIAL

## 2024-06-05 ENCOUNTER — CARE COORDINATION (OUTPATIENT)
Dept: CARE COORDINATION | Facility: CLINIC | Age: 34
End: 2024-06-05
Payer: COMMERCIAL

## 2024-06-05 DIAGNOSIS — F33.1 MODERATE EPISODE OF RECURRENT MAJOR DEPRESSIVE DISORDER (H): ICD-10-CM

## 2024-06-05 DIAGNOSIS — F41.1 GAD (GENERALIZED ANXIETY DISORDER): Primary | ICD-10-CM

## 2024-06-05 PROCEDURE — 90834 PSYTX W PT 45 MINUTES: CPT | Mod: 95 | Performed by: PSYCHOLOGIST

## 2024-06-05 NOTE — PROGRESS NOTES
6/5/2024  Clinic Care Coordination Contact  Care Team Conversations    Dr Barros sent referral to [facility]:  Patient/Caregiver Support    Patient/Caregiver Suport: Respite Care   Clinical Staff have discussed the Care Coordination Referral with the patient and/or caregiver: Yes   Additional Information: Single mom of 7 month old twins. What resouces are available for giving her a break?     Patient enrolled with [facility] Primary Care Coordination at [facility]  Speciality Care Coordinator is AGUSTIN PRABHAKAR    Routed note to GAGE Ng and Billy Lemon to follow up with patient regarding Dr Barros' referral to [facility].    Routed to Dr Barros as LYNNE Hubbard  Community Health Worker  [facility] Care Coordination  [email or web address]  [email or web address]   Office: [phone]  Fax: [phone]

## 2024-06-05 NOTE — Clinical Note
FYI Patient enrolled with [facility] Primary Care Coordination  Routed referral to GAGE Ng and Billy Lemon at [facility]

## 2024-06-05 NOTE — PROGRESS NOTES
OUTPATIENT PSYCHOTHERAPY PROGRESS NOTE    Client Name: Dariusz Leyva   YOB: 1990  Time of Service: 39 minutes   Service Type(s): Individual psychotherapy    VIDEO VISIT  Dariusz Leyva is a 33 year old who is being evaluated via a billable video visit.      Telehealth Details  Type of service:  psychotherapy  Time of service:  Start Time:  4:02 pm  End Time: 4:41 pm    Reason for Telehealth Visit: Patient has requested telehealth visit  Originating Site (patient location):  [facility]   Location- Patient's home  Distant Site (provider location):  Onsite  Mode of Communication:  Hernan      Diagnoses:   Unspecified depressive disorder and unspecified anxiety    Goals of therapy: Decrease negative and obsessional thought processes, increase self care, and identify replacement strategies for dealing with anxiety    Individuals Present:   Psychotherapy services during this visit included myself and Yadirafadumo Sullivanverónica.    Narrative:  Dariusz reported she is feeling  so-so.  The last few days she has not been getting much sleep - it is primarily her twins waking her up. She reported she eats about two times a day - she sometimes she is missing dinner because she is busy with the twins. She endorsed fatigue (she reported she was an hour late to work because she felt stuck - did not have the energy), anhedonia. She denied suicidal ideation. She reported feeling at a 7 out 10 on her anxiety scale (she reported she has been concerned about work/ making more). She reported that her stress level was at a 90 out of 100 (100 being most stressed).      She reported she is feeling  stranded  in relation to not having support from twins  father. She reported feeling alone. She is feeling lost with what she wants to do with her life.     Treatment: Treatment focused on processing her thoughts around identity and how to emphasize her health. She has lost her passion for media. The one thing she does for enjoyment is  playing the cello. She has always felt subpar at things she would do. Discussed possible ways for her to take a break.       Patient reaction: Dariusz was receptive to support and interventions provided today    Patient Progress: Dariusz was cooperative, attentive and engaged throughout the session. Progress toward goal completion seems good.     Additional Information:   Patient did not report medication changes.    Mental Status:  Appearance:  Casually dressed   Behavior/relationship to examiner/demeanor:  cooperative  Motor activity/EPS:  Within normal limits  Speech rate:  Within normal limits  Speech volume:  Within normal limits  Speech articulation:  Within normal limits  Speech coherence: Within normal limits  Speech spontaneity: Within normal limits  Mood (subjective):  so-so   Affect (objective appearance): Subdued  Thought Process (Associations):  Logical and Linear   Thought process (Rate):  Within normal limits   Thought content: Within normal limits  Abnormal Perception:  None   Insight:  Good   Judgment:  Good     Plan: Continue with goals as outlined above    Andreea Vásquez Psy.D.,L.P          Answers submitted by the patient for this visit:  Patient Health Questionnaire (Submitted on 6/4/2024)  If you checked off any problems, how difficult have these problems made it for you to do your work, take care of things at home, or get along with other people?: Not difficult at all  PHQ9 TOTAL SCORE: 10

## 2024-06-06 ENCOUNTER — PATIENT OUTREACH (OUTPATIENT)
Dept: CARE COORDINATION | Facility: CLINIC | Age: 34
End: 2024-06-06
Payer: COMMERCIAL

## 2024-06-06 NOTE — PROGRESS NOTES
Clinic Care Coordination Contact  Follow Up Progress Note      Assessment: [facility] contacted patient to check in. Patient reports that she is doing ok. She recently applied for a two bedroom apartment, and is waiting to hear back. Her twins are 7 months old now, and she has enjoyed watching them grow. She denies having any immediate social service needs at this time. She was encouraged to reach out with any questions or concerns.    Care Gaps:    Health Maintenance Due   Topic Date Due    YEARLY PREVENTIVE VISIT  Never done    ADVANCE CARE PLANNING  Never done    DEPRESSION ACTION PLAN  Never done    HEPATITIS B IMMUNIZATION (1 of 3 - 19+ 3-dose series) Never done    LIPID  07/20/2021    COVID-19 Vaccine (3 - 2023-24 season) 09/01/2023     Care Plans  Care Plan: Housing Instability       Problem: SDOH LACK OF STABLE HOUSING                   Care Plan: Mental Health       Problem: Mental Health Symptoms Need Improvement       Goal: Improve management of mental health symptoms and establish with mental health/psychosocial supports       Start Date: 1/12/2024 Expected End Date: 6/1/2024    This Visit's Progress: 40% Recent Progress: 10%    Priority: High    Note:     Barriers: Complex mental health.  Strengths: Strong support system.  Patient expressed understanding of goal: Yes.  Action steps to achieve this goal:  1. I will continue my appointments with Andreea Vásquez.  2. I will continue to accept help from my mom as long as she is in town.  3. I will reach out to [facility] with any questions or concerns.                              Intervention/Education provided during outreach: Patient verbalized understanding, engaged in AIDET communication during patient encounter.     Outreach Frequency: monthly, more frequently as needed    Plan: Patient was encouraged to reach out with any questions or concerns.    Care Coordinator will follow up in one month.    Angelica White, [facility]  Clinic Care Coordination  [facility]  [email or web address]  [phone]

## 2024-06-12 ENCOUNTER — VIRTUAL VISIT (OUTPATIENT)
Dept: PSYCHIATRY | Facility: CLINIC | Age: 34
End: 2024-06-12
Attending: PSYCHOLOGIST
Payer: COMMERCIAL

## 2024-06-12 DIAGNOSIS — F33.1 MODERATE EPISODE OF RECURRENT MAJOR DEPRESSIVE DISORDER (H): ICD-10-CM

## 2024-06-12 DIAGNOSIS — F41.1 GAD (GENERALIZED ANXIETY DISORDER): Primary | ICD-10-CM

## 2024-06-12 PROCEDURE — 90834 PSYTX W PT 45 MINUTES: CPT | Mod: 95 | Performed by: PSYCHOLOGIST

## 2024-06-12 NOTE — PROGRESS NOTES
OUTPATIENT PSYCHOTHERAPY PROGRESS NOTE    Client Name: Dariusz Leyva   YOB: 1990  Time of Service: 44 minutes   Service Type(s): Individual psychotherapy    VIDEO VISIT  Dariusz Leyva is a 33 year old who is being evaluated via a billable video visit.      Telehealth Details  Type of service:  psychotherapy  Time of service:  Start Time:  4:00 pm  End Time: 4:44 pm    Reason for Telehealth Visit: Patient has requested telehealth visit  Originating Site (patient location):  [facility] [place]   Location- Patient's home  Distant Site (provider location):  Onsite  Mode of Communication:  Hernan      Diagnoses:   Unspecified depressive disorder and unspecified anxiety    Goals of therapy: Decrease negative and obsessional thought processes, increase self care, and identify replacement strategies for dealing with anxiety    Individuals Present:   Psychotherapy services during this visit included myself and Dariusz Leyva.    Narrative:  Dariusz reported her mood is  depression has been kicking my butt.  She reported it is worse than last week. She indicated that her mood worsening was the primary concern. She only had two hours of sleep last night - she is struggling getting her twins to sleep and her anxiety also has been keeping her up. She reported appetite is below average - she is not sure it is mood related. She is not finishing her meals - sometimes she is too busy with the twins to eat. She endorsed fatigue and a lot pain when she walks. The pain limits her movement, she is hoping to get orthotics tomorrow. She endorsed anhedonia. She denied thoughts of suicide. She reported her stress is at an 80 out of 100 - school and money come to mind when she thinks about stress. She shared things that were stressing her budget.      She reported  I hurt my own feelings  - she recognized the man she had been spending time with has been spending time with another woman.     Treatment: Treatment focused on  self-care strategies. Dariusz reported she is trying to focus on herself.  She is keeping the attitude that the man she was spending time with is  just a friend.  She is looking for opportunities to get sleep. Discussed when she might have her next day off. Discussed finishing the last pieces of homework from her master s degree and possibly going to [place] to go to commencement. Also talked about her reaching out to someone who can help her with her resume.      Patient reaction: Dariusz was receptive to support and interventions provided today    Patient Progress: Dariusz was cooperative, attentive and engaged throughout the session. Progress toward goal completion seems good.     Additional Information:   Patient did not report medication changes.    Mental Status:  Appearance:  Casually dressed   Behavior/relationship to examiner/demeanor:  cooperative  Motor activity/EPS:  Within normal limits  Speech rate:  Within normal limits  Speech volume:  Within normal limits  Speech articulation:  Within normal limits  Speech coherence: Within normal limits  Speech spontaneity: Within normal limits  Mood (subjective):  depression has been kicking my butt   Affect (objective appearance): Euthymic  Thought Process (Associations):  Logical and Linear   Thought process (Rate):  Within normal limits   Thought content: Within normal limits  Abnormal Perception:  None   Insight:  Good   Judgment:  Good     Plan: Continue with goals as outlined above    Andreea Vásquez Psy.D.,L.P

## 2024-06-26 ENCOUNTER — VIRTUAL VISIT (OUTPATIENT)
Dept: PSYCHIATRY | Facility: CLINIC | Age: 34
End: 2024-06-26
Attending: PSYCHOLOGIST
Payer: COMMERCIAL

## 2024-06-26 DIAGNOSIS — F41.1 GAD (GENERALIZED ANXIETY DISORDER): Primary | ICD-10-CM

## 2024-06-26 DIAGNOSIS — F33.1 MODERATE EPISODE OF RECURRENT MAJOR DEPRESSIVE DISORDER (H): ICD-10-CM

## 2024-06-26 PROCEDURE — 90832 PSYTX W PT 30 MINUTES: CPT | Mod: 95 | Performed by: PSYCHOLOGIST

## 2024-06-26 NOTE — PROGRESS NOTES
"OUTPATIENT PSYCHOTHERAPY PROGRESS NOTE    Client Name: Dariusz Leyva   YOB: 1990  Time of Service: 33 minutes   Service Type(s): Individual psychotherapy    VIDEO VISIT  Dariusz Leyva is a 33 year old who is being evaluated via a billable video visit.      Telehealth Details  Type of service:  psychotherapy  Time of service:  Start Time:  4:00 pm  End Time: 4:33 pm    Reason for Telehealth Visit: Patient has requested telehealth visit  Originating Site (patient location):  [facility] [place]   Location- Patient's home  Distant Site (provider location):  Onsite  Mode of Communication:  Hernan      Diagnoses:   Unspecified depressive disorder and unspecified anxiety    Goals of therapy: Decrease negative and obsessional thought processes, increase self care, and identify replacement strategies for dealing with anxiety    Individuals Present:   Psychotherapy services during this visit included myself and Yadirafadumo Sullivanverónica.     Narrative:  Dariusz reported her mood is  okay.\" She is not sleeping well because her babies have been ill. She reported her appetite is \"touch and go.\" She indicated that it is easy just to snack - she is not sure if this is mood or time related. She reported it is hard for her to know if she is anhedonic because she has not had time to do enjoyable things. She endorsed fatigue. She denied thoughts of suicide. She is not sure how to rate her stress level because she is \"exhausted.\"     She reported that she is officially done with school. She is relieved, but she feels like she is just trying to get through the days. She is just trying to figure out what she can do for herself - but others are asking her about her next steps.     Her ex apologized to her recently and he asked to be her friend. She reported she is willing to give it a try.    Treatment: Treatment focused on self care. She is struggling thinking past her current day. Discussed things she might do. She indicated she " went to the zoo this past weekend.    Patient reaction: Dariusz was receptive to support and interventions provided today    Patient Progress: Dariusz was cooperative, attentive and engaged throughout the session. Progress toward goal completion seems good.     Additional Information:   Patient did not report medication changes.    Mental Status:  Appearance:  Casually dressed   Behavior/relationship to examiner/demeanor:  cooperative  Motor activity/EPS:  Within normal limits  Speech rate:  Within normal limits  Speech volume:  Within normal limits  Speech articulation:  Within normal limits  Speech coherence: Within normal limits  Speech spontaneity: Within normal limits  Mood (subjective):  okay   Affect (objective appearance): Subdued  Thought Process (Associations):  Logical and Linear   Thought process (Rate):  Within normal limits   Thought content: Within normal limits  Abnormal Perception:  None   Insight:  Good   Judgment:  Good     Plan: Continue with goals as outlined above    Andreea Vásquez Psy.D.,L.P

## 2024-07-07 ENCOUNTER — HEALTH MAINTENANCE LETTER (OUTPATIENT)
Age: 34
End: 2024-07-07

## 2024-07-10 ENCOUNTER — BEH TREATMENT PLAN (OUTPATIENT)
Dept: BEHAVIORAL HEALTH | Facility: CLINIC | Age: 34
End: 2024-07-10

## 2024-07-10 ENCOUNTER — VIRTUAL VISIT (OUTPATIENT)
Dept: PSYCHIATRY | Facility: CLINIC | Age: 34
End: 2024-07-10
Attending: PSYCHOLOGIST
Payer: COMMERCIAL

## 2024-07-10 DIAGNOSIS — F41.1 GAD (GENERALIZED ANXIETY DISORDER): Primary | ICD-10-CM

## 2024-07-10 DIAGNOSIS — F33.1 MODERATE EPISODE OF RECURRENT MAJOR DEPRESSIVE DISORDER (H): ICD-10-CM

## 2024-07-10 PROCEDURE — 90834 PSYTX W PT 45 MINUTES: CPT | Mod: 95 | Performed by: PSYCHOLOGIST

## 2024-07-10 NOTE — PROGRESS NOTES
OUTPATIENT PSYCHOTHERAPY PROGRESS NOTE    Client Name: Dariusz Leyva   YOB: 1990  Time of Service: 38 minutes   Service Type(s): Individual psychotherapy    VIDEO VISIT  Dariusz Leyva is a 33 year old who is being evaluated via a billable video visit.      Telehealth Details  Type of service:  psychotherapy  Time of service:  Start Time:  4:01 pm  End Time: 4:39 pm    Reason for Telehealth Visit: Patient has requested telehealth visit  Originating Site (patient location):  [facility]   Location- Patient's home  Distant Site (provider location):  Onsite  Mode of Communication:  Hernan      Diagnoses:   Unspecified depressive disorder and unspecified anxiety    Goals of therapy: Decrease negative and obsessional thought processes, increase self care, and identify replacement strategies for dealing with anxiety    Individuals Present:   Psychotherapy services during this visit included myself and Dariusz Leyva.     Narrative:  Dariusz reported her mood has been  okay.  She reported she is waking frequently during the night to take care of the twins. She indicated her appetite has been pretty normal. She is dealing with physical pain - this is making it hard for her to enjoy things. She endorsed fatigue. She denied suicidal ideation. She reported she was not sure how to scale her stress level.      She reported her twins are ill. She has been working from home. She reported her mother has been here helping her. She has been in a routine every day.      She is planning on movie soon, probably in August. She is moving to get into a subsidized apartment.      Treatment: Treatment focused on behavioral activation. She does not have a lot of energy to go outside the house. She decided to set a goal for the week - she plans to go back to the grocery store once a week. Discussed setting up contingencies. Updated treatment plan.     Patient reaction: Dariusz was receptive to support and interventions  provided today    Patient Progress: Dariusz was cooperative, attentive and engaged throughout the session. Progress toward goal completion seems good.     Additional Information:   Patient did not report medication changes.    Mental Status:  Appearance:  Casually dressed   Behavior/relationship to examiner/demeanor:  cooperative  Motor activity/EPS:  Within normal limits  Speech rate:  Within normal limits  Speech volume:  Within normal limits  Speech articulation:  Within normal limits  Speech coherence: Within normal limits  Speech spontaneity: Within normal limits  Mood (subjective):  okay   Affect (objective appearance): Mood congruent  Thought Process (Associations):  Logical and Linear   Thought process (Rate):  Within normal limits   Thought content: Within normal limits  Abnormal Perception:  None   Insight:  Good   Judgment:  Good     Plan: Continue with goals as outlined above    Andreea Vásquez Psy.D.,L.P

## 2024-07-10 NOTE — PROGRESS NOTES
"OUTPATIENT TREATMENT PLAN SUMMARY    Date of Treatment Plan: 7/10/24  90-Day Review Date: 10/10/24  Date of Initial Service: 9/17/18       DSM-V Diagnosis (include numeric code)  Dysthymic Disorder (F34.1)    Current symptoms and circumstances that substantiate the diagnosis:  Patient reported fairly consistent low mood, disrupted sleep, fatigue, periods of anhedonia, vegetative symptoms    She endorsed symptoms of anxiety including, she reported a looming \"doom\" coming, and losing herself and being a single mom     How symptoms and/or behaviors are affecting level of function:  Isolation, struggle with self-care at times, struggling with moving forward (i.e. career)    Risk Assessment:  Suicide:  Assessed Level of Immediate Risk: Low  Ideation: not current, is experiencing less often  Plan:  No  Means: No  Intent: No    Homicide/Violence:  Assessed Level of Immediate Risk: None  Ideation: No  Plan: No  Means: No  Intent: No    If on a medication, please include name and dosage: Citalopram 30mg once a day      Symptom/Problem Measurable Goals Interventions Gains Made   1.Negative thinking - cynacism 1. In the therapeutic environment and during stressful situations, Dariusz will learn to identify her pessimistic thoughts and challenge them    Would like to have awareness of how this might come out in her environment around her children 1.CBT: Problem solving, skill building and psychoeducation 1. She reports insight into her negative thoughts, she sometimes is successful in addressing those thoughts unless there is a lot of stress   2.  Obsessive thinking - impacts mood 2. Per patient report, will have reduction in symptoms 2. CBT 2. 4/3/24 Has been able to create a mindset to move her aware from obsessing about relationships. \"Let it happen.\"    3. Self Care 3. Will make incremental changes toward self defined goals 3. CBT: Problem solving, skill building and psychoeducation 3. NA - new   4. Anxiety 4. Reduction in " smoking. Currently smoking once a day. Would like to no more than once or twice a week. And move towards only smoking in social situations 4. CBT strategies - replacement behavior when anxious 4. NA - new       Frequency of Sessions: Weekly    Discharge and Aftercare Goals: discharge on completion of goals    Expected duration of treatment:  To be reassessed in 90 days    Participants in therapy plan (family, friends, support network): client and therapist      Telehealth appointment - patient gave verbal acknowledge of plan as written      Regulatory Guidelines for Updating Treatment Plan  [place] Medical Assistance: Reviewed & signed at least every 90days  Medicare:  Update per policy

## 2024-07-12 ENCOUNTER — PATIENT OUTREACH (OUTPATIENT)
Dept: CARE COORDINATION | Facility: CLINIC | Age: 34
End: 2024-07-12
Payer: COMMERCIAL

## 2024-07-12 NOTE — PROGRESS NOTES
Clinic Care Coordination Contact  Follow Up Progress Note      Assessment: [facility] contacted patient to check in. Patient reports that she is doing well. She finished school, so is glad to not have to worry about this anymore. She was approved for a two bedroom, two bathroom apartment, and is set to move in next month. She recently applied for [employer] 8 housing. She is having fun watching the twins grow. Her daughter has been pulling herself up and walking along furniture. She reports that they have been on a pretty regular sleep scheduled, and she feels like this has improved her mental health. She denies having any social service needs at this time.    Care Gaps:    Health Maintenance Due   Topic Date Due    YEARLY PREVENTIVE VISIT  Never done    ADVANCE CARE PLANNING  Never done    DEPRESSION ACTION PLAN  Never done    HEPATITIS B IMMUNIZATION (1 of 3 - 19+ 3-dose series) Never done    LIPID  07/20/2021    COVID-19 Vaccine (3 - 2023-24 season) 09/01/2023    DEPRESSION 6 MO INDEX REPEAT PHQ-9  06/19/2024     Care Plans  Care Plan: Housing Instability       Problem: SDOH LACK OF STABLE HOUSING                   Care Plan: Mental Health       Problem: Mental Health Symptoms Need Improvement       Goal: Improve management of mental health symptoms and establish with mental health/psychosocial supports       Start Date: 1/12/2024 Expected End Date: 6/1/2024    This Visit's Progress: 80% Recent Progress: 40%    Priority: High    Note:     Barriers: Complex mental health.  Strengths: Strong support system.  Patient expressed understanding of goal: Yes.  Action steps to achieve this goal:  1. I will continue my appointments with Andreea Vásquez.  2. I will continue to accept help from my mom as long as she is in town.  3. I will reach out to [facility] with any questions or concerns.                              Intervention/Education provided during outreach: Patient verbalized understanding, engaged in AIDET communication during  patient encounter.     Outreach Frequency: monthly, more frequently as needed    Plan: Patient was encouraged to reach out with any questions or concerns.    Care Coordinator will follow up in one month.    Angelica [facility]  Clinic Care Coordination  [facility]  [email or web address]  [phone]

## 2024-07-17 ENCOUNTER — VIRTUAL VISIT (OUTPATIENT)
Dept: PSYCHIATRY | Facility: CLINIC | Age: 34
End: 2024-07-17
Attending: PSYCHOLOGIST
Payer: COMMERCIAL

## 2024-07-17 DIAGNOSIS — F33.1 MODERATE EPISODE OF RECURRENT MAJOR DEPRESSIVE DISORDER (H): ICD-10-CM

## 2024-07-17 DIAGNOSIS — F41.1 GAD (GENERALIZED ANXIETY DISORDER): Primary | ICD-10-CM

## 2024-07-17 PROCEDURE — 90837 PSYTX W PT 60 MINUTES: CPT | Mod: 95 | Performed by: PSYCHOLOGIST

## 2024-07-17 NOTE — PROGRESS NOTES
OUTPATIENT PSYCHOTHERAPY PROGRESS NOTE    Client Name: Dariusz Leyva   YOB: 1990  Time of Service: 60 minutes   Service Type(s): Individual psychotherapy    VIDEO VISIT  Dariusz Leyva is a 33 year old who is being evaluated via a billable video visit.      Telehealth Details  Type of service:  psychotherapy  Time of service:  Start Time:  4:00 pm  End Time: 5:00 pm    Reason for Telehealth Visit: Patient has requested telehealth visit  Originating Site (patient location):  [facility]   Location- Patient's home  Distant Site (provider location):  Onsite  Mode of Communication:  Hernan      Diagnoses:   Unspecified depressive disorder and unspecified anxiety    Goals of therapy: Decrease negative and obsessional thought processes, increase self care, and identify replacement strategies for dealing with anxiety    Individuals Present:   Psychotherapy services during this visit included myself and Dariusz Leyva.     Narrative:  Dariusz reported that someone that she was dating is now in a relationship. She reported feeling  embarrassed  because it took her off guard. She reported a sense of rejection. She is still sorting through her feelings.     Treatment: Clinician used reflective listening, validation, positive reinforcement, and psychoeducation.    Patient reaction: Dariusz was receptive to support and interventions provided today    Patient Progress: Dariusz was cooperative, attentive and engaged throughout the session. Progress toward goal completion seems good.     Additional Information:   Patient did not report medication changes.    Mental Status:  Appearance:  Casually dressed   Behavior/relationship to examiner/demeanor:  cooperative  Motor activity/EPS:  Within normal limits  Speech rate:  Within normal limits  Speech volume:  Within normal limits  Speech articulation:  Within normal limits  Speech coherence: Within normal limits  Speech spontaneity: Within normal limits  Mood  (subjective):  alright   Affect (objective appearance): Mood congruent  Thought Process (Associations):  Logical and Linear   Thought process (Rate):  Within normal limits   Thought content: Within normal limits  Abnormal Perception:  None   Insight:  Good   Judgment:  Good     Plan: Continue with goals as outlined above    Andreea Vásquez Psy.D.,L.P

## 2024-07-24 ENCOUNTER — VIRTUAL VISIT (OUTPATIENT)
Dept: PSYCHIATRY | Facility: CLINIC | Age: 34
End: 2024-07-24
Attending: PSYCHOLOGIST
Payer: COMMERCIAL

## 2024-07-24 DIAGNOSIS — F41.1 GAD (GENERALIZED ANXIETY DISORDER): Primary | ICD-10-CM

## 2024-07-24 DIAGNOSIS — F33.1 MODERATE EPISODE OF RECURRENT MAJOR DEPRESSIVE DISORDER (H): ICD-10-CM

## 2024-07-24 PROCEDURE — 90837 PSYTX W PT 60 MINUTES: CPT | Mod: 95 | Performed by: PSYCHOLOGIST

## 2024-07-24 NOTE — PROGRESS NOTES
OUTPATIENT PSYCHOTHERAPY PROGRESS NOTE    Client Name: Dariusz Leyva   YOB: 1990  Time of Service: 53 minutes   Service Type(s): Individual psychotherapy    VIDEO VISIT  Dariusz Leyva is a 33 year old who is being evaluated via a billable video visit.      Telehealth Details  Type of service:  psychotherapy  Time of service:  Start Time:  4:11 pm  End Time: 5:04 pm    Reason for Telehealth Visit: Patient has requested telehealth visit  Originating Site (patient location):  [facility]   Location- Patient's home  Distant Site (provider location):  Onsite  Mode of Communication:  Hernan      Diagnoses:   Unspecified depressive disorder and unspecified anxiety    Goals of therapy: Decrease negative and obsessional thought processes, increase self care, and identify replacement strategies for dealing with anxiety    Individuals Present:   Psychotherapy services during this visit included myself and Francescotatyanafadumo Sullivanverónica.     Narrative:  Dariusz reported she is feeling  lethargic and tired.  She has been feeling ill. She has been home for three weeks between her illness and her twins being ill.      She continues to have disrupted sleep because of caring for the twins at night. She endorsed anhedonia, fatigue, and suicidal ideation without intent to act. She denied appetite disturbance. She indicated her stress level is at a 70 out of 100 (100 being most stressed). She attributes her stress to work and financial concerns.      Treatment: Treatment focused on ways to manage stress/distress. She has been focused on  out of sight out of mind  in regards to a relational change. She is  trying to get comfortable with reality.  She is using the wisdom she has developed from past relationships ending. She has set her mind to letting go. She reported that she wants to focus her energy elsewhere - occupationally and self-care. She is also doing things with her babies, like their swim lessons.     Patient reaction:  Dariusz was receptive to support and interventions provided today    Patient Progress: Dariusz was cooperative, attentive and engaged throughout the session. Progress toward goal completion seems good.     Additional Information:   Patient did not report medication changes.    Mental Status:  Appearance:  Casually dressed   Behavior/relationship to examiner/demeanor:  cooperative  Motor activity/EPS:  Within normal limits  Speech rate:  Within normal limits  Speech volume:  Within normal limits  Speech articulation:  Within normal limits  Speech coherence: Within normal limits  Speech spontaneity: Within normal limits  Mood (subjective):  lethargic and tired   Affect (objective appearance): Euthymic  Thought Process (Associations):  Logical and Linear   Thought process (Rate):  Within normal limits   Thought content: Within normal limits  Abnormal Perception:  None   Insight:  Good   Judgment:  Good     Plan: Continue with goals as outlined above    Andreea Vásquez Psy.D.,L.P

## 2024-07-31 ENCOUNTER — VIRTUAL VISIT (OUTPATIENT)
Dept: PSYCHIATRY | Facility: CLINIC | Age: 34
End: 2024-07-31
Attending: PSYCHOLOGIST
Payer: COMMERCIAL

## 2024-07-31 DIAGNOSIS — F41.1 GAD (GENERALIZED ANXIETY DISORDER): Primary | ICD-10-CM

## 2024-07-31 DIAGNOSIS — F33.1 MODERATE EPISODE OF RECURRENT MAJOR DEPRESSIVE DISORDER (H): ICD-10-CM

## 2024-07-31 PROCEDURE — 90832 PSYTX W PT 30 MINUTES: CPT | Mod: 95 | Performed by: PSYCHOLOGIST

## 2024-07-31 NOTE — PROGRESS NOTES
OUTPATIENT PSYCHOTHERAPY PROGRESS NOTE    Client Name: Dariusz Leyva   YOB: 1990  Time of Service:  32 minutes   Service Type(s): Individual psychotherapy    VIDEO VISIT  Dariusz Leyva is a 33 year old who is being evaluated via a billable video visit.      Telehealth Details  Type of service:  psychotherapy  Time of service:  Start Time:  4:03 pm  End Time: 4:35 pm    Reason for Telehealth Visit: Patient has requested telehealth visit  Originating Site (patient location):  [facility]   Location- Patient's home  Distant Site (provider location):  Onsite  Mode of Communication:  Hernan      Diagnoses:   Unspecified depressive disorder and unspecified anxiety    Goals of therapy: Decrease negative and obsessional thought processes, increase self care, and identify replacement strategies for dealing with anxiety    Individuals Present:   Psychotherapy services during this visit included myself and Dariusz Leyva.     Narrative:  Dariusz reported she was  okay.  She reported she is getting about a  good two hours  of sleep a night, but otherwise she is waking frequently. She has been enjoying somethings, like looking forward to her new apartment. However, it is short lived - for example now she needs to move. She asked to have two weeks to move. She reported she had some passive thoughts of suicide last week, without intent to act. She denied fatigue and appetite disturbance. She indicated her stress level is at a 70 out of 100 (100 being most stressed).      Treatment:  Discussed some work and relational stressors.  Clinician used reflective listening, validation, positive reinforcement, and psychoeducation within a framework of CBT.    Patient reaction: Dariusz was receptive to support and interventions provided today    Patient Progress: Dariusz was cooperative, attentive and engaged throughout the session. Progress toward goal completion seems good.     Additional Information:   Patient did  not report medication changes.    Mental Status:  Appearance:  Casually dressed   Behavior/relationship to examiner/demeanor:  cooperative  Motor activity/EPS:  Within normal limits  Speech rate:  Within normal limits  Speech volume:  Within normal limits  Speech articulation:  Within normal limits  Speech coherence: Within normal limits  Speech spontaneity: Within normal limits  Mood (subjective):  okay   Affect (objective appearance): Euthymic  Thought Process (Associations):  Logical and Linear   Thought process (Rate):  Within normal limits   Thought content: Within normal limits  Abnormal Perception:  None   Insight:  Good   Judgment:  Good     Plan: Continue with goals as outlined above    Andreea Vásquez Psy.D.,L.P

## 2024-08-05 ENCOUNTER — ANCILLARY PROCEDURE (OUTPATIENT)
Dept: GENERAL RADIOLOGY | Facility: CLINIC | Age: 34
End: 2024-08-05
Attending: FAMILY MEDICINE
Payer: COMMERCIAL

## 2024-08-05 ENCOUNTER — PATIENT OUTREACH (OUTPATIENT)
Dept: CARE COORDINATION | Facility: CLINIC | Age: 34
End: 2024-08-05

## 2024-08-05 ENCOUNTER — OFFICE VISIT (OUTPATIENT)
Dept: FAMILY MEDICINE | Facility: CLINIC | Age: 34
End: 2024-08-05
Payer: COMMERCIAL

## 2024-08-05 VITALS
TEMPERATURE: 97.6 F | BODY MASS INDEX: 45.99 KG/M2 | RESPIRATION RATE: 20 BRPM | DIASTOLIC BLOOD PRESSURE: 90 MMHG | WEIGHT: 293 LBS | OXYGEN SATURATION: 98 % | HEIGHT: 67 IN | SYSTOLIC BLOOD PRESSURE: 150 MMHG | HEART RATE: 70 BPM

## 2024-08-05 DIAGNOSIS — R05.8 PRODUCTIVE COUGH: ICD-10-CM

## 2024-08-05 DIAGNOSIS — J06.9 VIRAL UPPER RESPIRATORY TRACT INFECTION: Primary | ICD-10-CM

## 2024-08-05 DIAGNOSIS — R03.0 ELEVATED BLOOD PRESSURE READING WITHOUT DIAGNOSIS OF HYPERTENSION: ICD-10-CM

## 2024-08-05 PROCEDURE — 99213 OFFICE O/P EST LOW 20 MIN: CPT | Mod: GC

## 2024-08-05 PROCEDURE — 71046 X-RAY EXAM CHEST 2 VIEWS: CPT | Mod: TC | Performed by: RADIOLOGY

## 2024-08-05 PROCEDURE — 87635 SARS-COV-2 COVID-19 AMP PRB: CPT

## 2024-08-05 NOTE — PROGRESS NOTES
Preceptor Attestation:    I discussed the patient with the resident and evaluated the patient in person. I have verified the content of the note, which accurately reflects my assessment of the patient and the plan of care.   Supervising Physician:  Harshal Hernandez MD.  Preceptor Attestation:   I personally reviewed the imaging and agree with the interpretation documented by the resident. I have verified the content of the note, which accurately reflects my assessment of the patient and the plan of care.   Supervising Physician:  Harshal Hernandez MD.

## 2024-08-05 NOTE — PROGRESS NOTES
Assessment & Plan     Viral upper respiratory tract infection  Productive cough  Patient with 2-week history of respiratory symptoms including productive cough.  Low likelihood of bacterial infection due to normal vital signs, lack of fever/chills, and normal chest x-ray today in clinic.  No sinus tenderness on exam.  Likely viral infection, recommend conservative treatment with fluids, hydration, and over-the-counter symptomatic management.  - XR CHEST 2 VW  - Symptomatic COVID-19 Virus (Coronavirus) by PCR Nose    Elevated blood pressure reading without diagnosis of hypertension  BP elevated to 150/90 today.  Patient endorses whitecoat hypertension.  Reviewed most recent vital signs from 5/15/2024 with normal BP of 119/81.  She is asymptomatic today, denies headache or vision changes.  Continue to follow.          Return if symptoms worsen or fail to improve.      Iram Arzola is a 33 year old, presenting for the following health issues:  URI (X 2 WEEKS )        8/5/2024     3:43 PM   Additional Questions   Roomed by gh   Accompanied by self         8/5/2024    Information    services provided? No      HPI     33-year-old female presents for respiratory symptoms    Cold symptoms such as rhinorrhea/congestion started about 2 weeks when her 9-month-old twins brought home an illness from   Progressed into more systemic symptoms about 5 days ago including cough productive of white/yellow phlegm, pleuritic chest pain, and body aches  She denies sore throat, fever/chills, or sinus pain/pressure    She has used over-the-counter remedies such as Tylenol Cold and flu, decongestions, and Vicks cough drops without much relief  She had a hot steam shower that was helpful however the effects lasted short-term    She does endorse using THC vape daily (10 times per week) states she has been using this less recently due to her illness  She is a non-smoker, no longer breastfeeding        "  Objective    BP (!) 150/90   Pulse 70   Temp 97.6  F (36.4  C) (Tympanic)   Resp 20   Ht 1.702 m (5' 7.01\")   Wt (!) 160.5 kg (353 lb 12.8 oz)   LMP 08/02/2024 (Approximate)   SpO2 98%   BMI 55.40 kg/m    Body mass index is 55.4 kg/m .  Physical Exam  Vitals reviewed.   Constitutional:       General: She is not in acute distress.     Appearance: Normal appearance. She is ill-appearing.   HENT:      Head: Normocephalic and atraumatic.      Nose: Nose normal.      Mouth/Throat:      Mouth: Mucous membranes are moist.      Pharynx: Oropharynx is clear. No oropharyngeal exudate or posterior oropharyngeal erythema.   Eyes:      Conjunctiva/sclera: Conjunctivae normal.   Cardiovascular:      Rate and Rhythm: Normal rate and regular rhythm.      Heart sounds: Normal heart sounds. No murmur heard.  Pulmonary:      Effort: Pulmonary effort is normal. No respiratory distress.      Breath sounds: Wheezing present.      Comments: Wheezing cough present on exam. Coarse air sounds with adequate air movement.   Skin:     General: Skin is warm and dry.   Neurological:      General: No focal deficit present.      Mental Status: She is alert.          XR CHEST 2 VW    Result Date: 8/5/2024  EXAM: XR CHEST 2 VIEWS LOCATION: [facility] DATE: 8/5/2024 INDICATION:  Productive cough COMPARISON: 7/12/2016     IMPRESSION:   Heart size and pulmonary vascularity within normal limits. No focal lung infiltrates. Osseous structures grossly intact. Visualized upper abdomen unremarkable.         Signed Electronically by: Iliana Ambriz DO PGY3    "

## 2024-08-05 NOTE — PROGRESS NOTES
Clinic Care Coordination Contact  Follow Up Progress Note      Assessment: [facility] contacted patient to check in. Patient reports that she is doing well. She was approved for a two-bedroom apartment, so has been working on getting packed. She denies having any social service needs at this time. She was encouraged to reach out with any questions or concerns.    Care Gaps:    Health Maintenance Due   Topic Date Due    YEARLY PREVENTIVE VISIT  Never done    ADVANCE CARE PLANNING  Never done    DEPRESSION ACTION PLAN  Never done    HEPATITIS B IMMUNIZATION (1 of 3 - 19+ 3-dose series) Never done    LIPID  07/20/2021    COVID-19 Vaccine (3 - 2023-24 season) 09/01/2023    DEPRESSION 6 MO INDEX REPEAT PHQ-9  06/19/2024     Care Plans  Care Plan: Housing Instability       Problem: SDOH LACK OF STABLE HOUSING       Long-Range Goal: Establish Stable Housing  Completed 1/12/2024      Start Date: 7/14/2023 Expected End Date: 11/1/2023    This Visit's Progress: 100% Recent Progress: 100%    Priority: High    Note:     Barriers: Complex mental health, lack of finances.  Strengths: Strong support system.  Patient expressed understanding of goal: Yes.  Action steps to achieve this goal:  1. I will explore housing options throughout [facility].  2. I will review the housing resources sent to me by [facility].  3. I will reach out to Angelica with any questions or concerns.                            Care Plan: Mental Health       Problem: Mental Health Symptoms Need Improvement       Goal: Improve management of mental health symptoms and establish with mental health/psychosocial supports  Completed 8/5/2024      Start Date: 1/12/2024 Expected End Date: 6/1/2024    This Visit's Progress: 100% Recent Progress: 80%    Priority: High    Note:     Barriers: Complex mental health.  Strengths: Strong support system.  Patient expressed understanding of goal: Yes.  Action steps to achieve this goal:  1. I will continue my appointments with Andreea  Fahad.  2. I will continue to accept help from my mom as long as she is in town.  3. I will reach out to [facility] with any questions or concerns.                              Intervention/Education provided during outreach: Patient verbalized understanding, engaged in AIDET communication during patient encounter.     Plan: Patient was encouraged to reach out with any questions or concerns.    Care Coordinator will do no further outreaches at this time.    Angelica White, [facility]  Clinic Care Coordination  [facility]  [email or web address]  [phone]

## 2024-08-06 LAB — SARS-COV-2 RNA RESP QL NAA+PROBE: NEGATIVE

## 2024-08-21 ENCOUNTER — VIRTUAL VISIT (OUTPATIENT)
Dept: PSYCHIATRY | Facility: CLINIC | Age: 34
End: 2024-08-21
Attending: PSYCHOLOGIST
Payer: COMMERCIAL

## 2024-08-21 DIAGNOSIS — F33.1 MODERATE EPISODE OF RECURRENT MAJOR DEPRESSIVE DISORDER (H): ICD-10-CM

## 2024-08-21 DIAGNOSIS — F41.1 GAD (GENERALIZED ANXIETY DISORDER): Primary | ICD-10-CM

## 2024-08-21 PROCEDURE — 90837 PSYTX W PT 60 MINUTES: CPT | Mod: 95 | Performed by: PSYCHOLOGIST

## 2024-08-21 NOTE — PROGRESS NOTES
OUTPATIENT PSYCHOTHERAPY PROGRESS NOTE    Client Name: Dariusz Leyva   YOB: 1990  Time of Service:  58 minutes   Service Type(s): Individual psychotherapy    VIDEO VISIT  Dariusz Leyva is a 33 year old who is being evaluated via a billable video visit.      Telehealth Details  Type of service:  psychotherapy  Time of service:  Start Time:  4:01 pm  End Time: 4:59 pm    Reason for Telehealth Visit: Patient has requested telehealth visit  Originating Site (patient location):  [facility]   Location- Patient's home  Distant Site (provider location):  Onsite  Mode of Communication:  [place]      Diagnoses:   Unspecified depressive disorder and unspecified anxiety    Goals of therapy: Decrease negative and obsessional thought processes, increase self care, and identify replacement strategies for dealing with anxiety    Individuals Present:   Psychotherapy services during this visit included myself and Dariusz Leyva.     Narrative:  Dariusz reported mood was  at mid-level.  She has fatigue at times - seems mood related. She does not have energy or time to do things.  She may have some anhedonia. She denied sleep and appetite disturbance, and suicidal ideation. She reported her stress level is a 80 out of 100 (100 being most stress). She reached out to the babies' dad and he has not been responding since she asked him to do a DNA test. She is not sure what else to do. The twins will be one Oct 12th.      She is planning on going to [place] for graduation.      She shared she is trying to  feel normal again.  Discussed what she means by this statement. She would like to not feel pain. Her pain makes it difficult to do things.      Treatment:   Clinician used reflective listening, validation, positive reinforcement, and psychoeducation within a framework of CBT.    Patient reaction: Dariusz was receptive to support and interventions provided today    Patient Progress: Dariusz was cooperative, attentive  and engaged throughout the session. Progress toward goal completion seems good.     Additional Information:   Patient did not report medication changes.    Mental Status:  Appearance:  Casually dressed   Behavior/relationship to examiner/demeanor:  cooperative  Motor activity/EPS:  Within normal limits  Speech rate:  Within normal limits  Speech volume:  Within normal limits  Speech articulation:  Within normal limits  Speech coherence: Within normal limits  Speech spontaneity: Within normal limits  Mood (subjective):  at mid-level   Affect (objective appearance): Euthymic  Thought Process (Associations):  Logical and Linear   Thought process (Rate):  Within normal limits   Thought content: Within normal limits  Abnormal Perception:  None   Insight:  Good   Judgment:  Good     Plan: Continue with goals as outlined above    Andreea Vásquez Psy.D.,L.P

## 2024-08-28 ENCOUNTER — VIRTUAL VISIT (OUTPATIENT)
Dept: PSYCHIATRY | Facility: CLINIC | Age: 34
End: 2024-08-28
Attending: PSYCHOLOGIST
Payer: COMMERCIAL

## 2024-08-28 DIAGNOSIS — F41.1 GAD (GENERALIZED ANXIETY DISORDER): Primary | ICD-10-CM

## 2024-08-28 DIAGNOSIS — F33.1 MODERATE EPISODE OF RECURRENT MAJOR DEPRESSIVE DISORDER (H): ICD-10-CM

## 2024-08-28 PROCEDURE — 90832 PSYTX W PT 30 MINUTES: CPT | Mod: 95 | Performed by: PSYCHOLOGIST

## 2024-08-28 NOTE — PROGRESS NOTES
OUTPATIENT PSYCHOTHERAPY PROGRESS NOTE    Client Name: Dariusz Leyva   YOB: 1990  Time of Service:  28 minutes   Service Type(s): Individual psychotherapy    VIDEO VISIT  Dariusz Leyva is a 33 year old who is being evaluated via a billable video visit.      Telehealth Details  Type of service:  psychotherapy  Time of service:  Start Time:  4:05 pm  End Time: 4:33 pm    Reason for Telehealth Visit: Patient has requested telehealth visit  Originating Site (patient location):  [facility]   Location- Patient's home  Distant Site (provider location):  Onsite  Mode of Communication:  [place]ernan      Diagnoses:   Unspecified depressive disorder and unspecified anxiety    Goals of therapy: Decrease negative and obsessional thought processes, increase self care, and identify replacement strategies for dealing with anxiety    Individuals Present:   Psychotherapy services during this visit included myself and Dariusz Leyva.     Narrative:  Dariusz reported her mood is  so-so.  Her sleep has been  touch and go  depending on the babies. She has been enjoying thing  a little bit.  She denied appetite disturbance and suicidal ideation. She has a  medium  anxiety level.      She reported that she would like to do a shorter session today. She shared about past relationships and how she conceptualizes them now.      Treatment:   Clinician used reflective listening, validation, positive reinforcement, and psychoeducation within a framework of CBT.    Patient reaction: Dariusz was receptive to support and interventions provided today    Patient Progress: Dariusz was cooperative, attentive and engaged throughout the session. Progress toward goal completion seems good.     Additional Information:   Patient did not report medication changes.    Mental Status:  Appearance:  Casually dressed   Behavior/relationship to examiner/demeanor:  cooperative  Motor activity/EPS:  Within normal limits  Speech rate:  Within  normal limits  Speech volume:  Within normal limits  Speech articulation:  Within normal limits  Speech coherence: Within normal limits  Speech spontaneity: Within normal limits  Mood (subjective):  so-so   Affect (objective appearance): Euthymic  Thought Process (Associations):  Logical and Linear   Thought process (Rate):  Within normal limits   Thought content: Within normal limits  Abnormal Perception:  None   Insight:  Good   Judgment:  Good     Plan: Continue with goals as outlined above    Andreea Vásquez Psy.D.,L.P

## 2024-09-04 ENCOUNTER — VIRTUAL VISIT (OUTPATIENT)
Dept: PSYCHIATRY | Facility: CLINIC | Age: 34
End: 2024-09-04
Attending: PSYCHOLOGIST
Payer: COMMERCIAL

## 2024-09-04 DIAGNOSIS — F41.1 GAD (GENERALIZED ANXIETY DISORDER): Primary | ICD-10-CM

## 2024-09-04 DIAGNOSIS — F33.1 MODERATE EPISODE OF RECURRENT MAJOR DEPRESSIVE DISORDER (H): ICD-10-CM

## 2024-09-04 PROCEDURE — 90837 PSYTX W PT 60 MINUTES: CPT | Mod: 95 | Performed by: PSYCHOLOGIST

## 2024-09-04 NOTE — PROGRESS NOTES
OUTPATIENT PSYCHOTHERAPY PROGRESS NOTE    Client Name: Dariusz Leyva   YOB: 1990  Time of Service:  64 minutes   Service Type(s): Individual psychotherapy    VIDEO VISIT  Dariusz Leyva is a 33 year old who is being evaluated via a billable video visit.      Telehealth Details  Type of service:  psychotherapy  Time of service:  Start Time:  4:00 pm  End Time: 5:04 pm    Reason for Telehealth Visit: Patient has requested telehealth visit  Originating Site (patient location):  [facility]   Location- Patient's home  Distant Site (provider location):  Onsite  Mode of Communication:  [place]ernan      Diagnoses:   Unspecified depressive disorder and unspecified anxiety    Goals of therapy: Decrease negative and obsessional thought processes, increase self care, and identify replacement strategies for dealing with anxiety    Individuals Present:   Psychotherapy services during this visit included myself and Dariusz Leyva.     Narrative:  Dariusz reported her mood is  low and steady.  Her sleep is still disrupted by her babies, but better than it was a few months ago. She endorsed fatigue - but she is not sure if it is mood related. She also endorsed anhedonia. She denied appetite disturbance and suicidal ideation. She rated her stress level at a 90 out of 100 (100 being most stressed).      She reported  I feel like a teenager with kids.  Indicated that she is feeling overwhelmed with raising her kids. She also reported struggle getting to a point of being healthier. She said it should benefit her, but she feels miserable thinking about the entire process of getting healthier. She does not like how she looks and she is dealing with chronic pain. She reported she has to wait until Feb to see a nutritionist. She also does not help anymore - her cousin needed to stop coming as often and she does not have a PCA.        Treatment:   Clinician used reflective listening, validation, positive reinforcement, and  psychoeducation within a framework of CBT.    Patient reaction: Dariusz was receptive to support and interventions provided today    Patient Progress: Dariusz was cooperative, attentive and engaged throughout the session. Progress toward goal completion seems good.     Additional Information:   Patient did not report medication changes.    Mental Status:  Appearance:  Casually dressed   Behavior/relationship to examiner/demeanor:  cooperative  Motor activity/EPS:  Within normal limits  Speech rate:  Within normal limits  Speech volume:  Within normal limits  Speech articulation:  Within normal limits  Speech coherence: Within normal limits  Speech spontaneity: Within normal limits  Mood (subjective):  low and steady   Affect (objective appearance): Subdued  Thought Process (Associations):  Logical and Linear   Thought process (Rate):  Within normal limits   Thought content: Within normal limits  Abnormal Perception:  None   Insight:  Good   Judgment:  Good     Plan: Continue with goals as outlined above    Andreea Vásquez Psy.D.,L.P

## 2024-09-11 ENCOUNTER — VIRTUAL VISIT (OUTPATIENT)
Dept: PSYCHIATRY | Facility: CLINIC | Age: 34
End: 2024-09-11
Attending: PSYCHOLOGIST
Payer: COMMERCIAL

## 2024-09-11 DIAGNOSIS — F41.1 GAD (GENERALIZED ANXIETY DISORDER): Primary | ICD-10-CM

## 2024-09-11 DIAGNOSIS — F33.1 MODERATE EPISODE OF RECURRENT MAJOR DEPRESSIVE DISORDER (H): ICD-10-CM

## 2024-09-11 PROCEDURE — 90837 PSYTX W PT 60 MINUTES: CPT | Mod: 95 | Performed by: PSYCHOLOGIST

## 2024-09-11 NOTE — PROGRESS NOTES
OUTPATIENT PSYCHOTHERAPY PROGRESS NOTE    Client Name: Dariusz Leyva   YOB: 1990  Time of Service:  60 minutes   Service Type(s): Individual psychotherapy    VIDEO VISIT  Dariusz Leyva is a 34 year old who is being evaluated via a billable video visit.      Telehealth Details  Type of service:  psychotherapy  Time of service:  Start Time:  4:02 pm  End Time: 5:02 pm    Reason for Telehealth Visit: Patient has requested telehealth visit  Originating Site (patient location):  [facility]   Location- Patient's home  Distant Site (provider location):  Onsite  Mode of Communication:  [place]an      Diagnoses:   Unspecified depressive disorder and unspecified anxiety    Goals of therapy: Decrease negative and obsessional thought processes, increase self care, and identify replacement strategies for dealing with anxiety    Individuals Present:   Psychotherapy services during this visit included myself and Yadirafadumo Gordon.     Narrative:  Dariusz reported her mood is  okay.  She reported her sleep remains the same. She indicated she is enjoying things  a little bit.  She reported going to the [place] festival - she described it as okay. She denied concerns with appetite and suicidal ideation. She rated her stress as a 90 (100 being most stressed). She indicated that she does not currently have a PCA so she does not have help and her financial situation is worse than it was more recently. She reported  I just want a break.  However, even when she gets away for a day she does not feel like anything gets accomplished in terms of feeling better.       Treatment:   Clinician used reflective listening, validation, positive reinforcement, and psychoeducation within a framework of CBT. Discussed her evolving relationship with self and how her perspective is changing.    Patient reaction: Dariusz was receptive to support and interventions provided today    Patient Progress: Dariusz was cooperative, attentive  and engaged throughout the session. Progress toward goal completion seems good.     Additional Information:   Patient did not report medication changes.    Mental Status:  Appearance:  Casually dressed   Behavior/relationship to examiner/demeanor:  cooperative  Motor activity/EPS:  Within normal limits  Speech rate:  Within normal limits  Speech volume:  Within normal limits  Speech articulation:  Within normal limits  Speech coherence: Within normal limits  Speech spontaneity: Within normal limits  Mood (subjective):  okay   Affect (objective appearance): Mood congruent  Thought Process (Associations):  Logical and Linear   Thought process (Rate):  Within normal limits   Thought content: Within normal limits  Abnormal Perception:  None   Insight:  Good   Judgment:  Good     Plan: Continue with goals as outlined above    Andreea Vásquez Psy.D.,L.P

## 2024-09-18 ENCOUNTER — VIRTUAL VISIT (OUTPATIENT)
Dept: PSYCHIATRY | Facility: CLINIC | Age: 34
End: 2024-09-18
Attending: PSYCHOLOGIST
Payer: COMMERCIAL

## 2024-09-18 DIAGNOSIS — F41.1 GAD (GENERALIZED ANXIETY DISORDER): Primary | ICD-10-CM

## 2024-09-18 DIAGNOSIS — F33.1 MODERATE EPISODE OF RECURRENT MAJOR DEPRESSIVE DISORDER (H): ICD-10-CM

## 2024-09-18 PROCEDURE — 90837 PSYTX W PT 60 MINUTES: CPT | Mod: 95 | Performed by: PSYCHOLOGIST

## 2024-09-18 NOTE — PROGRESS NOTES
OUTPATIENT PSYCHOTHERAPY PROGRESS NOTE    Client Name: Dariusz Leyva   YOB: 1990  Time of Service:  55 minutes   Service Type(s): Individual psychotherapy    VIDEO VISIT  Dariusz Leyva is a 34 year old who is being evaluated via a billable video visit.      Telehealth Details  Type of service:  psychotherapy  Time of service:  Start Time:  4:03 pm  End Time: 4:58 pm    Reason for Telehealth Visit: Patient has requested telehealth visit  Originating Site (patient location):  [facility]   Location- Patient's home  Distant Site (provider location):  Onsite  Mode of Communication:  [place]ernan      Diagnoses:   Unspecified depressive disorder and unspecified anxiety    Goals of therapy: Decrease negative and obsessional thought processes, increase self care, and identify replacement strategies for dealing with anxiety    Individuals Present:   Psychotherapy services during this visit included myself and Dariusz Leyva.     Narrative:  Dariusz reported her mood is  okay  She has been going to bed a little later and her twins still wake her. She has occasional fatigue. She denied appetite disturbance, suicidal ideation. and anhedonia. She reported her stress is a 74 out of 100 (100 being most stressed).      She is still looking for a PCA and she asked her ex. He will likely do it for part of the hours.      She has a paternity test pending.       She was asked out on a date.     Discussed her thoughts about relationships.    Treatment:   Clinician used reflective listening, validation, positive reinforcement, and psychoeducation within a framework of CBT. Discussed values within the contexts of relationships.    Patient reaction: Dariusz was receptive to support and interventions provided today    Patient Progress: Dariusz was cooperative, attentive and engaged throughout the session. Progress toward goal completion seems good.     Additional Information:   Patient did not report medication  changes.    Mental Status:  Appearance:  Casually dressed   Behavior/relationship to examiner/demeanor:  cooperative  Motor activity/EPS:  Within normal limits  Speech rate:  Within normal limits  Speech volume:  Within normal limits  Speech articulation:  Within normal limits  Speech coherence: Within normal limits  Speech spontaneity: Within normal limits  Mood (subjective):  okay   Affect (objective appearance): Mood congruent  Thought Process (Associations):  Logical and Linear   Thought process (Rate):  Within normal limits   Thought content: Within normal limits  Abnormal Perception:  None   Insight:  Good   Judgment:  Good     Plan: Continue with goals as outlined above    Andreea Vásquez Psy.D.,L.P

## 2024-10-09 ENCOUNTER — VIRTUAL VISIT (OUTPATIENT)
Dept: PSYCHIATRY | Facility: CLINIC | Age: 34
End: 2024-10-09
Attending: PSYCHOLOGIST
Payer: COMMERCIAL

## 2024-10-09 DIAGNOSIS — F33.1 MODERATE EPISODE OF RECURRENT MAJOR DEPRESSIVE DISORDER (H): ICD-10-CM

## 2024-10-09 DIAGNOSIS — F41.1 GAD (GENERALIZED ANXIETY DISORDER): Primary | ICD-10-CM

## 2024-10-09 PROCEDURE — 90837 PSYTX W PT 60 MINUTES: CPT | Mod: 95 | Performed by: PSYCHOLOGIST

## 2024-10-09 NOTE — PROGRESS NOTES
OUTPATIENT PSYCHOTHERAPY PROGRESS NOTE    Client Name: Dariusz Leyva   YOB: 1990  Time of Service:  56 minutes   Service Type(s): Individual psychotherapy    VIDEO VISIT  Dariusz Leyva is a 34 year old who is being evaluated via a billable video visit.      Telehealth Details  Type of service:  psychotherapy  Time of service:  Start Time:  4:06 pm  End Time: 5:02 pm    Reason for Telehealth Visit: Patient has requested telehealth visit  Originating Site (patient location):  [facility]   Location- Patient's home  Distant Site (provider location):  Onsite  Mode of Communication:  [place]ernan      Diagnoses:   Unspecified depressive disorder and unspecified anxiety    Goals of therapy: Decrease negative and obsessional thought processes, increase self care, and identify replacement strategies for dealing with anxiety    Individuals Present:   Psychotherapy services during this visit included myself and Dariusz Leyva.     Narrative:  Dariusz reported that she has been sick - it started last week and got worse. There was one-time last week that she did not sleep for 48 hours. She reported her mood is  I don t know, I am just miserable.  She has had inconsistent sleep because she is uncomfortable. She endorsed fatigue and anhedonia. She denied suicidal ideation.      Discussed developing information from relationships.     She has a new PCA.       Dariusz will be going out of town this weekend to celebrate her twins one year birthday.      Treatment:   Clinician used reflective listening, validation, positive reinforcement, and psychoeducation within a framework of CBT. Processed her feelings about these interpersonal relationships. She is feeling satisfied with being single right now.     Patient reaction: Dariusz was receptive to support and interventions provided today    Patient Progress: Dariusz was cooperative, attentive and engaged throughout the session. Progress toward goal completion seems  good.     Additional Information:   Patient did not report medication changes.    Mental Status:  Appearance:  Casually dressed   Behavior/relationship to examiner/demeanor:  cooperative  Motor activity/EPS:  Within normal limits  Speech rate:  Within normal limits  Speech volume:  Within normal limits  Speech articulation:  Within normal limits  Speech coherence: Within normal limits  Speech spontaneity: Within normal limits  Mood (subjective):  I don t know, I am just miserable   Affect (objective appearance): Subdued in the context of illness  Thought Process (Associations):  Logical and Linear   Thought process (Rate):  Within normal limits   Thought content: Within normal limits  Abnormal Perception:  None   Insight:  Good   Judgment:  Good     Plan: Continue with goals as outlined above    Andreea Vásquez Psy.D.,L.P

## 2024-10-16 ENCOUNTER — VIRTUAL VISIT (OUTPATIENT)
Dept: PSYCHIATRY | Facility: CLINIC | Age: 34
End: 2024-10-16
Attending: PSYCHOLOGIST
Payer: COMMERCIAL

## 2024-10-16 DIAGNOSIS — F41.1 GAD (GENERALIZED ANXIETY DISORDER): Primary | ICD-10-CM

## 2024-10-16 DIAGNOSIS — F33.1 MODERATE EPISODE OF RECURRENT MAJOR DEPRESSIVE DISORDER (H): ICD-10-CM

## 2024-10-16 PROCEDURE — 90837 PSYTX W PT 60 MINUTES: CPT | Mod: 95 | Performed by: PSYCHOLOGIST

## 2024-10-16 NOTE — PROGRESS NOTES
OUTPATIENT PSYCHOTHERAPY PROGRESS NOTE    Client Name: Dariusz Leyva   YOB: 1990  Time of Service:  60 minutes   Service Type(s): Individual psychotherapy    VIDEO VISIT  Dariusz Leyva is a 34 year old who is being evaluated via a billable video visit.      Telehealth Details  Type of service:  psychotherapy  Time of service:  Start Time:  4:13 pm  End Time: 5:13 pm    Reason for Telehealth Visit: Patient has requested telehealth visit  Originating Site (patient location):  [facility]   Location- Patient's home  Distant Site (provider location):  Onsite  Mode of Communication:  [place]      Diagnoses:   Unspecified depressive disorder and unspecified anxiety    Goals of therapy: Decrease negative and obsessional thought processes, increase self care, and identify replacement strategies for dealing with anxiety    Individuals Present:   Psychotherapy services during this visit included myself and Dariusz Leyva.     Narrative:  Dariusz reported she had an  unexpected root canal  today. She indicated that her twins birthday on Saturday was  an interesting adventure.  She was still not feeling well and she had to manage all the activities at the away event. Discussed some of the interpersonal stressors that occurred.      Treatment:   Clinician used reflective listening, validation, positive reinforcement, and psychoeducation within a framework of CBT. Patient is setting clear boundaries in relationships.    Patient reaction: Dariusz was receptive to support and interventions provided today    Patient Progress: Dariusz was cooperative, attentive and engaged throughout the session. Progress toward goal completion seems good.     Additional Information:   Patient did not report medication changes.    Mental Status:  Appearance:  Casually dressed   Behavior/relationship to examiner/demeanor:  cooperative  Motor activity/EPS:  Within normal limits  Speech rate:  Within normal limits  Speech volume:   Within normal limits  Speech articulation:  Within normal limits  Speech coherence: Within normal limits  Speech spontaneity: Within normal limits  Mood (subjective):  unexpected root canal today   Affect (objective appearance): Euthymic  Thought Process (Associations):  Logical and Linear   Thought process (Rate):  Within normal limits   Thought content: Within normal limits  Abnormal Perception:  None   Insight:  Good   Judgment:  Good     Plan: Continue with goals as outlined above    Andreea Vásquez Psy.D.,L.P

## 2024-10-30 ENCOUNTER — VIRTUAL VISIT (OUTPATIENT)
Dept: PSYCHIATRY | Facility: CLINIC | Age: 34
End: 2024-10-30
Attending: PSYCHOLOGIST
Payer: COMMERCIAL

## 2024-10-30 DIAGNOSIS — F41.1 GAD (GENERALIZED ANXIETY DISORDER): Primary | ICD-10-CM

## 2024-10-30 DIAGNOSIS — F33.1 MODERATE EPISODE OF RECURRENT MAJOR DEPRESSIVE DISORDER (H): ICD-10-CM

## 2024-10-30 PROCEDURE — 90837 PSYTX W PT 60 MINUTES: CPT | Mod: 95 | Performed by: PSYCHOLOGIST

## 2024-10-30 NOTE — PROGRESS NOTES
OUTPATIENT PSYCHOTHERAPY PROGRESS NOTE    Client Name: Dariusz Leyva   YOB: 1990  Time of Service:  65 minutes   Service Type(s): Individual psychotherapy    VIDEO VISIT  Dariusz Leyva is a 34 year old who is being evaluated via a billable video visit.      Telehealth Details  Type of service:  psychotherapy  Time of service:  Start Time:  4:00 pm  End Time: 5:05 pm    Reason for Telehealth Visit: Patient has requested telehealth visit  Originating Site (patient location):  [facility]   Location- Patient's home  Distant Site (provider location):  Onsite  Mode of Communication:  Hernan      Diagnoses:   Unspecified depressive disorder and unspecified anxiety    Goals of therapy: Decrease negative and obsessional thought processes, increase self care, and identify replacement strategies for dealing with anxiety    Individuals Present:   Psychotherapy services during this visit included myself and Dariusz Leyva.     Narrative:  Dariusz reported  down a bit.  She reported it has been chaotic. She reported she has only had 3-4 hours of sleep a night the last few weeks. She endorsed fatigue, anhedonia, and suicidal ideation. She denied intent to act on the thoughts and a plan. She reported she is having the ideation about 3 times or less a week - it sticks around for a while when her anxiety is high. She denied appetite disturbance. She rated her anxiety at 97 out of 100 (100 being the highest). She attributed the increased anxiety to not having control and the  chaos.  She indicated that the chaos comes from illness in her family, missing work, and how it affects her financially - as well as her twins father s mental health. She has been in contact with their father and he has been struggling with his mental health. He is not offering any support - financially, being present or emotionally. Discussed other relationship concerns.      Treatment:   Clinician used reflective listening, validation,  positive reinforcement, and psychoeducation within a framework of CBT. Patient continues to consider what relationships she may want to maintain and which she may want to let go.     Patient reaction: Dariusz was receptive to support and interventions provided today    Patient Progress: Dariusz was cooperative, attentive and engaged throughout the session. Progress toward goal completion seems good.     Additional Information:   Patient did not report medication changes.    Mental Status:  Appearance:  Casually dressed   Behavior/relationship to examiner/demeanor:  cooperative  Motor activity/EPS:  Within normal limits  Speech rate:  Within normal limits  Speech volume:  Within normal limits  Speech articulation:  Within normal limits  Speech coherence: Within normal limits  Speech spontaneity: Within normal limits  Mood (subjective):  down   Affect (objective appearance): Subdued at the onset  Thought Process (Associations):  Logical and Linear   Thought process (Rate):  Within normal limits   Thought content: Within normal limits  Abnormal Perception:  None   Insight:  Good   Judgment:  Good     Plan: Continue with goals as outlined above    Andreea Vásquez Psy.D.,L.P

## 2024-11-06 ENCOUNTER — VIRTUAL VISIT (OUTPATIENT)
Dept: PSYCHIATRY | Facility: CLINIC | Age: 34
End: 2024-11-06
Attending: PSYCHOLOGIST
Payer: COMMERCIAL

## 2024-11-06 DIAGNOSIS — F41.1 GAD (GENERALIZED ANXIETY DISORDER): Primary | ICD-10-CM

## 2024-11-06 DIAGNOSIS — F33.1 MODERATE EPISODE OF RECURRENT MAJOR DEPRESSIVE DISORDER (H): ICD-10-CM

## 2024-11-06 PROCEDURE — 90834 PSYTX W PT 45 MINUTES: CPT | Mod: 95 | Performed by: PSYCHOLOGIST

## 2024-11-06 NOTE — PROGRESS NOTES
OUTPATIENT PSYCHOTHERAPY PROGRESS NOTE    Client Name: Dariusz Leyva   YOB: 1990  Time of Service: 38 minutes   Service Type(s): Individual psychotherapy    VIDEO VISIT  Dariusz Leyva is a 34 year old who is being evaluated via a billable video visit.      Telehealth Details  Type of service:  psychotherapy  Time of service:  Start Time:  4:03 pm  End Time: 4:41 pm    Reason for Telehealth Visit: Patient has requested telehealth visit  Originating Site (patient location):  [facility]   Location- Patient's home  Distant Site (provider location):  Onsite  Mode of Communication:  Hernan      Diagnoses:   Unspecified depressive disorder and unspecified anxiety    Goals of therapy: Decrease negative and obsessional thought processes, increase self care, and identify replacement strategies for dealing with anxiety    Individuals Present:   Psychotherapy services during this visit included myself and Dariusz Leyva.     Narrative:  Dariusz reported she is feeling  okay.  She reported getting about 3 hours of sleep a night - her twins disrupt her sleep. She endorsed fatigue - she relates this to the lack of sleep. She denied appetite disturbance. She endorsed anhedonia - she relates this to being tired. She denied thought of SI. She indicated her stress was at an 80 (100 being most stressed). She reported her stress relates to frustration with not getting enough sleep. She indicated that the stress is related to not getting to do much of anything, except the same cycle over and over again.  She reported having a tension headache. She has had this for a few days.      She is trying to encourage her children s father to meet them. He indicated he does want to meet them, but he is being vague and noncommittal. She is feeling  disappointed.       She had someone she is seeing ask if she loves him and it is early in the relationship. She reported her first response was concern.     Treatment:   Clinician  used reflective listening, validation, positive reinforcement, and psychoeducation within a framework of CBT. Discussed patterns in relationships that impact her response.       Patient reaction: Dariusz was receptive to support and interventions provided today    Patient Progress: Dariusz was cooperative, attentive and engaged throughout the session. Progress toward goal completion seems good.     Additional Information:   Patient did not report medication changes.    Mental Status:  Appearance:  Casually dressed   Behavior/relationship to examiner/demeanor:  cooperative  Motor activity/EPS:  Within normal limits  Speech rate:  Within normal limits  Speech volume:  Within normal limits  Speech articulation:  Within normal limits  Speech coherence: Within normal limits  Speech spontaneity: Within normal limits  Mood (subjective):  okay   Affect (objective appearance): Mood congruent  Thought Process (Associations):  Logical and Linear   Thought process (Rate):  Within normal limits   Thought content: Within normal limits  Abnormal Perception:  None   Insight:  Good   Judgment:  Good     Plan: Continue with goals as outlined above    Andreea Vásquez Psy.D.,L.P

## 2024-11-13 ENCOUNTER — VIRTUAL VISIT (OUTPATIENT)
Dept: PSYCHIATRY | Facility: CLINIC | Age: 34
End: 2024-11-13
Attending: PSYCHOLOGIST
Payer: COMMERCIAL

## 2024-11-13 DIAGNOSIS — F41.1 GAD (GENERALIZED ANXIETY DISORDER): Primary | ICD-10-CM

## 2024-11-13 DIAGNOSIS — F33.1 MODERATE EPISODE OF RECURRENT MAJOR DEPRESSIVE DISORDER (H): ICD-10-CM

## 2024-11-20 ENCOUNTER — VIRTUAL VISIT (OUTPATIENT)
Dept: PSYCHIATRY | Facility: CLINIC | Age: 34
End: 2024-11-20
Attending: PSYCHOLOGIST
Payer: COMMERCIAL

## 2024-11-20 ENCOUNTER — DOCUMENTATION ONLY (OUTPATIENT)
Dept: FAMILY MEDICINE | Facility: CLINIC | Age: 34
End: 2024-11-20
Payer: COMMERCIAL

## 2024-11-20 DIAGNOSIS — F33.1 MODERATE EPISODE OF RECURRENT MAJOR DEPRESSIVE DISORDER (H): ICD-10-CM

## 2024-11-20 DIAGNOSIS — F41.1 GAD (GENERALIZED ANXIETY DISORDER): Primary | ICD-10-CM

## 2024-11-20 NOTE — PROGRESS NOTES
Patient Quality Outreach    Patient is due for the following:   Hypertension -  Hypertension follow-up visit  Depression  -  PHQ-9 needed  Physical Preventive Adult Physical    Action(s) Taken:   Patient was scheduled for BP follow-up    Type of outreach:    Phone, spoke to patient/parent.      Questions for provider review:    None           Mirian Arriola MA  Chart routed to Care Team.

## 2024-11-20 NOTE — PROGRESS NOTES
OUTPATIENT PSYCHOTHERAPY PROGRESS NOTE    Client Name: Dariusz Leyva   YOB: 1990  Time of Service: 35 minutes   Service Type(s): Individual psychotherapy    VIDEO VISIT  Dariusz Leyva is a 34 year old who is being evaluated via a billable video visit.      Telehealth Details  Type of service:  psychotherapy  Time of service:  Start Time:  4:15 pm  End Time: 4:50 pm    Reason for Telehealth Visit: Patient has requested telehealth visit  Originating Site (patient location):  [facility]   Location- Patient's home  Distant Site (provider location):  Onsite  Mode of Communication:  [place]an      Diagnoses:   Unspecified depressive disorder and unspecified anxiety    Goals of therapy: Decrease negative and obsessional thought processes, increase self care, and identify replacement strategies for dealing with anxiety    Individuals Present:   Psychotherapy services during this visit included myself and Dariusz Leyva.     Narrative:  Dariusz reported her mood is  okay.  She reported she got fired on Monday - she said it was based on time discrepancies. She indicated that things like ER visit count against. She needs a termination letter to provider proof to . She has applied for unemployment, she plans to find a work program, and apply to jobs.      She shared she would like to  tap out  for a while. She explained that she would see this as being in a sensory tank (getting a break from interacting). She recognizes she can t do this.      Treatment:   Clinician used reflective listening, validation, positive reinforcement, and psychoeducation within a framework of CBT.      Patient reaction: Dariusz was receptive to support and interventions provided today    Patient Progress: Dariusz was cooperative, attentive and engaged throughout the session. Progress toward goal completion seems good.     Additional Information:   Patient did not report medication changes.    Mental Status:  Appearance:   Casually dressed   Behavior/relationship to examiner/demeanor:  cooperative  Motor activity/EPS:  Within normal limits  Speech rate:  Within normal limits  Speech volume:  Within normal limits  Speech articulation:  Within normal limits  Speech coherence: Within normal limits  Speech spontaneity: Within normal limits  Mood (subjective):  okay   Affect (objective appearance): Subdued  Thought Process (Associations):  Logical and Linear   Thought process (Rate):  Within normal limits   Thought content: Within normal limits  Abnormal Perception:  None   Insight:  Good   Judgment:  Good     Plan: Continue with goals as outlined above    Andreea Vásquez Psy.D.,L.P

## 2024-11-27 ENCOUNTER — ANCILLARY PROCEDURE (OUTPATIENT)
Dept: GENERAL RADIOLOGY | Facility: CLINIC | Age: 34
End: 2024-11-27
Attending: PODIATRIST
Payer: COMMERCIAL

## 2024-11-27 ENCOUNTER — OFFICE VISIT (OUTPATIENT)
Dept: PODIATRY | Facility: CLINIC | Age: 34
End: 2024-11-27
Payer: COMMERCIAL

## 2024-11-27 DIAGNOSIS — M21.41 BILATERAL PES PLANUS: ICD-10-CM

## 2024-11-27 DIAGNOSIS — M79.671 BILATERAL FOOT PAIN: ICD-10-CM

## 2024-11-27 DIAGNOSIS — E66.01 MORBID OBESITY WITH BMI OF 45.0-49.9, ADULT (H): ICD-10-CM

## 2024-11-27 DIAGNOSIS — M76.821 POSTERIOR TIBIAL TENDON DYSFUNCTION, RIGHT: ICD-10-CM

## 2024-11-27 DIAGNOSIS — M72.2 PLANTAR FASCIITIS, LEFT: ICD-10-CM

## 2024-11-27 DIAGNOSIS — M21.42 BILATERAL PES PLANUS: ICD-10-CM

## 2024-11-27 DIAGNOSIS — M79.672 BILATERAL FOOT PAIN: ICD-10-CM

## 2024-11-27 DIAGNOSIS — M79.672 LEFT FOOT PAIN: Primary | ICD-10-CM

## 2024-11-27 DIAGNOSIS — M79.5 RESIDUAL FOREIGN BODY IN SOFT TISSUE: ICD-10-CM

## 2024-11-27 PROCEDURE — 99214 OFFICE O/P EST MOD 30 MIN: CPT | Performed by: PODIATRIST

## 2024-11-27 PROCEDURE — 73630 X-RAY EXAM OF FOOT: CPT | Mod: TC | Performed by: RADIOLOGY

## 2024-11-27 NOTE — PROGRESS NOTES
ASSESSMENT:  Encounter Diagnoses   Name Primary?    Left foot pain Yes    Residual foreign body in soft tissue     Bilateral foot pain     Posterior tibial tendon dysfunction, right     Plantar fasciitis, left     Bilateral pes planus     Morbid obesity with BMI of 45.0-49.9, adult (H)        MEDICAL DECISION MAKING:  I explained that the palpable hyperkeratotic lesion on the plantar left foot might be secondary to a retained foreign body, versus scar tissue from injury, versus a hyperkeratotic lesion related to the injury.    I personally reviewed the left foot x-ray images.  No no radiopaque foreign body visualized.  I explained that often glass is not visualized.    Therefore, ultrasound of the left foot ordered.    I explained that retained foreign bodies rarely cause infection.  I explained that sometimes pain fully resolves and the foreign body is left alone.  An attempt to surgically remove it is an option, depending on ultrasound findings.    Her pain continues to be consistent with a left plantar fascial pain and right posterior tibial tendon pain.  We discussed how this is related to her flatfoot structure and her body weight.  She acknowledges her body weight and says it is a double edge sword.  Is difficult for her to be active for weight loss, given her foot pain and right knee pain.  She says she needs another knee surgery.  She was successfully losing weight until she became pregnant with her twins.  [facility] states she has made a follow-up appointment with the weight management clinic.    Referral to physical therapy  As needed use of the Tri-Lock ankle brace  Continue custom orthoses  Stiffer soled shoes recommended -her current pair are fairly flexible.  I explained how the stiffer sole can help offload the plantar fascial tissue.    I will reach out with the ultrasound results.    Total time spent in this patient's care was greater than 30 minutes.  This time included history taking, clinical  exam, personal review of the left foot x-ray images, discussion regarding care plan options, ordering physical therapy and a left lower extremity ultrasound, and documenting today's encounter.    Disclaimer: This note consists of symbols derived from keyboarding, dictation and/or voice recognition software. As a result, there may be errors in the script that have gone undetected. Please consider this when interpreting information found in this chart.    Orville Brantley, JOHNSON, FACFAS, MS    [facility] & Ankle Surgery      ____________________________________________________________________    HPI:       Dariusz Leyva presents today with increased pain and possible glass in both feet.  She reports stepping on some broken glass 2 weeks ago.  This was from a clear mug.  She attempted to remove the glass yet there continues to be pain and a lump at the location of injury.    She also reports worsening bilateral foot pain.  She specifies the medial right midfoot and the plantar medial left heel.    I last evaluated her 5/15/2024.  Pain was thought to be from right posterior tibial tendon dysfunction, bilateral plantar fasciitis and bilateral pes planus.  Conservative recommendations were reviewed.  She was prescribed a Tri-Lock brace which does help reduce pain.  She also is using custom orthoses.    Despite these modalities, she reports worsening pain.    *  Past Medical History:   Diagnosis Date    DDD (degenerative disc disease), cervical 02/03/2022    Depressive disorder     Dichorionic diamniotic twin pregnancy 06/09/2023    Dyslipidemia     Hypertension goal BP (blood pressure) < 140/90 06/04/2024    Increased PTH level     Vitamin D deficiency    *  *  Past Surgical History:   Procedure Laterality Date    ARTHROSCOPIC  RECONSTRUCTION/REPAIR LATERAL LIGAMENT KNEE Right 7/29/2020    Procedure: Posterior Lateral Corner Reconstruction with Allograft;  Surgeon: Cyril Lebron MD;   Location: [facility] OR    ARTHROSCOPY KNEE Right 7/29/2020    Procedure: Right Knee Exam Under Anesthesia, Arthroscopy knee and Debridement, Peroneal Nerve Neurolysis;  Surgeon: Cyril Lebron MD;  Location: [facility] OR    CYST REMOVAL      back    OTHER SURGICAL HISTORY      tonselectomy   *  *  Current Outpatient Medications   Medication Sig Dispense Refill    acetaminophen (TYLENOL) 325 MG tablet TAKE 1-2 TABLETS(325-650MG) BY MOUTH EVERY 6 HOURS AS NEEDED FOR MILD PAIN Strength: 325 mg 100 tablet 3    cetirizine (ZYRTEC) 10 MG tablet Take 1 tablet (10 mg) by mouth daily 90 tablet 3    citalopram (CELEXA) 20 MG tablet Take 1.5 tablets (30 mg) by mouth daily 135 tablet 4    clotrimazole (LOTRIMIN) 1 % external cream Apply topically 2 times daily as needed 60 g 1    cyclobenzaprine (FLEXERIL) 10 MG tablet Take 1 tablet (10 mg) by mouth 3 times daily as needed for muscle spasms 90 tablet 0    etonogestrel-ethinyl estradiol (NUVARING) 0.12-0.015 MG/24HR vaginal ring Place 1 each vaginally every 28 days 3 each 3    fluticasone (FLONASE) 50 MCG/ACT nasal spray Spray 2 sprays into both nostrils daily 16 g 4    fluticasone (FLONASE) 50 MCG/ACT nasal spray Spray 2 sprays into both nostrils daily as needed for rhinitis or allergies 18.2 mL 11    ibuprofen (ADVIL/MOTRIN) 600 MG tablet TAKE 1 TABLET BY MOUTH EVERY 6 HOURS AS NEEDED FOR MILD PAIN OR FEVER 30 tablet 1    ketoconazole (NIZORAL) 2 % external shampoo APPLY TOPICALLY DAILY AS NEEDED FOR ITCHING OR IRRITATION 120 mL 3    lactase (LACTAID) 3000 UNIT tablet Take 1 tablet (3,000 Units) by mouth 3 times daily (with meals) 30 tablet 0         EXAM:    Vitals: There were no vitals taken for this visit.  BMI: There is no height or weight on file to calculate BMI.    Vascular:  Pedal pulses are palpable for both the DP and PT arteries.  CFT < 3 sec.  No edema.       Neuro: Light touch sensation is intact to the L4, L5, S1 distributions  No evidence of weakness, spasticity, or  contracture in the lower extremities.      Derm: Normal texture and turgor.  No erythema, ecchymosis, or cyanosis.  No open lesions.   There is a hyperpigmented slightly elevated hard lump on the plantar lateral midfoot.  This appears to be a nucleated hyperkeratotic lesion.  This is the site of her injury from stepping on glass.     Musculoskeletal:    Lower extremity muscle strength is normal.  Significant decrease in medial longitudinal arch with weightbearing.  Pain on palpation to the left plantar medial heel and into the arch.  No pain with squeezing the body of the calcaneus.  Mild pain with inversion of the right foot against resistance and with palpation over the distal aspect of the posterior tibial tendon.       X-Ray Findings:  I personally reviewed the left foot images.  See comments above         ADMIT

## 2024-11-27 NOTE — LETTER
11/27/2024      Dariusz Leyva  [address]  [place] [address]      Dear Colleague,    Thank you for referring your patient, Dariusz Leyva, to the [facility]. Please see a copy of my visit note below.    ASSESSMENT:  Encounter Diagnoses   Name Primary?     Left foot pain Yes     Residual foreign body in soft tissue      Bilateral foot pain      Posterior tibial tendon dysfunction, right      Plantar fasciitis, left      Bilateral pes planus      Morbid obesity with BMI of 45.0-49.9, adult (H)        MEDICAL DECISION MAKING:  I explained that the palpable hyperkeratotic lesion on the plantar left foot might be secondary to a retained foreign body, versus scar tissue from injury, versus a hyperkeratotic lesion related to the injury.    I personally reviewed the left foot x-ray images.  No no radiopaque foreign body visualized.  I explained that often glass is not visualized.    Therefore, ultrasound of the left foot ordered.    I explained that retained foreign bodies rarely cause infection.  I explained that sometimes pain fully resolves and the foreign body is left alone.  An attempt to surgically remove it is an option, depending on ultrasound findings.    Her pain continues to be consistent with a left plantar fascial pain and right posterior tibial tendon pain.  We discussed how this is related to her flatfoot structure and her body weight.  She acknowledges her body weight and says it is a double edge sword.  Is difficult for her to be active for weight loss, given her foot pain and right knee pain.  She says she needs another knee surgery.  She was successfully losing weight until she became pregnant with her twins.  Dariusz states she has made a follow-up appointment with the weight management clinic.    Referral to physical therapy  As needed use of the Tri-Lock ankle brace  Continue custom orthoses  Stiffer soled shoes recommended -her current pair are  fairly flexible.  I explained how the stiffer sole can help offload the plantar fascial tissue.    I will reach out with the ultrasound results.    Total time spent in this patient's care was greater than 30 minutes.  This time included history taking, clinical exam, personal review of the left foot x-ray images, discussion regarding care plan options, ordering physical therapy and a left lower extremity ultrasound, and documenting today's encounter.    Disclaimer: This note consists of symbols derived from keyboarding, dictation and/or voice recognition software. As a result, there may be errors in the script that have gone undetected. Please consider this when interpreting information found in this chart.    Orville Brantley DPM, FACFAS, MS    [facility] Podiatry/Foot & Ankle Surgery      ____________________________________________________________________    HPI:       Dariusz Leyva presents today with increased pain and possible glass in both feet.  She reports stepping on some broken glass 2 weeks ago.  This was from a clear mug.  She attempted to remove the glass yet there continues to be pain and a lump at the location of injury.    She also reports worsening bilateral foot pain.  She specifies the medial right midfoot and the plantar medial left heel.    I last evaluated her 5/15/2024.  Pain was thought to be from right posterior tibial tendon dysfunction, bilateral plantar fasciitis and bilateral pes planus.  Conservative recommendations were reviewed.  She was prescribed a Tri-Lock brace which does help reduce pain.  She also is using custom orthoses.    Despite these modalities, she reports worsening pain.    *  Past Medical History:   Diagnosis Date     DDD (degenerative disc disease), cervical 02/03/2022     Depressive disorder      Dichorionic diamniotic twin pregnancy 06/09/2023     Dyslipidemia      Hypertension goal BP (blood pressure) < 140/90 06/04/2024     Increased PTH level       Vitamin D deficiency    *  *  Past Surgical History:   Procedure Laterality Date     ARTHROSCOPIC  RECONSTRUCTION/REPAIR LATERAL LIGAMENT KNEE Right 7/29/2020    Procedure: Posterior Lateral Corner Reconstruction with Allograft;  Surgeon: Cyril Lebron MD;  [facility] OR     ARTHROSCOPY KNEE Right 7/29/2020    Procedure: Right Knee Exam Under Anesthesia, Arthroscopy knee and Debridement, Peroneal Nerve Neurolysis;  Surgeon: Cyril Lebron MD;  [facility]: [facility] OR     CYST REMOVAL      back     OTHER SURGICAL HISTORY      tonselectomy   *  *  Current Outpatient Medications   Medication Sig Dispense Refill     acetaminophen (TYLENOL) 325 MG tablet TAKE 1-2 TABLETS(325-650MG) BY MOUTH EVERY 6 HOURS AS NEEDED FOR MILD PAIN Strength: 325 mg 100 tablet 3     cetirizine (ZYRTEC) 10 MG tablet Take 1 tablet (10 mg) by mouth daily 90 tablet 3     citalopram (CELEXA) 20 MG tablet Take 1.5 tablets (30 mg) by mouth daily 135 tablet 4     clotrimazole (LOTRIMIN) 1 % external cream Apply topically 2 times daily as needed 60 g 1     cyclobenzaprine (FLEXERIL) 10 MG tablet Take 1 tablet (10 mg) by mouth 3 times daily as needed for muscle spasms 90 tablet 0     etonogestrel-ethinyl estradiol (NUVARING) 0.12-0.015 MG/24HR vaginal ring Place 1 each vaginally every 28 days 3 each 3     fluticasone (FLONASE) 50 MCG/ACT nasal spray Spray 2 sprays into both nostrils daily 16 g 4     fluticasone (FLONASE) 50 MCG/ACT nasal spray Spray 2 sprays into both nostrils daily as needed for rhinitis or allergies 18.2 mL 11     ibuprofen (ADVIL/MOTRIN) 600 MG tablet TAKE 1 TABLET BY MOUTH EVERY 6 HOURS AS NEEDED FOR MILD PAIN OR FEVER 30 tablet 1     ketoconazole (NIZORAL) 2 % external shampoo APPLY TOPICALLY DAILY AS NEEDED FOR ITCHING OR IRRITATION 120 mL 3     lactase (LACTAID) 3000 UNIT tablet Take 1 tablet (3,000 Units) by mouth 3 times daily (with meals) 30 tablet 0         EXAM:    Vitals: There were no vitals taken for this  visit.  BMI: There is no height or weight on file to calculate BMI.    Vascular:  Pedal pulses are palpable for both the DP and PT arteries.  CFT < 3 sec.  No edema.       Neuro: Light touch sensation is intact to the L4, L5, S1 distributions  No evidence of weakness, spasticity, or contracture in the lower extremities.      Derm: Normal texture and turgor.  No erythema, ecchymosis, or cyanosis.  No open lesions.   There is a hyperpigmented slightly elevated hard lump on the plantar lateral midfoot.  This appears to be a nucleated hyperkeratotic lesion.  This is the site of her injury from stepping on glass.     Musculoskeletal:    Lower extremity muscle strength is normal.  Significant decrease in medial longitudinal arch with weightbearing.  Pain on palpation to the left plantar medial heel and into the arch.  No pain with squeezing the body of the calcaneus.  Mild pain with inversion of the right foot against resistance and with palpation over the distal aspect of the posterior tibial tendon.       X-Ray Findings:  I personally reviewed the left foot images.  See comments above          Again, thank you for allowing me to participate in the care of your patient.        Sincerely,        Orville Brantley DPM

## 2024-11-27 NOTE — PATIENT INSTRUCTIONS
Thank you for choosing [facility] Podiatry / Foot & Ankle Surgery!    DR. STYLES'S CLINIC LOCATIONS:     [facility]: [phone]   [address] [place] [address] APPOINTMENTS: [phone]   [place] [address] RADIOLOGY: [phone]   (Every other Tues - Wed - Fri PM) SET UP SURGERY: [phone]    PHYSICAL THERAPY: [phone]   [facility] BILLING QUESTIONS: [phone] [address] FAX: [phone]   [place] [address]    (Thurs & Fri AM)         Dr. Styles's Heel and Arch Pain Recommendations:     1)  A rigid-soled shoe will take stress off of the plantar fascia. An athletic type shoe with a more rigid sole is probably best.    2)  Some good over the counter inserts/ arch supports might help:  Spenco, Superfeet, Birkenstock, others.  If you buy some, consider gradually getting used to them. Increase time on them over the course of a week.    3)  Custom orthoses (prescription arch supports) are known to help treat and prevent plantar fasciitis.    4)  Avoid barefoot walking, even at home.  It is a good idea to wear supportive shoes as much as possible.    5)  Stretch your calf musculature and plantar fascia frequently.  It is a good idea to do this before getting out of bed, if pain is worse in the mornings.    6)  Ice and anti-inflammatories can help if pain is related to inflammation - more likely to help when the problem first starts.  If the pain has existed for over a month, anti-inflammatory measures might be less helpful.  Sometimes he can be therapeutic.    If your pain persists, please return to clinic.  Future options include bracing, physical therapy, immobilization/walking boot, surgery or other procedures.    Plantar fasciitis is very common and given time, will usually resolve. Obviously it can take a long time, as we walk on the part that his hurting.        PLANTAR FASCIITIS  Plantar fasciitis is often referred to as heel spurs or heel pain. Plantar  fasciitis is a very common problem that affects people of all foot shapes, age, weight and activity level. Pain may be in the arch or on the weight-bearing surface of the heel. The pain may come on without injury or identifiable cause. Pain is generally present when first getting out of bed in the morning or up from a seated break.     CAUSES  The plantar fascia is a dense fibrous band of tissue that stretches across the bottom surface of the foot. The fascia helps support the foot muscles and arch. Plantar fasciitis is thought to be caused by mechanical strain or overload. Frequent walking without shoes or wearing unsupportive shoes is thought to cause structural overload and ultimately inflammation of the plantar fascia. Some people have heel spurs that can be seen on x-ray. The heel spur is actually a minor component of plantar fascitis and is largely ignored.       SELF TREATMENT   The easiest solution is to stop walking around your home without shoes. Plantar fasciitis is largely a shoe problem. Shoes are either not being worn often enough or your current shoes are inadequate for your weight, foot structure or activity level. The majority of shoes on the market today are not sufficient to resist development of plantar fasciitis or to promote healing. Assume that your current shoes are inadequate and will need to be replaced. Even high quality shoes wear out with 6 months to one year of frequent use. Weight loss is another option. Losing ten pounds in the next two months may be enough to resolve the problem. Ice applied to the area of pain two to three times per day for ten minutes each session can be very helpful. Warm foot soaks in epsom salts can also relieve pain. This should continue until the problem resolves. Achilles tendon stretching is essential. Stretch multiple times daily to promote healing and to prevent recurrence in the future. Over all stretching of the body is helpful as well such as the  calves, thighs and lower back. Normally when one area of the body is tight, other areas are too. Gentle Yoga can be good for this.     Over the counter topical anti inflammatories can be helpful such as biofreeze, bengay, salon pas, ect...  Oral ibuprofen or aleve is recommended as well to try to calm down inflammation.     Night splints can be helpful to gradually stretch the foot at night as a lot of pain is when you get up in the morning. Taking a towel or thera band and stretching the foot back multiple times before you get ou of bed can be beneficial as well.     MEDICAL TREATMENT  Medical treatments often include custom arch supports, cortisone injections, physical therapy, splints to be worn in bed, prescription medications and surgery. The home treatments listed above will be necessary regardless of these advanced medical treatments. Surgery is rarely needed but is very helpful in selected cases.     PROGNOSIS  Plantar fasciitis can last from one day to a lifetime. Some people get intermittent fascitis that is very short-lived. Others suffer daily for years. Excessive body weight, frequent bare foot walking, long hours on the feet, inadequate shoes, predisposing foot structures and excessive activity such as running are all potential issues that lead to chronic and/or recurring plantar fascitis. Having plantar fasciitis means that you are forever prone to this problem and will require modification of some of the above factors. Most people seek treatment within one to four months. Healing usually requires a similar one to four month time frame. Healing time is relative to the amount of effort spent treating the problem.   Plantar fasciitis is highly recurrent. Risk factors often continue, including return to bare foot walking, inadequate shoes, excessive body weight, excessive activities, etc. Your life style and foot structure may predispose you to recurrent plantar fasciitis. A daily prevention regimen can  be very helpful. Ongoing use of shoe inserts, careful attention to appropriate shoes, daily Achilles stretching, etc. may prevent recurrence. Prompt attention at the earliest warning signs of heel pain can resolve the problem in as short as a few days.     EXERCISES  Stair Exercise: Step on the stairs with the ball of your foot and hold your position for at least 15 seconds, then slowly step down with the heels of your foot. You can do this daily and as often as you want.   Picking the Towel: Sit comfortably and then pick the towel up with your toes. You can use any object other than a towel as long as the material can be soft and you can pick it up with your toes.  Rolling the Bottle: Use a small ball or frozen water bottle and then roll it around with your foot.   Flex the Toes: Sit comfortably and then flex your toes by pointing it towards the floor or towards your body. This will relax and flex your foot and exercise your plantar fascia, the calf, and the Achilles tendon. The inability of the foot to stretch often causes the bunching up of the plantar fascia area leading to the pain.  Calf/Achilles Stretching Examples:  Do the stretching gently. Do not bounce or stretch to the point of pain. Hold each stretch for 30 seconds. Stretch 10 times per set, three sets per day. Morning, afternoon and evening. If your heel pain is very severe in the morning, consider doing the first set of stretches before you get out of bed.                 OVER THE COUNTER INSERT RECOMMENDATIONS  SuperFeet   Sofsole Fit Spenco   Power Step   Walk-Fit Arch Cradles     Most of these can be found at your local [employer], sporting goods stores, or online.  **A good high quality over the counter insert should cost around $40-$50      [employer] SHOES LOCATIONS  [place]  [address] [place] [address]  [phone]   [address]  [phone] [place] [address]  [phone]   [address]  [phone]   [place]  [address]  [phone] [place] [address]  [phone]   [place]  [address]  [phone]   [place]  [address] [place] [address]  [phone] [place]  [address]  [phone]     [facility] LOCATIONS  [facility]  [address]  [place] [address]  Phone: [phone]  Fax: [phone] [facility]  [address]  [place] [address]  Phone: [phone]   Fax: [phone]   [facility] [place]  [address]  [place] [address]  Phone: [phone]  Fax: [phone] [facility]  [address]  [place] [address]  Phone: [phone]   Fax: [phone]   [facility]   [address]  [place] [address]  Phone: [phone]  Fax: [phone] * Please call any location listed to make an appointment for a casting/fitting. Your referral was sent to their central office and they will all have the order on file.

## 2024-12-02 ENCOUNTER — HOSPITAL ENCOUNTER (OUTPATIENT)
Dept: ULTRASOUND IMAGING | Facility: HOSPITAL | Age: 34
Discharge: HOME | End: 2024-12-02
Attending: PODIATRIST | Admitting: PODIATRIST
Payer: COMMERCIAL

## 2024-12-02 DIAGNOSIS — M79.672 LEFT FOOT PAIN: ICD-10-CM

## 2024-12-02 DIAGNOSIS — M79.5 RESIDUAL FOREIGN BODY IN SOFT TISSUE: ICD-10-CM

## 2024-12-02 PROCEDURE — 76882 US LMTD JT/FCL EVL NVASC XTR: CPT | Mod: LT

## 2024-12-04 ENCOUNTER — MYC MEDICAL ADVICE (OUTPATIENT)
Dept: PODIATRY | Facility: CLINIC | Age: 34
End: 2024-12-04
Payer: COMMERCIAL

## 2024-12-04 DIAGNOSIS — S90.852A FOREIGN BODY IN LEFT FOOT, INITIAL ENCOUNTER: Primary | ICD-10-CM

## 2024-12-05 NOTE — TELEPHONE ENCOUNTER
Patient last seen 11/27/24. Ultrasound was ordered and plan was for provider to contact patient with results. Results were communicated to patient via results message.     Patient would like to proceed with surgical excision. Please place case request.     Vikki Angeles ATC

## 2024-12-06 ENCOUNTER — OFFICE VISIT (OUTPATIENT)
Dept: FAMILY MEDICINE | Facility: CLINIC | Age: 34
End: 2024-12-06
Payer: COMMERCIAL

## 2024-12-06 VITALS
BODY MASS INDEX: 53.08 KG/M2 | HEART RATE: 72 BPM | WEIGHT: 293 LBS | TEMPERATURE: 98.4 F | DIASTOLIC BLOOD PRESSURE: 76 MMHG | OXYGEN SATURATION: 98 % | SYSTOLIC BLOOD PRESSURE: 126 MMHG | RESPIRATION RATE: 16 BRPM

## 2024-12-06 DIAGNOSIS — R03.0 ELEVATED BLOOD PRESSURE READING WITHOUT DIAGNOSIS OF HYPERTENSION: ICD-10-CM

## 2024-12-06 DIAGNOSIS — D18.03 CAVERNOUS HEMANGIOMA OF LIVER: Primary | ICD-10-CM

## 2024-12-06 DIAGNOSIS — R68.89 COLD INTOLERANCE: ICD-10-CM

## 2024-12-06 LAB
EST. AVERAGE GLUCOSE BLD GHB EST-MCNC: 114 MG/DL
HBA1C MFR BLD: 5.6 % (ref 0–5.6)

## 2024-12-06 PROCEDURE — 36415 COLL VENOUS BLD VENIPUNCTURE: CPT

## 2024-12-06 PROCEDURE — 84443 ASSAY THYROID STIM HORMONE: CPT

## 2024-12-06 PROCEDURE — 80053 COMPREHEN METABOLIC PANEL: CPT

## 2024-12-06 PROCEDURE — 83036 HEMOGLOBIN GLYCOSYLATED A1C: CPT

## 2024-12-06 PROCEDURE — 99214 OFFICE O/P EST MOD 30 MIN: CPT | Mod: GC

## 2024-12-06 NOTE — PROGRESS NOTES
Preceptor Attestation:    I discussed the patient with the resident and evaluated the patient in person. I have verified the content of the note, which accurately reflects my assessment of the patient and the plan of care.   Supervising Physician:  Annia Collins MD

## 2024-12-06 NOTE — PROGRESS NOTES
Assessment & Plan     Cavernous hemangioma of liver  Incidental finding on 2/16/2024 CT pelvis with contrast identifying a 2.2 cm probably cavernous hemangioma of the R hepatic lobe and recommended a non-emergent hepatic US. Pt has not undergone US examination and would like to do so.   - US Abdomen Limited; Future    Cold intolerance  Subjective report of often feeling cold even when ambient temperature is >75 degrees. No h/o thyroid disorder and denies heart palpitations, constipation, tremors, and weight gain.   - TSH with free T4 reflex    BMI 50.0-59.9, adult (H)  Pt wants to pursue weight loss and previously took phentermine and naltrexone in 2022 and subsequently stopped due to loss of appetite in the evening. Meanwhile, pt gave birth to twins and weight loss efforts put on hold. Although not discussed today, chart review reveals psychotherapy for anxiety and depression. At this time, phentermine not ideal for pt given anxiety. Pt has also tried a diet program for the past 6 months without achieving weight loss. Pt desires to lose weight to proceed with orthopedic surgery in the future to address R leg issues. Plan for today is to recheck labs. Given pt reporting increased urination with some increased thirst at times in the setting of elevated BMI, will check A1c. After reviewing all labs, start Zepbound. Pt denies pregnancy at present with contraceptive currently used.   - Comprehensive metabolic panel  - Hemoglobin A1c  - Anticipate on starting Zepbound    Elevated blood pressure reading without diagnosis of hypertension  Elevated blood pressures in the past. Initial blood pressure evaluation today elevated at 159/78 and 145/83. After visit concluded, blood pressure recheck and normal at 126/76. White coat syndrome most likely given pattern of elevation and then normal on recheck.   - Will continue to monitor.     BMI  Estimated body mass index is 53.08 kg/m  as calculated from the following:    Height  "as of 8/5/24: 1.702 m (5' 7.01\").    Weight as of this encounter: 153.8 kg (339 lb).       Return 1/21/2025, for follow-up.     Iram Arzola is a 34 year old, presenting for the following health issues:  Hypertension (Bp check and check her liver results from ER at [facility] )    HPI   Pt here to follow up on multiple concerns. Pt under the care of multiple specialists. Today, pt interested in following up on getting a hepatic US recommended from Feb. 2024 CT pelvis w/ contrast that identified a hepatic hemangioma. Pt also concerned about blood pressure due to elevation. Pt denied vision changes, SOB, chest pain, swelling in hands and feet, and decreased urine output. Pt concerned about increased urination, including overnight, with some increased thirst without weight loss, increased hunger, and skin changes. Pt would like to restart weight loss program but does not have an appointment with the bariatric clinic until March 2025. Pt previously took phentermine but then stopped. Since then, pt had twins and continues to breastfeed them. At last pt described cold intolerance and remains cold even if temperature >75 degrees F in her apartment.     Review of Systems  Constitutional, HEENT, cardiovascular, pulmonary, gi and gu systems are negative, except as otherwise noted.      Objective    /76   Pulse 72   Temp 98.4  F (36.9  C) (Oral)   Resp 16   Wt (!) 153.8 kg (339 lb)   SpO2 98%   BMI 53.08 kg/m    Body mass index is 53.08 kg/m .  Physical Exam   GENERAL: alert and no distress  NECK: no asymmetry, masses, or scars  RESP: lungs clear to auscultation - no rales, rhonchi or wheezes  CV: regular rate and rhythm, normal S1 S2, no S3 or S4, no murmur, click or rub, no peripheral edema  ABDOMEN: soft, nontender, and bowel sounds normal  MS: no gross musculoskeletal defects noted, no edema          This patient precepted with Dr. Annia Collins MD.     Signed Electronically by: DIANNE" MD VERENICE MORALES, PGY-2 \

## 2024-12-07 LAB
ALBUMIN SERPL BCG-MCNC: 3.9 G/DL (ref 3.5–5.2)
ALP SERPL-CCNC: 86 U/L (ref 40–150)
ALT SERPL W P-5'-P-CCNC: 12 U/L (ref 0–50)
ANION GAP SERPL CALCULATED.3IONS-SCNC: 8 MMOL/L (ref 7–15)
AST SERPL W P-5'-P-CCNC: 17 U/L (ref 0–45)
BILIRUB SERPL-MCNC: 0.2 MG/DL
BUN SERPL-MCNC: 12.4 MG/DL (ref 6–20)
CALCIUM SERPL-MCNC: 9.2 MG/DL (ref 8.8–10.4)
CHLORIDE SERPL-SCNC: 105 MMOL/L (ref 98–107)
CREAT SERPL-MCNC: 0.89 MG/DL (ref 0.51–0.95)
EGFRCR SERPLBLD CKD-EPI 2021: 88 ML/MIN/1.73M2
GLUCOSE SERPL-MCNC: 92 MG/DL (ref 70–99)
HCO3 SERPL-SCNC: 25 MMOL/L (ref 22–29)
POTASSIUM SERPL-SCNC: 4.5 MMOL/L (ref 3.4–5.3)
PROT SERPL-MCNC: 7.7 G/DL (ref 6.4–8.3)
SODIUM SERPL-SCNC: 138 MMOL/L (ref 135–145)
TSH SERPL DL<=0.005 MIU/L-ACNC: 0.75 UIU/ML (ref 0.3–4.2)

## 2024-12-09 ENCOUNTER — ANCILLARY PROCEDURE (OUTPATIENT)
Dept: ULTRASOUND IMAGING | Facility: CLINIC | Age: 34
End: 2024-12-09
Attending: STUDENT IN AN ORGANIZED HEALTH CARE EDUCATION/TRAINING PROGRAM
Payer: COMMERCIAL

## 2024-12-09 DIAGNOSIS — D18.03 CAVERNOUS HEMANGIOMA OF LIVER: ICD-10-CM

## 2024-12-09 PROCEDURE — 76705 ECHO EXAM OF ABDOMEN: CPT | Mod: TC | Performed by: RADIOLOGY

## 2024-12-10 ENCOUNTER — TELEPHONE (OUTPATIENT)
Dept: FAMILY MEDICINE | Facility: CLINIC | Age: 34
End: 2024-12-10
Payer: COMMERCIAL

## 2024-12-10 RX ORDER — TIRZEPATIDE 2.5 MG/.5ML
2.5 INJECTION, SOLUTION SUBCUTANEOUS
Qty: 2 ML | Refills: 0 | Status: SHIPPED | OUTPATIENT
Start: 2024-12-10

## 2024-12-10 NOTE — TELEPHONE ENCOUNTER
Reviewed pt's labs; normal. Called pt to reconfirm pharmacy and that Zepbound will be ordered with initial starting dose of 2.5 mg weekly for 4 weeks. Informed medication requires PA but will request on her behalf.   - Zepbound ordered   - Informed of lab results     Orville Coyne  Resident Physician, PGY-2

## 2024-12-11 ENCOUNTER — TELEPHONE (OUTPATIENT)
Dept: FAMILY MEDICINE | Facility: CLINIC | Age: 34
End: 2024-12-11
Payer: COMMERCIAL

## 2024-12-11 NOTE — TELEPHONE ENCOUNTER
Prior Authorization needed on:      ZEPBOUND 2.5 MG/0.5ML prefilled pen       Medication/Dose:      ZEPBOUND 2.5 MG/0.5ML prefilled pen      Pharmacy confirmed as   [id number] - [place], [place] - [address] [facility] [address]  [address] [place] [address]  Phone: [phone] Fax: [phone]  : Yes    Insurance Name:  [facility]   Insurance Patient ID: [id number]    Alternatives Suggested:  Please advise if you would like to start a PA or send an alternative: would you like to start a PA? Or send an alternative?    Mirian Arriola MA December 11, 2024 at 11:30 AM

## 2024-12-12 NOTE — TELEPHONE ENCOUNTER
Retail Pharmacy Prior Authorization Team   Phone: [phone]    PA Initiation    Medication: ZEPBOUND 2.5 MG/0.5ML SC SOAJ  Insurance Company: [employer] - Phone [phone] Fax [phone]  Pharmacy Filling the Rx: [id number] - [place], [place] - [address] [place], [place] [address] AT [facility] [address]  Filling Pharmacy Phone: [phone]  Filling Pharmacy Fax:    Start Date: 12/12/2024    Started PA on CMM and a response of A request for this medication was recently received and we are reviewing it. Please call us at [phone] with any questions.  Called insurance and spoke with Alexis, she shows that someone started a PA on 12/10/2024 but she cannot see who did.  There are questions that need to be answered. Completed via phone.

## 2024-12-12 NOTE — TELEPHONE ENCOUNTER
Prior Authorization Retail Medication Request    Medication/Dose: PA for  ZEPBOUND 2.5 MG/0.5ML prefilled pen  Diagnosis and ICD code (if different than what is on RX):    BMI 50.0-59.9, adult (H) [Z68.43]  - Primary        New/renewal/insurance change PA/secondary ins. PA:  Previously Tried and Failed:  patient is not a candidate for phentermine because of anxiety for which she is undergoing treatment.  Rationale:  Patient has also tried weight loss through diet, exercise, and lifestyle changes for more than 6 months without success. Lastly BMI >50    Insurance   Primary: [facility]   Insurance ID:  [id number]    Secondary (if applicable):  Insurance ID:      Pharmacy Information (if different than what is on RX)  Name:  [facility]   Phone:  [phone]   Fax:[phone]     Clinic Information  Preferred routing pool for dept communication: [facility]ENT

## 2024-12-12 NOTE — TELEPHONE ENCOUNTER
Prior Authorization Approval    Authorization Effective Date: 11/12/2024  Authorization Expiration Date: 12/12/2025  Medication: PA for  ZEPBOUND 2.5 MG/0.5ML prefilled pen-APPROVED  Reference #:     Insurance Company: [employer] - Phone [phone] Fax [phone]  Which Pharmacy is filling the prescription (Not needed for infusion/clinic administered): [id number] - [place], [place] - [address] [place] [address] AT [facility] [address]  Pharmacy Notified: Yes  Patient Notified: Instructed pharmacy to notify patient when script is ready to /ship.

## 2024-12-13 NOTE — RESULT ENCOUNTER NOTE
Thank you for following up to obtain the ultrasound. There were no abnormal findings. The ultrasound confirmed benign hepatic hemangioma without any new findings on your liver. No additional follow-up is needed for this problem.     Also, the prior authorization for Zepbound was approved. The pharmacy should notify you when the medication is ready for pick-up. Please call the clinic if you have any questions.     Orville Coyne  Resident Physician, PGY-2

## 2024-12-18 ENCOUNTER — BEH TREATMENT PLAN (OUTPATIENT)
Dept: PSYCHIATRY | Facility: CLINIC | Age: 34
End: 2024-12-18
Payer: COMMERCIAL

## 2024-12-18 ENCOUNTER — VIRTUAL VISIT (OUTPATIENT)
Dept: PSYCHIATRY | Facility: CLINIC | Age: 34
End: 2024-12-18
Attending: PSYCHOLOGIST
Payer: COMMERCIAL

## 2024-12-18 DIAGNOSIS — F33.1 MODERATE EPISODE OF RECURRENT MAJOR DEPRESSIVE DISORDER (H): ICD-10-CM

## 2024-12-18 DIAGNOSIS — F41.1 GAD (GENERALIZED ANXIETY DISORDER): Primary | ICD-10-CM

## 2024-12-18 PROCEDURE — 90837 PSYTX W PT 60 MINUTES: CPT | Mod: 95 | Performed by: PSYCHOLOGIST

## 2024-12-18 NOTE — PROGRESS NOTES
"OUTPATIENT TREATMENT PLAN SUMMARY    Date of Treatment Plan: 12/18/2024  90-Day Review Date: 3/18/25  Date of Initial Service: 9/17/18       DSM-V Diagnosis (include numeric code)  Dysthymic Disorder (F34.1)    Current symptoms and circumstances that substantiate the diagnosis:  Patient reported fairly consistent low mood, disrupted sleep, fatigue, periods of anhedonia    She endorsed symptoms of anxiety including, several worries, recent panic attacks    How symptoms and/or behaviors are affecting level of function:  She feels overwhelmed a lot easier, impacts ability to be productive    Risk Assessment:  Suicide:  Assessed Level of Immediate Risk: Low  Ideation: not current, is experiencing less often  Plan:  No  Means: No  Intent: No    Homicide/Violence:  Assessed Level of Immediate Risk: Has some anger but no intent to harm others physically  Ideation: No  Plan: No  Means: No  Intent: No    If on a medication, please include name and dosage: Citalopram 30mg once a day (is prescribed but she is not taking it every day)      Symptom/Problem Measurable Goals Interventions Gains Made   1.  Ruminative  thinking - impacts mood 1. Per patient report, will have reduction in symptoms 1. CBT 1. 4/3/24 Has been able to create a mindset to move her aware from obsessing about relationships. \"Let it happen.\"    2.  Self Care 2. Will make incremental changes toward self defined goals 2. CBT: Problem solving, skill building and psychoeducation 2.    3. Anxiety 3. Reduce anxiety symptoms per patient report    A. Reduce feelings of being overwhelmed per patient report 3. CBT strategies - replacement behavior when anxious 3. NA - new       Frequency of Sessions: Weekly    Discharge and Aftercare Goals: discharge on completion of goals    Expected duration of treatment:  To be reassessed in 90 days    Participants in therapy plan (family, friends, support network): client and therapist      Telehealth appointment - patient gave " verbal acknowledge of plan as written      Regulatory Guidelines for Updating Treatment Plan  [place] Medical Assistance: Reviewed & signed at least every 90days  Medicare:  Update per policy

## 2024-12-18 NOTE — PROGRESS NOTES
OUTPATIENT PSYCHOTHERAPY PROGRESS NOTE    Client Name: Dariusz Leyva   YOB: 1990  Time of Service: 65 minutes   Service Type(s): Individual psychotherapy    VIDEO VISIT  Dariusz Leyva is a 34 year old who is being evaluated via a billable video visit.      Telehealth Details  Type of service:  psychotherapy  Time of service:  Start Time:  4:01 pm  End Time: 5:06 pm    Reason for Telehealth Visit: Patient has requested telehealth visit  Originating Site (patient location):  [facility]   Location- Patient's home  Distant Site (provider location):  Onsite  Mode of Communication:  [place]      Diagnoses:   Unspecified depressive disorder and unspecified anxiety    Goals of therapy: Decrease negative and obsessional thought processes, increase self care, and identify replacement strategies for dealing with anxiety    Individuals Present:   Psychotherapy services during this visit included myself and Dariusz Leyva.     Narrative:  Dariusz reported her mood is  I have been having a lot of anxiety lately.  She reported several stressors including people and financial stress. She is on a med for weight loss so her appetite is different likely due to the med. She is experiencing some fatigue and anhedonia. She denied sleep disturbance, and suicidal ideation. She rated her stress at a 90 out of 100 (100 being most stressed).      She is struggling with the response of her children s father. He responses tend to be self-centered. She has tried to reach out to her children s paternal grandparents - but has not heard from them yet.     Went to the ED on Saturday due to migraine and she had a panic attack. This was her first panic attack in a medical setting. She thought the pain she was experiencing contributed. This is the first she has had in a very long time. She occasionally will have tachycardia since Saturday.      Discussed stressors related to the person that is living with her temporarily.      Updated goals.      Treatment:   Clinician used reflective listening, validation, positive reinforcement, and psychoeducation within a framework of CBT.      Patient reaction: Dariusz was receptive to support and interventions provided today    Patient Progress: Dariusz was cooperative, attentive and engaged throughout the session. Progress toward goal completion seems good.     Additional Information:   Patient did not report medication changes.    Mental Status:  Appearance:  Casually dressed   Behavior/relationship to examiner/demeanor:  cooperative  Motor activity/EPS:  Within normal limits  Speech rate:  Within normal limits  Speech volume:  Within normal limits  Speech articulation:  Within normal limits  Speech coherence: Within normal limits  Speech spontaneity: Within normal limits  Mood (subjective):  I have been having a lot of anxiety lately.   Affect (objective appearance): Generally euthymic  Thought Process (Associations):  Logical and Linear   Thought process (Rate):  Within normal limits   Thought content: Within normal limits  Abnormal Perception:  None   Insight:  Good   Judgment:  Good     Plan: Continue with goals as outlined above    Andreea Vásquez Psy.D.,L.P

## 2024-12-30 ENCOUNTER — TELEPHONE (OUTPATIENT)
Dept: PODIATRY | Facility: CLINIC | Age: 34
End: 2024-12-30
Payer: COMMERCIAL

## 2024-12-30 RX ORDER — TIRZEPATIDE 2.5 MG/.5ML
2.5 INJECTION, SOLUTION SUBCUTANEOUS
Qty: 2 ML | Refills: 0 | OUTPATIENT
Start: 2024-12-30

## 2024-12-30 RX ORDER — TIRZEPATIDE 5 MG/.5ML
5 INJECTION, SOLUTION SUBCUTANEOUS
Qty: 2 ML | Refills: 0 | Status: SHIPPED | OUTPATIENT
Start: 2025-12-08

## 2024-12-30 NOTE — TELEPHONE ENCOUNTER
Phone call to patient:    Confirmed pt started Zepbound. Pt indicated 4th injection at 2.5 mg will be on Tuesday, Jan. 8, 2025. Thus, informed of reorder in anticipation of next 4 weeks.   - Zepbound 5 mg per week, as of 1/9/2025.     Orville Coyne  Resident Physician, PGY-2

## 2024-12-30 NOTE — TELEPHONE ENCOUNTER
This is for Dr. Brantley    12/30 HAVE NOT HEARD FROM PATIENT   12/19 mcm  12/05 [facility]

## 2024-12-31 ENCOUNTER — THERAPY VISIT (OUTPATIENT)
Dept: PHYSICAL THERAPY | Facility: REHABILITATION | Age: 34
End: 2024-12-31
Attending: PODIATRIST
Payer: COMMERCIAL

## 2024-12-31 DIAGNOSIS — M21.42 BILATERAL PES PLANUS: ICD-10-CM

## 2024-12-31 DIAGNOSIS — M21.41 PES PLANUS OF BOTH FEET: Primary | ICD-10-CM

## 2024-12-31 DIAGNOSIS — M72.2 PLANTAR FASCIITIS, LEFT: ICD-10-CM

## 2024-12-31 DIAGNOSIS — M76.821 POSTERIOR TIBIAL TENDON DYSFUNCTION, RIGHT: ICD-10-CM

## 2024-12-31 DIAGNOSIS — M21.42 PES PLANUS OF BOTH FEET: Primary | ICD-10-CM

## 2024-12-31 DIAGNOSIS — M21.41 BILATERAL PES PLANUS: ICD-10-CM

## 2024-12-31 PROCEDURE — 97110 THERAPEUTIC EXERCISES: CPT | Mod: GP | Performed by: PHYSICAL THERAPIST

## 2024-12-31 PROCEDURE — 97161 PT EVAL LOW COMPLEX 20 MIN: CPT | Mod: GP | Performed by: PHYSICAL THERAPIST

## 2024-12-31 ASSESSMENT — ACTIVITIES OF DAILY LIVING (ADL)
SWELLING: THE SYMPTOM AFFECTS MY ACTIVITY MODERATELY
GOING_UP_OR_DOWN_10_STAIRS: MODERATE DIFFICULTY
PLEASE_INDICATE_YOR_PRIMARY_REASON_FOR_REFERRAL_TO_THERAPY:: KNEE
WEAKNESS: THE SYMPTOM AFFECTS MY ACTIVITY SLIGHTLY
SWELLING: THE SYMPTOM AFFECTS MY ACTIVITY MODERATELY
PERFORMING_HEAVY_ACTIVITIES_AROUND_YOUR_HOME: EXTREME DIFFICULTY OR UNABLE TO PERFORM ACTIVITY
WALK: ACTIVITY IS FAIRLY DIFFICULT
ANY_OF_YOUR_USUAL_WORK,_HOUSEWORK_OR_SCHOOL_ACTIVITIES: QUITE A BIT OF DIFFICULTY
PERFORMING_LIGHT_ACTIVITIES_AROUND_YOUR_HOME: NO DIFFICULTY
PAIN: THE SYMPTOM AFFECTS MY ACTIVITY SEVERELY
HOW_WOULD_YOU_RATE_THE_CURRENT_FUNCTION_OF_YOUR_KNEE_DURING_YOUR_USUAL_DAILY_ACTIVITIES_ON_A_SCALE_FROM_0_TO_100_WITH_100_BEING_YOUR_LEVEL_OF_KNEE_FUNCTION_PRIOR_TO_YOUR_INJURY_AND_0_BEING_THE_INABILITY_TO_PERFORM_ANY_OF_YOUR_USUAL_DAILY_ACTIVITIES?: 30
MAKING_SHARP_TURNS_WHILE_RUNNING_FAST: EXTREME DIFFICULTY OR UNABLE TO PERFORM ACTIVITY
LEFS_RAW_SCORE: 0
KNEEL ON THE FRONT OF YOUR KNEE: ACTIVITY IS VERY DIFFICULT
PUTTING_ON_YOUR_SHOES_OR_SOCKS: A LITTLE BIT OF DIFFICULTY
STAND: ACTIVITY IS SOMEWHAT DIFFICULT
WALKING_BETWEEN_ROOMS: NO DIFFICULTY
SHOPPING: QUITE A BIT OF DIFFICULTY
STANDING_FOR_1_HOUR: EXTREME DIFFICULTY OR UNABLE TO PERFORM ACTIVITY
LIMPING: THE SYMPTOM AFFECTS MY ACTIVITY SLIGHTLY
WALK: ACTIVITY IS FAIRLY DIFFICULT
STAND: ACTIVITY IS SOMEWHAT DIFFICULT
YOUR_USUAL_HOBBIES,_RECREATIONAL_OR_SPORTING_ACTIVITIES: A LITTLE BIT OF DIFFICULTY
SQUAT: ACTIVITY IS VERY DIFFICULT
SIT WITH YOUR KNEE BENT: ACTIVITY IS MINIMALLY DIFFICULT
GO UP STAIRS: ACTIVITY IS FAIRLY DIFFICULT
LIMPING: THE SYMPTOM AFFECTS MY ACTIVITY SLIGHTLY
KNEEL ON THE FRONT OF YOUR KNEE: ACTIVITY IS VERY DIFFICULT
LEFS_SCORE(%): 0
RUNNING_ON_UNEVEN_GROUND: EXTREME DIFFICULTY OR UNABLE TO PERFORM ACTIVITY
LIFTING_AN_OBJECT,_LIKE_A_BAG_OF_GROCERIES_FROM_THE_FLOOR: A LITTLE BIT OF DIFFICULTY
RISE FROM A CHAIR: ACTIVITY IS SOMEWHAT DIFFICULT
KNEE_ACTIVITY_OF_DAILY_LIVING_SUM: 33
WALKING_2_BLOCKS: QUITE A BIT OF DIFFICULTY
GIVING WAY, BUCKLING OR SHIFTING OF KNEE: THE SYMPTOM AFFECTS MY ACTIVITY MODERATELY
STIFFNESS: THE SYMPTOM AFFECTS MY ACTIVITY MODERATELY
SITTING_FOR_1_HOUR: MODERATE DIFFICULTY
GETTING_INTO_OR_OUT_OF_A_CAR: A LITTLE BIT OF DIFFICULTY
RUNNING_ON_EVEN_GROUND: EXTREME DIFFICULTY OR UNABLE TO PERFORM ACTIVITY
WEAKNESS: THE SYMPTOM AFFECTS MY ACTIVITY SLIGHTLY
SQUAT: ACTIVITY IS VERY DIFFICULT
GO DOWN STAIRS: ACTIVITY IS MINIMALLY DIFFICULT
KNEE_ACTIVITY_OF_DAILY_LIVING_SCORE: 47.14
HOW_WOULD_YOU_RATE_THE_OVERALL_FUNCTION_OF_YOUR_KNEE_DURING_YOUR_USUAL_DAILY_ACTIVITIES?: ABNORMAL
HOW_WOULD_YOU_RATE_THE_OVERALL_FUNCTION_OF_YOUR_KNEE_DURING_YOUR_USUAL_DAILY_ACTIVITIES?: ABNORMAL
GO DOWN STAIRS: ACTIVITY IS MINIMALLY DIFFICULT
SQUATTING: QUITE A BIT OF DIFFICULTY
ROLLING_OVER_IN_BED: A LITTLE BIT OF DIFFICULTY
RAW_SCORE: 33
HOW_WOULD_YOU_RATE_THE_CURRENT_FUNCTION_OF_YOUR_KNEE_DURING_YOUR_USUAL_DAILY_ACTIVITIES_ON_A_SCALE_FROM_0_TO_100_WITH_100_BEING_YOUR_LEVEL_OF_KNEE_FUNCTION_PRIOR_TO_YOUR_INJURY_AND_0_BEING_THE_INABILITY_TO_PERFORM_ANY_OF_YOUR_USUAL_DAILY_ACTIVITIES?: 30
GO UP STAIRS: ACTIVITY IS FAIRLY DIFFICULT
GETTING_INTO_AND_OUT_OF_A_BATH: A LITTLE BIT OF DIFFICULTY
PLEASE_INDICATE_YOR_PRIMARY_REASON_FOR_REFERRAL_TO_THERAPY:: FOOT AND/OR ANKLE
SIT WITH YOUR KNEE BENT: ACTIVITY IS MINIMALLY DIFFICULT
GIVING WAY, BUCKLING OR SHIFTING OF KNEE: THE SYMPTOM AFFECTS MY ACTIVITY MODERATELY
STIFFNESS: THE SYMPTOM AFFECTS MY ACTIVITY MODERATELY
PAIN: THE SYMPTOM AFFECTS MY ACTIVITY SEVERELY
WALKING_A_MILE: EXTREME DIFFICULTY OR UNABLE TO PERFORM ACTIVITY
AS_A_RESULT_OF_YOUR_KNEE_INJURY,_HOW_WOULD_YOU_RATE_YOUR_CURRENT_LEVEL_OF_DAILY_ACTIVITY?: ABNORMAL
RISE FROM A CHAIR: ACTIVITY IS SOMEWHAT DIFFICULT
AS_A_RESULT_OF_YOUR_KNEE_INJURY,_HOW_WOULD_YOU_RATE_YOUR_CURRENT_LEVEL_OF_DAILY_ACTIVITY?: ABNORMAL
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
## 2025-02-03 ENCOUNTER — VIRTUAL VISIT (OUTPATIENT)
Dept: URGENT CARE | Facility: CLINIC | Age: 35
End: 2025-02-03
Payer: MEDICAID

## 2025-02-03 DIAGNOSIS — N39.0 ACUTE UTI (URINARY TRACT INFECTION): Primary | ICD-10-CM

## 2025-02-03 PROCEDURE — 98005 SYNCH AUDIO-VIDEO EST LOW 20: CPT

## 2025-02-03 RX ORDER — NITROFURANTOIN 25; 75 MG/1; MG/1
100 CAPSULE ORAL 2 TIMES DAILY
Qty: 10 CAPSULE | Refills: 0 | Status: SHIPPED | OUTPATIENT
Start: 2025-02-03 | End: 2025-02-08

## 2025-02-03 RX ORDER — OMEGA-3 FATTY ACIDS/FISH OIL 300-1000MG
600 CAPSULE ORAL EVERY 4 HOURS PRN
Qty: 30 CAPSULE | Refills: 0 | Status: SHIPPED | OUTPATIENT
Start: 2025-02-03

## 2025-02-03 NOTE — PROGRESS NOTES
Dariusz is a 34 year old female who presents for a billable video visit.    ASSESSMENT/PLAN:  Diagnoses and all orders for this visit:    Acute UTI (urinary tract infection)  -     nitroFURantoin macrocrystal-monohydrate (MACROBID) 100 MG capsule; Take 1 capsule (100 mg) by mouth 2 times daily for 5 days.  -     ibuprofen (ADVIL/MOTRIN) 200 MG capsule; Take 3 capsules (600 mg) by mouth every 4 hours as needed for fever.        Follow up with primary care provider with any problems, questions or concerns or if symptoms worsen or fail to improve. Patient agreed to plan and verbalized understanding.     SUBJECTIVE:  Patient reports urinary symptoms x 3 days. She notes dysuria, frequency, urgency. She denies fever, chills, nausea and vomiting, flank pain.    ROS: Pertinent ROS neg other than the symptoms noted above in the HPI.     OBJECTIVE:  Vitals not done due to this being a virtual visit    GENERAL: healthy, alert and no distress  EYES: Eyes grossly normal to inspection,conjunctivae and sclerae normal  RESP: Able to speak in complete sentences, no audible wheeze or cough  SKIN: no suspicious lesions or rashes  NEURO: mentation intact and speech normal  PSYCH: mentation appears normal, affect normal/bright    Video-Visit Details    Type of service:  Video Visit  Video Start Time: 2:59 pm  Video End Time: 3:08   pm  Originating Location: Home    Distant Location:  [facility]     Platform used for Video Visit: Hernan Shine PA-C

## 2025-02-03 NOTE — PATIENT INSTRUCTIONS
You will treated for a UTI based on symptoms. Lack of flank pain, fever, nausea make me less concerned for an ascending infection. You will be treated with Nitrofurantoin monohydrate/macrocrystals (Macrobid) 100 mg PO BID x 5 days

## 2025-02-05 ENCOUNTER — OFFICE VISIT (OUTPATIENT)
Dept: FAMILY MEDICINE | Facility: CLINIC | Age: 35
End: 2025-02-05
Payer: MEDICAID

## 2025-02-05 VITALS
WEIGHT: 293 LBS | OXYGEN SATURATION: 99 % | TEMPERATURE: 97.5 F | HEIGHT: 68 IN | SYSTOLIC BLOOD PRESSURE: 104 MMHG | RESPIRATION RATE: 12 BRPM | HEART RATE: 67 BPM | BODY MASS INDEX: 44.41 KG/M2 | DIASTOLIC BLOOD PRESSURE: 69 MMHG

## 2025-02-05 DIAGNOSIS — F33.1 MDD (MAJOR DEPRESSIVE DISORDER), RECURRENT EPISODE, MODERATE (H): ICD-10-CM

## 2025-02-05 DIAGNOSIS — F41.1 GAD (GENERALIZED ANXIETY DISORDER): ICD-10-CM

## 2025-02-05 DIAGNOSIS — Z76.89 ENCOUNTER FOR WEIGHT MANAGEMENT: ICD-10-CM

## 2025-02-05 RX ORDER — TIRZEPATIDE 5 MG/.5ML
5 INJECTION, SOLUTION SUBCUTANEOUS
Qty: 2 ML | Refills: 0 | Status: SHIPPED | OUTPATIENT
Start: 2026-02-04 | End: 2025-02-05

## 2025-02-05 RX ORDER — CITALOPRAM HYDROBROMIDE 20 MG/1
20 TABLET ORAL DAILY
Qty: 90 TABLET | Refills: 3 | Status: SHIPPED | OUTPATIENT
Start: 2025-02-05

## 2025-02-05 NOTE — PROGRESS NOTES
"  Assessment & Plan     BMI 50.0-59.9, adult (H)  Encounter for weight management  Since starting Zepbound in December 2024, pt has achieved a 24 lb weight loss. Pt also has upcoming appointment for weight management/nutrition clinic coming up. Pt motivated. Discussed increasing dose to 5 mg. Pt reports general nausea without vomiting and a lack of interest in food. Pt knows to remind herself to eat. Pt denied severe abdominal pain and constipation. Advised pt to request next dose via MyChart and to return to clinic in 3 months; otherwise, sooner if side effects become severe or intolerable.   - tirzepatide-Weight Management (ZEPBOUND) 5 MG/0.5ML prefilled pen; Inject 0.5 mLs (5 mg) subcutaneously every 7 days.    MDD (major depressive disorder), recurrent episode, moderate (H)  CARLY (generalized anxiety disorder)  Pt requested refill of medication at a lower dose. Previously, pt taking 30 mg and wants to step down to 20 mg. Although she continues to experience depression, wt loss has helped to relieve some of the symptoms.   - citalopram (CELEXA) 20 MG tablet; Take 1 tablet (20 mg) by mouth daily.      BMI  Estimated body mass index is 48.73 kg/m  as calculated from the following:    Height as of this encounter: 1.715 m (5' 7.5\").    Weight as of this encounter: 143.2 kg (315 lb 12.8 oz).       Return in 3 months.       Subjective   Dariusz is a 34 year old, presenting for the following health issues:  Medication Request (Med  follow-up zepbound )      2/5/2025     4:11 PM   Additional Questions   Roomed by MAGDI rose MA   Accompanied by Self         2/5/2025    Information    services provided? No     HPI   Pt reports doing well and happy with weight loss so far. Pt motivated to take next steps such as improving nutrition and exercising more regularly. Pt noted winter season is currently a challenge to efforts but recognizes the importance of developing a healthier lifestyle. Pt has an upcoming " "appointment with wt loss/mgmt and nutrition clinic. Meanwhile, pt experiences mild nausea without vomiting and a lack of interest in food, such that she has to remind herself to eat. Pt denies severe abdominal pain and constipation. Pt also reports running out of citalopram and requests refill at a lower dose.       Review of Systems  Constitutional, HEENT, cardiovascular, pulmonary, gi and gu systems are negative, except as otherwise noted.      Objective    /69   Pulse 67   Temp 97.5  F (36.4  C) (Tympanic)   Resp 12   Ht 1.715 m (5' 7.5\")   Wt (!) 143.2 kg (315 lb 12.8 oz)   LMP 01/15/2025 (Exact Date)   SpO2 99%   Breastfeeding No   BMI 48.73 kg/m    Body mass index is 48.73 kg/m .  Physical Exam   GENERAL: Alert and no distress  RESP: No increased work of breathing. Able to speak in full sentences without rest. Symmetric chest rise.   CV: No cyanosis, no peripheral edema  ABDOMEN: Non-distended abdomen, normal bowel sounds  MS: No gross musculoskeletal defects noted, no edema          This patient precepted with Dr. Emil Grewal MD.     Signed Electronically by: DIANNE MORALES MD VERENICE, PGY-2  "

## 2025-02-06 NOTE — PROGRESS NOTES
Physician Attestation   I, Emil Grewal MD, saw this patient and agree with the findings and plan of care as documented in the note.      Items personally reviewed/procedural attestation: vitals.    Emil Grewal MD

## 2025-02-12 ENCOUNTER — VIRTUAL VISIT (OUTPATIENT)
Dept: PSYCHIATRY | Facility: CLINIC | Age: 35
End: 2025-02-12
Attending: PSYCHOLOGIST
Payer: MEDICAID

## 2025-02-12 DIAGNOSIS — F41.1 GAD (GENERALIZED ANXIETY DISORDER): Primary | ICD-10-CM

## 2025-02-12 DIAGNOSIS — F33.1 MODERATE EPISODE OF RECURRENT MAJOR DEPRESSIVE DISORDER (H): ICD-10-CM

## 2025-02-12 NOTE — PROGRESS NOTES
OUTPATIENT PSYCHOTHERAPY PROGRESS NOTE    Client Name: Dariusz Leyva   YOB: 1990  Time of Service: 60 minutes   Service Type(s): Individual psychotherapy    VIDEO VISIT  Dariusz Leyva is a 34 year old who is being evaluated via a billable video visit.      Telehealth Details  Type of service:  psychotherapy  Time of service:  Start Time:  4:00 pm  End Time: 5:00 pm    Reason for Telehealth Visit: Patient has requested telehealth visit  Originating Site (patient location):  [facility]   Location- Patient's home  Distant Site (provider location):  Onsite  Mode of Communication:  Hernan      Diagnoses:   Unspecified depressive disorder and unspecified anxiety    Goals of therapy: Decrease negative and obsessional thought processes, increase self care, and identify replacement strategies for dealing with anxiety    Individuals Present:   Psychotherapy services during this visit included myself and Dariusz Leyva.     Narrative:  Dariusz reported that her twins just had ear tubes put in. She reported her mood is  down.  She endorsed sleep disturbance, anhedonia, and fatigue. She reported appetite is normal for the most part. She denied suicidal ideation. She reported her stress is at an 80 (1-100, 100 being most stressed). She indicated that someone she is in a relationship with is a stressor. Her income is lower than usual and she has stress from bills.      Treatment:   Clinician used reflective listening, validation, positive reinforcement, and psychoeducation within a framework of CBT.      Patient reaction: Dariusz was receptive to support and interventions provided today    Patient Progress: Dariusz was cooperative, attentive and engaged throughout the session. Progress toward goal completion seems good.     Additional Information:   Patient did not report medication changes.    Mental Status:  Appearance:  Casually dressed   Behavior/relationship to examiner/demeanor:  cooperative  Motor  activity/EPS:  Within normal limits  Speech rate:  Within normal limits  Speech volume:  Within normal limits  Speech articulation:  Within normal limits  Speech coherence: Within normal limits  Speech spontaneity: Within normal limits  Mood (subjective):  down   Affect (objective appearance): Generally euthymic  Thought Process (Associations):  Logical and Linear   Thought process (Rate):  Within normal limits   Thought content: Within normal limits  Abnormal Perception:  None   Insight:  Good   Judgment:  Good     Plan: Patient was unclear what they would like to do with therapy going forward. Provider informed patient that the provider would be out of town the last week of Feb and the first week of March. Encouraged patient to reach out via My Chart once they decide how they would like to proceed.    Andreea Vásquez Psy.D.,L.P

## 2025-02-15 DIAGNOSIS — Z76.89 ENCOUNTER FOR WEIGHT MANAGEMENT: ICD-10-CM

## 2025-02-16 RX ORDER — TIRZEPATIDE 5 MG/.5ML
INJECTION, SOLUTION SUBCUTANEOUS
Qty: 2 ML | Refills: 0 | OUTPATIENT
Start: 2025-02-16

## 2025-02-24 NOTE — PROGRESS NOTES
Dariusz Leyva is a 34 year old who is being evaluated via a billable video visit.      How would you like to obtain your AVS? MyChart  If the video visit is dropped, the invitation should be resent by: Text to cell phone: [phone]  Will anyone else be joining your video visit? No        Medical  Weight Loss Follow-Up Diet Evaluation  Assessment:  Dariusz is presenting today for a follow up weight management nutrition consultation.  This patient has had an initial appointment and was referred by Dr. Walker for MNT as treatment for Morbid obesity    Weight loss medication: Zepbound (PCP).   Pt's weight is 315.8 lbs  Initial weight: 317 lbs  Weight change: -1.2 lbs         No data to display              BMI: 48.73  Ideal body weight: 62.7 kg (138 lb 5.4 oz)  Adjusted ideal body weight: 94.9 kg (209 lb 5.2 oz)    Estimated RMR (Lincoln-St Jeor equation):   1,729 kcals x 1.2 (sedentary) = 2,075 kcals (for weight maintenance)  Recommended Protein Intake: 80 - 100 grams of protein/day  Patient Active Problem List:  Patient Active Problem List   Diagnosis    Pain in thoracic spine    Abnormal Pap smear of cervix    Large tonsils    BMI 50.0-59.9, adult (H)    CARLY (generalized anxiety disorder)    MDD (major depressive disorder), recurrent episode, moderate (H)    Hyperlipidemia with target LDL less than 130    Migraine with aura and without status migrainosus, not intractable    DDD (degenerative disc disease), cervical    DDD (degenerative disc disease), lumbar    Increased PTH level    Vitamin D deficiency    Hypertension goal BP (blood pressure) < 140/90    Posterior tibial tendon dysfunction, right    Plantar fasciitis, left    Pes planus of both feet   Diabetes: no, A1C of 5.6% on 12/6/2024  Goals (Una 6/5/2023):  Limit/avoid processed foods, focus on whole foods  Drinking more water - 64 oz bottle daily    Progress on goals from last visit: Patient stated that she got pregnant in 2023 and gained weight during  that time. Patient was able to get on Zepbound through her PCP - nausea and appetite suppression. Patient has struggling with her intake since post-partum. Patient stated that she has a hard time getting motivated to make a meal and has pain with standing d/t issues with feet (able to stand for 5 minutes or so - step pads in the kitchen have helped). Patient will typically go grocery shopping at [employer] and [employer].    Lactose intolerance, allergy to stone fruits (apples, pears, cherry, plums, peaches). Does not like peas, liver, cottage cheese.    Dietary Recall:  Typically only eating 1x/day - fruit (cantaloupe, banana) and protein item  Beverages:   Juice  Naked Juice (no sugar added)  Water - 3 bottles - 48 ounces  Exercise:   Patient was previously doing PT - will complete the stretches at time and planning on using a stationary bike  Nutrition Diagnosis:    Overweight/Obesity (NC 3.3) related to overeating and poor lifestyle habits as evidenced by skipping meals, low protein intake and BMI 48.73  Intervention:  Food and/or nutrient delivery: Try to create structure with meals during the day - even if you just take 1-2 bites of food at that meal time. Limit fluid intake before and with meals.   Nutrition education: Intro to MWM, recipes, snacks, protein supplements    Monitoring/Evaluation:    Goals:  Limit fluid intake with meals to limit a false sense of fullness  Try to create structure with meals times - start by taking one bite of food at meals  Utilize soft textured proteins the first couple of days following an injection    Patient to follow up in 1 day with bariatrician and 2.5 month(s) with RD      Video-Visit Details    Type of service:  Video Visit    Video Start Time (time video started): 3:27 PM    Video End Time (time video stopped): 4:00 PM    Originating Location (pt. Location): Home      Distant Location (provider location):  Off-site    Mode of Communication:  Video Conference via  [facility]    Physician has received verbal consent for a Video Visit from the patient? Yes      Joselyn Raman RD

## 2025-02-26 ENCOUNTER — VIRTUAL VISIT (OUTPATIENT)
Dept: SURGERY | Facility: CLINIC | Age: 35
End: 2025-02-26
Payer: MEDICAID

## 2025-02-26 DIAGNOSIS — E66.01 MORBID OBESITY WITH BMI OF 45.0-49.9, ADULT (H): Primary | ICD-10-CM

## 2025-02-26 DIAGNOSIS — Z71.3 NUTRITIONAL COUNSELING: ICD-10-CM

## 2025-02-26 PROCEDURE — 97803 MED NUTRITION INDIV SUBSEQ: CPT | Mod: 95 | Performed by: DIETITIAN, REGISTERED

## 2025-02-26 NOTE — LETTER
2/26/2025      Dariusz Leyva  [address]  [place] [address]      Dear Colleague,    Thank you for referring your patient, Dariusz Leyva, to the [facility] [place]. Please see a copy of my visit note below.    Dariusz Leyva is a 34 year old who is being evaluated via a billable video visit.      How would you like to obtain your AVS? MyChart  If the video visit is dropped, the invitation should be resent by: Text to cell phone: [phone]  Will anyone else be joining your video visit? No        Medical  Weight Loss Follow-Up Diet Evaluation  Assessment:  Dariusz is presenting today for a follow up weight management nutrition consultation.  This patient has had an initial appointment and was referred by Dr. Walker for MNT as treatment for Morbid obesity    Weight loss medication: Zepbound (PCP).   Pt's weight is 315.8 lbs  Initial weight: 317 lbs  Weight change: -1.2 lbs         No data to display              BMI: 48.73  Ideal body weight: 62.7 kg (138 lb 5.4 oz)  Adjusted ideal body weight: 94.9 kg (209 lb 5.2 oz)    Estimated RMR (Edmond-St Jeor equation):   1,729 kcals x 1.2 (sedentary) = 2,075 kcals (for weight maintenance)  Recommended Protein Intake: 80 - 100 grams of protein/day  Patient Active Problem List:  Patient Active Problem List   Diagnosis     Pain in thoracic spine     Abnormal Pap smear of cervix     Large tonsils     BMI 50.0-59.9, adult (H)     CARLY (generalized anxiety disorder)     MDD (major depressive disorder), recurrent episode, moderate (H)     Hyperlipidemia with target LDL less than 130     Migraine with aura and without status migrainosus, not intractable     DDD (degenerative disc disease), cervical     DDD (degenerative disc disease), lumbar     Increased PTH level     Vitamin D deficiency     Hypertension goal BP (blood pressure) < 140/90     Posterior tibial tendon dysfunction, right     Plantar fasciitis, left      Pes planus of both feet   Diabetes: no, A1C of 5.6% on 12/6/2024  Goals (Una 6/5/2023):  Limit/avoid processed foods, focus on whole foods  Drinking more water - 64 oz bottle daily    Progress on goals from last visit: Patient stated that she got pregnant in 2023 and gained weight during that time. Patient was able to get on Zepbound through her PCP - nausea and appetite suppression. Patient has struggling with her intake since post-partum. Patient stated that she has a hard time getting motivated to make a meal and has pain with standing d/t issues with feet (able to stand for 5 minutes or so - step pads in the kitchen have helped). Patient will typically go grocery shopping at [employer] and [employer].    Lactose intolerance, allergy to stone fruits (apples, pears, cherry, plums, peaches). Does not like peas, liver, cottage cheese.    Dietary Recall:  Typically only eating 1x/day - fruit (cantaloupe, banana) and protein item  Beverages:   Juice  Naked Juice (no sugar added)  Water - 3 bottles - 48 ounces  Exercise:   Patient was previously doing PT - will complete the stretches at time and planning on using a stationary bike  Nutrition Diagnosis:    Overweight/Obesity (NC 3.3) related to overeating and poor lifestyle habits as evidenced by skipping meals, low protein intake and BMI 48.73  Intervention:  Food and/or nutrient delivery: Try to create structure with meals during the day - even if you just take 1-2 bites of food at that meal time. Limit fluid intake before and with meals.   Nutrition education: Intro to MWM, recipes, snacks, protein supplements    Monitoring/Evaluation:    Goals:  Limit fluid intake with meals to limit a false sense of fullness  Try to create structure with meals times - start by taking one bite of food at meals  Utilize soft textured proteins the first couple of days following an injection    Patient to follow up in 1 day with bariatrician and 2.5 month(s) with RD      Video-Visit  Details    Type of service:  Video Visit    Video Start Time (time video started): 3:27 PM    Video End Time (time video stopped): 4:00 PM    Originating Location (pt. Location): Home      Distant Location (provider location):  Off-site    Mode of Communication:  Video Conference via [facility]    Physician has received verbal consent for a Video Visit from the patient? Yes      Joselyn Raman RD           Again, thank you for allowing me to participate in the care of your patient.        Sincerely,        Joselyn Raman RD    Electronically signed

## 2025-02-27 ENCOUNTER — OFFICE VISIT (OUTPATIENT)
Dept: SURGERY | Facility: CLINIC | Age: 35
End: 2025-02-27
Payer: MEDICAID

## 2025-02-27 VITALS
SYSTOLIC BLOOD PRESSURE: 132 MMHG | BODY MASS INDEX: 45.99 KG/M2 | HEIGHT: 67 IN | DIASTOLIC BLOOD PRESSURE: 78 MMHG | WEIGHT: 293 LBS

## 2025-02-27 DIAGNOSIS — E66.01 SEVERE OBESITY (BMI >= 40) (H): Primary | ICD-10-CM

## 2025-02-27 DIAGNOSIS — E78.5 HYPERLIPIDEMIA WITH TARGET LDL LESS THAN 130: ICD-10-CM

## 2025-02-27 NOTE — LETTER
2/27/2025      Dariusz Leyva  [address]  [place] [address]      Dear Colleague,    Thank you for referring your patient, Dariusz Leyva, to the [facility]ATR[facility] [place]. Please see a copy of my visit note below.    Bariatric Follow-up    34 year old  female BMI:Body mass index is 48.94 kg/m .    Weight:   Wt Readings from Last 1 Encounters:   02/27/25 (!) 141.7 kg (312 lb 8 oz)    pounds      Comorbidities:  Patient Active Problem List   Diagnosis     Pain in thoracic spine     Abnormal Pap smear of cervix     Large tonsils     BMI 50.0-59.9, adult (H)     CARLY (generalized anxiety disorder)     MDD (major depressive disorder), recurrent episode, moderate (H)     Hyperlipidemia with target LDL less than 130     Migraine with aura and without status migrainosus, not intractable     DDD (degenerative disc disease), cervical     DDD (degenerative disc disease), lumbar     Increased PTH level     Vitamin D deficiency     Hypertension goal BP (blood pressure) < 140/90     Posterior tibial tendon dysfunction, right     Plantar fasciitis, left     Pes planus of both feet         Interim: Had twins in September now 18 months old. Also moved to 2 bedroom apartment. Babies were in the NICU for 7 weeks. Breast fed for 14 months. Had post partum depression, re-starting her PPI just before delivery. Dr. Winn started Zepbound after Dariusz was done breast feeding.  She took 2.5mg for 2 months and missed 2 weeks due to an insurance glitch but increased to Zepbound 5.0mg and is doing OK. Some nausea. Trying to drink more water. Kids are going to [employer] which provides her. Was 354# before starting Zepbound. Now 42# down.Looking for a job. Current plantar fasciitis    Plan:  DIET  protein and calorie free beverages, hunger solutions food shelf link shared with Dariusz.   EXERCISE Stationary bike 30 min as able.    PHARMACOTHERAPY Continue Zepbound,  ramping up as tolerated. Sent 7.5mg dose. Call or myChart to let us know if wanting to increase to 10mg or stay at 7.5mg another month    We discussed the importance of hydration, staying ahead of potential constipation, and consuming 3 protein containing, nutrient dense meals while taking GLP-1 RA medications. Discussed importance of getting adequate protein to maintain muscle mass and trying to eat 3 times in the day. If unable due to lack of appetite then stay at current dose or go back to previous dose. Encouraged Dariusz to use the social resources provided, therapist, family, friends, PCA.        -We reviewed her medications and whether associated with weight gain.  Current Outpatient Medications   Medication Sig Dispense Refill     acetaminophen (TYLENOL) 325 MG tablet TAKE 1-2 TABLETS(325-650MG) BY MOUTH EVERY 6 HOURS AS NEEDED FOR MILD PAIN Strength: 325 mg 100 tablet 3     cetirizine (ZYRTEC) 10 MG tablet Take 1 tablet (10 mg) by mouth daily 90 tablet 3     citalopram (CELEXA) 20 MG tablet Take 1 tablet (20 mg) by mouth daily. 90 tablet 3     clotrimazole (LOTRIMIN) 1 % external cream Apply topically 2 times daily as needed 60 g 1     cyclobenzaprine (FLEXERIL) 10 MG tablet Take 1 tablet (10 mg) by mouth 3 times daily as needed for muscle spasms 90 tablet 0     fluticasone (FLONASE) 50 MCG/ACT nasal spray Spray 2 sprays into both nostrils daily as needed for rhinitis or allergies 18.2 mL 11     ibuprofen (ADVIL/MOTRIN) 200 MG capsule Take 3 capsules (600 mg) by mouth every 4 hours as needed for fever. 30 capsule 0     ketoconazole (NIZORAL) 2 % external shampoo APPLY TOPICALLY DAILY AS NEEDED FOR ITCHING OR IRRITATION 120 mL 3     lactase (LACTAID) 3000 UNIT tablet Take 1 tablet (3,000 Units) by mouth 3 times daily (with meals) 30 tablet 0     tirzepatide-Weight Management (ZEPBOUND) 5 MG/0.5ML prefilled pen Inject 0.5 mLs (5 mg) subcutaneously every 7 days. 2 mL 0     tirzepatide-Weight Management  (ZEPBOUND) 7.5 MG/0.5ML prefilled pen Inject 0.5 mLs (7.5 mg) subcutaneously every 7 days for 28 days. 2 mL 0     etonogestrel-ethinyl estradiol (NUVARING) 0.12-0.015 MG/24HR vaginal ring Place 1 each vaginally every 28 days (Patient not taking: Reported on 2/27/2025) 3 each 3     ZEPBOUND 2.5 MG/0.5ML prefilled pen Inject 0.5 mLs (2.5 mg) subcutaneously every 7 days. (Patient not taking: Reported on 2/27/2025) 2 mL 0     No current facility-administered medications for this visit.        We discussed [facility] Bariatric Basics including:  -eating 3 meals daily  -eating protein first  -eating slowly, chewing food well  -avoiding/limiting calorie containing beverages  -choosing wheat, not white with breads, crackers, pastas, sravani, bagels, tortillas, rice  -limiting restaurant or cafeteria eating to twice a week or less  -We discussed the importance of restorative sleep and stress management in maintaining a healthy weight.  -We discussed insulin resistance and glycemic index as it relates to appetite and weight control  -We discussed the National Weight Control Registry healthy weight maintenance strategies and ways to optimize metabolism.  -We discussed the importance of physical activity including cardiovascular and strength training in maintaining a healthier weight and explored viable options.    Most recent labs:  Recent Labs   Lab Test 07/20/20  0904 06/19/18  1652   CHOL 235.5* 265*   HDL 56.0 57   * 192*   TRIG 101.0  --    CHOLHDLRATIO 4.2  --       Hemoglobin A1C   Date Value Ref Range Status   12/06/2024 5.6 0.0 - 5.6 % Final     Comment:     Normal <5.7%   Prediabetes 5.7-6.4%    Diabetes 6.5% or higher     Note: Adopted from ADA consensus guidelines.   01/26/2017 5.4 4.1 - 5.7 % Final      TSH   Date Value Ref Range Status   12/06/2024 0.75 0.30 - 4.20 uIU/mL Final   02/03/2022 1.46 0.30 - 5.00 uIU/mL Final      BP Readings from Last 3 Encounters:   02/27/25 132/78   02/05/25 104/69   12/06/24  "126/76          DIETARY HISTORY  Meals Per Day: 2-3  Eating Protein First?: trying  Food Diary: B:eggs and sausage banana L:skips or chicken Sai salad from [employer] sometimes juice D:fish filet carrots  Snacks Per Day: here and there was craving sugar so started drinking soda  Typical Snack: see above  Fluid Intake: water and naked juice, tea with honey  Portion Control: improved  Calorie Containing Beverages: yes soda  Alcohol per week: no  Typical Protein Food Choices: salmon  Choosing Whole Grains: yes  Grocery Shopping is done by: herself  Food Preparation is done by: herself  Meals at Restaurant/Cafeteria/Take Out Per Week: rare  Eating at the Table:   TV is Off During Meals:     Positive Changes Since Last Visit: back on track  Struggling With: exercise    Knowledgeable in Reading Food Labels:   Getting Adequate Protein: trying  Sleeping 7-8 hours/day 4  Stress management therapist,     PHYSICAL ACTIVITY PATTERNS:  Cardiovascular: walking at the store , has exercise bike available  Strength Training:     REVIEW OF SYSTEMS    ROS  As above  PHYSICAL EXAM:  Vitals: /78 (BP Location: Right arm, Patient Position: Sitting, Cuff Size: Adult Large)   Ht 1.702 m (5' 7\")   Wt (!) 141.7 kg (312 lb 8 oz)   LMP 01/15/2025 (Exact Date)   BMI 48.94 kg/m        GEN: Pleasant, well groomed, in no acute distress  EYES: EOMI,  ENT: airway patent  NECK: no carotid bruits, no anterior/supraclavicular lymphadenopathy, thyroid normal   HEART: Rhythm regular, rate regular, no murmur   LUNGS: Clear  ABDOMEN: soft, non-tender, obese, no rashes   VASCULAR: bilateral lower extremity edema  MUSCULOSKELETAL:  muscle mass OK  SKIN:  no color changes of venous stasis, no ulcerations    Total time spent on the date of this encounter doing: chart review, review of test results, patient visit, physical exam, education, counseling, developing plan of care, and documenting = 40 minutes.            Again, thank you for allowing me to " participate in the care of your patient.        Sincerely,        Ann Walker MD    Electronically signed

## 2025-02-27 NOTE — PATIENT INSTRUCTIONS
[email or web address].org/find-[email or web address]       Call [phone] or IGG message when you want the next dose or if you want to stay the same.    [facility] Bariatric Basics    Remember to:    -Eat 3 meals a day (not 2, not 5) Chew your food well/SLOW down  -Eat your protein first  -Be a water drinker/Minize liquid calories (no regular pop, no juice) skim or 1% milk OK  -Sleep 7-8 hours each night. Address sleep if problematic  -Stress management is important. Address if problematic  -Move-8000 steps daily Muscle: maintain your muscle mass (strength training 2X/wk)  -Wheat, not white (bread, pasta, crackers, sravani, bagels, tortillas, rice)  -Limit restaurant, cafeteria, take out, drive through to 2 times per week or less  -Minimize caffeine, alcohol, and night-time snacking  -Consider keeping a food diary (i.e. My Fitness Pal, Lose It, or other food tracker)  -Follow up with the dietitian      **Some lean proteins: chicken, turkey, tuna, salmon, crab, fish, shrimp, scallops, lobster, lean cuts of beef and pork, luncheon meats, veggie burgers, beans (black, lima, garbanzo, gregory, kidney, refried), chile, cottage cheese, string cheese, other cheese, eggs, tofu, peanut butter, nuts, vegan crumbles, greek yogurt

## 2025-02-27 NOTE — PROGRESS NOTES
Bariatric Follow-up    34 year old  female BMI:Body mass index is 48.94 kg/m .    Weight:   Wt Readings from Last 1 Encounters:   02/27/25 (!) 141.7 kg (312 lb 8 oz)    pounds      Comorbidities:  Patient Active Problem List   Diagnosis    Pain in thoracic spine    Abnormal Pap smear of cervix    Large tonsils    BMI 50.0-59.9, adult (H)    CARLY (generalized anxiety disorder)    MDD (major depressive disorder), recurrent episode, moderate (H)    Hyperlipidemia with target LDL less than 130    Migraine with aura and without status migrainosus, not intractable    DDD (degenerative disc disease), cervical    DDD (degenerative disc disease), lumbar    Increased PTH level    Vitamin D deficiency    Hypertension goal BP (blood pressure) < 140/90    Posterior tibial tendon dysfunction, right    Plantar fasciitis, left    Pes planus of both feet         Interim: Had twins in September now 18 months old. Also moved to 2 bedroom apartment. Babies were in the NICU for 7 weeks. Breast fed for 14 months. Had post partum depression, re-starting her PPI just before delivery. Dr. Winn started Zepbound after Dariusz was done breast feeding.  She took 2.5mg for 2 months and missed 2 weeks due to an insurance glitch but increased to Zepbound 5.0mg and is doing OK. Some nausea. Trying to drink more water. Kids are going to [employer] which provides her. Was 354# before starting Zepbound. Now 42# down.Looking for a job. Current plantar fasciitis    Plan:  DIET  protein and calorie free beverages, hunger solutions food shelf link shared with Dariusz.   EXERCISE Stationary bike 30 min as able.    PHARMACOTHERAPY Continue Zepbound, ramping up as tolerated. Sent 7.5mg dose. Call or myChart to let us know if wanting to increase to 10mg or stay at 7.5mg another month    We discussed the importance of hydration, staying ahead of potential constipation, and consuming 3 protein containing, nutrient dense meals while  taking GLP-1 RA medications. Discussed importance of getting adequate protein to maintain muscle mass and trying to eat 3 times in the day. If unable due to lack of appetite then stay at current dose or go back to previous dose. Encouraged Dariusz to use the social resources provided, therapist, family, friends, PCA.        -We reviewed her medications and whether associated with weight gain.  Current Outpatient Medications   Medication Sig Dispense Refill    acetaminophen (TYLENOL) 325 MG tablet TAKE 1-2 TABLETS(325-650MG) BY MOUTH EVERY 6 HOURS AS NEEDED FOR MILD PAIN Strength: 325 mg 100 tablet 3    cetirizine (ZYRTEC) 10 MG tablet Take 1 tablet (10 mg) by mouth daily 90 tablet 3    citalopram (CELEXA) 20 MG tablet Take 1 tablet (20 mg) by mouth daily. 90 tablet 3    clotrimazole (LOTRIMIN) 1 % external cream Apply topically 2 times daily as needed 60 g 1    cyclobenzaprine (FLEXERIL) 10 MG tablet Take 1 tablet (10 mg) by mouth 3 times daily as needed for muscle spasms 90 tablet 0    fluticasone (FLONASE) 50 MCG/ACT nasal spray Spray 2 sprays into both nostrils daily as needed for rhinitis or allergies 18.2 mL 11    ibuprofen (ADVIL/MOTRIN) 200 MG capsule Take 3 capsules (600 mg) by mouth every 4 hours as needed for fever. 30 capsule 0    ketoconazole (NIZORAL) 2 % external shampoo APPLY TOPICALLY DAILY AS NEEDED FOR ITCHING OR IRRITATION 120 mL 3    lactase (LACTAID) 3000 UNIT tablet Take 1 tablet (3,000 Units) by mouth 3 times daily (with meals) 30 tablet 0    tirzepatide-Weight Management (ZEPBOUND) 5 MG/0.5ML prefilled pen Inject 0.5 mLs (5 mg) subcutaneously every 7 days. 2 mL 0    tirzepatide-Weight Management (ZEPBOUND) 7.5 MG/0.5ML prefilled pen Inject 0.5 mLs (7.5 mg) subcutaneously every 7 days for 28 days. 2 mL 0    etonogestrel-ethinyl estradiol (NUVARING) 0.12-0.015 MG/24HR vaginal ring Place 1 each vaginally every 28 days (Patient not taking: Reported on 2/27/2025) 3 each 3    ZEPBOUND 2.5 MG/0.5ML  prefilled pen Inject 0.5 mLs (2.5 mg) subcutaneously every 7 days. (Patient not taking: Reported on 2/27/2025) 2 mL 0     No current facility-administered medications for this visit.        We discussed [facility] Bariatric Basics including:  -eating 3 meals daily  -eating protein first  -eating slowly, chewing food well  -avoiding/limiting calorie containing beverages  -choosing wheat, not white with breads, crackers, pastas, sravani, bagels, tortillas, rice  -limiting restaurant or cafeteria eating to twice a week or less  -We discussed the importance of restorative sleep and stress management in maintaining a healthy weight.  -We discussed insulin resistance and glycemic index as it relates to appetite and weight control  -We discussed the National Weight Control Registry healthy weight maintenance strategies and ways to optimize metabolism.  -We discussed the importance of physical activity including cardiovascular and strength training in maintaining a healthier weight and explored viable options.    Most recent labs:  Recent Labs   Lab Test 07/20/20  0904 06/19/18  1652   CHOL 235.5* 265*   HDL 56.0 57   * 192*   TRIG 101.0  --    CHOLHDLRATIO 4.2  --       Hemoglobin A1C   Date Value Ref Range Status   12/06/2024 5.6 0.0 - 5.6 % Final     Comment:     Normal <5.7%   Prediabetes 5.7-6.4%    Diabetes 6.5% or higher     Note: Adopted from ADA consensus guidelines.   01/26/2017 5.4 4.1 - 5.7 % Final      TSH   Date Value Ref Range Status   12/06/2024 0.75 0.30 - 4.20 uIU/mL Final   02/03/2022 1.46 0.30 - 5.00 uIU/mL Final      BP Readings from Last 3 Encounters:   02/27/25 132/78   02/05/25 104/69   12/06/24 126/76          DIETARY HISTORY  Meals Per Day: 2-3  Eating Protein First?: trying  Food Diary: B:eggs and sausage banana L:skips or chicken Sai salad from [employer] sometimes juice D:fish filet carrots  Snacks Per Day: here and there was craving sugar so started drinking soda  Typical Snack: see  "above  Fluid Intake: water and naked juice, tea with honey  Portion Control: improved  Calorie Containing Beverages: yes soda  Alcohol per week: no  Typical Protein Food Choices: salmon  Choosing Whole Grains: yes  Grocery Shopping is done by: herself  Food Preparation is done by: herself  Meals at Restaurant/Cafeteria/Take Out Per Week: rare  Eating at the Table:   TV is Off During Meals:     Positive Changes Since Last Visit: back on track  Struggling With: exercise    Knowledgeable in Reading Food Labels:   Getting Adequate Protein: trying  Sleeping 7-8 hours/day 4  Stress management therapist,     PHYSICAL ACTIVITY PATTERNS:  Cardiovascular: walking at the store , has exercise bike available  Strength Training:     REVIEW OF SYSTEMS    ROS  As above  PHYSICAL EXAM:  Vitals: /78 (BP Location: Right arm, Patient Position: Sitting, Cuff Size: Adult Large)   Ht 1.702 m (5' 7\")   Wt (!) 141.7 kg (312 lb 8 oz)   LMP 01/15/2025 (Exact Date)   BMI 48.94 kg/m        GEN: Pleasant, well groomed, in no acute distress  EYES: EOMI,  ENT: airway patent  NECK: no carotid bruits, no anterior/supraclavicular lymphadenopathy, thyroid normal   HEART: Rhythm regular, rate regular, no murmur   LUNGS: Clear  ABDOMEN: soft, non-tender, obese, no rashes   VASCULAR: bilateral lower extremity edema  MUSCULOSKELETAL:  muscle mass OK  SKIN:  no color changes of venous stasis, no ulcerations    Total time spent on the date of this encounter doing: chart review, review of test results, patient visit, physical exam, education, counseling, developing plan of care, and documenting = 40 minutes.          "

## 2025-03-04 ENCOUNTER — OFFICE VISIT (OUTPATIENT)
Dept: FAMILY MEDICINE | Facility: CLINIC | Age: 35
End: 2025-03-04
Payer: MEDICAID

## 2025-03-04 VITALS
OXYGEN SATURATION: 99 % | TEMPERATURE: 97.2 F | HEIGHT: 67 IN | BODY MASS INDEX: 45.99 KG/M2 | DIASTOLIC BLOOD PRESSURE: 76 MMHG | SYSTOLIC BLOOD PRESSURE: 109 MMHG | WEIGHT: 293 LBS | RESPIRATION RATE: 20 BRPM | HEART RATE: 79 BPM

## 2025-03-04 DIAGNOSIS — Z12.4 CERVICAL CANCER SCREENING: Primary | ICD-10-CM

## 2025-03-04 DIAGNOSIS — Z00.00 ROUTINE GENERAL MEDICAL EXAMINATION AT A HEALTH CARE FACILITY: ICD-10-CM

## 2025-03-04 DIAGNOSIS — Z71.1 CONCERN ABOUT STD IN FEMALE WITHOUT DIAGNOSIS: ICD-10-CM

## 2025-03-04 DIAGNOSIS — G89.29 CHRONIC PAIN OF RIGHT KNEE: ICD-10-CM

## 2025-03-04 DIAGNOSIS — N89.8 VAGINAL IRRITATION: ICD-10-CM

## 2025-03-04 DIAGNOSIS — M25.561 CHRONIC PAIN OF RIGHT KNEE: ICD-10-CM

## 2025-03-04 LAB
ALBUMIN UR-MCNC: ABNORMAL MG/DL
APPEARANCE UR: CLEAR
BILIRUB UR QL STRIP: NEGATIVE
CLUE CELLS: ABNORMAL
COLOR UR AUTO: YELLOW
GLUCOSE UR STRIP-MCNC: NEGATIVE MG/DL
HGB UR QL STRIP: NEGATIVE
KETONES UR STRIP-MCNC: 15 MG/DL
LEUKOCYTE ESTERASE UR QL STRIP: ABNORMAL
NITRATE UR QL: NEGATIVE
PH UR STRIP: 6 [PH] (ref 5–8)
SP GR UR STRIP: 1.02 (ref 1–1.03)
TRICHOMONAS, WET PREP: ABNORMAL
UROBILINOGEN UR STRIP-ACNC: 1 E.U./DL
WBC'S/HIGH POWER FIELD, WET PREP: ABNORMAL
YEAST, WET PREP: ABNORMAL

## 2025-03-04 RX ORDER — OMEGA-3 FATTY ACIDS/FISH OIL 300-1000MG
600 CAPSULE ORAL EVERY 4 HOURS PRN
Qty: 90 CAPSULE | Refills: 3 | Status: SHIPPED | OUTPATIENT
Start: 2025-03-04

## 2025-03-04 SDOH — HEALTH STABILITY: PHYSICAL HEALTH: ON AVERAGE, HOW MANY DAYS PER WEEK DO YOU ENGAGE IN MODERATE TO STRENUOUS EXERCISE (LIKE A BRISK WALK)?: 3 DAYS

## 2025-03-04 SDOH — HEALTH STABILITY: PHYSICAL HEALTH: ON AVERAGE, HOW MANY MINUTES DO YOU ENGAGE IN EXERCISE AT THIS LEVEL?: 30 MIN

## 2025-03-04 ASSESSMENT — SOCIAL DETERMINANTS OF HEALTH (SDOH): HOW OFTEN DO YOU GET TOGETHER WITH FRIENDS OR RELATIVES?: ONCE A WEEK

## 2025-03-04 NOTE — PROGRESS NOTES
"Preventive Care Visit  [facility]  DIANNE MORALES MD, Family Medicine  Mar 4, 2025      Assessment & Plan     Cervical cancer screening  Pt completed routine Pap smear with cytology. Small area of erythema surrounding the cervical os without discharge nor bleeding. No cervical motion tenderness.  - HPV and Gynecologic Cytology Panel - Recommended Age 30 - 65 Years    Vaginal irritation  Current symptoms of vaginal irritation while urinating and malodorous discharge. Pt requested testing for UTI and BV. UA nitrite negative and leukocyte esterase trace. Wet prep negative. Pt aware of results via MyChart. No treatment required at this time. Advised pt to return to clinic if symptoms worsen or change.    - Wet preparation  - UA with Microscopic reflex to Culture - Clinic Collect    Concern about STD in female without diagnosis  Routine screening requested. Currently in process.   - Chlamydia trachomatis/Neisseria gonorrhoeae by PCR - Clinic Collect    Routine general medical examination at a health care facility  No questions or concerns for preventive care visit. Pt continues to work on weight loss and started following with [facility] bariatric clinic with Dr. Walker. Motivating factors for weight loss include improving health and obtaining R knee revision. Pt confirmed Dr. Walker has taken over Zepbound prescription. BMI >40. Physical exam largely normal except for bilateral knee pain, R>L, due to previous injury requiring surgery. Appreciated pt's openness and enthusiasm about health maintenance.   - Lipid panel reflex to direct LDL Non-fasting; Future    Chronic knee pain  Routine refill requested. Counseled on GI side effects of NSAID overuse.   - ibuprofen (ADVIL/MOTRIN) 200 MG capsule; Take 3 capsules (600 mg) by mouth every 4 hours as needed for fever.    BMI  Estimated body mass index is 48.74 kg/m  as calculated from the following:    Height as of this encounter: 1.702 m (5' 7\").    " Weight as of this encounter: 141.2 kg (311 lb 3.2 oz).       Counseling  Appropriate preventive services were addressed with this patient via screening, questionnaire, or discussion as appropriate for fall prevention, nutrition, physical activity, Tobacco-use cessation, social engagement, weight loss and cognition.  Checklist reviewing preventive services available has been given to the patient.  Reviewed patient's diet, addressing concerns and/or questions.   She is at risk for lack of exercise and has been provided with information to increase physical activity for the benefit of her well-being.   The patient's PHQ-9 score is consistent with mild depression. She was provided with information regarding depression.       Return in about 53 weeks (around 3/10/2026) for Annual Wellness Visit.    Iram Arzola is a 34 year old, presenting for the following:  Vaginal Problem (Poss BV and abdominal cramping - strange vaginal odor and clear discharge. //Ck std ) and Physical (CPE )        3/4/2025     4:24 PM   Additional Questions   Roomed by E. her MA   Accompanied by Self         3/4/2025    Information    services provided? No          HPI  Annual preventive visit. Pt prioritizing health and currently working on losing weight. Revision of R-knee surgery also a goal. Pt had no questions or concerns, except for some vaginal odor/irritation. Pt requested UTI and BV testing. Pt now going to bariatric clinic with Dr. Walker who has refilled Zepbound and will continue to do so. Pt reports doing well.     Advance Care Planning  Patient does not have a Health Care Directive: Discussed advance care planning with patient; information given to patient to review.      3/4/2025   General Health   How would you rate your overall physical health? (!) FAIR   Feel stress (tense, anxious, or unable to sleep) Only a little   (!) STRESS CONCERN      3/4/2025   Nutrition   Three or more servings of calcium each  day? Yes   Diet: Other   If other, please elaborate: no dairy   How many servings of fruit and vegetables per day? (!) 2-3   How many sweetened beverages each day? 0-1         3/4/2025   Exercise   Days per week of moderate/strenous exercise 3 days   Average minutes spent exercising at this level 30 min         3/4/2025   Social Factors   Frequency of gathering with friends or relatives Once a week   Worry food won't last until get money to buy more No   Food not last or not have enough money for food? No   Do you have housing? (Housing is defined as stable permanent housing and does not include staying ouside in a car, in a tent, in an abandoned building, in an overnight shelter, or couch-surfing.) Yes   Are you worried about losing your housing? No   Lack of transportation? No   Unable to get utilities (heat,electricity)? No         3/4/2025   Dental   Dentist two times every year? Yes          Today's PHQ-9 Score:       3/4/2025     4:17 PM   PHQ-9 SCORE   PHQ-9 Total Score MyChart 8 (Mild depression)   PHQ-9 Total Score 8        Patient-reported         3/4/2025   Substance Use   Alcohol more than 3/day or more than 7/wk Not Applicable   Do you use any other substances recreationally? (!) CANNABIS PRODUCTS     Social History     Tobacco Use    Smoking status: Never     Passive exposure: Never    Smokeless tobacco: Never   Substance Use Topics    Alcohol use: Yes     Alcohol/week: 0.0 standard drinks of alcohol     Comment: 1 per week    Drug use: Yes     Types: Marijuana     Comment: smokes pot once every few day         Mammogram Screening - Patient under 40 years of age: Routine Mammogram Screening not recommended.         3/4/2025   STI Screening   New sexual partner(s) since last STI/HIV test? No     History of abnormal Pap smear: No - age 30-64 HPV with reflex Pap every 5 years recommended        3/7/2022     9:23 AM 2/1/2016    11:50 AM 3/6/2015     9:28 AM   PAP / HPV   PAP Negative for Intraepithelial  "Lesion or Malignancy (NILM)  Negative for squamous intraepithelial lesion or malignancy  Electronically signed by Cailin Augustin CT (ASCP) on 2/8/2016 at  9:54 AM    Atypical squamous cells of undetermined significance  Electronically signed by Anand Jackson MD on 3/20/2015 at 10:43 AM              3/4/2025   Contraception/Family Planning   Questions about contraception or family planning No     Reviewed and updated as needed this visit by Provider                  Review of Systems  Constitutional, HEENT, cardiovascular, pulmonary, gi and gu systems are negative, except as otherwise noted.     Objective    Exam  /76   Pulse 79   Temp 97.2  F (36.2  C) (Tympanic)   Resp 20   Ht 1.702 m (5' 7\")   Wt (!) 141.2 kg (311 lb 3.2 oz)   LMP 02/10/2025 (Exact Date)   SpO2 99%   BMI 48.74 kg/m     Estimated body mass index is 48.74 kg/m  as calculated from the following:    Height as of this encounter: 1.702 m (5' 7\").    Weight as of this encounter: 141.2 kg (311 lb 3.2 oz).    Physical Exam  GENERAL: alert, cooperative, and no distress  NECK: no adenopathy, no asymmetry, masses, or scars  RESP: lungs clear to auscultation - no rales, rhonchi or wheezes  CV: regular rate and rhythm, no murmur, click or rub, no peripheral edema  ABDOMEN: soft, nontender, and bowel sounds normal  MS: mild knee joint swelling in R leg but otherwise, no gross musculoskeletal defects noted  NEURO: normal strength, no focal deficits   PSYCH: Bright affect, normal rate of speech, self-reflective, engaged throughout visit      This patient precepted with Dr. Roberto Ramirez MD.    Signed Electronically by: DIANNE MORALES MD VERENICE, PGY-2   "

## 2025-03-04 NOTE — PATIENT INSTRUCTIONS
Patient Education   Preventive Care Advice   This is general advice given by our system to help you stay healthy. However, your care team may have specific advice just for you. Please talk to your care team about your preventive care needs.  Nutrition  Eat 5 or more servings of fruits and vegetables each day.  Try wheat bread, brown rice and whole grain pasta (instead of white bread, rice, and pasta).  Get enough calcium and vitamin D. Check the label on foods and aim for 100% of the RDA (recommended daily allowance).  Lifestyle  Exercise at least 150 minutes each week  (30 minutes a day, 5 days a week).  Do muscle strengthening activities 2 days a week. These help control your weight and prevent disease.  No smoking.  Wear sunscreen to prevent skin cancer.  Have a dental exam and cleaning every 6 months.  Yearly exams  See your health care team every year to talk about:  Any changes in your health.  Any medicines your care team has prescribed.  Preventive care, family planning, and ways to prevent chronic diseases.  Shots (vaccines)   HPV shots (up to age 26), if you've never had them before.  Hepatitis B shots (up to age 59), if you've never had them before.  COVID-19 shot: Get this shot when it's due.  Flu shot: Get a flu shot every year.  Tetanus shot: Get a tetanus shot every 10 years.  Pneumococcal, hepatitis A, and RSV shots: Ask your care team if you need these based on your risk.  Shingles shot (for age 50 and up)  General health tests  Diabetes screening:  Starting at age 35, Get screened for diabetes at least every 3 years.  If you are younger than age 35, ask your care team if you should be screened for diabetes.  Cholesterol test: At age 39, start having a cholesterol test every 5 years, or more often if advised.  Bone density scan (DEXA): At age 50, ask your care team if you should have this scan for osteoporosis (brittle bones).  Hepatitis C: Get tested at least once in your life.  STIs (sexually  transmitted infections)  Before age 24: Ask your care team if you should be screened for STIs.  After age 24: Get screened for STIs if you're at risk. You are at risk for STIs (including HIV) if:  You are sexually active with more than one person.  You don't use condoms every time.  You or a partner was diagnosed with a sexually transmitted infection.  If you are at risk for HIV, ask about PrEP medicine to prevent HIV.  Get tested for HIV at least once in your life, whether you are at risk for HIV or not.  Cancer screening tests  Cervical cancer screening: If you have a cervix, begin getting regular cervical cancer screening tests starting at age 21.  Breast cancer scan (mammogram): If you've ever had breasts, begin having regular mammograms starting at age 40. This is a scan to check for breast cancer.  Colon cancer screening: It is important to start screening for colon cancer at age 45.  Have a colonoscopy test every 10 years (or more often if you're at risk) Or, ask your provider about stool tests like a FIT test every year or Cologuard test every 3 years.  To learn more about your testing options, visit:   .  For help making a decision, visit:   https://bit.ly/yb22502.  Prostate cancer screening test: If you have a prostate, ask your care team if a prostate cancer screening test (PSA) at age 55 is right for you.  Lung cancer screening: If you are a current or former smoker ages 50 to 80, ask your care team if ongoing lung cancer screenings are right for you.  For informational purposes only. Not to replace the advice of your health care provider. Copyright   2023 [employer] All rights reserved. Clinically reviewed by the [facility]. [employer] [id number] - REV 01/24.

## 2025-03-05 LAB
C TRACH DNA SPEC QL PROBE+SIG AMP: NEGATIVE
N GONORRHOEA DNA SPEC QL NAA+PROBE: NEGATIVE
SPECIMEN TYPE: NORMAL

## 2025-03-10 LAB
BKR AP ASSOCIATED HPV REPORT: NORMAL
BKR LAB AP GYN ADEQUACY: NORMAL
BKR LAB AP GYN INTERPRETATION: NORMAL
BKR LAB AP PREVIOUS ABNORMAL: NORMAL
PATH REPORT.COMMENTS IMP SPEC: NORMAL
PATH REPORT.COMMENTS IMP SPEC: NORMAL
PATH REPORT.RELEVANT HX SPEC: NORMAL

## 2025-03-12 LAB
HPV HR 12 DNA CVX QL NAA+PROBE: NEGATIVE
HPV16 DNA CVX QL NAA+PROBE: NEGATIVE
HPV18 DNA CVX QL NAA+PROBE: NEGATIVE
HUMAN PAPILLOMA VIRUS FINAL DIAGNOSIS: NORMAL

## 2025-04-02 ENCOUNTER — VIRTUAL VISIT (OUTPATIENT)
Dept: PSYCHIATRY | Facility: CLINIC | Age: 35
End: 2025-04-02
Attending: PSYCHOLOGIST
Payer: MEDICAID

## 2025-04-02 ENCOUNTER — MYC MEDICAL ADVICE (OUTPATIENT)
Dept: SURGERY | Facility: CLINIC | Age: 35
End: 2025-04-02
Payer: MEDICAID

## 2025-04-02 DIAGNOSIS — F41.1 GAD (GENERALIZED ANXIETY DISORDER): Primary | ICD-10-CM

## 2025-04-02 DIAGNOSIS — E66.01 SEVERE OBESITY (BMI >= 40) (H): Primary | ICD-10-CM

## 2025-04-02 DIAGNOSIS — F33.1 MODERATE EPISODE OF RECURRENT MAJOR DEPRESSIVE DISORDER (H): ICD-10-CM

## 2025-04-02 NOTE — PROGRESS NOTES
OUTPATIENT PSYCHOTHERAPY PROGRESS NOTE    Client Name: Dariusz Leyva   YOB: 1990  Time of Service: 61 minutes   Service Type(s): Individual psychotherapy    VIDEO VISIT  Daruisz Leyva is a 34 year old who is being evaluated via a billable video visit.      Telehealth Details  Type of service:  psychotherapy  Time of service:  Start Time:  4:12 pm  End Time: 5:13 pm    Reason for Telehealth Visit: Patient has requested telehealth visit  Originating Site (patient location):  [facility]   Location- Patient's home  Distant Site (provider location):  Onsite  Mode of Communication:  Hernan      Diagnoses:   Unspecified depressive disorder and unspecified anxiety    Goals of therapy: Decrease negative and obsessional thought processes, increase self care, and identify replacement strategies for dealing with anxiety    Individuals Present:   Psychotherapy services during this visit included myself and Dariusz Leyva.     Narrative:  Dariusz reported she was feeling  alright.  She reported she is trying to keep herself busy everyday. She has asked the person who was living with her to move out. Discussed aspects related to this person leaving. She had a period of depression, but she is feeling better. She still has periods of  comatose  where it can be hard to get things done. She has a schedule of things she wants to do each day.      Discussed her relationship with her children.    Treatment:   Clinician used reflective listening, validation, positive reinforcement, and psychoeducation within a framework of CBT.      Patient reaction: Dariusz was receptive to support and interventions provided today    Patient Progress: Dariusz was cooperative, attentive and engaged throughout the session. Progress toward goal completion seems good.     Additional Information:   Patient did not report medication changes.    Mental Status:  Appearance:  Casually dressed   Behavior/relationship to examiner/demeanor:   cooperative  Motor activity/EPS:  Within normal limits  Speech rate:  Within normal limits  Speech volume:  Within normal limits  Speech articulation:  Within normal limits  Speech coherence: Within normal limits  Speech spontaneity: Within normal limits  Mood (subjective):  alright   Affect (objective appearance): Generally euthymic  Thought Process (Associations):  Logical and Linear   Thought process (Rate):  Within normal limits   Thought content: Within normal limits  Abnormal Perception:  None   Insight:  Good   Judgment:  Good     Plan: Continue with goals as described above    Andreea Vásquez Psy.D.,L.P

## 2025-04-16 ENCOUNTER — VIRTUAL VISIT (OUTPATIENT)
Dept: PSYCHIATRY | Facility: CLINIC | Age: 35
End: 2025-04-16
Attending: PSYCHOLOGIST
Payer: COMMERCIAL

## 2025-04-16 DIAGNOSIS — F41.1 GAD (GENERALIZED ANXIETY DISORDER): Primary | ICD-10-CM

## 2025-04-16 DIAGNOSIS — F33.1 MODERATE EPISODE OF RECURRENT MAJOR DEPRESSIVE DISORDER (H): ICD-10-CM

## 2025-04-16 PROCEDURE — 99207 PR NON-BILLABLE SERV PER CHARTING: CPT | Mod: 95 | Performed by: PSYCHOLOGIST

## 2025-04-16 NOTE — PROGRESS NOTES
OUTPATIENT PSYCHOTHERAPY PROGRESS NOTE    Client Name: Dariusz Leyva   YOB: 1990  Time of Service: 14 minutes   Service Type(s): Individual psychotherapy    VIDEO VISIT  Dariusz Leyva is a 34 year old who is being evaluated via a billable video visit.      Telehealth Details  Type of service:  psychotherapy  Time of service:  Start Time:  4:23 pm  End Time: 4:37 pm    Reason for Telehealth Visit: Patient has requested telehealth visit  Originating Site (patient location):  [facility]   Location- Patient's home  Distant Site (provider location):  Onsite  Mode of Communication:  Hernan      Diagnoses:   Unspecified depressive disorder and unspecified anxiety    Goals of therapy: Decrease negative and obsessional thought processes, increase self care, and identify replacement strategies for dealing with anxiety    Individuals Present:   Psychotherapy services during this visit included myself and Dariusz Leyva.     Narrative:  Dariusz reported that the person that was living with her moved out. She continues to apply for jobs. She has had some interviews - but no job offers yet. Dariusz was delayed in coming to the appointment and her electronic device did not have a long enough charge to do a full appointment. Decided to reschedule for next week at noon and ended appointment short.    Treatment:   Clinician used reflective listening, validation, positive reinforcement, and psychoeducation within a framework of CBT.      Patient reaction: Dariusz was receptive to support and interventions provided today    Patient Progress: Dariusz was cooperative, attentive and engaged throughout the session. Progress toward goal completion seems good.     Additional Information:   Patient did not report medication changes.    Mental Status:  Appearance:  Casually dressed   Behavior/relationship to examiner/demeanor:  cooperative  Motor activity/EPS:  Within normal limits  Speech rate:  Within normal  limits  Speech volume:  Within normal limits  Speech articulation:  Within normal limits  Speech coherence: Within normal limits  Speech spontaneity: Within normal limits  Mood (subjective):  alright   Affect (objective appearance): Generally euthymic  Thought Process (Associations):  Logical and Linear   Thought process (Rate):  Within normal limits   Thought content: Within normal limits  Abnormal Perception:  None   Insight:  Good   Judgment:  Good     Plan: Continue with goals as described above    Andreea Vásquez Psy.D.,L.P

## 2025-04-23 ENCOUNTER — VIRTUAL VISIT (OUTPATIENT)
Dept: PSYCHIATRY | Facility: CLINIC | Age: 35
End: 2025-04-23
Attending: PSYCHOLOGIST
Payer: COMMERCIAL

## 2025-04-23 DIAGNOSIS — F41.1 GAD (GENERALIZED ANXIETY DISORDER): Primary | ICD-10-CM

## 2025-04-23 DIAGNOSIS — F33.1 MODERATE EPISODE OF RECURRENT MAJOR DEPRESSIVE DISORDER (H): ICD-10-CM

## 2025-04-23 NOTE — PROGRESS NOTES
OUTPATIENT PSYCHOTHERAPY PROGRESS NOTE    Client Name: Dariusz Leyva   YOB: 1990  Time of Service:  55 minutes   Service Type(s): Individual psychotherapy    VIDEO VISIT  Dariusz Leyva is a 34 year old who is being evaluated via a billable video visit.      Telehealth Details  Type of service:  psychotherapy  Time of service:  Start Time:  12:14 pm  End Time: 1:09 pm    Reason for Telehealth Visit: Patient has requested telehealth visit  Originating Site (patient location):  [facility]   Location- Patient's home  Distant Site (provider location):  Onsite  Mode of Communication:  [place]ernan      Diagnoses:   Unspecified depressive disorder and unspecified anxiety    Goals of therapy: Decrease negative and obsessional thought processes, increase self care, and identify replacement strategies for dealing with anxiety    Individuals Present:   Psychotherapy services during this visit included myself and Yadirafadumo Gordon.     Narrative:  Dariusz reported her mood was  stagnate.  She has felt this way for a few weeks. She feels like she is doing the same thing day in and day out. She reported  I don t feel like I am progressing. Things are not getting better.  She reported  I feel lost.  She continues to have leg pain that interferes with her functioning. She is having a harder time going out and being around people.     Treatment:   Treatment focused on cognitive reframing. She equates progressing in life with how her environment looks. She reported when she cleans that it is not 100% how she would like it to look. She talked about the redundancy of things she needs to do at home. She shared what she would like - live peacefully, have enough space.        Patient reaction: Dariusz was receptive to support and interventions provided today    Patient Progress: Dariusz was cooperative, attentive and engaged throughout the session. Progress toward goal completion seems good.     Additional Information:    Patient did not report medication changes.    Mental Status:  Appearance:  Casually dressed   Behavior/relationship to examiner/demeanor:  cooperative  Motor activity/EPS:  Within normal limits  Speech rate:  Within normal limits  Speech volume:  Within normal limits  Speech articulation:  Within normal limits  Speech coherence: Within normal limits  Speech spontaneity: Within normal limits  Mood (subjective):  stagnate   Affect (objective appearance): Subdued  Thought Process (Associations):  Logical and Linear   Thought process (Rate):  Within normal limits   Thought content: Within normal limits  Abnormal Perception:  None   Insight:  Good   Judgment:  Good     Plan: Continue with goals as described above    Andreea Vásquez Psy.D.,L.P

## 2025-05-07 ENCOUNTER — VIRTUAL VISIT (OUTPATIENT)
Dept: PSYCHIATRY | Facility: CLINIC | Age: 35
End: 2025-05-07
Attending: PSYCHOLOGIST
Payer: COMMERCIAL

## 2025-05-07 DIAGNOSIS — F41.1 GAD (GENERALIZED ANXIETY DISORDER): Primary | ICD-10-CM

## 2025-05-07 DIAGNOSIS — F33.1 MODERATE EPISODE OF RECURRENT MAJOR DEPRESSIVE DISORDER (H): ICD-10-CM
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
## 2025-05-13 ENCOUNTER — MYC MEDICAL ADVICE (OUTPATIENT)
Dept: SURGERY | Facility: CLINIC | Age: 35
End: 2025-05-13
Payer: COMMERCIAL

## 2025-05-13 DIAGNOSIS — E66.01 SEVERE OBESITY (BMI >= 40) (H): Primary | ICD-10-CM

## 2025-05-21 ENCOUNTER — VIRTUAL VISIT (OUTPATIENT)
Dept: PSYCHIATRY | Facility: CLINIC | Age: 35
End: 2025-05-21
Attending: PSYCHOLOGIST
Payer: COMMERCIAL

## 2025-05-21 DIAGNOSIS — F33.1 MODERATE EPISODE OF RECURRENT MAJOR DEPRESSIVE DISORDER (H): ICD-10-CM

## 2025-05-21 DIAGNOSIS — F41.1 GAD (GENERALIZED ANXIETY DISORDER): Primary | ICD-10-CM

## 2025-05-21 NOTE — PROGRESS NOTES
OUTPATIENT PSYCHOTHERAPY PROGRESS NOTE    Client Name: Dariusz Leyva   YOB: 1990  Time of Service: 54 minutes   Service Type(s): Individual psychotherapy    VIDEO VISIT  Dariusz Leyva is a 34 year old who is being evaluated via a billable video visit.      Telehealth Details  Type of service:  psychotherapy  Time of service:  Start Time:  4:00 pm  End Time: 4:54 pm    Reason for Telehealth Visit: Patient has requested telehealth visit  Originating Site (patient location):  [facility]   Location- Patient's home  Distant Site (provider location):  Onsite  Mode of Communication:  Hernan      Diagnoses:   Unspecified depressive disorder and unspecified anxiety    Goals of therapy: Decrease negative and obsessional thought processes, increase self care, and identify replacement strategies for dealing with anxiety    Individuals Present:   Psychotherapy services during this visit included myself and Dariusz Leyva.     Narrative:  Dariusz reported her mood was  so-so.  She reported more than usual fatigue - both physical and  brain fog.  She endorsed anhedonia. She denied sleep and appetite disturbance and suicidal ideation. She rated her stress at an 89 out of 100 (100 being most stressed).      She reported the stress comes from not having a job - she does not have any savings left and she is feeling financial pressure. At the end of the month, they are ending all funding for her children for childcare because she is unemployed.      Treatment:   Clinician used reflective listening, validation and psychoeducation within the framework of CBT.    Patient reaction: Dariusz was receptive to support and interventions provided today    Patient Progress: Dariusz was cooperative, attentive and engaged throughout the session. Progress toward goal completion seems good.     Additional Information:   Patient did not report medication changes.    Mental Status:  Appearance:  Casually dressed    Behavior/relationship to examiner/demeanor:  cooperative  Motor activity/EPS:  Within normal limits  Speech rate:  Within normal limits  Speech volume:  Within normal limits  Speech articulation:  Within normal limits  Speech coherence: Within normal limits  Speech spontaneity: Within normal limits  Mood (subjective):  so-so   Affect (objective appearance): Mood congruent, some indications of sadness  Thought Process (Associations):  Logical and Linear   Thought process (Rate):  Within normal limits   Thought content: Within normal limits  Abnormal Perception:  None   Insight:  Good   Judgment:  Good     Plan: Continue with goals as described above    Andreea Vásquez Psy.D.,L.P

## 2025-06-02 ENCOUNTER — OFFICE VISIT (OUTPATIENT)
Dept: FAMILY MEDICINE | Facility: CLINIC | Age: 35
End: 2025-06-02
Payer: COMMERCIAL

## 2025-06-02 VITALS
TEMPERATURE: 98 F | HEART RATE: 77 BPM | DIASTOLIC BLOOD PRESSURE: 73 MMHG | RESPIRATION RATE: 12 BRPM | HEIGHT: 68 IN | OXYGEN SATURATION: 98 % | SYSTOLIC BLOOD PRESSURE: 108 MMHG | BODY MASS INDEX: 41.74 KG/M2 | WEIGHT: 275.4 LBS

## 2025-06-02 DIAGNOSIS — R63.4 WEIGHT LOSS: ICD-10-CM

## 2025-06-02 DIAGNOSIS — R42 DIZZINESS: Primary | ICD-10-CM

## 2025-06-02 LAB
ALBUMIN SERPL BCG-MCNC: 4.1 G/DL (ref 3.5–5.2)
ALP SERPL-CCNC: 98 U/L (ref 40–150)
ALT SERPL W P-5'-P-CCNC: 23 U/L (ref 0–50)
ANION GAP SERPL CALCULATED.3IONS-SCNC: 11 MMOL/L (ref 7–15)
AST SERPL W P-5'-P-CCNC: 23 U/L (ref 0–45)
BILIRUB SERPL-MCNC: 0.4 MG/DL
BUN SERPL-MCNC: 12.3 MG/DL (ref 6–20)
CALCIUM SERPL-MCNC: 9.5 MG/DL (ref 8.8–10.4)
CHLORIDE SERPL-SCNC: 106 MMOL/L (ref 98–107)
CREAT SERPL-MCNC: 0.81 MG/DL (ref 0.51–0.95)
EGFRCR SERPLBLD CKD-EPI 2021: >90 ML/MIN/1.73M2
ERYTHROCYTE [DISTWIDTH] IN BLOOD BY AUTOMATED COUNT: 12.9 % (ref 10–15)
EST. AVERAGE GLUCOSE BLD GHB EST-MCNC: 103 MG/DL
GLUCOSE SERPL-MCNC: 87 MG/DL (ref 70–99)
HBA1C MFR BLD: 5.2 % (ref 0–5.6)
HCO3 SERPL-SCNC: 22 MMOL/L (ref 22–29)
HCT VFR BLD AUTO: 38.3 % (ref 35–47)
HGB BLD-MCNC: 12.9 G/DL (ref 11.7–15.7)
MCH RBC QN AUTO: 29 PG (ref 26.5–33)
MCHC RBC AUTO-ENTMCNC: 33.7 G/DL (ref 31.5–36.5)
MCV RBC AUTO: 86 FL (ref 78–100)
PLATELET # BLD AUTO: 389 10E3/UL (ref 150–450)
POTASSIUM SERPL-SCNC: 3.9 MMOL/L (ref 3.4–5.3)
PROT SERPL-MCNC: 7.7 G/DL (ref 6.4–8.3)
RBC # BLD AUTO: 4.45 10E6/UL (ref 3.8–5.2)
SODIUM SERPL-SCNC: 139 MMOL/L (ref 135–145)
TSH SERPL DL<=0.005 MIU/L-ACNC: 0.69 UIU/ML (ref 0.3–4.2)
WBC # BLD AUTO: 6 10E3/UL (ref 4–11)

## 2025-06-02 PROCEDURE — 84443 ASSAY THYROID STIM HORMONE: CPT | Performed by: FAMILY MEDICINE

## 2025-06-02 PROCEDURE — 3078F DIAST BP <80 MM HG: CPT | Performed by: FAMILY MEDICINE

## 2025-06-02 PROCEDURE — 83036 HEMOGLOBIN GLYCOSYLATED A1C: CPT | Performed by: FAMILY MEDICINE

## 2025-06-02 PROCEDURE — 3044F HG A1C LEVEL LT 7.0%: CPT | Performed by: FAMILY MEDICINE

## 2025-06-02 PROCEDURE — 36415 COLL VENOUS BLD VENIPUNCTURE: CPT | Performed by: FAMILY MEDICINE

## 2025-06-02 PROCEDURE — 99214 OFFICE O/P EST MOD 30 MIN: CPT | Performed by: FAMILY MEDICINE

## 2025-06-02 PROCEDURE — 80053 COMPREHEN METABOLIC PANEL: CPT | Performed by: FAMILY MEDICINE

## 2025-06-02 PROCEDURE — 3074F SYST BP LT 130 MM HG: CPT | Performed by: FAMILY MEDICINE

## 2025-06-02 PROCEDURE — 85027 COMPLETE CBC AUTOMATED: CPT | Performed by: FAMILY MEDICINE

## 2025-06-02 ASSESSMENT — PATIENT HEALTH QUESTIONNAIRE - PHQ9: SUM OF ALL RESPONSES TO PHQ QUESTIONS 1-9: 8

## 2025-06-02 NOTE — PATIENT INSTRUCTIONS
Based on our discussion, I have outlined the following instructions for you:      - Make sure to have regular snacks that include foods high in protein, like nuts or yogurt, and drink plenty of water throughout the day.  - Try to move slowly and avoid sudden movements, like standing up quickly, to help prevent feeling dizzy.  - Your doctor might check your blood or other tests to make sure everything is okay.  - Keep following your current plan to lose weight, and remember to snack regularly and stay hydrated.  - Schedule a visit with the weight management clinic to discuss if the amount of Zepbound you are taking needs to be changed.    Thank you again for your visit, and we look forward to supporting you in your journey to better health.
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
## 2025-06-02 NOTE — PROGRESS NOTES
Orthostatic Vitals from 05/31/25 1127 to 06/02/25 1127    Date and Time Orthostatic BP Orthostatic Pulse Patient Position BP   Location Cuff Size   06/02/25 0907 106/72 106 -- -- --   06/02/25 0903 120/82 76 -- -- --   06/02/25 0900 98/71 No symptoms 79 -- -- --   06/02/25 0852 -- -- Sitting Left arm Adult Large      Peak Flow from 05/31/25 1127 to 06/02/25 1127    Date and Time PF Resp   06/02/25 0852 -- 12

## 2025-06-03 ENCOUNTER — RESULTS FOLLOW-UP (OUTPATIENT)
Dept: FAMILY MEDICINE | Facility: CLINIC | Age: 35
End: 2025-06-03

## 2025-06-04 ENCOUNTER — VIRTUAL VISIT (OUTPATIENT)
Dept: PSYCHIATRY | Facility: CLINIC | Age: 35
End: 2025-06-04
Attending: PSYCHOLOGIST
Payer: COMMERCIAL

## 2025-06-04 DIAGNOSIS — F41.1 GAD (GENERALIZED ANXIETY DISORDER): Primary | ICD-10-CM

## 2025-06-04 DIAGNOSIS — F33.1 MODERATE EPISODE OF RECURRENT MAJOR DEPRESSIVE DISORDER (H): ICD-10-CM

## 2025-06-04 NOTE — PROGRESS NOTES
OUTPATIENT PSYCHOTHERAPY PROGRESS NOTE    Client Name: Dariusz Leyva   YOB: 1990  Time of Service: 57 minutes   Service Type(s): Individual psychotherapy    VIDEO VISIT  Dariusz Leyva is a 34 year old who is being evaluated via a billable video visit.      Telehealth Details  Type of service:  psychotherapy  Time of service:  Start Time:  4:00 pm  End Time: 4:57 pm    Reason for Telehealth Visit: Patient has requested telehealth visit  Originating Site (patient location):  [facility]   Location- Patient's home  Distant Site (provider location):  Onsite  Mode of Communication:  Hernan      Diagnoses:   Unspecified depressive disorder and unspecified anxiety    Goals of therapy: Decrease negative and obsessional thought processes, increase self care, and identify replacement strategies for dealing with anxiety    Individuals Present:   Psychotherapy services during this visit included myself and Yadirafadumo Gordon.     Narrative:  Dariusz reported she is not feeling physically well - otherwise  ok.  She did not feel like eating earlier, but she is going to if eating helps her feel better. She denied sleep disturbance, except a couple times a week she gets up and then cannot go to sleep for 2-3 hours. She reported she has energy but she gets overexerted easily. She indicated she has not been doing much lately - she has no interest in going out. She reported she has to mentally push herself to go somewhere. She denied suicidal ideation. She rated her stress level as an 80 out of 100 (100 being most stressed). She reported her primary stressors are finances and her mental health.      She reported that the lab received the DNA tests on Friday for her children.      Dariusz shared about recent and past interpersonal interactions.     She reflected that she is anxious about going out and doing things with others.      Treatment:   Clinician used reflective listening, validation and psychoeducation  within the framework of CBT.    Patient reaction: Dariusz was receptive to support and interventions provided today    Patient Progress: Dariusz was cooperative, attentive and engaged throughout the session. Progress toward goal completion seems good.     Additional Information:   Patient did not report medication changes.    Mental Status:  Appearance:  Casually dressed   Behavior/relationship to examiner/demeanor:  cooperative  Motor activity/EPS:  Within normal limits  Speech rate:  Within normal limits  Speech volume:  Within normal limits  Speech articulation:  Within normal limits  Speech coherence: Within normal limits  Speech spontaneity: Within normal limits  Mood (subjective):  okay   Affect (objective appearance): Mood congruent  Thought Process (Associations):  Logical and Linear   Thought process (Rate):  Within normal limits   Thought content: Within normal limits  Abnormal Perception:  None   Insight:  Good   Judgment:  Good     Plan: Continue with goals as described above    Andreea Vásquez Psy.D.,L.P

## 2025-06-17 ENCOUNTER — TELEPHONE (OUTPATIENT)
Dept: PSYCHIATRY | Facility: CLINIC | Age: 35
End: 2025-06-17
Payer: COMMERCIAL

## 2025-06-18 ENCOUNTER — VIRTUAL VISIT (OUTPATIENT)
Dept: PSYCHIATRY | Facility: CLINIC | Age: 35
End: 2025-06-18
Attending: PSYCHOLOGIST
Payer: COMMERCIAL

## 2025-06-18 ENCOUNTER — MYC MEDICAL ADVICE (OUTPATIENT)
Dept: SURGERY | Facility: CLINIC | Age: 35
End: 2025-06-18
Payer: COMMERCIAL

## 2025-06-18 DIAGNOSIS — F33.1 MODERATE EPISODE OF RECURRENT MAJOR DEPRESSIVE DISORDER (H): ICD-10-CM

## 2025-06-18 DIAGNOSIS — E66.01 SEVERE OBESITY (BMI >= 40) (H): Primary | ICD-10-CM

## 2025-06-18 DIAGNOSIS — F41.1 GAD (GENERALIZED ANXIETY DISORDER): Primary | ICD-10-CM

## 2025-06-18 ASSESSMENT — PATIENT HEALTH QUESTIONNAIRE - PHQ9
10. IF YOU CHECKED OFF ANY PROBLEMS, HOW DIFFICULT HAVE THESE PROBLEMS MADE IT FOR YOU TO DO YOUR WORK, TAKE CARE OF THINGS AT HOME, OR GET ALONG WITH OTHER PEOPLE: SOMEWHAT DIFFICULT
SUM OF ALL RESPONSES TO PHQ QUESTIONS 1-9: 11
SUM OF ALL RESPONSES TO PHQ QUESTIONS 1-9: 11

## 2025-06-18 NOTE — PROGRESS NOTES
Patient requested for this appointment to be moved to next week, however they had already completed the questionnaire. Provider will request a new appt for next week and use this note to document the questionnaire answers.     Answers submitted by the patient for this visit:  Patient Health Questionnaire (Submitted on 6/18/2025)  If you checked off any problems, how difficult have these problems made it for you to do your work, take care of things at home, or get along with other people?: Somewhat difficult  PHQ9 TOTAL SCORE: 11

## 2025-07-01 ENCOUNTER — OFFICE VISIT (OUTPATIENT)
Dept: SURGERY | Facility: CLINIC | Age: 35
End: 2025-07-01
Payer: COMMERCIAL

## 2025-07-01 VITALS
SYSTOLIC BLOOD PRESSURE: 106 MMHG | HEIGHT: 67 IN | DIASTOLIC BLOOD PRESSURE: 76 MMHG | BODY MASS INDEX: 40.65 KG/M2 | WEIGHT: 259 LBS

## 2025-07-01 DIAGNOSIS — R11.0 NAUSEA: ICD-10-CM

## 2025-07-01 DIAGNOSIS — E66.01 SEVERE OBESITY (BMI >= 40) (H): Primary | ICD-10-CM

## 2025-07-01 PROCEDURE — 99214 OFFICE O/P EST MOD 30 MIN: CPT | Performed by: FAMILY MEDICINE

## 2025-07-01 PROCEDURE — 3078F DIAST BP <80 MM HG: CPT | Performed by: FAMILY MEDICINE

## 2025-07-01 PROCEDURE — 3074F SYST BP LT 130 MM HG: CPT | Performed by: FAMILY MEDICINE

## 2025-07-01 RX ORDER — ONDANSETRON 4 MG/1
4 TABLET, ORALLY DISINTEGRATING ORAL EVERY 8 HOURS PRN
Qty: 60 TABLET | Refills: 1 | Status: SHIPPED | OUTPATIENT
Start: 2025-07-01 | End: 2025-09-29

## 2025-07-01 NOTE — LETTER
"7/1/2025      Dariusz Leyva  [address]  [place] [address]      Dear Colleague,    Thank you for referring your patient, Dariusz Leyva, to the [facility] [place]. Please see a copy of my visit note below.    Bariatric Follow-up    34 year old  female BMI:Body mass index is 40.57 kg/m .    Weight:   Wt Readings from Last 1 Encounters:   07/01/25 117.5 kg (259 lb)    pounds      Comorbidities:  Patient Active Problem List   Diagnosis     Pain in thoracic spine     Abnormal Pap smear of cervix     Large tonsils     BMI 50.0-59.9, adult (H)     CARLY (generalized anxiety disorder)     MDD (major depressive disorder), recurrent episode, moderate (H)     Hyperlipidemia with target LDL less than 130     Migraine with aura and without status migrainosus, not intractable     DDD (degenerative disc disease), cervical     DDD (degenerative disc disease), lumbar     Increased PTH level     Vitamin D deficiency     Hypertension goal BP (blood pressure) < 140/90     Posterior tibial tendon dysfunction, right     Plantar fasciitis, left     Pes planus of both feet         Interim: Maintaining a 95# weight loss since last August when her weight was 354#. See graph above.  Nausea persists with the smell of greasy foods. Greasy foods cause diarrha. Stress is high with the twins. Has support in place. She is lactose intolerant. She enjoys eggs, tuna, beans, chicken, fish, dairy free greek yogurt and beef. She did not do well with Jose's, Ezekiel charms, chicken wings, or the smell of sausage the other day. She does drink Sprite on occasion. The goal weight for her knee surgery is 250# so she is close and asks questions about how to keep the weight off after surgery.     Plan:  DIET  Reminded to choose lean proteins \"most of the time.\" Discussed dairy free proteins. RD for MNT.   EXERCISE Walking as able, PT for knee   PHARMACOTHERAPY continue Zepbound 15mg. Suspect nausea " will subside once reaching steady state at 5 weeks (currently has had 3 injections). Avoiding fatty foods will help mitigate the nausea. Zofran prn, hydrate through the day with calorie free beverage and try to stay consistent with 3 meals    Discussed stopping Zepbound prior to her knee surgery for 1 week or as instructed by her Orthopedic surgeon then restarting after surgery. Would be helpful to add protein shake or powder post operatively to meet protein healing goals while hypermetabolic.      -We reviewed her medications and whether associated with weight gain.  Current Outpatient Medications   Medication Sig Dispense Refill     acetaminophen (TYLENOL) 325 MG tablet TAKE 1-2 TABLETS(325-650MG) BY MOUTH EVERY 6 HOURS AS NEEDED FOR MILD PAIN Strength: 325 mg (Patient taking differently: every 6 hours as needed. TAKE 1-2 TABLETS(325-650MG) BY MOUTH EVERY 6 HOURS AS NEEDED FOR MILD PAIN Strength: 325 mg) 100 tablet 3     cetirizine (ZYRTEC) 10 MG tablet Take 1 tablet (10 mg) by mouth daily 90 tablet 3     citalopram (CELEXA) 20 MG tablet Take 1 tablet (20 mg) by mouth daily. 90 tablet 3     clotrimazole (LOTRIMIN) 1 % external cream Apply topically 2 times daily as needed 60 g 1     cyclobenzaprine (FLEXERIL) 10 MG tablet Take 1 tablet (10 mg) by mouth 3 times daily as needed for muscle spasms 90 tablet 0     fluticasone (FLONASE) 50 MCG/ACT nasal spray Spray 2 sprays into both nostrils daily as needed for rhinitis or allergies 18.2 mL 11     ibuprofen (ADVIL/MOTRIN) 200 MG capsule Take 3 capsules (600 mg) by mouth every 4 hours as needed for fever. 90 capsule 3     ketoconazole (NIZORAL) 2 % external shampoo APPLY TOPICALLY DAILY AS NEEDED FOR ITCHING OR IRRITATION 120 mL 3     lactase (LACTAID) 3000 UNIT tablet Take 1 tablet (3,000 Units) by mouth 3 times daily (with meals) 30 tablet 0     ondansetron (ZOFRAN ODT) 4 MG ODT tab Take 1 tablet (4 mg) by mouth every 8 hours as needed for nausea. 60 tablet 1      tirzepatide-Weight Management (ZEPBOUND) 15 MG/0.5ML prefilled pen Inject 0.5 mLs (15 mg) subcutaneously every 7 days. 6 mL 3     No current facility-administered medications for this visit.        We discussed [facility] Bariatric Basics including:  -eating 3 meals daily  -eating protein first  -eating slowly, chewing food well  -avoiding/limiting calorie containing beverages  -choosing wheat, not white with breads, crackers, pastas, sravani, bagels, tortillas, rice  -limiting restaurant or cafeteria eating to twice a week or less  -We discussed the importance of restorative sleep and stress management in maintaining a healthy weight.  -We discussed insulin resistance and glycemic index as it relates to appetite and weight control  -We discussed the National Weight Control Registry healthy weight maintenance strategies and ways to optimize metabolism.  -We discussed the importance of physical activity including cardiovascular and strength training in maintaining a healthier weight and explored viable options.    Most recent labs:  Recent Labs   Lab Test 07/20/20  0904 06/19/18  1652   CHOL 235.5* 265*   HDL 56.0 57   * 192*   TRIG 101.0  --    CHOLHDLRATIO 4.2  --       Hemoglobin A1C   Date Value Ref Range Status   06/02/2025 5.2 0.0 - 5.6 % Final     Comment:     Normal <5.7%   Prediabetes 5.7-6.4%    Diabetes 6.5% or higher     Note: Adopted from ADA consensus guidelines.   01/26/2017 5.4 4.1 - 5.7 % Final      TSH   Date Value Ref Range Status   06/02/2025 0.69 0.30 - 4.20 uIU/mL Final   02/03/2022 1.46 0.30 - 5.00 uIU/mL Final      BP Readings from Last 3 Encounters:   07/01/25 106/76   06/02/25 108/73   03/04/25 109/76          DIETARY HISTORY  Meals Per Day: 3  Eating Protein First?: mainly  Food Diary: B:yogurt 20gm sugar L:salad D:sloppy joes ground turkey on bun  Snacks Per Day: occ  Typical Snack: slim tiera  Fluid Intake: occ sprite,   Portion Control: improved  Calorie Containing Beverages:  "yes  Alcohol per week: no  Typical Protein Food Choices: some lean some not so lean  Choosing Whole Grains: yes sometimes  Grocery Shopping is done by: herself  Food Preparation is done by: herself  Meals at Restaurant/Cafeteria/Take Out Per Week: 3  Eating at the Table:   TV is Off During Meals:     Positive Changes Since Last Visit: portions  Struggling With: exercise, hair loss    Knowledgeable in Reading Food Labels: yes  Getting Adequate Protein: trying  Sleeping 7-8 hours/day 4 hours   Stress management has support    PHYSICAL ACTIVITY PATTERNS:  Cardiovascular: walking when able  Strength Training: no    REVIEW OF SYSTEMS  As above      PHYSICAL EXAM:  Vitals: /76 (BP Location: Right arm, Patient Position: Sitting, Cuff Size: Adult Regular)   Ht 1.702 m (5' 7\")   Wt 117.5 kg (259 lb)   LMP 06/01/2025   BMI 40.57 kg/m        GEN: Pleasant, well groomed, in no acute distress  EYES: EOMI,  ENT: airway patent  NECK: no carotid bruits, no anterior/supraclavicular lymphadenopathy, thyroid normal   HEART: Rhythm regular, rate regular, no murmur   LUNGS: Clear  ABDOMEN: soft, non-tender, obese, no rashes   VASCULAR: no  lower extremity edema  MUSCULOSKELETAL:  muscle mass OK  SKIN:  no color changes of venous stasis, no ulcerations    Total time spent on the date of this encounter doing: chart review, review of test results, patient visit, physical exam, education, counseling, developing plan of care, and documenting = 30 minutes.        IDariusz, give verbal consent for a resident to be present in today's visit.     Again, thank you for allowing me to participate in the care of your patient.        Sincerely,        Ann Walker MD    Electronically signed"

## 2025-07-01 NOTE — PATIENT INSTRUCTIONS
Nausea and constipation are the most common side effects with the GLP-1/GIP medications.     Drinking water throughout the day, preventing and or treating constipation, and eating 3 nutrient dense meals containing protein is important while on GLP-1 RA/GIP medications. It can mitigate these side effects.     For Prevention and Treatment of Constipation    From least aggressive to most aggressive:    Move: wallking-the more we move, the more our bowels move  Water: Drink water-64oz+ day  Go when you need to go. Don't wait. The longer you wait, the harder it gets.  Fiber: Fruit, raw veggies, nuts, whole grains,   Stool Softeners: if constipation is mild and for maintenance  Gentle laxatives: Miralax, senokot, dulcolax , Smooth move tea as needed    More aggressive (and typically won't get to this point)  Milk of Magnesia  Mag Citrate (what you drink before a colonoscopy)  Suppositories  Enema      Ann Marie Corefino is a good brand that provides those requests,   Ensure plant based protein shake,   Lucy plant based protein shakes have become popular   Evolve plant based protein shakes are lower in calories, high fiber, high protein and low sugar     [facility] protein shake are great but they are lactose free, not dairy free. Would need Lactaid.

## 2025-07-01 NOTE — PROGRESS NOTES
"Bariatric Follow-up    34 year old  female BMI:Body mass index is 40.57 kg/m .    Weight:   Wt Readings from Last 1 Encounters:   07/01/25 117.5 kg (259 lb)    pounds      Comorbidities:  Patient Active Problem List   Diagnosis    Pain in thoracic spine    Abnormal Pap smear of cervix    Large tonsils    BMI 50.0-59.9, adult (H)    CARLY (generalized anxiety disorder)    MDD (major depressive disorder), recurrent episode, moderate (H)    Hyperlipidemia with target LDL less than 130    Migraine with aura and without status migrainosus, not intractable    DDD (degenerative disc disease), cervical    DDD (degenerative disc disease), lumbar    Increased PTH level    Vitamin D deficiency    Hypertension goal BP (blood pressure) < 140/90    Posterior tibial tendon dysfunction, right    Plantar fasciitis, left    Pes planus of both feet         Interim: Maintaining a 95# weight loss since last August when her weight was 354#. See graph above.  Nausea persists with the smell of greasy foods. Greasy foods cause diarrha. Stress is high with the twins. Has support in place. She is lactose intolerant. She enjoys eggs, tuna, beans, chicken, fish, dairy free greek yogurt and beef. She did not do well with Jose's, Ezekiel charms, chicken wings, or the smell of sausage the other day. She does drink Sprite on occasion. The goal weight for her knee surgery is 250# so she is close and asks questions about how to keep the weight off after surgery.     Plan:  DIET  Reminded to choose lean proteins \"most of the time.\" Discussed dairy free proteins. RD for MNT.   EXERCISE Walking as able, PT for knee   PHARMACOTHERAPY continue Zepbound 15mg. Suspect nausea will subside once reaching steady state at 5 weeks (currently has had 3 injections). Avoiding fatty foods will help mitigate the nausea. Zofran prn, hydrate through the day with calorie free beverage and try to stay consistent with 3 meals    Discussed stopping Zepbound prior to " her knee surgery for 1 week or as instructed by her Orthopedic surgeon then restarting after surgery. Would be helpful to add protein shake or powder post operatively to meet protein healing goals while hypermetabolic.      -We reviewed her medications and whether associated with weight gain.  Current Outpatient Medications   Medication Sig Dispense Refill    acetaminophen (TYLENOL) 325 MG tablet TAKE 1-2 TABLETS(325-650MG) BY MOUTH EVERY 6 HOURS AS NEEDED FOR MILD PAIN Strength: 325 mg (Patient taking differently: every 6 hours as needed. TAKE 1-2 TABLETS(325-650MG) BY MOUTH EVERY 6 HOURS AS NEEDED FOR MILD PAIN Strength: 325 mg) 100 tablet 3    cetirizine (ZYRTEC) 10 MG tablet Take 1 tablet (10 mg) by mouth daily 90 tablet 3    citalopram (CELEXA) 20 MG tablet Take 1 tablet (20 mg) by mouth daily. 90 tablet 3    clotrimazole (LOTRIMIN) 1 % external cream Apply topically 2 times daily as needed 60 g 1    cyclobenzaprine (FLEXERIL) 10 MG tablet Take 1 tablet (10 mg) by mouth 3 times daily as needed for muscle spasms 90 tablet 0    fluticasone (FLONASE) 50 MCG/ACT nasal spray Spray 2 sprays into both nostrils daily as needed for rhinitis or allergies 18.2 mL 11    ibuprofen (ADVIL/MOTRIN) 200 MG capsule Take 3 capsules (600 mg) by mouth every 4 hours as needed for fever. 90 capsule 3    ketoconazole (NIZORAL) 2 % external shampoo APPLY TOPICALLY DAILY AS NEEDED FOR ITCHING OR IRRITATION 120 mL 3    lactase (LACTAID) 3000 UNIT tablet Take 1 tablet (3,000 Units) by mouth 3 times daily (with meals) 30 tablet 0    ondansetron (ZOFRAN ODT) 4 MG ODT tab Take 1 tablet (4 mg) by mouth every 8 hours as needed for nausea. 60 tablet 1    tirzepatide-Weight Management (ZEPBOUND) 15 MG/0.5ML prefilled pen Inject 0.5 mLs (15 mg) subcutaneously every 7 days. 6 mL 3     No current facility-administered medications for this visit.        We discussed [facility] Bariatric Basics including:  -eating 3 meals daily  -eating protein  first  -eating slowly, chewing food well  -avoiding/limiting calorie containing beverages  -choosing wheat, not white with breads, crackers, pastas, sravani, bagels, tortillas, rice  -limiting restaurant or cafeteria eating to twice a week or less  -We discussed the importance of restorative sleep and stress management in maintaining a healthy weight.  -We discussed insulin resistance and glycemic index as it relates to appetite and weight control  -We discussed the National Weight Control Registry healthy weight maintenance strategies and ways to optimize metabolism.  -We discussed the importance of physical activity including cardiovascular and strength training in maintaining a healthier weight and explored viable options.    Most recent labs:  Recent Labs   Lab Test 07/20/20  0904 06/19/18  1652   CHOL 235.5* 265*   HDL 56.0 57   * 192*   TRIG 101.0  --    CHOLHDLRATIO 4.2  --       Hemoglobin A1C   Date Value Ref Range Status   06/02/2025 5.2 0.0 - 5.6 % Final     Comment:     Normal <5.7%   Prediabetes 5.7-6.4%    Diabetes 6.5% or higher     Note: Adopted from ADA consensus guidelines.   01/26/2017 5.4 4.1 - 5.7 % Final      TSH   Date Value Ref Range Status   06/02/2025 0.69 0.30 - 4.20 uIU/mL Final   02/03/2022 1.46 0.30 - 5.00 uIU/mL Final      BP Readings from Last 3 Encounters:   07/01/25 106/76   06/02/25 108/73   03/04/25 109/76          DIETARY HISTORY  Meals Per Day: 3  Eating Protein First?: mainly  Food Diary: B:yogurt 20gm sugar L:salad D:sloppy joes ground turkey on bun  Snacks Per Day: occ  Typical Snack: slim tiera  Fluid Intake: occ sprite,   Portion Control: improved  Calorie Containing Beverages: yes  Alcohol per week: no  Typical Protein Food Choices: some lean some not so lean  Choosing Whole Grains: yes sometimes  Grocery Shopping is done by: herself  Food Preparation is done by: herself  Meals at Restaurant/Cafeteria/Take Out Per Week: 3  Eating at the Table:   TV is Off During Meals:  "    Positive Changes Since Last Visit: portions  Struggling With: exercise, hair loss    Knowledgeable in Reading Food Labels: yes  Getting Adequate Protein: trying  Sleeping 7-8 hours/day 4 hours   Stress management has support    PHYSICAL ACTIVITY PATTERNS:  Cardiovascular: walking when able  Strength Training: no    REVIEW OF SYSTEMS  As above      PHYSICAL EXAM:  Vitals: /76 (BP Location: Right arm, Patient Position: Sitting, Cuff Size: Adult Regular)   Ht 1.702 m (5' 7\")   Wt 117.5 kg (259 lb)   LMP 06/01/2025   BMI 40.57 kg/m        GEN: Pleasant, well groomed, in no acute distress  EYES: EOMI,  ENT: airway patent  NECK: no carotid bruits, no anterior/supraclavicular lymphadenopathy, thyroid normal   HEART: Rhythm regular, rate regular, no murmur   LUNGS: Clear  ABDOMEN: soft, non-tender, obese, no rashes   VASCULAR: no  lower extremity edema  MUSCULOSKELETAL:  muscle mass OK  SKIN:  no color changes of venous stasis, no ulcerations    Total time spent on the date of this encounter doing: chart review, review of test results, patient visit, physical exam, education, counseling, developing plan of care, and documenting = 30 minutes.        IDariusz, give verbal consent for a resident to be present in today's visit.   "

## 2025-07-02 ENCOUNTER — VIRTUAL VISIT (OUTPATIENT)
Dept: PSYCHIATRY | Facility: CLINIC | Age: 35
End: 2025-07-02
Attending: PSYCHOLOGIST
Payer: COMMERCIAL

## 2025-07-02 DIAGNOSIS — F33.1 MODERATE EPISODE OF RECURRENT MAJOR DEPRESSIVE DISORDER (H): ICD-10-CM

## 2025-07-02 DIAGNOSIS — F41.1 GAD (GENERALIZED ANXIETY DISORDER): Primary | ICD-10-CM

## 2025-07-02 PROCEDURE — 90837 PSYTX W PT 60 MINUTES: CPT | Mod: 95 | Performed by: PSYCHOLOGIST

## 2025-07-02 NOTE — PROGRESS NOTES
OUTPATIENT PSYCHOTHERAPY PROGRESS NOTE    Client Name: Dariusz Lyeva   YOB: 1990  Time of Service: 58 minutes   Service Type(s): Individual psychotherapy    VIDEO VISIT  Dariusz Leyva is a 34 year old who is being evaluated via a billable video visit.      Telehealth Details  Type of service:  psychotherapy  Time of service:  Start Time:  12:03 pm  End Time: 1:01 pm    Reason for Telehealth Visit: Patient has requested telehealth visit  Originating Site (patient location):  [facility]   Location- Patient's home  Distant Site (provider location):  Onsite  Mode of Communication:  [place]      Diagnoses:   Unspecified depressive disorder and unspecified anxiety    Goals of therapy: Decrease negative and obsessional thought processes, increase self care, and identify replacement strategies for dealing with anxiety    Individuals Present:   Psychotherapy services during this visit included myself and Dariusz Leyva.     Narrative:  Dariusz reported her mood is  depressed  in a way that she has no motivation to do anything. She reported midnight waking and then struggling to go back to sleep - this has been present about two weeks. She does not have much of an appetite - she attributes this to medications. She endorsed anhedonia and fatigue (physical and mental). She denied suicidal ideation. She rated her stress as 80 (scale 1-100, 100 being most stressed). Finances are her primary stressors.      She reported she wants to make enough money to support her kids and herself. She is not getting pleasure from eating. She discussed her possible upcoming surgery.      Treatment:   Clinician used reflective listening, validation and psychoeducation within the framework of CBT.    Patient reaction: Dariusz was receptive to support and interventions provided today    Patient Progress: Dariusz was cooperative, attentive and engaged throughout the session. Progress toward goal completion seems good.      Additional Information:   Patient did not report medication changes.    Mental Status:  Appearance:  Casually dressed   Behavior/relationship to examiner/demeanor:  cooperative  Motor activity/EPS:  Within normal limits  Speech rate:  Within normal limits  Speech volume:  Within normal limits  Speech articulation:  Within normal limits  Speech coherence: Within normal limits  Speech spontaneity: Within normal limits  Mood (subjective):  depressed   Affect (objective appearance): Mood congruent  Thought Process (Associations):  Logical and Linear   Thought process (Rate):  Within normal limits   Thought content: Within normal limits  Abnormal Perception:  None   Insight:  Good   Judgment:  Good     Plan: Continue with goals as described above    Andreea Vásquez Psy.D.,L.P

## 2025-07-18 ENCOUNTER — TRANSFERRED RECORDS (OUTPATIENT)
Dept: HEALTH INFORMATION MANAGEMENT | Facility: CLINIC | Age: 35
End: 2025-07-18
Payer: COMMERCIAL

## 2025-07-31 DIAGNOSIS — E66.01 SEVERE OBESITY (BMI >= 40) (H): Primary | ICD-10-CM

## 2025-09-02 ENCOUNTER — VIRTUAL VISIT (OUTPATIENT)
Dept: SURGERY | Facility: CLINIC | Age: 35
End: 2025-09-02
Payer: COMMERCIAL

## 2025-09-02 DIAGNOSIS — E66.9 OBESITY (BMI 30-39.9): Primary | ICD-10-CM

## 2025-09-02 DIAGNOSIS — Z71.3 NUTRITIONAL COUNSELING: ICD-10-CM

## 2025-09-02 PROCEDURE — 97803 MED NUTRITION INDIV SUBSEQ: CPT | Mod: 95 | Performed by: DIETITIAN, REGISTERED

## (undated) DEVICE — DRAPE U-DRAPE 1015NSD NON-STERILE

## (undated) DEVICE — Device

## (undated) DEVICE — GLOVE PROTEXIS W/NEU-THERA 7.0  2D73TE70

## (undated) DEVICE — BLADE SHAVER ARTHRO 4.2MM FULL RADIUS 9247A

## (undated) DEVICE — ESU PENCIL W/SMOKE EVAC NEPTUNE STRYKER 0703-046-000

## (undated) DEVICE — SU ETHILON 3-0 PS-1 18" 1663H

## (undated) DEVICE — COVER CAMERA IN-LIGHT DISP LT-C02

## (undated) DEVICE — NDL 18GA 1.5" 305196

## (undated) DEVICE — CAST PADDING 6" STERILE 9046S

## (undated) DEVICE — DRAPE C-ARM W/STRAPS 42X72" 07-CA104

## (undated) DEVICE — DEVICE RETRIEVER HEWSON 71111579

## (undated) DEVICE — GLOVE PROTEXIS W/NEU-THERA 7.5  2D73TE75

## (undated) DEVICE — VESSEL LOOPS YELLOW MAXI 31145694

## (undated) DEVICE — GOWN XLG DISP 9545

## (undated) DEVICE — SU FIBERWIRE #2 FIBERSTICK 50"  AR-7209

## (undated) DEVICE — SOL NACL 0.9% IRRIG 1000ML BOTTLE 2F7124

## (undated) DEVICE — PIN GUIDE ARTHREX 2.4MM W/EYE BEATH PIN AR-1297L

## (undated) DEVICE — PIN GUIDE ARTHREX 2.4MM DRILL  AR-1250L

## (undated) DEVICE — STRAP KNEE/BODY 31143004

## (undated) DEVICE — LINEN ORTHO PACK 5446

## (undated) DEVICE — TUBING SUCTION MEDI-VAC 1/4"X20' N620A

## (undated) DEVICE — DRSG STERI STRIP 1/2X4" R1547

## (undated) DEVICE — SOL WATER IRRIG 1000ML BOTTLE 2F7114

## (undated) DEVICE — SOL NACL 0.9% IRRIG 3000ML BAG 2B7477

## (undated) DEVICE — SU LOOP #2 TIGERLOOP AR-7234T

## (undated) DEVICE — SU LOOP #2 FIBERLOOP  AR-7234

## (undated) DEVICE — TUBING ARTHRO CONMED/LINVATEC PUMP BLUE INFLOW 10K100

## (undated) DEVICE — SU VICRYL 0 CT-1 27" UND J260H

## (undated) DEVICE — SU ETHIBOND 2 V-37 4X30" MX69G

## (undated) DEVICE — ESU GROUND PAD ADULT W/CORD E7507

## (undated) DEVICE — DRAPE C-ARMOR 5 SIDED 5523

## (undated) DEVICE — SU VICRYL 2-0 CT-1 27" UND J259H

## (undated) DEVICE — GLOVE PROTEXIS POWDER FREE 7.0 ORTHOPEDIC 2D73ET70

## (undated) DEVICE — SPONGE LAP 18X18" X8435

## (undated) DEVICE — SUCTION MANIFOLD DORNOCH ULTRA CART UL-CL500

## (undated) DEVICE — PACK ACL SUPPLEMENT STD

## (undated) DEVICE — DRSG ABDOMINAL 07 1/2X8" 7197D

## (undated) DEVICE — BNDG ELASTIC 6" DBL LENGTH UNSTERILE 6611-16

## (undated) DEVICE — SU MONOCRYL 3-0 PS-1 27" Y936H

## (undated) DEVICE — SYR 10ML FINGER CONTROL W/O NDL 309695

## (undated) DEVICE — DRAPE TIBURON TOP SHEET 100X60" 29352

## (undated) DEVICE — DECANTER TRANSFER DEVICE 2008S

## (undated) DEVICE — LINEN TOWEL PACK X5 5464

## (undated) RX ORDER — FENTANYL CITRATE 50 UG/ML
INJECTION, SOLUTION INTRAMUSCULAR; INTRAVENOUS
Status: DISPENSED
Start: 2020-07-29

## (undated) RX ORDER — HYDROMORPHONE HYDROCHLORIDE 1 MG/ML
INJECTION, SOLUTION INTRAMUSCULAR; INTRAVENOUS; SUBCUTANEOUS
Status: DISPENSED
Start: 2020-07-29

## (undated) RX ORDER — PROPOFOL 10 MG/ML
INJECTION, EMULSION INTRAVENOUS
Status: DISPENSED
Start: 2020-07-29

## (undated) RX ORDER — CEFAZOLIN SODIUM 1 G/3ML
INJECTION, POWDER, FOR SOLUTION INTRAMUSCULAR; INTRAVENOUS
Status: DISPENSED
Start: 2020-07-29

## (undated) RX ORDER — HYDROXYZINE HYDROCHLORIDE 25 MG/1
TABLET, FILM COATED ORAL
Status: DISPENSED
Start: 2020-07-29

## (undated) RX ORDER — CEFAZOLIN SODIUM IN 0.9 % NACL 3 G/100 ML
INTRAVENOUS SOLUTION, PIGGYBACK (ML) INTRAVENOUS
Status: DISPENSED
Start: 2020-07-29

## (undated) RX ORDER — KETOROLAC TROMETHAMINE 30 MG/ML
INJECTION, SOLUTION INTRAMUSCULAR; INTRAVENOUS
Status: DISPENSED
Start: 2020-07-29

## (undated) RX ORDER — BUPIVACAINE HYDROCHLORIDE AND EPINEPHRINE 2.5; 5 MG/ML; UG/ML
INJECTION, SOLUTION EPIDURAL; INFILTRATION; INTRACAUDAL; PERINEURAL
Status: DISPENSED
Start: 2020-07-29